# Patient Record
Sex: MALE | Race: WHITE | NOT HISPANIC OR LATINO | Employment: OTHER | URBAN - METROPOLITAN AREA
[De-identification: names, ages, dates, MRNs, and addresses within clinical notes are randomized per-mention and may not be internally consistent; named-entity substitution may affect disease eponyms.]

---

## 2017-01-09 ENCOUNTER — DOCUMENTATION ONLY (OUTPATIENT)
Dept: OTHER | Facility: CLINIC | Age: 77
End: 2017-01-09

## 2017-01-09 DIAGNOSIS — Z71.89 ACP (ADVANCE CARE PLANNING): Primary | Chronic | ICD-10-CM

## 2017-01-24 ENCOUNTER — TRANSFERRED RECORDS (OUTPATIENT)
Dept: CARDIOLOGY | Facility: CLINIC | Age: 77
End: 2017-01-24

## 2017-01-24 LAB
ALBUMIN SERPL-MCNC: 3.9 G/DL
ALP SERPL-CCNC: 58 U/L
ALT SERPL-CCNC: 23 U/L
ANION GAP SERPL CALCULATED.3IONS-SCNC: NORMAL MMOL/L
AST SERPL-CCNC: 27 U/L
BILIRUB SERPL-MCNC: 0.2 MG/DL
BUN SERPL-MCNC: 28 MG/DL
CALCIUM SERPL-MCNC: 8.2 MG/DL
CHLORIDE SERPLBLD-SCNC: 103 MMOL/L
CHOLEST SERPL-MCNC: 172 MG/DL
CO2 SERPL-SCNC: 27 MMOL/L
CREAT SERPL-MCNC: 1.1 MG/DL
ERYTHROCYTE [DISTWIDTH] IN BLOOD BY AUTOMATED COUNT: 13.1 %
GFR SERPL CREATININE-BSD FRML MDRD: >60 ML/MIN/1.73M2
GLUCOSE SERPL-MCNC: 72 MG/DL (ref 70–139)
HCT VFR BLD AUTO: 41.2 %
HDLC SERPL-MCNC: 73 MG/DL
HEMOGLOBIN: 13.9 G/DL (ref 13.5–17.5)
LDLC SERPL CALC-MCNC: 78 MG/DL
MCH RBC QN AUTO: NORMAL PG
MCHC RBC AUTO-ENTMCNC: NORMAL G/DL
MCV RBC AUTO: 99 FL
NONHDLC SERPL-MCNC: NORMAL MG/DL
PLATELET # BLD AUTO: 130 10^9/L
POTASSIUM SERPL-SCNC: 4.6 MMOL/L
PROT SERPL-MCNC: 6.6 G/DL
RBC # BLD AUTO: 4.16 10^12/L
SODIUM SERPL-SCNC: 141 MMOL/L
TRIGL SERPL-MCNC: 107 MG/DL
WBC # BLD AUTO: 4.8 10^9/L

## 2017-03-13 DIAGNOSIS — I25.10 CORONARY ARTERY DISEASE INVOLVING NATIVE CORONARY ARTERY WITHOUT ANGINA PECTORIS: ICD-10-CM

## 2017-03-13 RX ORDER — ISOSORBIDE MONONITRATE 60 MG/1
60 TABLET, EXTENDED RELEASE ORAL DAILY
Qty: 90 TABLET | Refills: 3 | Status: SHIPPED | OUTPATIENT
Start: 2017-03-13 | End: 2018-03-01

## 2017-06-02 ENCOUNTER — HOSPITAL ENCOUNTER (OUTPATIENT)
Dept: ULTRASOUND IMAGING | Facility: CLINIC | Age: 77
Discharge: HOME | End: 2017-06-02
Attending: SURGERY | Admitting: SURGERY
Payer: MEDICARE

## 2017-06-02 ENCOUNTER — OFFICE VISIT (OUTPATIENT)
Dept: OTHER | Facility: CLINIC | Age: 77
End: 2017-06-02
Attending: SURGERY
Payer: MEDICARE

## 2017-06-02 VITALS — DIASTOLIC BLOOD PRESSURE: 81 MMHG | SYSTOLIC BLOOD PRESSURE: 145 MMHG | HEART RATE: 46 BPM

## 2017-06-02 DIAGNOSIS — Z98.890 HISTORY OF RIGHT-SIDED CAROTID ENDARTERECTOMY: ICD-10-CM

## 2017-06-02 DIAGNOSIS — I65.21 CAROTID STENOSIS, RIGHT: ICD-10-CM

## 2017-06-02 DIAGNOSIS — I65.21 CAROTID STENOSIS, RIGHT: Primary | ICD-10-CM

## 2017-06-02 PROCEDURE — 99212 OFFICE O/P EST SF 10 MIN: CPT | Mod: ZP | Performed by: SURGERY

## 2017-06-02 PROCEDURE — 99211 OFF/OP EST MAY X REQ PHY/QHP: CPT | Mod: 25

## 2017-06-02 PROCEDURE — 93882 EXTRACRANIAL UNI/LTD STUDY: CPT | Mod: RT

## 2017-06-02 NOTE — NURSING NOTE
"Chief Complaint   Patient presents with     RECHECK     R carotid (11:00 [facility]; 11:30 K) History of RIGHT CAROTID ENDARTERECTOMY on 11/9/16; 6 month follow up to 12/27/16 appointment with Dr. Suresh       Initial /81 (BP Location: Right arm, Patient Position: Chair, Cuff Size: Adult Large)  Pulse (!) 46 Estimated body mass index is 31.9 kg/(m^2) as calculated from the following:    Height as of 11/8/16: 5' 10\" (1.778 m).    Weight as of 11/8/16: 222 lb 4.8 oz (100.8 kg).  Medication Reconciliation: complete     Face to face nursing time: 8 minutes    Jody Hummel MA     "

## 2017-06-02 NOTE — MR AVS SNAPSHOT
After Visit Summary   6/2/2017    Iván Nicholas    MRN: [id number]           Patient Information     Date Of Birth          1940        Visit Information        Provider Department      6/2/2017 11:30 AM Paco Suresh MD [facility]      Today's Diagnoses     Carotid stenosis, right    -  1       Follow-ups after your visit        Your next 10 appointments already scheduled     Aug 15, 2017 12:10 PM CDT   LAB with [facility]   [facility] ([facility])    [address] [place] [id number] [phone]           Patient must bring picture ID.  Patient should be prepared to give a urine specimen  Please do not eat 10-12 hours before your appointment if you are coming in fasting for labs on lipids, cholesterol, or glucose (sugar).  Pregnant women should follow their Care Team instructions. Water with medications is okay. Do not drink coffee or other fluids.   If you have concerns about taking  your medications, please ask at office or if scheduling via [employer], send a message by clicking on Secure Messaging, Message Your Care Team.            Aug 15, 2017  1:15 PM CDT   Return Visit with Akhil Mendoza MD   [facility] ([facility])    [address] [place] [id number] [phone]              Who to contact     If you have questions or need follow up information about today's clinic visit or your schedule please contact [facility] directly at [phone].  Normal or non-critical lab and imaging results will be communicated to you by MyChart, letter or phone within 4 business days after the clinic has received the results. If you do not hear from us within 7 days, please contact the clinic through MyChart or phone. If you have a critical or abnormal  "lab result, we will notify you by phone as soon as possible.  Submit refill requests through [employer] or call your pharmacy and they will forward the refill request to us. Please allow 3 business days for your refill to be completed.          Additional Information About Your Visit        MyChart Information     [employer] lets you send messages to your doctor, view your test results, renew your prescriptions, schedule appointments and more. To sign up, go to [email or web address] . Click on \"Log in\" on the left side of the screen, which will take you to the Welcome page. Then click on \"Sign up Now\" on the right side of the page.     You will be asked to enter the access code listed below, as well as some personal information. Please follow the directions to create your username and password.     Your access code is: [email or web address]  Expires: 9/3/2017  8:24 AM     Your access code will  in 90 days. If you need help or a new code, please call your [place] clinic or [phone].        Care EveryWhere ID     This is your Care EveryWhere ID. This could be used by other organizations to access your [facility] medical records  [id number]        Your Vitals Were     Pulse                   46            Blood Pressure from Last 3 Encounters:   17 145/81   16 134/62   16 118/60    Weight from Last 3 Encounters:   16 222 lb 4.8 oz (100.8 kg)   16 222 lb 11.2 oz (101 kg)   08/17/15 221 lb 11.2 oz (100.6 kg)              Today, you had the following     No orders found for display       Primary Care Provider Office Phone # Fax #    Stephan Nice [phone]       [facility] [address]  [facility] [address]        Thank you!     Thank you for choosing [facility]  for your care. Our goal is always to provide you with excellent care. Hearing back from our patients is one way we can continue to improve our services. Please take a few " minutes to complete the written survey that you may receive in the mail after your visit with us. Thank you!             Your Updated Medication List - Protect others around you: Learn how to safely use, store and throw away your medicines at www.disposemymeds.org.          This list is accurate as of: 6/2/17 11:59 PM.  Always use your most recent med list.                   Brand Name Dispense Instructions for use    ALEVE 220 MG tablet   Generic drug:  naproxen sodium      Take 220 mg by mouth 2 times daily (with meals)       ALLOPURINOL PO      Take 100 mg by mouth daily. To prevent gout attacks, recently started.       aspirin 81 MG EC tablet     30 tablet    Take 1 tablet (81 mg) by mouth daily       atenolol 25 MG tablet    TENORMIN    90 tablet    Take 1 tablet by mouth daily. Hold IF heart rate less than 55.       * CENTRUM SILVER per tablet      Take 1 tablet by mouth daily.       * PRESERVISION AREDS 2 Caps      Take by mouth 2 times daily       clopidogrel 75 MG tablet    PLAVIX    90 tablet    Take 1 tablet (75 mg) by mouth daily       CRESTOR 40 MG tablet   Generic drug:  rosuvastatin     30 tablet    Take 1 tablet by mouth daily.       ezetimibe 10 MG tablet    ZETIA    90 tablet    Take 1 tablet (10 mg) by mouth daily       indomethacin 50 MG capsule    INDOCIN     Take 50 mg by mouth as needed for moderate pain       isosorbide mononitrate 60 MG 24 hr tablet    IMDUR    90 tablet    Take 1 tablet (60 mg) by mouth daily       nitroglycerin 0.4 MG sublingual tablet    NITROSTAT    25 tablet    Place 1 tablet (0.4 mg) under the tongue every 5 minutes as needed for chest pain       oxyCODONE 5 MG IR tablet    ROXICODONE    20 tablet    Take 1-2 tablets (5-10 mg) by mouth every 4 hours as needed for moderate to severe pain       primidone 250 MG tablet    MYSOLINE     Take 250 mg by mouth 2 times daily       * Notice:  This list has 2 medication(s) that are the same as other medications prescribed for  you. Read the directions carefully, and ask your doctor or other care provider to review them with you.

## 2017-06-05 NOTE — PROGRESS NOTES
Mr. Nicholas is a 77-year-old gentleman who underwent a right carotid endarterectomy in November 2016.  This was performed for symptomatic disease.  He does not have any complaints.  His surgical site has healed nicely.  He underwent right carotid duplex sonography today.  There is no evidence of recurrent stenosis on the right side.  We will see him back in 1 year.  At that time carotid duplex sonography will be repeated.

## 2017-08-07 ENCOUNTER — PRE VISIT (OUTPATIENT)
Dept: CARDIOLOGY | Facility: CLINIC | Age: 77
End: 2017-08-07

## 2017-08-07 DIAGNOSIS — I65.21 CAROTID STENOSIS, RIGHT: ICD-10-CM

## 2017-08-07 DIAGNOSIS — I47.29 PAROXYSMAL VENTRICULAR TACHYCARDIA (H): ICD-10-CM

## 2017-08-09 NOTE — TELEPHONE ENCOUNTER
Received records from [facility] for Jan 2017, labs entered and records sent to scan

## 2017-08-15 ENCOUNTER — OFFICE VISIT (OUTPATIENT)
Dept: CARDIOLOGY | Facility: CLINIC | Age: 77
End: 2017-08-15
Attending: INTERNAL MEDICINE
Payer: COMMERCIAL

## 2017-08-15 VITALS
BODY MASS INDEX: 31.57 KG/M2 | HEART RATE: 48 BPM | HEIGHT: 70 IN | DIASTOLIC BLOOD PRESSURE: 80 MMHG | WEIGHT: 220.5 LBS | SYSTOLIC BLOOD PRESSURE: 136 MMHG

## 2017-08-15 DIAGNOSIS — E78.00 PURE HYPERCHOLESTEROLEMIA: ICD-10-CM

## 2017-08-15 DIAGNOSIS — I25.118 CORONARY ARTERY DISEASE OF NATIVE ARTERY OF NATIVE HEART WITH STABLE ANGINA PECTORIS (H): Chronic | ICD-10-CM

## 2017-08-15 DIAGNOSIS — I65.21 CAROTID STENOSIS, RIGHT: ICD-10-CM

## 2017-08-15 DIAGNOSIS — I25.10 CAD (CORONARY ARTERY DISEASE): ICD-10-CM

## 2017-08-15 DIAGNOSIS — I47.29 PAROXYSMAL VENTRICULAR TACHYCARDIA (H): Primary | ICD-10-CM

## 2017-08-15 DIAGNOSIS — I10 ESSENTIAL HYPERTENSION, BENIGN: ICD-10-CM

## 2017-08-15 DIAGNOSIS — R06.09 DOE (DYSPNEA ON EXERTION): ICD-10-CM

## 2017-08-15 LAB
ANION GAP SERPL CALCULATED.3IONS-SCNC: 9.2 MMOL/L (ref 6–17)
BUN SERPL-MCNC: 25 MG/DL (ref 7–30)
CALCIUM SERPL-MCNC: 8.9 MG/DL (ref 8.5–10.5)
CHLORIDE SERPL-SCNC: 102 MMOL/L (ref 98–107)
CHOLEST SERPL-MCNC: 171 MG/DL
CO2 SERPL-SCNC: 32 MMOL/L (ref 23–29)
CREAT SERPL-MCNC: 1.13 MG/DL (ref 0.7–1.3)
GFR SERPL CREATININE-BSD FRML MDRD: 63 ML/MIN/1.7M2
GLUCOSE SERPL-MCNC: 93 MG/DL (ref 70–105)
HDLC SERPL-MCNC: 66 MG/DL
LDLC SERPL CALC-MCNC: 86 MG/DL
NONHDLC SERPL-MCNC: 105 MG/DL
POTASSIUM SERPL-SCNC: 5.2 MMOL/L (ref 3.5–5.1)
SODIUM SERPL-SCNC: 138 MMOL/L (ref 136–145)
TRIGL SERPL-MCNC: 93 MG/DL

## 2017-08-15 PROCEDURE — 36415 COLL VENOUS BLD VENIPUNCTURE: CPT | Performed by: INTERNAL MEDICINE

## 2017-08-15 PROCEDURE — 80048 BASIC METABOLIC PNL TOTAL CA: CPT | Performed by: INTERNAL MEDICINE

## 2017-08-15 PROCEDURE — 80061 LIPID PANEL: CPT | Performed by: INTERNAL MEDICINE

## 2017-08-15 PROCEDURE — 99213 OFFICE O/P EST LOW 20 MIN: CPT | Performed by: INTERNAL MEDICINE

## 2017-08-15 RX ORDER — OMEGA-3 FATTY ACIDS/FISH OIL 300-1000MG
200 CAPSULE ORAL EVERY 4 HOURS PRN
Status: ON HOLD | COMMUNITY
End: 2018-11-14

## 2017-08-15 NOTE — MR AVS SNAPSHOT
After Visit Summary   8/15/2017    Iván Nicholas    MRN: [id number]           Patient Information     Date Of Birth          1940        Visit Information        Provider Department      8/15/2017 1:15 PM Akhil Mendoza MD [facility] AT [place]        Today's Diagnoses     Paroxysmal ventricular tachycardia (H)    -  1    Pure hypercholesterolemia        Essential hypertension, benign        SAMS (dyspnea on exertion)        Carotid stenosis, right        Coronary artery disease of native artery of native heart with stable angina pectoris (H)           Follow-ups after your visit        Additional Services     Follow-Up with Cardiac Advanced Practice Provider           Follow-Up with Cardiologist                 Future tests that were ordered for you today     Open Future Orders        Priority Expected Expires Ordered    Follow-Up with Cardiologist Routine 8/15/2019 9/4/2019 8/15/2017    Basic metabolic panel Routine 8/15/2018 8/16/2018 8/15/2017    Lipid Profile Routine 8/15/2018 8/16/2018 8/15/2017    Follow-Up with Cardiac Advanced Practice Provider Routine 8/15/2018 8/16/2018 8/15/2017            Who to contact     If you have questions or need follow up information about today's clinic visit or your schedule please contact [facility] directly at [phone].  Normal or non-critical lab and imaging results will be communicated to you by MyChart, letter or phone within 4 business days after the clinic has received the results. If you do not hear from us within 7 days, please contact the clinic through MyChart or phone. If you have a critical or abnormal lab result, we will notify you by phone as soon as possible.  Submit refill requests through [employer] or call your pharmacy and they will forward the refill request to us. Please allow 3 business days for your refill to be completed.          Additional  "Information About Your Visit        MyChart Information     [employer] lets you send messages to your doctor, view your test results, renew your prescriptions, schedule appointments and more. To sign up, go to [email or web address] . Click on \"Log in\" on the left side of the screen, which will take you to the Welcome page. Then click on \"Sign up Now\" on the right side of the page.     You will be asked to enter the access code listed below, as well as some personal information. Please follow the directions to create your username and password.     Your access code is: [email or web address]  Expires: 9/3/2017  8:24 AM     Your access code will  in 90 days. If you need help or a new code, please call your [place] clinic or [phone].        Care EveryWhere ID     This is your Care EveryWhere ID. This could be used by other organizations to access your [facility] medical records  [id number]        Your Vitals Were     Pulse Height BMI (Body Mass Index)             48 1.778 m (5' 10\") 31.64 kg/m2          Blood Pressure from Last 3 Encounters:   08/15/17 136/80   17 145/81   16 134/62    Weight from Last 3 Encounters:   08/15/17 100 kg (220 lb 8 oz)   16 100.8 kg (222 lb 4.8 oz)   16 101 kg (222 lb 11.2 oz)              We Performed the Following     Follow-Up with Cardiologist        Primary Care Provider Office Phone # Fax #    Stephan Harlan Nice [phone]       [facility] [address]  [facility] [address]        Equal Access to Services     ELIDA [place] : Hadaaron Caballero, elin aleman, mayte hathaway. So [facility] [phone].    ATENCIÓN: Si habla español, tiene a mckeon disposición servicios gratuitos de asistencia lingüística. Llame al [phone].    We comply with applicable federal civil rights laws and [place] laws. We do not discriminate on the basis of race, color, national origin, age, " disability sex, sexual orientation or gender identity.            Thank you!     Thank you for choosing [facility] [place]  for your care. Our goal is always to provide you with excellent care. Hearing back from our patients is one way we can continue to improve our services. Please take a few minutes to complete the written survey that you may receive in the mail after your visit with us. Thank you!             Your Updated Medication List - Protect others around you: Learn how to safely use, store and throw away your medicines at www.disposemymeds.org.          This list is accurate as of: 8/15/17  1:58 PM.  Always use your most recent med list.                   Brand Name Dispense Instructions for use Diagnosis    ADVIL 200 MG capsule   Generic drug:  ibuprofen      Take 200 mg by mouth every 4 hours as needed for fever        ALEVE 220 MG tablet   Generic drug:  naproxen sodium      Take 220 mg by mouth 2 times daily (with meals)        ALLOPURINOL PO      Take 100 mg by mouth daily. To prevent gout attacks, recently started.        aspirin 81 MG EC tablet     30 tablet    Take 1 tablet (81 mg) by mouth daily    Stenosis of right internal carotid artery       atenolol 25 MG tablet    TENORMIN    90 tablet    Take 1 tablet by mouth daily. Hold IF heart rate less than 55.    CAD (coronary artery disease)       * CENTRUM SILVER per tablet      Take 1 tablet by mouth daily.        * PRESERVISION AREDS 2 Caps      Take by mouth 2 times daily        clopidogrel 75 MG tablet    PLAVIX    90 tablet    Take 1 tablet (75 mg) by mouth daily    Stenosis of right internal carotid artery       CRESTOR 40 MG tablet   Generic drug:  rosuvastatin     30 tablet    Take 1 tablet by mouth daily.    CAD (coronary artery disease)       ezetimibe 10 MG tablet    ZETIA    90 tablet    Take 1 tablet (10 mg) by mouth daily    CAD (coronary artery disease)       indomethacin 50 MG capsule    INDOCIN      Take 50 mg by mouth as needed for moderate pain        isosorbide mononitrate 60 MG 24 hr tablet    IMDUR    90 tablet    Take 1 tablet (60 mg) by mouth daily    Coronary artery disease involving native coronary artery without angina pectoris       nitroGLYcerin 0.4 MG sublingual tablet    NITROSTAT    25 tablet    Place 1 tablet (0.4 mg) under the tongue every 5 minutes as needed for chest pain    CAD (coronary artery disease)       primidone 250 MG tablet    MYSOLINE     Take 250 mg by mouth 2 times daily        * Notice:  This list has 2 medication(s) that are the same as other medications prescribed for you. Read the directions carefully, and ask your doctor or other care provider to review them with you.

## 2017-08-15 NOTE — PROGRESS NOTES
HPI and Plan:   See dictation    Orders Placed This Encounter   Procedures     Basic metabolic panel     Lipid Profile     Follow-Up with Cardiac Advanced Practice Provider     Follow-Up with Cardiologist       Orders Placed This Encounter   Medications     ibuprofen (ADVIL) 200 MG capsule     Sig: Take 200 mg by mouth every 4 hours as needed for fever       Medications Discontinued During This Encounter   Medication Reason     oxyCODONE (ROXICODONE) 5 MG immediate release tablet Therapy completed         Encounter Diagnoses   Name Primary?     Paroxysmal ventricular tachycardia (H) Yes     Pure hypercholesterolemia      Essential hypertension, benign      SAMS (dyspnea on exertion)      Carotid stenosis, right      Coronary artery disease of native artery of native heart with stable angina pectoris (H)        CURRENT MEDICATIONS:  Current Outpatient Prescriptions   Medication Sig Dispense Refill     ibuprofen (ADVIL) 200 MG capsule Take 200 mg by mouth every 4 hours as needed for fever       isosorbide mononitrate (IMDUR) 60 MG 24 hr tablet Take 1 tablet (60 mg) by mouth daily 90 tablet 3     aspirin EC 81 MG EC tablet Take 1 tablet (81 mg) by mouth daily 30 tablet 0     naproxen sodium (ALEVE) 220 MG tablet Take 220 mg by mouth 2 times daily (with meals)       Multiple Vitamins-Minerals (PRESERVISION AREDS 2) CAPS Take by mouth 2 times daily       ezetimibe (ZETIA) 10 MG tablet Take 1 tablet (10 mg) by mouth daily 90 tablet 3     indomethacin (INDOCIN) 50 MG capsule Take 50 mg by mouth as needed for moderate pain       primidone (MYSOLINE) 250 MG tablet Take 250 mg by mouth 2 times daily       nitroglycerin (NITROSTAT) 0.4 MG SL tablet Place 1 tablet (0.4 mg) under the tongue every 5 minutes as needed for chest pain 25 tablet 11     atenolol (TENORMIN) 25 MG tablet Take 1 tablet by mouth daily. Hold IF heart rate less than 55. 90 tablet 3     rosuvastatin (CRESTOR) 40 MG tablet Take 1 tablet by mouth daily. 30  tablet 1     ALLOPURINOL PO Take 100 mg by mouth daily. To prevent gout attacks, recently started.        Multiple Vitamins-Minerals (CENTRUM SILVER) per tablet Take 1 tablet by mouth daily.       clopidogrel (PLAVIX) 75 MG tablet Take 1 tablet (75 mg) by mouth daily (Patient not taking: Reported on 6/2/2017) 90 tablet 3       ALLERGIES   No Known Allergies    PAST MEDICAL HISTORY:  Past Medical History:   Diagnosis Date     Arthritis      Carotid artery stenosis     Right, s/p right CEA 11/9/2016     Cholesterol serum elevated      Colon polyps      Color blind      Coronary artery disease     2/2012 - MERARI to mid LAD     Decreased hearing      Depression      Gout      Hypertension      Hypertrophy of prostate without urinary obstruction and other lower urinary tract symptoms (LUTS)      Impotence      Left bundle branch block      Onychomycosis of toenail      Other and unspecified hyperlipidemia      Paroxysmal ventricular tachycardia (H)     with stress test 2012     Stented coronary artery        PAST SURGICAL HISTORY:  Past Surgical History:   Procedure Laterality Date     ANGIOPLASTY  1991     COLONOSCOPY  12/13/2011    Procedure:COLONOSCOPY; COLONOSCOPY; Surgeon:EMMANUELLE MOSER; Location: GI     CORONARY ANGIOGRAPHY ADULT ORDER  1991,2012 1991 - stent to proximal LAD, 2012 - Stent to mid LAD     ENDARTERECTOMY CAROTID Right 11/10/2016    Procedure: ENDARTERECTOMY CAROTID;  Surgeon: Paco Suresh MD;  Location:  OR     HEART CATH, ANGIOPLASTY       ORTHOPEDIC SURGERY       PHACOEMULSIFICATION CLEAR CORNEA WITH STANDARD INTRAOCULAR LENS IMPLANT  12/19/2013    Procedure: PHACOEMULSIFICATION CLEAR CORNEA WITH STANDARD INTRAOCULAR LENS IMPLANT;  RIGHT PHACOEMULSIFICATION CLEAR CORNEA WITH STANDARD INTRAOCULAR LENS IMPLANT ;  Surgeon: Luisito Juan MD;  Location: [facility]       FAMILY HISTORY:  Family History   Problem Relation Age of Onset     HEART DISEASE Mother 83     HEART DISEASE Father  "69     emphysema       SOCIAL HISTORY:  Social History     Social History     Marital status:      Spouse name: N/A     Number of children: N/A     Years of education: N/A     Social History Main Topics     Smoking status: Former Smoker     Packs/day: 0.50     Years: 20.00     Types: Cigarettes     Quit date: 12/13/1990     Smokeless tobacco: Never Used     Alcohol use 0.0 oz/week     0 Standard drinks or equivalent per week      Comment: daily - drinks x2     Drug use: No     Sexual activity: No     Other Topics Concern     Caffeine Concern No     1-2 cups daily     Sleep Concern No     Stress Concern No     Special Diet No     trying to watch sodium intake     Exercise No     some walking     Social History Narrative       Review of Systems:  Skin:  Negative       Eyes:  Positive for glasses    ENT:  Positive for nasal congestion while sleeping  Respiratory:  Positive for dyspnea on exertion     Cardiovascular:    edema;Positive for    Gastroenterology: Negative      Genitourinary:  not assessed      Musculoskeletal:  Positive for arthritis    Neurologic:  Positive for numbness or tingling of feet cold at night needs to wear socks now while sleeping  Psychiatric:  Negative      Heme/Lymph/Imm:  Negative      Endocrine:  Negative        Physical Exam:  Vitals: /80  Pulse (!) 48  Ht 1.778 m (5' 10\")  Wt 100 kg (220 lb 8 oz)  BMI 31.64 kg/m2    Constitutional:  cooperative, alert and oriented, well developed, well nourished, in no acute distress        Skin:  warm and dry to the touch, no apparent skin lesions or masses noted        Head:  normocephalic, no masses or lesions        Eyes:  pupils equal and round;conjunctivae and lids unremarkable;sclera white;no xanthalasma;no nystagmus        ENT:  no pallor or cyanosis, dentition good        Neck:  carotid pulses are full and equal bilaterally;no carotid bruit   prominent carotid pulsations at base of neck bilaterally. Well-healed right carotid " endarterectomy scar    Chest:  normal breath sounds, clear to auscultation, normal A-P diameter, normal symmetry, normal respiratory excursion, no use of accessory muscles          Cardiac: regular rhythm;normal S1 and S2       systolic murmur;grade 1;RUSB          Abdomen:  BS normoactive        Vascular: pulses full and equal                                        Extremities and Back:  no spinal abnormalities noted;normal muscle strength and tone   bilateral LE edema;pitting;1+          Neurological:  affect appropriate, oriented to time, person and place;no gross motor deficits              CC  Akhil Mendoza MD  [address]  [place] [address]

## 2017-08-15 NOTE — LETTER
8/15/2017    Stephan Nice MD  [facility]   [address]  [place] [address]    RE: Iván Lopeztres       Dear Colleague,    I had the pleasure of seeing Iván Nicholas in the [facility].    Mr. Nicholas is a very nice 77-year-old gentleman with past medical history significant for angioplasty of his left anterior descending artery in 1991, stenting of his mid left anterior descending artery in 02/2012 due to crescendo angina and abnormal stress test.  Wen was jailed but did not appear to be significantly compromised.  He has had chronic stable exertional angina at a high workload ever since.  We did take him back to the Cath Lab in 2012.  FFR of the jailed diagonal was 0.8, and we decided to treat him medically.  He had no other significant blockages.  Last evaluation of his coronary anatomy was a stress nuclear scan in 09/2015 demonstrating a small reversible basal lateral defect consistent with a small area of ischemia.  We decided to continue medical management.      Iván returns to clinic stating he is doing quite well.  Again, he continues to only have shortness of breath at a very high workload when walking up a hill.  There may be some low-grade chest discomfort associated with this.  It goes away quickly with rest.  He has had no spontaneous episodes and no prolonged episodes.      He has no orthopnea or PND.  His ankle edema is at baseline.  He has no orthopnea or PND, no palpitations, lightheadedness, dizziness, syncope or near-syncope.      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  Unfortunately, he does not do anything in the off season.     Outpatient Encounter Prescriptions as of 8/15/2017   Medication Sig Dispense Refill     ibuprofen (ADVIL) 200 MG capsule Take 200 mg by mouth every 4 hours as needed for fever        isosorbide mononitrate (IMDUR) 60 MG 24 hr tablet Take 1 tablet (60 mg) by mouth daily 90 tablet 3     aspirin EC 81 MG EC tablet Take 1 tablet (81 mg) by mouth daily 30 tablet 0     naproxen sodium (ALEVE) 220 MG tablet Take 220 mg by mouth 2 times daily (with meals)       Multiple Vitamins-Minerals (PRESERVISION AREDS 2) CAPS Take by mouth 2 times daily       ezetimibe (ZETIA) 10 MG tablet Take 1 tablet (10 mg) by mouth daily 90 tablet 3     indomethacin (INDOCIN) 50 MG capsule Take 50 mg by mouth as needed for moderate pain       primidone (MYSOLINE) 250 MG tablet Take 250 mg by mouth 2 times daily       nitroglycerin (NITROSTAT) 0.4 MG SL tablet Place 1 tablet (0.4 mg) under the tongue every 5 minutes as needed for chest pain 25 tablet 11     atenolol (TENORMIN) 25 MG tablet Take 1 tablet by mouth daily. Hold IF heart rate less than 55. 90 tablet 3     rosuvastatin (CRESTOR) 40 MG tablet Take 1 tablet by mouth daily. 30 tablet 1     ALLOPURINOL PO Take 100 mg by mouth daily. To prevent gout attacks, recently started.        Multiple Vitamins-Minerals (CENTRUM SILVER) per tablet Take 1 tablet by mouth daily.       clopidogrel (PLAVIX) 75 MG tablet Take 1 tablet (75 mg) by mouth daily (Patient not taking: Reported on 6/2/2017) 90 tablet 3     [DISCONTINUED] oxyCODONE (ROXICODONE) 5 MG immediate release tablet Take 1-2 tablets (5-10 mg) by mouth every 4 hours as needed for moderate to severe pain 20 tablet 0     No facility-administered encounter medications on file as of 8/15/2017.       ASSESSMENT AND PLAN:  Iván appears to be doing well from a cardiac standpoint, appears to have chronic stable exertional angina.  Stress nuclear scan of 2015 appears to show that it is a small area, and we have decided to continue with medical management.  He states he has not changed over the years.      Blood pressure is well controlled at 136/80 with a pulse of 48.  He is asymptomatic with this and will continue his medical  regimen as is.  I have warned him that there is on atenolol shortage and he may not be able to get his next dose, at which time we will consider switching him to metoprolol.      Fasting lipid profile is okay.  Total cholesterol is 171, HDL is 66, LDL is 86, triglycerides are 93, and this is on rosuvastatin 40 plus Zetia 10.  We talked about the fact that the price of Zetia should come down in the next 6 months or so as it is going generic this fall, but he is on pretty much maximal therapy at this time.  I do not think I am going to move to a PCSK9 inhibitor.  I have recommended that he just try to exercise regularly and try to lose weight.      Weight is 220 pounds, which gives him a body mass index of 31.7.      Basic metabolic profile shows potassium of 5.2.  Creatinine is 1.13 with a BUN of 27.  Again, we will continue his medical regimen as is.  We did talk about the fact that taking Naprosyn or his Aleve or indomethacin, which he almost never does but he does take Aleve regularly, can contribute to his peripheral edema.  It does not appear to be causing any kidney problems.      We will have him follow up in 1 year.  If he should have any problems, I would be glad to see him sooner.      Thank you for allowing me to participate in his care.     Sincerely,    Akhil Mendoza MD     [facility]

## 2017-08-16 NOTE — PROGRESS NOTES
HISTORY OF PRESENT ILLNESS:  Mr. Nicholas is a very nice 77-year-old gentleman with past medical history significant for angioplasty of his left anterior descending artery in 1991, stenting of his mid left anterior descending artery in 02/2012 due to crescendo angina and abnormal stress test.  Wen was jailed but did not appear to be significantly compromised.  He has had chronic stable exertional angina at a high workload ever since.  We did take him back to the Cath Lab in 2012.  FFR of the jailed diagonal was 0.8, and we decided to treat him medically.  He had no other significant blockages.  Last evaluation of his coronary anatomy was a stress nuclear scan in 09/2015 demonstrating a small reversible basal lateral defect consistent with a small area of ischemia.  We decided to continue medical management.      Iván returns to clinic stating he is doing quite well.  Again, he continues to only have shortness of breath at a very high workload when walking up a hill.  There may be some low-grade chest discomfort associated with this.  It goes away quickly with rest.  He has had no spontaneous episodes and no prolonged episodes.      He has no orthopnea or PND.  His ankle edema is at baseline.  He has no orthopnea or PND, no palpitations, lightheadedness, dizziness, syncope or near-syncope.      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  Unfortunately, he does not do anything in the off season.      ASSESSMENT AND PLAN:  Iván appears to be doing well from a cardiac standpoint, appears to have chronic stable exertional angina.  Stress nuclear scan of 2015 appears to show that it is a small area, and we have decided to continue with medical management.  He states he has not changed over the years.      Blood pressure is well controlled at 136/80 with a pulse of 48.  He is asymptomatic with this and will  continue his medical regimen as is.  I have warned him that there is on atenolol shortage and he may not be able to get his next dose, at which time we will consider switching him to metoprolol.      Fasting lipid profile is okay.  Total cholesterol is 171, HDL is 66, LDL is 86, triglycerides are 93, and this is on rosuvastatin 40 plus Zetia 10.  We talked about the fact that the price of Zetia should come down in the next 6 months or so as it is going generic this fall, but he is on pretty much maximal therapy at this time.  I do not think I am going to move to a PCSK9 inhibitor.  I have recommended that he just try to exercise regularly and try to lose weight.      Weight is 220 pounds, which gives him a body mass index of 31.7.      Basic metabolic profile shows potassium of 5.2.  Creatinine is 1.13 with a BUN of 27.  Again, we will continue his medical regimen as is.  We did talk about the fact that taking Naprosyn or his Aleve or indomethacin, which he almost never does but he does take Aleve regularly, can contribute to his peripheral edema.  It does not appear to be causing any kidney problems.      We will have him follow up in 1 year.  If he should have any problems, I would be glad to see him sooner.      Thank you for allowing me to participate in his care.         MIRELLA GOODE MD, [facility]             D: 08/15/2017 13:58   T: 08/15/2017 19:03   MT: CAROLYN      Name:     GREG VALDERRAMA   MRN:      [id number]        Account:      [id number]   :      1940           Service Date: 08/15/2017      Document: [id number]

## 2017-11-09 DIAGNOSIS — I65.21 STENOSIS OF RIGHT INTERNAL CAROTID ARTERY: Primary | ICD-10-CM

## 2018-03-01 DIAGNOSIS — I25.10 CORONARY ARTERY DISEASE INVOLVING NATIVE CORONARY ARTERY WITHOUT ANGINA PECTORIS: ICD-10-CM

## 2018-03-01 RX ORDER — ISOSORBIDE MONONITRATE 60 MG/1
60 TABLET, EXTENDED RELEASE ORAL DAILY
Qty: 90 TABLET | Refills: 1 | Status: SHIPPED | OUTPATIENT
Start: 2018-03-01 | End: 2019-04-16

## 2018-08-03 ENCOUNTER — HOSPITAL ENCOUNTER (OUTPATIENT)
Dept: ULTRASOUND IMAGING | Facility: CLINIC | Age: 78
Discharge: HOME | End: 2018-08-03
Attending: SURGERY | Admitting: SURGERY
Payer: MEDICARE

## 2018-08-03 ENCOUNTER — OFFICE VISIT (OUTPATIENT)
Dept: OTHER | Facility: CLINIC | Age: 78
End: 2018-08-03
Attending: SURGERY
Payer: MEDICARE

## 2018-08-03 VITALS — DIASTOLIC BLOOD PRESSURE: 81 MMHG | HEART RATE: 65 BPM | SYSTOLIC BLOOD PRESSURE: 194 MMHG

## 2018-08-03 DIAGNOSIS — I65.21 CAROTID ARTERY STENOSIS, SYMPTOMATIC, RIGHT: Primary | ICD-10-CM

## 2018-08-03 DIAGNOSIS — I65.21 STENOSIS OF RIGHT INTERNAL CAROTID ARTERY: ICD-10-CM

## 2018-08-03 PROCEDURE — 99213 OFFICE O/P EST LOW 20 MIN: CPT | Mod: ZP | Performed by: SURGERY

## 2018-08-03 PROCEDURE — 93880 EXTRACRANIAL BILAT STUDY: CPT

## 2018-08-03 PROCEDURE — G0463 HOSPITAL OUTPT CLINIC VISIT: HCPCS | Mod: 25

## 2018-08-03 NOTE — PROGRESS NOTES
Mr. ALBRECHT underwent right CEA in Nov 2016 for a symptomatic lesion.  He has no complaints.  His right neck incision has healed completely.    I personally reviewed the ultrasonography.  There is no evidence of recurrence on the right or progression of the stenosis on the left.    I discussed the findings with him in detail.  We will see him back in 1 year with repeat duplex sonography of both carotid arteries.

## 2018-08-03 NOTE — NURSING NOTE
"Iván Nicholas is a 78 year old male who presents for:  Chief Complaint   Patient presents with     RECHECK     1 year carotid follow up         Vitals:    Vitals:    08/03/18 1124   BP: 189/78   BP Location: Right arm   Patient Position: Chair   Cuff Size: Adult Large   Pulse: 65       BMI:  Estimated body mass index is 31.64 kg/(m^2) as calculated from the following:    Height as of 8/15/17: 5' 10\" (1.778 m).    Weight as of 8/15/17: 220 lb 8 oz (100 kg).    Pain Score:  Data Unavailable        Nichole Walter      "

## 2018-08-03 NOTE — LETTER
[facility] [address]  [place] [address]  Phone: [phone]  Fax: [phone]      August 3, 2018    Re: Iván MELGAR 1940    Mr. ALBRECHT underwent right CEA in 2016 for a symptomatic lesion.  He has no complaints.  His right neck incision has healed completely.     I personally reviewed the ultrasonography.  There is no evidence of recurrence on the right or progression of the stenosis on the left.     I discussed the findings with him in detail.  We will see him back in 1 year with repeat duplex sonography of both carotid arteries.    ANA MÁRQUEZ MD

## 2018-08-13 ENCOUNTER — DOCUMENTATION ONLY (OUTPATIENT)
Dept: CARDIOLOGY | Facility: CLINIC | Age: 78
End: 2018-08-13

## 2018-08-13 ENCOUNTER — OFFICE VISIT (OUTPATIENT)
Dept: CARDIOLOGY | Facility: CLINIC | Age: 78
End: 2018-08-13
Attending: INTERNAL MEDICINE
Payer: COMMERCIAL

## 2018-08-13 VITALS
BODY MASS INDEX: 32.25 KG/M2 | HEIGHT: 70 IN | WEIGHT: 225.3 LBS | DIASTOLIC BLOOD PRESSURE: 69 MMHG | SYSTOLIC BLOOD PRESSURE: 134 MMHG | HEART RATE: 52 BPM

## 2018-08-13 DIAGNOSIS — I25.118 CORONARY ARTERY DISEASE OF NATIVE ARTERY OF NATIVE HEART WITH STABLE ANGINA PECTORIS (H): Chronic | ICD-10-CM

## 2018-08-13 LAB
ANION GAP SERPL CALCULATED.3IONS-SCNC: 10.6 MMOL/L (ref 6–17)
BUN SERPL-MCNC: 23 MG/DL (ref 7–30)
CALCIUM SERPL-MCNC: 8.8 MG/DL (ref 8.5–10.5)
CHLORIDE SERPL-SCNC: 105 MMOL/L (ref 98–107)
CHOLEST SERPL-MCNC: 160 MG/DL
CO2 SERPL-SCNC: 30 MMOL/L (ref 23–29)
CREAT SERPL-MCNC: 1 MG/DL (ref 0.7–1.3)
GFR SERPL CREATININE-BSD FRML MDRD: 72 ML/MIN/1.7M2
GLUCOSE SERPL-MCNC: 101 MG/DL (ref 70–105)
HDLC SERPL-MCNC: 61 MG/DL
LDLC SERPL CALC-MCNC: 82 MG/DL
NONHDLC SERPL-MCNC: 99 MG/DL
POTASSIUM SERPL-SCNC: 4.6 MMOL/L (ref 3.5–5.1)
SODIUM SERPL-SCNC: 141 MMOL/L (ref 136–145)
TRIGL SERPL-MCNC: 83 MG/DL

## 2018-08-13 PROCEDURE — 80048 BASIC METABOLIC PNL TOTAL CA: CPT | Performed by: INTERNAL MEDICINE

## 2018-08-13 PROCEDURE — 99214 OFFICE O/P EST MOD 30 MIN: CPT | Performed by: NURSE PRACTITIONER

## 2018-08-13 PROCEDURE — 36415 COLL VENOUS BLD VENIPUNCTURE: CPT | Performed by: INTERNAL MEDICINE

## 2018-08-13 PROCEDURE — 80061 LIPID PANEL: CPT | Performed by: INTERNAL MEDICINE

## 2018-08-13 NOTE — PATIENT INSTRUCTIONS
Results for orders placed or performed in visit on 08/13/18 (from the past 24 hour(s))   Basic metabolic panel   Result Value Ref Range    Sodium 141 136 - 145 mmol/L    Potassium 4.6 3.5 - 5.1 mmol/L    Chloride 105 98 - 107 mmol/L    Carbon Dioxide 30 (H) 23 - 29 mmol/L    Anion Gap 10.6 6 - 17 mmol/L    Glucose 101 70 - 105 mg/dL    Urea Nitrogen 23 7 - 30 mg/dL    Creatinine 1.00 0.70 - 1.30 mg/dL    GFR Estimate 72 >60 mL/min/1.7m2    GFR Estimate If Black 87 >60 mL/min/1.7m2    Calcium 8.8 8.5 - 10.5 mg/dL   Lipid Profile   Result Value Ref Range    Cholesterol 160 <200 mg/dL    Triglycerides 83 <150 mg/dL    HDL Cholesterol 61 >39 mg/dL    LDL Cholesterol Calculated 82 <100 mg/dL    Non HDL Cholesterol 99 <130 mg/dL       We will call you if Dr. Mendoza wants any testing down

## 2018-08-13 NOTE — PROGRESS NOTES
Service Date: 08/13/2018      HISTORY OF PRESENT ILLNESS:  Mr. Nicholas is a 78-year-old gentleman who is here today for an annual cardiovascular exam.  His past medical history includes angioplasty of his left anterior descending artery in 1991, stenting of his mid-LAD descending artery in 02/2012.  His distal LAD was jailed but did not appear to be significantly compromised.  He has had chronic stable exertional angina at high workload since.  He was taken back to the Cath Lab in 2012, and the FFR of the jailed diagonal was 0.8.  He has been treated medically since.  He is on Imdur 60 mg per day.  His last stress test was done in 2015.  It was a stress nuclear scan.  It demonstrated a small reversible basal lateral defect consistent with a small area of ischemia.  Continuation of medical management was recommended.  Today he reports he took nitroglycerin back in December of 2017 when he had to walk up a flight of stairs.  Previously, if he just stopped his activity his chest tightness would resolve, but this time he took 1 nitro and then his chest pain resolved.  He is scheduled for knee replacement in November.  His activities have been fairly limited because of left knee pain.  He does not have too much opportunity for high workloads.  He denies any chest pain at rest or with minimal exertions.  Today he denies orthopnea, PND.  His right ankle has 1+ edema at baseline.  He denies palpitations, lightheadedness, dizziness, syncope or near-syncope.        Labwork done today:  Cholesterol 160, HDL 61, LDL 82, triglycerides 83.  Sodium 141, potassium 4.6, BUN 23, creatinine 1, GFR 72.      The patient underwent a right CEA in 11/2016.  He has had no symptoms of TIAs.  Recent ultrasound of his carotids demonstrated good blood flow.      PHYSICAL EXAMINATION:    VITAL SIGNS:   Blood pressure 134/69, pulse 52, weight today is 225 pounds.   CARDIAC:  Heart tones are regular with an S1, S2 without an S3, S4.   LUNGS:   Clear without crackles or wheezes.   NECK:  Veins are flat.  Negative for carotid bruits.   ABDOMEN:  Large but soft, negative for abdominal bruits.   EXTREMITIES:  Lower extremities with bilateral trace edema.      ASSESSMENT AND PLAN:   1.  This is a 78-year-old gentleman with a history of coronary artery disease as well as chronic stable exertional angina.  He has needed to use nitroglycerin once in the last year.  He is also facing left knee replacement surgery in November.  I am wondering if he should undergo a nuclear stress scan.  I will have a discussion with Dr. Mendoza and order one if appropriate.   2.  Hypertension.  Blood pressure looks well-managed.   3.  Hyperlipidemia.  His lipid profile today with Zetia and Crestor is at goal.      He will see Dr. Mendoza in followup next year.  If we decide to go ahead and do a nuclear stress test, we will schedule him appropriately.      Alley Hdez MS, ANP         [facility]IC RAMIRES, CNP             D: 2018   T: 2018   MT: CAROLYN      Name:     GREG VALDERRAMA   MRN:      [id number]        Account:      [id number]   :      1940           Service Date: 2018      Document: [id number]

## 2018-08-13 NOTE — MR AVS SNAPSHOT
After Visit Summary   8/13/2018    Iván Nicholas    MRN: [id number]           Patient Information     Date Of Birth          1940        Visit Information        Provider Department      8/13/2018 11:00 AM Alley Hdez, [facility]   [place]        Today's Diagnoses     Coronary artery disease of native artery of native heart with stable angina pectoris (H)          Care Instructions    Results for orders placed or performed in visit on 08/13/18 (from the past 24 hour(s))   Basic metabolic panel   Result Value Ref Range    Sodium 141 136 - 145 mmol/L    Potassium 4.6 3.5 - 5.1 mmol/L    Chloride 105 98 - 107 mmol/L    Carbon Dioxide 30 (H) 23 - 29 mmol/L    Anion Gap 10.6 6 - 17 mmol/L    Glucose 101 70 - 105 mg/dL    Urea Nitrogen 23 7 - 30 mg/dL    Creatinine 1.00 0.70 - 1.30 mg/dL    GFR Estimate 72 >60 mL/min/1.7m2    GFR Estimate If Black 87 >60 mL/min/1.7m2    Calcium 8.8 8.5 - 10.5 mg/dL   Lipid Profile   Result Value Ref Range    Cholesterol 160 <200 mg/dL    Triglycerides 83 <150 mg/dL    HDL Cholesterol 61 >39 mg/dL    LDL Cholesterol Calculated 82 <100 mg/dL    Non HDL Cholesterol 99 <130 mg/dL       We will call you if Dr. Mendoza wants any testing down          Follow-ups after your visit        Additional Services     Follow-Up with Cardiologist                 Your next 10 appointments already scheduled     Nov 12, 2018   Procedure with Matt Schaefer MD   [facility] (--)    [address] [facility] [address]   [phone]              Future tests that were ordered for you today     Open Future Orders        Priority Expected Expires Ordered    Follow-Up with Cardiologist Routine 8/13/2019 8/14/2019 8/13/2018    Lipid Profile Routine 8/13/2019 8/14/2019 8/13/2018    Basic metabolic panel Routine 8/13/2019 8/14/2019 8/13/2018            Who to contact     If you have questions or need follow  "up information about today's clinic visit or your schedule please contact [facility] directly at [phone].  Normal or non-critical lab and imaging results will be communicated to you by MyChart, letter or phone within 4 business days after the clinic has received the results. If you do not hear from us within 7 days, please contact the clinic through MyChart or phone. If you have a critical or abnormal lab result, we will notify you by phone as soon as possible.  Submit refill requests through [facility] or call your pharmacy and they will forward the refill request to us. Please allow 3 business days for your refill to be completed.          Additional Information About Your Visit        Care EveryWhere ID     This is your Care EveryWhere ID. This could be used by other organizations to access your [facility] medical records  [id number]        Your Vitals Were     Pulse Height BMI (Body Mass Index)             52 1.778 m (5' 10\") 32.33 kg/m2          Blood Pressure from Last 3 Encounters:   08/13/18 134/69   08/03/18 194/81   08/15/17 136/80    Weight from Last 3 Encounters:   08/13/18 102.2 kg (225 lb 4.8 oz)   08/15/17 100 kg (220 lb 8 oz)   11/08/16 100.8 kg (222 lb 4.8 oz)              We Performed the Following     Follow-Up with Cardiac Advanced Practice Provider        Primary Care Provider Office Phone # Fax #    Stephan Nice [phone]       [facility] [address]  [facility] [place] [facility] [address]        Equal Access to Services     SILVANA ISRAEL AH: Hadii aad ku hadasho Soomaali, waaxda luqadaha, qaybta kaalmada adeegyada, mayte dick. So [facility] [phone].    ATENCIÓN: Si habla español, tiene a mckeon disposición servicios gratuitos de asistencia lingüística. Llame al [phone].    We comply with applicable federal civil rights laws and [place] laws. We do not discriminate on the basis of race, color, " national origin, age, disability, sex, sexual orientation, or gender identity.            Thank you!     Thank you for choosing [facility]  for your care. Our goal is always to provide you with excellent care. Hearing back from our patients is one way we can continue to improve our services. Please take a few minutes to complete the written survey that you may receive in the mail after your visit with us. Thank you!             Your Updated Medication List - Protect others around you: Learn how to safely use, store and throw away your medicines at www.disposemymeds.org.          This list is accurate as of 8/13/18 11:36 AM.  Always use your most recent med list.                   Brand Name Dispense Instructions for use Diagnosis    ADVIL 200 MG capsule   Generic drug:  ibuprofen      Take 200 mg by mouth every 4 hours as needed for fever        ALEVE 220 MG tablet   Generic drug:  naproxen sodium      Take 220 mg by mouth 2 times daily (with meals)        ALLOPURINOL PO      Take 100 mg by mouth daily. To prevent gout attacks, recently started.        aspirin 81 MG EC tablet     30 tablet    Take 1 tablet (81 mg) by mouth daily    Stenosis of right internal carotid artery       atenolol 25 MG tablet    TENORMIN    90 tablet    Take 1 tablet by mouth daily. Hold IF heart rate less than 55.    CAD (coronary artery disease)       * CENTRUM SILVER per tablet      Take 1 tablet by mouth daily.        * PRESERVISION AREDS 2 Caps      Take by mouth 2 times daily        clopidogrel 75 MG tablet    PLAVIX    90 tablet    Take 1 tablet (75 mg) by mouth daily    Stenosis of right internal carotid artery       CRESTOR 40 MG tablet   Generic drug:  rosuvastatin     30 tablet    Take 1 tablet by mouth daily.    CAD (coronary artery disease)       ezetimibe 10 MG tablet    ZETIA    90 tablet    Take 1 tablet (10 mg) by mouth daily    CAD (coronary artery disease)       indomethacin 50 MG  capsule    INDOCIN     Take 50 mg by mouth as needed for moderate pain        isosorbide mononitrate 60 MG 24 hr tablet    IMDUR    90 tablet    Take 1 tablet (60 mg) by mouth daily    Coronary artery disease involving native coronary artery without angina pectoris       nitroGLYcerin 0.4 MG sublingual tablet    NITROSTAT    25 tablet    Place 1 tablet (0.4 mg) under the tongue every 5 minutes as needed for chest pain    CAD (coronary artery disease)       primidone 250 MG tablet    MYSOLINE     Take 250 mg by mouth 2 times daily        * Notice:  This list has 2 medication(s) that are the same as other medications prescribed for you. Read the directions carefully, and ask your doctor or other care provider to review them with you.

## 2018-08-13 NOTE — LETTER
8/13/2018    Stephan Nice MD  [facility] [address]  [place] [address]    RE: Iván Nicholas       Dear Colleague,    I had the pleasure of seeing Iván Nicholas in the [facility].    HPI and Plan:   See dictation    Orders Placed This Encounter   Procedures     Lipid Profile     Basic metabolic panel     Follow-Up with Cardiologist     No orders of the defined types were placed in this encounter.    There are no discontinued medications.      Encounter Diagnosis   Name Primary?     Coronary artery disease of native artery of native heart with stable angina pectoris (H)        CURRENT MEDICATIONS:  Current Outpatient Prescriptions   Medication Sig Dispense Refill     ALLOPURINOL PO Take 100 mg by mouth daily. To prevent gout attacks, recently started.        aspirin EC 81 MG EC tablet Take 1 tablet (81 mg) by mouth daily 30 tablet 0     atenolol (TENORMIN) 25 MG tablet Take 1 tablet by mouth daily. Hold IF heart rate less than 55. 90 tablet 3     ezetimibe (ZETIA) 10 MG tablet Take 1 tablet (10 mg) by mouth daily 90 tablet 3     ibuprofen (ADVIL) 200 MG capsule Take 200 mg by mouth every 4 hours as needed for fever       isosorbide mononitrate (IMDUR) 60 MG 24 hr tablet Take 1 tablet (60 mg) by mouth daily 90 tablet 1     Multiple Vitamins-Minerals (CENTRUM SILVER) per tablet Take 1 tablet by mouth daily.       Multiple Vitamins-Minerals (PRESERVISION AREDS 2) CAPS Take by mouth 2 times daily       naproxen sodium (ALEVE) 220 MG tablet Take 220 mg by mouth 2 times daily (with meals)       nitroglycerin (NITROSTAT) 0.4 MG SL tablet Place 1 tablet (0.4 mg) under the tongue every 5 minutes as needed for chest pain 25 tablet 11     primidone (MYSOLINE) 250 MG tablet Take 250 mg by mouth 2 times daily       rosuvastatin (CRESTOR) 40 MG tablet Take 1 tablet by mouth daily. 30 tablet 1     clopidogrel (PLAVIX) 75 MG tablet Take 1 tablet (75 mg)  by mouth daily (Patient not taking: Reported on 8/13/2018) 90 tablet 3     indomethacin (INDOCIN) 50 MG capsule Take 50 mg by mouth as needed for moderate pain         ALLERGIES   No Known Allergies    PAST MEDICAL HISTORY:  Past Medical History:   Diagnosis Date     Arthritis      Carotid artery stenosis     Right, s/p right CEA 11/9/2016     Cholesterol serum elevated      Colon polyps      Color blind      Coronary artery disease     2/2012 - MERARI to mid LAD     Decreased hearing      Depression      Gout      Hypertension      Hypertrophy of prostate without urinary obstruction and other lower urinary tract symptoms (LUTS)      Impotence      Left bundle branch block      Onychomycosis of toenail      Other and unspecified hyperlipidemia      Paroxysmal ventricular tachycardia (H)     with stress test 2012     Stented coronary artery        PAST SURGICAL HISTORY:  Past Surgical History:   Procedure Laterality Date     ANGIOPLASTY  1991     COLONOSCOPY  12/13/2011    Procedure:COLONOSCOPY; COLONOSCOPY; Surgeon:EMMANUELLE MOSER; Location: GI     CORONARY ANGIOGRAPHY ADULT ORDER  1991,2012 1991 - stent to proximal LAD, 2012 - Stent to mid LAD     ENDARTERECTOMY CAROTID Right 11/10/2016    Procedure: ENDARTERECTOMY CAROTID;  Surgeon: Paco Suresh MD;  Location:  OR     HEART CATH, ANGIOPLASTY       ORTHOPEDIC SURGERY       PHACOEMULSIFICATION CLEAR CORNEA WITH STANDARD INTRAOCULAR LENS IMPLANT  12/19/2013    Procedure: PHACOEMULSIFICATION CLEAR CORNEA WITH STANDARD INTRAOCULAR LENS IMPLANT;  RIGHT PHACOEMULSIFICATION CLEAR CORNEA WITH STANDARD INTRAOCULAR LENS IMPLANT ;  Surgeon: Luisito Juan MD;  [facility]       FAMILY HISTORY:  Family History   Problem Relation Age of Onset     HEART DISEASE Mother 83     HEART DISEASE Father 69     emphysema     Coronary Artery Disease Brother      Coronary Artery Disease Sister        SOCIAL HISTORY:  Social History     Social History     Marital  "status:      Spouse name: N/A     Number of children: N/A     Years of education: N/A     Social History Main Topics     Smoking status: Former Smoker     Packs/day: 0.50     Years: 20.00     Types: Cigarettes     Quit date: 12/13/1990     Smokeless tobacco: Never Used     Alcohol use 0.0 oz/week     0 Standard drinks or equivalent per week      Comment: daily - drinks x2     Drug use: No     Sexual activity: No     Other Topics Concern     Caffeine Concern No     1-2 cups daily     Sleep Concern No     Stress Concern No     Special Diet No     trying to watch sodium intake     Exercise No     some walking     Social History Narrative       Review of Systems:  Skin:  Negative     Eyes:  Positive for glasses;color blindness;cataracts  ENT:  Positive for nasal congestion  Respiratory:  Positive for shortness of breath;dyspnea on exertion  Cardiovascular:  Negative for;palpitations;chest pain;lightheadedness;dizziness edema;Positive for;exercise intolerance;fatigue  Gastroenterology:   heartburn;reflux  Genitourinary:  Positive for    Musculoskeletal:  Positive for arthritis  Neurologic:  Negative    Psychiatric:  Positive for excessive stress  Heme/Lymph/Imm:  Negative    Endocrine:  Negative      Physical Exam:  Vitals: /69  Pulse 52  Ht 1.778 m (5' 10\")  Wt 102.2 kg (225 lb 4.8 oz)  BMI 32.33 kg/m2    Constitutional:  cooperative, alert and oriented, well developed, well nourished, in no acute distress        Skin:  warm and dry to the touch, no apparent skin lesions or masses noted     right CEA scar    Head:  normocephalic, no masses or lesions        Eyes:  pupils equal and round;conjunctivae and lids unremarkable;sclera white;no xanthalasma;no nystagmus        Lymph:      ENT:  no pallor or cyanosis, dentition good        Neck:  carotid pulses are full and equal bilaterally;no carotid bruit   prominent carotid pulsations at base of neck bilaterally. Well-healed right carotid endarterectomy " scar    Respiratory:  normal breath sounds, clear to auscultation, normal A-P diameter, normal symmetry, normal respiratory excursion, no use of accessory muscles         Cardiac: regular rhythm;normal S1 and S2       systolic murmur;grade 1;RUSB        pulses full and equal                                        GI:  BS normoactive        Extremities and Muscular Skeletal:  no spinal abnormalities noted;normal muscle strength and tone   bilateral LE edema;pitting;1+ 1+;RLE edema;pitting        Neurological:  no gross motor deficits        Psych:           Recent Lab Results:  LIPID RESULTS:  Lab Results   Component Value Date    CHOL 160 08/13/2018    HDL 61 08/13/2018    LDL 82 08/13/2018    TRIG 83 08/13/2018    CHOLHDLRATIO 2.7 09/14/2015       LIVER ENZYME RESULTS:  Lab Results   Component Value Date    AST 27 01/24/2017    ALT 23 01/24/2017       CBC RESULTS:  Lab Results   Component Value Date    WBC 4.8 01/24/2017    RBC 4.16 01/24/2017    HGB 13.9 01/24/2017    HCT 41.2 01/24/2017    MCV 99 01/24/2017    MCH 34.3 (H) 11/10/2016    MCHC 34.3 11/10/2016    RDW 13.1 01/24/2017     01/24/2017       BMP RESULTS:  Lab Results   Component Value Date     08/13/2018    POTASSIUM 4.6 08/13/2018    CHLORIDE 105 08/13/2018    CO2 30 (H) 08/13/2018    ANIONGAP 10.6 08/13/2018     08/13/2018    BUN 23 08/13/2018    CR 1.00 08/13/2018    GFRESTIMATED 72 08/13/2018    GFRESTBLACK 87 08/13/2018    DONELL 8.8 08/13/2018        A1C RESULTS:  Lab Results   Component Value Date    A1C 5.2 11/10/2016       INR RESULTS:  Lab Results   Component Value Date    INR 0.99 11/10/2016    INR 1.01 11/08/2016           CC  Akhil Mendoza MD  [address]  [place] [id number]                  Thank you for allowing me to participate in the care of your patient.      Sincerely,     ERIC Lugo [facility]    cc:   Akhil Mendoza MD  [address]  [place]  [place] [address]

## 2018-08-13 NOTE — PROGRESS NOTES
"Saw pt in clinic for annual follow-up. He reported using \"nitroglycerin\"} x1 with stair climbing only.He is on imdur/ atenolol.  He is scheduled for left knee replacement in November, his last stress test was in 2015.  I think he needs another one. Since I am retiring I want to check in with you for continuity of plans. thanks  "

## 2018-08-13 NOTE — LETTER
8/13/2018      Stephan Nice MD  [facility] [address]  [place] [address]      RE: Iván Lopeztres       Dear Colleague,    I had the pleasure of seeing Iván Nicholas in the [facility].    Service Date: 08/13/2018      HISTORY OF PRESENT ILLNESS:  Mr. Nicholas is a 78-year-old gentleman who is here today for an annual cardiovascular exam.  His past medical history includes angioplasty of his left anterior descending artery in 1991, stenting of his mid-LAD descending artery in 02/2012.  His distal LAD was jailed but did not appear to be significantly compromised.  He has had chronic stable exertional angina at high workload since.  He was taken back to the Cath Lab in 2012, and the FFR of the jailed diagonal was 0.8.  He has been treated medically since.  He is on Imdur 60 mg per day.  His last stress test was done in 2015.  It was a stress nuclear scan.  It demonstrated a small reversible basal lateral defect consistent with a small area of ischemia.  Continuation of medical management was recommended.  Today he reports he took nitroglycerin back in December of 2017 when he had to walk up a flight of stairs.  Previously, if he just stopped his activity his chest tightness would resolve, but this time he took 1 nitro and then his chest pain resolved.  He is scheduled for knee replacement in November.  His activities have been fairly limited because of left knee pain.  He does not have too much opportunity for high workloads.  He denies any chest pain at rest or with minimal exertions.  Today he denies orthopnea, PND.  His right ankle has 1+ edema at baseline.  He denies palpitations, lightheadedness, dizziness, syncope or near-syncope.        Labwork done today:  Cholesterol 160, HDL 61, LDL 82, triglycerides 83.  Sodium 141, potassium 4.6, BUN 23, creatinine 1, GFR 72.      The patient underwent a right CEA in 11/2016.  He has had no  symptoms of TIAs.  Recent ultrasound of his carotids demonstrated good blood flow.      PHYSICAL EXAMINATION:    VITAL SIGNS:   Blood pressure 134/69, pulse 52, weight today is 225 pounds.   CARDIAC:  Heart tones are regular with an S1, S2 without an S3, S4.   LUNGS:  Clear without crackles or wheezes.   NECK:  Veins are flat.  Negative for carotid bruits.   ABDOMEN:  Large but soft, negative for abdominal bruits.   EXTREMITIES:  Lower extremities with bilateral trace edema.      ASSESSMENT AND PLAN:   1.  This is a 78-year-old gentleman with a history of coronary artery disease as well as chronic stable exertional angina.  He has needed to use nitroglycerin once in the last year.  He is also facing left knee replacement surgery in November.  I am wondering if he should undergo a nuclear stress scan.  I will have a discussion with Dr. Mendoza and order one if appropriate.   2.  Hypertension.  Blood pressure looks well-managed.   3.  Hyperlipidemia.  His lipid profile today with Zetia and Crestor is at goal.      He will see Dr. Mendoza in followup next year.  If we decide to go ahead and do a nuclear stress test, we will schedule him appropriately.      Alley Hdez MS, ANP         ERIC RAMIRES, CNP             D: 2018   T: 2018   MT: CAROLYN      Name:     GREG VALDERRAMA   MRN:      [id number]        Account:      [id number]   :      1940           Service Date: 2018      Document: [id number]           Outpatient Encounter Prescriptions as of 2018   Medication Sig Dispense Refill     ALLOPURINOL PO Take 100 mg by mouth daily. To prevent gout attacks, recently started.        aspirin EC 81 MG EC tablet Take 1 tablet (81 mg) by mouth daily 30 tablet 0     atenolol (TENORMIN) 25 MG tablet Take 1 tablet by mouth daily. Hold IF heart rate less than 55. 90 tablet 3     ezetimibe (ZETIA) 10 MG tablet Take 1 tablet (10 mg) by mouth daily 90 tablet 3     ibuprofen (ADVIL) 200 MG  capsule Take 200 mg by mouth every 4 hours as needed for fever       isosorbide mononitrate (IMDUR) 60 MG 24 hr tablet Take 1 tablet (60 mg) by mouth daily 90 tablet 1     Multiple Vitamins-Minerals (CENTRUM SILVER) per tablet Take 1 tablet by mouth daily.       Multiple Vitamins-Minerals (PRESERVISION AREDS 2) CAPS Take by mouth 2 times daily       naproxen sodium (ALEVE) 220 MG tablet Take 220 mg by mouth 2 times daily (with meals)       nitroglycerin (NITROSTAT) 0.4 MG SL tablet Place 1 tablet (0.4 mg) under the tongue every 5 minutes as needed for chest pain 25 tablet 11     primidone (MYSOLINE) 250 MG tablet Take 250 mg by mouth 2 times daily       rosuvastatin (CRESTOR) 40 MG tablet Take 1 tablet by mouth daily. 30 tablet 1     clopidogrel (PLAVIX) 75 MG tablet Take 1 tablet (75 mg) by mouth daily (Patient not taking: Reported on 8/13/2018) 90 tablet 3     indomethacin (INDOCIN) 50 MG capsule Take 50 mg by mouth as needed for moderate pain       No facility-administered encounter medications on file as of 8/13/2018.        Again, thank you for allowing me to participate in the care of your patient.      Sincerely,    ERIC Lugo [facility]

## 2018-08-13 NOTE — PROGRESS NOTES
HPI and Plan:   See dictation    Orders Placed This Encounter   Procedures     Lipid Profile     Basic metabolic panel     Follow-Up with Cardiologist     No orders of the defined types were placed in this encounter.    There are no discontinued medications.      Encounter Diagnosis   Name Primary?     Coronary artery disease of native artery of native heart with stable angina pectoris (H)        CURRENT MEDICATIONS:  Current Outpatient Prescriptions   Medication Sig Dispense Refill     ALLOPURINOL PO Take 100 mg by mouth daily. To prevent gout attacks, recently started.        aspirin EC 81 MG EC tablet Take 1 tablet (81 mg) by mouth daily 30 tablet 0     atenolol (TENORMIN) 25 MG tablet Take 1 tablet by mouth daily. Hold IF heart rate less than 55. 90 tablet 3     ezetimibe (ZETIA) 10 MG tablet Take 1 tablet (10 mg) by mouth daily 90 tablet 3     ibuprofen (ADVIL) 200 MG capsule Take 200 mg by mouth every 4 hours as needed for fever       isosorbide mononitrate (IMDUR) 60 MG 24 hr tablet Take 1 tablet (60 mg) by mouth daily 90 tablet 1     Multiple Vitamins-Minerals (CENTRUM SILVER) per tablet Take 1 tablet by mouth daily.       Multiple Vitamins-Minerals (PRESERVISION AREDS 2) CAPS Take by mouth 2 times daily       naproxen sodium (ALEVE) 220 MG tablet Take 220 mg by mouth 2 times daily (with meals)       nitroglycerin (NITROSTAT) 0.4 MG SL tablet Place 1 tablet (0.4 mg) under the tongue every 5 minutes as needed for chest pain 25 tablet 11     primidone (MYSOLINE) 250 MG tablet Take 250 mg by mouth 2 times daily       rosuvastatin (CRESTOR) 40 MG tablet Take 1 tablet by mouth daily. 30 tablet 1     clopidogrel (PLAVIX) 75 MG tablet Take 1 tablet (75 mg) by mouth daily (Patient not taking: Reported on 8/13/2018) 90 tablet 3     indomethacin (INDOCIN) 50 MG capsule Take 50 mg by mouth as needed for moderate pain         ALLERGIES   No Known Allergies    PAST MEDICAL HISTORY:  Past Medical History:   Diagnosis Date      Arthritis      Carotid artery stenosis     Right, s/p right CEA 11/9/2016     Cholesterol serum elevated      Colon polyps      Color blind      Coronary artery disease     2/2012 - MERARI to mid LAD     Decreased hearing      Depression      Gout      Hypertension      Hypertrophy of prostate without urinary obstruction and other lower urinary tract symptoms (LUTS)      Impotence      Left bundle branch block      Onychomycosis of toenail      Other and unspecified hyperlipidemia      Paroxysmal ventricular tachycardia (H)     with stress test 2012     Stented coronary artery        PAST SURGICAL HISTORY:  Past Surgical History:   Procedure Laterality Date     ANGIOPLASTY  1991     COLONOSCOPY  12/13/2011    Procedure:COLONOSCOPY; COLONOSCOPY; Surgeon:EMMANUELLE MOSER; Location: GI     CORONARY ANGIOGRAPHY ADULT ORDER  1991,2012 1991 - stent to proximal LAD, 2012 - Stent to mid LAD     ENDARTERECTOMY CAROTID Right 11/10/2016    Procedure: ENDARTERECTOMY CAROTID;  Surgeon: Paco Suresh MD;  Location:  OR     HEART CATH, ANGIOPLASTY       ORTHOPEDIC SURGERY       PHACOEMULSIFICATION CLEAR CORNEA WITH STANDARD INTRAOCULAR LENS IMPLANT  12/19/2013    Procedure: PHACOEMULSIFICATION CLEAR CORNEA WITH STANDARD INTRAOCULAR LENS IMPLANT;  RIGHT PHACOEMULSIFICATION CLEAR CORNEA WITH STANDARD INTRAOCULAR LENS IMPLANT ;  Surgeon: Luisito Juan MD;  Location: [facility]       FAMILY HISTORY:  Family History   Problem Relation Age of Onset     HEART DISEASE Mother 83     HEART DISEASE Father 69     emphysema     Coronary Artery Disease Brother      Coronary Artery Disease Sister        SOCIAL HISTORY:  Social History     Social History     Marital status:      Spouse name: N/A     Number of children: N/A     Years of education: N/A     Social History Main Topics     Smoking status: Former Smoker     Packs/day: 0.50     Years: 20.00     Types: Cigarettes     Quit date: 12/13/1990     Smokeless tobacco:  "Never Used     Alcohol use 0.0 oz/week     0 Standard drinks or equivalent per week      Comment: daily - drinks x2     Drug use: No     Sexual activity: No     Other Topics Concern     Caffeine Concern No     1-2 cups daily     Sleep Concern No     Stress Concern No     Special Diet No     trying to watch sodium intake     Exercise No     some walking     Social History Narrative       Review of Systems:  Skin:  Negative     Eyes:  Positive for glasses;color blindness;cataracts  ENT:  Positive for nasal congestion  Respiratory:  Positive for shortness of breath;dyspnea on exertion  Cardiovascular:  Negative for;palpitations;chest pain;lightheadedness;dizziness edema;Positive for;exercise intolerance;fatigue  Gastroenterology:   heartburn;reflux  Genitourinary:  Positive for    Musculoskeletal:  Positive for arthritis  Neurologic:  Negative    Psychiatric:  Positive for excessive stress  Heme/Lymph/Imm:  Negative    Endocrine:  Negative      Physical Exam:  Vitals: /69  Pulse 52  Ht 1.778 m (5' 10\")  Wt 102.2 kg (225 lb 4.8 oz)  BMI 32.33 kg/m2    Constitutional:  cooperative, alert and oriented, well developed, well nourished, in no acute distress        Skin:  warm and dry to the touch, no apparent skin lesions or masses noted     right CEA scar    Head:  normocephalic, no masses or lesions        Eyes:  pupils equal and round;conjunctivae and lids unremarkable;sclera white;no xanthalasma;no nystagmus        Lymph:      ENT:  no pallor or cyanosis, dentition good        Neck:  carotid pulses are full and equal bilaterally;no carotid bruit   prominent carotid pulsations at base of neck bilaterally. Well-healed right carotid endarterectomy scar    Respiratory:  normal breath sounds, clear to auscultation, normal A-P diameter, normal symmetry, normal respiratory excursion, no use of accessory muscles         Cardiac: regular rhythm;normal S1 and S2       systolic murmur;grade 1;RUSB        pulses full and " equal                                        GI:  BS normoactive        Extremities and Muscular Skeletal:  no spinal abnormalities noted;normal muscle strength and tone   bilateral LE edema;pitting;1+ 1+;RLE edema;pitting        Neurological:  no gross motor deficits        Psych:           Recent Lab Results:  LIPID RESULTS:  Lab Results   Component Value Date    CHOL 160 08/13/2018    HDL 61 08/13/2018    LDL 82 08/13/2018    TRIG 83 08/13/2018    CHOLHDLRATIO 2.7 09/14/2015       LIVER ENZYME RESULTS:  Lab Results   Component Value Date    AST 27 01/24/2017    ALT 23 01/24/2017       CBC RESULTS:  Lab Results   Component Value Date    WBC 4.8 01/24/2017    RBC 4.16 01/24/2017    HGB 13.9 01/24/2017    HCT 41.2 01/24/2017    MCV 99 01/24/2017    MCH 34.3 (H) 11/10/2016    MCHC 34.3 11/10/2016    RDW 13.1 01/24/2017     01/24/2017       BMP RESULTS:  Lab Results   Component Value Date     08/13/2018    POTASSIUM 4.6 08/13/2018    CHLORIDE 105 08/13/2018    CO2 30 (H) 08/13/2018    ANIONGAP 10.6 08/13/2018     08/13/2018    BUN 23 08/13/2018    CR 1.00 08/13/2018    GFRESTIMATED 72 08/13/2018    GFRESTBLACK 87 08/13/2018    DONELL 8.8 08/13/2018        A1C RESULTS:  Lab Results   Component Value Date    A1C 5.2 11/10/2016       INR RESULTS:  Lab Results   Component Value Date    INR 0.99 11/10/2016    INR 1.01 11/08/2016           CC  Akhil Mendoza MD  [address]  [place] [id number]

## 2018-08-15 DIAGNOSIS — I25.118 CORONARY ARTERY DISEASE OF NATIVE ARTERY OF NATIVE HEART WITH STABLE ANGINA PECTORIS (H): Primary | ICD-10-CM

## 2018-08-20 ENCOUNTER — TELEPHONE (OUTPATIENT)
Dept: CARDIOLOGY | Facility: CLINIC | Age: 78
End: 2018-08-20

## 2018-08-20 DIAGNOSIS — I25.10 CORONARY ARTERY DISEASE INVOLVING NATIVE CORONARY ARTERY OF NATIVE HEART WITHOUT ANGINA PECTORIS: Primary | ICD-10-CM

## 2018-08-20 NOTE — TELEPHONE ENCOUNTER
Last seen by Alley WARE FLORENCIA 8-13-18.   Patient scheduled for left total knee in Nov.   Last stress test was 9/2015.  Patient reports using 1 nitroglycerine SL with stair climbing.   Patient  believes he will need cardiac clearance for his knee surgery. Tentative Dr. Mendoza OV Oct 12 @ 4:15.   Will message Dr. Mendoza if stress test needed .

## 2018-08-21 NOTE — TELEPHONE ENCOUNTER
Spoke with patient and Oct 12th OV with Dr. Mendoza reviewed and recommendation for a lexiscan stress test. Patient agrees with plan. Stress test order placed.  Patient scheduled for Total Knee surgery November 12, 2018.  Patient also notes land line is disconnected. Please use mobile phone.

## 2018-10-03 ENCOUNTER — PRE VISIT (OUTPATIENT)
Dept: CARDIOLOGY | Facility: CLINIC | Age: 78
End: 2018-10-03

## 2018-10-03 DIAGNOSIS — I25.10 CORONARY ARTERY DISEASE INVOLVING NATIVE CORONARY ARTERY OF NATIVE HEART WITHOUT ANGINA PECTORIS: Primary | ICD-10-CM

## 2018-10-04 ENCOUNTER — HOSPITAL ENCOUNTER (OUTPATIENT)
Dept: CARDIOLOGY | Facility: CLINIC | Age: 78
Discharge: HOME | End: 2018-10-04
Attending: INTERNAL MEDICINE | Admitting: INTERNAL MEDICINE
Payer: MEDICARE

## 2018-10-04 PROCEDURE — 34300033 ZZH RX 343: Performed by: INTERNAL MEDICINE

## 2018-10-04 PROCEDURE — 93018 CV STRESS TEST I&R ONLY: CPT | Performed by: INTERNAL MEDICINE

## 2018-10-04 PROCEDURE — 25000128 H RX IP 250 OP 636: Performed by: INTERNAL MEDICINE

## 2018-10-04 PROCEDURE — 78452 HT MUSCLE IMAGE SPECT MULT: CPT

## 2018-10-04 PROCEDURE — A9502 TC99M TETROFOSMIN: HCPCS | Performed by: INTERNAL MEDICINE

## 2018-10-04 PROCEDURE — 93016 CV STRESS TEST SUPVJ ONLY: CPT | Performed by: INTERNAL MEDICINE

## 2018-10-04 PROCEDURE — 78452 HT MUSCLE IMAGE SPECT MULT: CPT | Mod: 26 | Performed by: INTERNAL MEDICINE

## 2018-10-04 RX ORDER — AMINOPHYLLINE 25 MG/ML
50-100 INJECTION, SOLUTION INTRAVENOUS
Status: DISCONTINUED | OUTPATIENT
Start: 2018-10-04 | End: 2018-10-05 | Stop reason: HOSPADM

## 2018-10-04 RX ORDER — ACYCLOVIR 200 MG/1
0-1 CAPSULE ORAL
Status: DISCONTINUED | OUTPATIENT
Start: 2018-10-04 | End: 2018-10-05 | Stop reason: HOSPADM

## 2018-10-04 RX ORDER — REGADENOSON 0.08 MG/ML
0.4 INJECTION, SOLUTION INTRAVENOUS ONCE
Status: COMPLETED | OUTPATIENT
Start: 2018-10-04 | End: 2018-10-04

## 2018-10-04 RX ORDER — ALBUTEROL SULFATE 90 UG/1
2 AEROSOL, METERED RESPIRATORY (INHALATION) EVERY 5 MIN PRN
Status: DISCONTINUED | OUTPATIENT
Start: 2018-10-04 | End: 2018-10-05 | Stop reason: HOSPADM

## 2018-10-04 RX ADMIN — TETROFOSMIN 12.8 MCI.: 1.38 INJECTION, POWDER, LYOPHILIZED, FOR SOLUTION INTRAVENOUS at 10:46

## 2018-10-04 RX ADMIN — REGADENOSON 0.4 MG: 0.08 INJECTION, SOLUTION INTRAVENOUS at 10:50

## 2018-10-04 RX ADMIN — TETROFOSMIN 4.49 MCI.: 1.38 INJECTION, POWDER, LYOPHILIZED, FOR SOLUTION INTRAVENOUS at 09:24

## 2018-10-12 ENCOUNTER — OFFICE VISIT (OUTPATIENT)
Dept: CARDIOLOGY | Facility: CLINIC | Age: 78
End: 2018-10-12
Payer: COMMERCIAL

## 2018-10-12 VITALS
DIASTOLIC BLOOD PRESSURE: 70 MMHG | WEIGHT: 221 LBS | HEART RATE: 50 BPM | SYSTOLIC BLOOD PRESSURE: 148 MMHG | HEIGHT: 70 IN | BODY MASS INDEX: 31.64 KG/M2

## 2018-10-12 DIAGNOSIS — I44.7 LEFT BUNDLE BRANCH BLOCK: ICD-10-CM

## 2018-10-12 DIAGNOSIS — E66.811 CLASS 1 OBESITY DUE TO EXCESS CALORIES WITH SERIOUS COMORBIDITY AND BODY MASS INDEX (BMI) OF 31.0 TO 31.9 IN ADULT: Chronic | ICD-10-CM

## 2018-10-12 DIAGNOSIS — I47.29 PAROXYSMAL VENTRICULAR TACHYCARDIA (H): ICD-10-CM

## 2018-10-12 DIAGNOSIS — E78.00 PURE HYPERCHOLESTEROLEMIA: ICD-10-CM

## 2018-10-12 DIAGNOSIS — R06.09 DOE (DYSPNEA ON EXERTION): ICD-10-CM

## 2018-10-12 DIAGNOSIS — E66.09 CLASS 1 OBESITY DUE TO EXCESS CALORIES WITH SERIOUS COMORBIDITY AND BODY MASS INDEX (BMI) OF 31.0 TO 31.9 IN ADULT: Chronic | ICD-10-CM

## 2018-10-12 DIAGNOSIS — I10 ESSENTIAL HYPERTENSION, BENIGN: ICD-10-CM

## 2018-10-12 DIAGNOSIS — I25.118 CORONARY ARTERY DISEASE OF NATIVE ARTERY OF NATIVE HEART WITH STABLE ANGINA PECTORIS (H): Primary | Chronic | ICD-10-CM

## 2018-10-12 DIAGNOSIS — R60.9 EDEMA, UNSPECIFIED TYPE: ICD-10-CM

## 2018-10-12 PROCEDURE — 99214 OFFICE O/P EST MOD 30 MIN: CPT | Performed by: INTERNAL MEDICINE

## 2018-10-12 PROCEDURE — 93000 ELECTROCARDIOGRAM COMPLETE: CPT | Performed by: INTERNAL MEDICINE

## 2018-10-12 RX ORDER — EZETIMIBE 10 MG/1
10 TABLET ORAL DAILY
Qty: 90 TABLET | Refills: 3 | Status: ON HOLD
Start: 2018-10-12 | End: 2021-09-07

## 2018-10-12 NOTE — MR AVS SNAPSHOT
After Visit Summary   10/12/2018    Iván Nicholas    MRN: [id number]           Patient Information     Date Of Birth          1940        Visit Information        Provider Department      10/12/2018 4:15 PM Akhil Mendoza MD [facility]        Today's Diagnoses     Coronary artery disease of native artery of native heart with stable angina pectoris (H)    -  1    Left bundle branch block        Pure hypercholesterolemia        Paroxysmal ventricular tachycardia (H)        Essential hypertension, benign        SAMS (dyspnea on exertion)        Edema, unspecified type        Class 1 obesity due to excess calories with serious comorbidity and body mass index (BMI) of 31.0 to 31.9 in adult           Follow-ups after your visit        Your next 10 appointments already scheduled     Nov 12, 2018   Procedure with Matt Schaefer MD   [facility] (--)    [address] [facility] [id number] [phone]              Who to contact     If you have questions or need follow up information about today's clinic visit or your schedule please contact [facility] directly at [phone].  Normal or non-critical lab and imaging results will be communicated to you by MyChart, letter or phone within 4 business days after the clinic has received the results. If you do not hear from us within 7 days, please contact the clinic through MyChart or phone. If you have a critical or abnormal lab result, we will notify you by phone as soon as possible.  Submit refill requests through [employer] or call your pharmacy and they will forward the refill request to us. Please allow 3 business days for your refill to be completed.          Additional Information About Your Visit        Care EveryWhere ID     This is your Care EveryWhere ID. This could be used by other organizations to access your [facility]  "medical records  [id number]        Your Vitals Were     Pulse Height BMI (Body Mass Index)             50 1.778 m (5' 10\") 31.71 kg/m2          Blood Pressure from Last 3 Encounters:   10/12/18 148/70   08/13/18 134/69   08/03/18 194/81    Weight from Last 3 Encounters:   10/12/18 100.2 kg (221 lb)   08/13/18 102.2 kg (225 lb 4.8 oz)   08/15/17 100 kg (220 lb 8 oz)              We Performed the Following     EKG 12-lead complete w/read - Clinics          Today's Medication Changes          These changes are accurate as of 10/12/18  5:00 PM.  If you have any questions, ask your nurse or doctor.               Stop taking these medicines if you haven't already. Please contact your care team if you have questions.     clopidogrel 75 MG tablet   Commonly known as:  PLAVIX   Stopped by:  Akhil Mendoza MD                Where to get your medicines      Some of these will need a paper prescription and others can be bought over the counter.  Ask your nurse if you have questions.     You don't need a prescription for these medications     ezetimibe 10 MG tablet                Primary Care Provider Office Phone # Fax #    Stephan Harlan Nice [phone]       [facility] [address]  [facility] [address]        Equal Access to Services     ELIDA ISRAEL AH: Hadii lashaun justin hadasho Somayela, waaxda luqadaha, qaybta kaalmada adeegyada, mayte ramos . [facility] [phone].    ATENCIÓN: Si habla español, tiene a mckeon disposición servicios gratuitos de asistencia lingüística. Llame al [phone].    We comply with applicable federal civil rights laws and [place] laws. We do not discriminate on the basis of race, color, national origin, age, disability, sex, sexual orientation, or gender identity.            Thank you!     Thank you for choosing [facility]  for your care. Our goal is always to provide you with excellent care. " Hearing back from our patients is one way we can continue to improve our services. Please take a few minutes to complete the written survey that you may receive in the mail after your visit with us. Thank you!             Your Updated Medication List - Protect others around you: Learn how to safely use, store and throw away your medicines at www.disposemymeds.org.          This list is accurate as of 10/12/18  5:00 PM.  Always use your most recent med list.                   Brand Name Dispense Instructions for use Diagnosis    ADVIL 200 MG capsule   Generic drug:  ibuprofen      Take 200 mg by mouth every 4 hours as needed for fever        ALEVE 220 MG tablet   Generic drug:  naproxen sodium      Take 220 mg by mouth 2 times daily (with meals)        ALLOPURINOL PO      Take 100 mg by mouth daily. To prevent gout attacks, recently started.        aspirin 81 MG EC tablet     30 tablet    Take 1 tablet (81 mg) by mouth daily    Stenosis of right internal carotid artery       atenolol 25 MG tablet    TENORMIN    90 tablet    Take 1 tablet by mouth daily. Hold IF heart rate less than 55.    CAD (coronary artery disease)       * CENTRUM SILVER per tablet      Take 1 tablet by mouth daily.        * PRESERVISION AREDS 2 Caps      Take by mouth 2 times daily        CRESTOR 40 MG tablet   Generic drug:  rosuvastatin     30 tablet    Take 1 tablet by mouth daily.    CAD (coronary artery disease)       ezetimibe 10 MG tablet    ZETIA    90 tablet    Take 1 tablet (10 mg) by mouth daily    Coronary artery disease of native artery of native heart with stable angina pectoris (H)       indomethacin 50 MG capsule    INDOCIN     Take 50 mg by mouth as needed for moderate pain        isosorbide mononitrate 60 MG 24 hr tablet    IMDUR    90 tablet    Take 1 tablet (60 mg) by mouth daily    Coronary artery disease involving native coronary artery without angina pectoris       nitroGLYcerin 0.4 MG sublingual tablet    NITROSTAT    25  tablet    Place 1 tablet (0.4 mg) under the tongue every 5 minutes as needed for chest pain    CAD (coronary artery disease)       primidone 250 MG tablet    MYSOLINE     Take 250 mg by mouth 2 times daily        * Notice:  This list has 2 medication(s) that are the same as other medications prescribed for you. Read the directions carefully, and ask your doctor or other care provider to review them with you.

## 2018-10-12 NOTE — LETTER
10/12/2018    Stephan Nice MD  [facility] [address]  [place] [address]    RE: Iván Nicholas       Dear Colleague,    I had the pleasure of seeing Iván Nicholas in the [facility].    HPI and Plan:   See dictation    Orders Placed This Encounter   Procedures     EKG 12-lead complete w/read - Clinics       Orders Placed This Encounter   Medications     ezetimibe (ZETIA) 10 MG tablet     Sig: Take 1 tablet (10 mg) by mouth daily     Dispense:  90 tablet     Refill:  3       Medications Discontinued During This Encounter   Medication Reason     clopidogrel (PLAVIX) 75 MG tablet      ezetimibe (ZETIA) 10 MG tablet          Encounter Diagnoses   Name Primary?     Coronary artery disease of native artery of native heart with stable angina pectoris (H) Yes     Left bundle branch block      Pure hypercholesterolemia      Paroxysmal ventricular tachycardia (H)      Essential hypertension, benign      SAMS (dyspnea on exertion)      Edema, unspecified type      Class 1 obesity due to excess calories with serious comorbidity and body mass index (BMI) of 31.0 to 31.9 in adult        CURRENT MEDICATIONS:  Current Outpatient Prescriptions   Medication Sig Dispense Refill     ALLOPURINOL PO Take 100 mg by mouth daily. To prevent gout attacks, recently started.        aspirin EC 81 MG EC tablet Take 1 tablet (81 mg) by mouth daily 30 tablet 0     atenolol (TENORMIN) 25 MG tablet Take 1 tablet by mouth daily. Hold IF heart rate less than 55. 90 tablet 3     ezetimibe (ZETIA) 10 MG tablet Take 1 tablet (10 mg) by mouth daily 90 tablet 3     ibuprofen (ADVIL) 200 MG capsule Take 200 mg by mouth every 4 hours as needed for fever       indomethacin (INDOCIN) 50 MG capsule Take 50 mg by mouth as needed for moderate pain       isosorbide mononitrate (IMDUR) 60 MG 24 hr tablet Take 1 tablet (60 mg) by mouth daily 90 tablet 1     Multiple Vitamins-Minerals  (CENTRUM SILVER) per tablet Take 1 tablet by mouth daily.       Multiple Vitamins-Minerals (PRESERVISION AREDS 2) CAPS Take by mouth 2 times daily       naproxen sodium (ALEVE) 220 MG tablet Take 220 mg by mouth 2 times daily (with meals)       nitroglycerin (NITROSTAT) 0.4 MG SL tablet Place 1 tablet (0.4 mg) under the tongue every 5 minutes as needed for chest pain 25 tablet 11     primidone (MYSOLINE) 250 MG tablet Take 250 mg by mouth 2 times daily       rosuvastatin (CRESTOR) 40 MG tablet Take 1 tablet by mouth daily. 30 tablet 1     [DISCONTINUED] ezetimibe (ZETIA) 10 MG tablet Take 1 tablet (10 mg) by mouth daily 90 tablet 3       ALLERGIES   No Known Allergies    PAST MEDICAL HISTORY:  Past Medical History:   Diagnosis Date     Arthritis      Carotid artery stenosis     Right, s/p right CEA 11/9/2016     Cholesterol serum elevated      Colon polyps      Color blind      Coronary artery disease     2/2012 - MERARI to mid LAD     Decreased hearing      Depression      Gout      Hypertension      Hypertrophy of prostate without urinary obstruction and other lower urinary tract symptoms (LUTS)      Impotence      Left bundle branch block      Onychomycosis of toenail      Other and unspecified hyperlipidemia      Paroxysmal ventricular tachycardia (H)     with stress test 2012       PAST SURGICAL HISTORY:  Past Surgical History:   Procedure Laterality Date     ANGIOPLASTY  1991     COLONOSCOPY  12/13/2011    Procedure:COLONOSCOPY; COLONOSCOPY; Surgeon:EMMANUELLE MOSER; Location: GI     CORONARY ANGIOGRAPHY ADULT ORDER  1991,2012 1991 - stent to proximal LAD, 2012 - Stent to mid LAD     ENDARTERECTOMY CAROTID Right 11/10/2016    Procedure: ENDARTERECTOMY CAROTID;  Surgeon: Paco Suresh MD;  Location:  OR     HEART CATH, ANGIOPLASTY       ORTHOPEDIC SURGERY       PHACOEMULSIFICATION CLEAR CORNEA WITH STANDARD INTRAOCULAR LENS IMPLANT  12/19/2013    Procedure: PHACOEMULSIFICATION CLEAR CORNEA WITH  STANDARD INTRAOCULAR LENS IMPLANT;  RIGHT PHACOEMULSIFICATION CLEAR CORNEA WITH STANDARD INTRAOCULAR LENS IMPLANT ;  Surgeon: Luisito Juan MD;  [facility]       FAMILY HISTORY:  Family History   Problem Relation Age of Onset     HEART DISEASE Mother 83     HEART DISEASE Father 69     emphysema     Coronary Artery Disease Brother      Coronary Artery Disease Sister        SOCIAL HISTORY:  Social History     Social History     Marital status:      Spouse name: N/A     Number of children: N/A     Years of education: N/A     Social History Main Topics     Smoking status: Former Smoker     Packs/day: 0.50     Years: 20.00     Types: Cigarettes     Quit date: 12/13/1990     Smokeless tobacco: Never Used     Alcohol use 0.0 oz/week     0 Standard drinks or equivalent per week      Comment: daily - drinks x2     Drug use: No     Sexual activity: No     Other Topics Concern     Caffeine Concern No     1-2 cups daily     Sleep Concern No     Stress Concern No     Special Diet No     trying to watch sodium intake     Exercise No     some walking     Social History Narrative       Review of Systems:  Skin:  Negative       Eyes:  Positive for glasses;color blindness;cataracts pt reports cataracts surgery bilaterally a couple of years ago.  ENT:  Positive for nasal congestion    Respiratory:  Positive for shortness of breath;dyspnea on exertion Pt reports  on hills, stairs; coughing at night.   Cardiovascular:    edema;Positive for    Gastroenterology: Positive for heartburn;reflux Pt reports with acidic foots eaten at PM time frame;   Genitourinary:  Positive for nocturia hx of prostate surgery  Musculoskeletal:  Positive for arthritis of the knees, hips, ankles  Neurologic:  Positive for numbness or tingling of feet cold at night needs to wear socks now while sleeping  Psychiatric:  Negative      Heme/Lymph/Imm:  Negative      Endocrine:  Negative        Physical Exam:  Vitals: /70  Pulse 50  Ht  "1.778 m (5' 10\")  Wt 100.2 kg (221 lb)  BMI 31.71 kg/m2    Constitutional:  cooperative, alert and oriented, well developed, well nourished, in no acute distress        Skin:  warm and dry to the touch, no apparent skin lesions or masses noted     right CEA scar    Head:  normocephalic, no masses or lesions        Eyes:  pupils equal and round;conjunctivae and lids unremarkable;sclera white;no xanthalasma;no nystagmus        Lymph:      ENT:  no pallor or cyanosis, dentition good        Neck:  carotid pulses are full and equal bilaterally;no carotid bruit   prominent carotid pulsations at base of neck bilaterally. Well-healed right carotid endarterectomy scar    Respiratory:  normal breath sounds, clear to auscultation, normal A-P diameter, normal symmetry, normal respiratory excursion, no use of accessory muscles         Cardiac: regular rhythm;normal S1 and S2       systolic murmur;grade 1;RUSB        pulses full and equal                                        GI:           Extremities and Muscular Skeletal:  no spinal abnormalities noted;normal muscle strength and tone   bilateral LE edema;pitting;trace          Neurological:  no gross motor deficits        Psych:  affect appropriate, oriented to time, person and place        CC  No referring provider defined for this encounter.                Thank you for allowing me to participate in the care of your patient.      Sincerely,     Akhil Mendoza MD     [facility]    cc:   No referring provider defined for this encounter.        "

## 2018-10-12 NOTE — LETTER
10/12/2018      Stephan Nice MD  [facility] [address]  [place] [address]      RE: Iván Lopeztres       Dear Colleague,    I had the pleasure of seeing Iván Nicholas in the [facility].    Service Date: 10/12/2018      HISTORY OF PRESENT ILLNESS:  Mr. Nicholas is a very nice 78-year-old gentleman with past medical history significant for angioplasty of the left anterior descending artery in 1991, stenting of his mid left anterior descending artery in 2012 due to crescendo angina and abnormal stress test.  His diagonal was jailed but did not appear to be significantly compromised.  He had chronic stable exertional angina at a high workload ever since.  We took him back to the Cath Lab later in 2012.  FFR of the jailed diagonal was 0.8, and we decided to treat him medically.  He had no other significant blockages.      Iván is now referred to the clinic for preoperative evaluation in anticipation of knee surgery in November.  A stress nuclear scan was performed earlier this month which appeared to demonstrate normal perfusion and ejection fraction of 52%.  Last evaluation of his left ventricular function by echo was 11/2016, estimating ejection fraction of 60%-65% without valvular problems.      Iván returns to clinic stating that he occasionally gets shortness of breath with climbing hills, but lately has been more limited by the knee, and he is not having any symptoms.  He does not have any chest, arm, neck, jaw or shoulder discomfort.  No lightheadedness, dizziness, syncope or near-syncope.  He does have peripheral edema for which he watches the salt.  He notes that it is usually gone first thing in the morning but accumulates as the day goes on.      ASSESSMENT AND PLAN:  Iván appears to be doing well from a cardiac standpoint without clinical evidence of ischemia, heart failure or significant arrhythmia.        Blood pressure  is up a bit at 148/70 with a pulse of 50.  I am not going to intensify his antihypertensive regimen at this time but will watch this.  If it stays high, I will do that.      His pulse is slow.  At some point in time I would evaluate for chronotropic incompetence as a possible cause of his dyspnea on exertion.  In the past I have always attributed this to angina at a high workload and consistent with chronic stable exertional angina.      Weight is 221 pounds, giving him a body mass index of 31.8.  I did recommend that once he gets his knee fixed, he needs to exercise regularly and get that weight down.      Fasting lipid profile was checked in August.  Total cholesterol is 160, HDL 61, LDL is 82, triglycerides are 83.  This is on rosuvastatin 40 and Zetia 10.  Zetia is not on his list, but he thinks he is still on it and he is going to check this.      I have emphasized the importance of once he gets his knee fixed, he needs to start exercising regularly.  His cholesterol profile will improve, his weight, his blood pressure control and overall cardiovascular fitness.      Right now he is ordered to see me again next summer.  I will see him at that time.  I will see him sooner if he has any problems.      Thank you for allowing me to participate in his care.         MIRELLA GOODE MD, [facility]             D: 10/12/2018   T: 10/12/2018   MT: CAROLYN      Name:     GREG VALDERRAMA   MRN:      [id number]        Account:      [id number]   :      1940           Service Date: 10/12/2018      Document: [id number]         Outpatient Encounter Prescriptions as of 10/12/2018   Medication Sig Dispense Refill     ALLOPURINOL PO Take 100 mg by mouth daily. To prevent gout attacks, recently started.        aspirin EC 81 MG EC tablet Take 1 tablet (81 mg) by mouth daily 30 tablet 0     atenolol (TENORMIN) 25 MG tablet Take 1 tablet by mouth daily. Hold IF heart rate less than 55. 90 tablet 3     ezetimibe (ZETIA) 10 MG  tablet Take 1 tablet (10 mg) by mouth daily 90 tablet 3     ibuprofen (ADVIL) 200 MG capsule Take 200 mg by mouth every 4 hours as needed for fever       indomethacin (INDOCIN) 50 MG capsule Take 50 mg by mouth as needed for moderate pain       isosorbide mononitrate (IMDUR) 60 MG 24 hr tablet Take 1 tablet (60 mg) by mouth daily 90 tablet 1     Multiple Vitamins-Minerals (CENTRUM SILVER) per tablet Take 1 tablet by mouth daily.       Multiple Vitamins-Minerals (PRESERVISION AREDS 2) CAPS Take by mouth 2 times daily       naproxen sodium (ALEVE) 220 MG tablet Take 220 mg by mouth 2 times daily (with meals)       nitroglycerin (NITROSTAT) 0.4 MG SL tablet Place 1 tablet (0.4 mg) under the tongue every 5 minutes as needed for chest pain 25 tablet 11     primidone (MYSOLINE) 250 MG tablet Take 250 mg by mouth 2 times daily       rosuvastatin (CRESTOR) 40 MG tablet Take 1 tablet by mouth daily. 30 tablet 1     [DISCONTINUED] clopidogrel (PLAVIX) 75 MG tablet Take 1 tablet (75 mg) by mouth daily (Patient not taking: Reported on 8/13/2018) 90 tablet 3     [DISCONTINUED] ezetimibe (ZETIA) 10 MG tablet Take 1 tablet (10 mg) by mouth daily 90 tablet 3     No facility-administered encounter medications on file as of 10/12/2018.        Again, thank you for allowing me to participate in the care of your patient.      Sincerely,    Akhil Mendoza MD     [facility]

## 2018-10-12 NOTE — PROGRESS NOTES
HPI and Plan:   See dictation    Orders Placed This Encounter   Procedures     EKG 12-lead complete w/read - Clinics       Orders Placed This Encounter   Medications     ezetimibe (ZETIA) 10 MG tablet     Sig: Take 1 tablet (10 mg) by mouth daily     Dispense:  90 tablet     Refill:  3       Medications Discontinued During This Encounter   Medication Reason     clopidogrel (PLAVIX) 75 MG tablet      ezetimibe (ZETIA) 10 MG tablet          Encounter Diagnoses   Name Primary?     Coronary artery disease of native artery of native heart with stable angina pectoris (H) Yes     Left bundle branch block      Pure hypercholesterolemia      Paroxysmal ventricular tachycardia (H)      Essential hypertension, benign      SAMS (dyspnea on exertion)      Edema, unspecified type      Class 1 obesity due to excess calories with serious comorbidity and body mass index (BMI) of 31.0 to 31.9 in adult        CURRENT MEDICATIONS:  Current Outpatient Prescriptions   Medication Sig Dispense Refill     ALLOPURINOL PO Take 100 mg by mouth daily. To prevent gout attacks, recently started.        aspirin EC 81 MG EC tablet Take 1 tablet (81 mg) by mouth daily 30 tablet 0     atenolol (TENORMIN) 25 MG tablet Take 1 tablet by mouth daily. Hold IF heart rate less than 55. 90 tablet 3     ezetimibe (ZETIA) 10 MG tablet Take 1 tablet (10 mg) by mouth daily 90 tablet 3     ibuprofen (ADVIL) 200 MG capsule Take 200 mg by mouth every 4 hours as needed for fever       indomethacin (INDOCIN) 50 MG capsule Take 50 mg by mouth as needed for moderate pain       isosorbide mononitrate (IMDUR) 60 MG 24 hr tablet Take 1 tablet (60 mg) by mouth daily 90 tablet 1     Multiple Vitamins-Minerals (CENTRUM SILVER) per tablet Take 1 tablet by mouth daily.       Multiple Vitamins-Minerals (PRESERVISION AREDS 2) CAPS Take by mouth 2 times daily       naproxen sodium (ALEVE) 220 MG tablet Take 220 mg by mouth 2 times daily (with meals)       nitroglycerin (NITROSTAT)  0.4 MG SL tablet Place 1 tablet (0.4 mg) under the tongue every 5 minutes as needed for chest pain 25 tablet 11     primidone (MYSOLINE) 250 MG tablet Take 250 mg by mouth 2 times daily       rosuvastatin (CRESTOR) 40 MG tablet Take 1 tablet by mouth daily. 30 tablet 1     [DISCONTINUED] ezetimibe (ZETIA) 10 MG tablet Take 1 tablet (10 mg) by mouth daily 90 tablet 3       ALLERGIES   No Known Allergies    PAST MEDICAL HISTORY:  Past Medical History:   Diagnosis Date     Arthritis      Carotid artery stenosis     Right, s/p right CEA 11/9/2016     Cholesterol serum elevated      Colon polyps      Color blind      Coronary artery disease     2/2012 - MERARI to mid LAD     Decreased hearing      Depression      Gout      Hypertension      Hypertrophy of prostate without urinary obstruction and other lower urinary tract symptoms (LUTS)      Impotence      Left bundle branch block      Onychomycosis of toenail      Other and unspecified hyperlipidemia      Paroxysmal ventricular tachycardia (H)     with stress test 2012       PAST SURGICAL HISTORY:  Past Surgical History:   Procedure Laterality Date     ANGIOPLASTY  1991     COLONOSCOPY  12/13/2011    Procedure:COLONOSCOPY; COLONOSCOPY; Surgeon:EMMANUELLE MOSER; Location: GI     CORONARY ANGIOGRAPHY ADULT ORDER  1991,2012 1991 - stent to proximal LAD, 2012 - Stent to mid LAD     ENDARTERECTOMY CAROTID Right 11/10/2016    Procedure: ENDARTERECTOMY CAROTID;  Surgeon: Paco Suresh MD;  Location:  OR     HEART CATH, ANGIOPLASTY       ORTHOPEDIC SURGERY       PHACOEMULSIFICATION CLEAR CORNEA WITH STANDARD INTRAOCULAR LENS IMPLANT  12/19/2013    Procedure: PHACOEMULSIFICATION CLEAR CORNEA WITH STANDARD INTRAOCULAR LENS IMPLANT;  RIGHT PHACOEMULSIFICATION CLEAR CORNEA WITH STANDARD INTRAOCULAR LENS IMPLANT ;  Surgeon: Luisito Juan MD;  Location: [facility]       FAMILY HISTORY:  Family History   Problem Relation Age of Onset     HEART DISEASE Mother 83      "HEART DISEASE Father 69     emphysema     Coronary Artery Disease Brother      Coronary Artery Disease Sister        SOCIAL HISTORY:  Social History     Social History     Marital status:      Spouse name: N/A     Number of children: N/A     Years of education: N/A     Social History Main Topics     Smoking status: Former Smoker     Packs/day: 0.50     Years: 20.00     Types: Cigarettes     Quit date: 12/13/1990     Smokeless tobacco: Never Used     Alcohol use 0.0 oz/week     0 Standard drinks or equivalent per week      Comment: daily - drinks x2     Drug use: No     Sexual activity: No     Other Topics Concern     Caffeine Concern No     1-2 cups daily     Sleep Concern No     Stress Concern No     Special Diet No     trying to watch sodium intake     Exercise No     some walking     Social History Narrative       Review of Systems:  Skin:  Negative       Eyes:  Positive for glasses;color blindness;cataracts pt reports cataracts surgery bilaterally a couple of years ago.  ENT:  Positive for nasal congestion    Respiratory:  Positive for shortness of breath;dyspnea on exertion Pt reports  on hills, stairs; coughing at night.   Cardiovascular:    edema;Positive for    Gastroenterology: Positive for heartburn;reflux Pt reports with acidic foots eaten at PM time frame;   Genitourinary:  Positive for nocturia hx of prostate surgery  Musculoskeletal:  Positive for arthritis of the knees, hips, ankles  Neurologic:  Positive for numbness or tingling of feet cold at night needs to wear socks now while sleeping  Psychiatric:  Negative      Heme/Lymph/Imm:  Negative      Endocrine:  Negative        Physical Exam:  Vitals: /70  Pulse 50  Ht 1.778 m (5' 10\")  Wt 100.2 kg (221 lb)  BMI 31.71 kg/m2    Constitutional:  cooperative, alert and oriented, well developed, well nourished, in no acute distress        Skin:  warm and dry to the touch, no apparent skin lesions or masses noted     right CEA scar    Head:  " normocephalic, no masses or lesions        Eyes:  pupils equal and round;conjunctivae and lids unremarkable;sclera white;no xanthalasma;no nystagmus        Lymph:      ENT:  no pallor or cyanosis, dentition good        Neck:  carotid pulses are full and equal bilaterally;no carotid bruit   prominent carotid pulsations at base of neck bilaterally. Well-healed right carotid endarterectomy scar    Respiratory:  normal breath sounds, clear to auscultation, normal A-P diameter, normal symmetry, normal respiratory excursion, no use of accessory muscles         Cardiac: regular rhythm;normal S1 and S2       systolic murmur;grade 1;RUSB        pulses full and equal                                        GI:           Extremities and Muscular Skeletal:  no spinal abnormalities noted;normal muscle strength and tone   bilateral LE edema;pitting;trace          Neurological:  no gross motor deficits        Psych:  affect appropriate, oriented to time, person and place        CC  No referring provider defined for this encounter.

## 2018-10-18 ENCOUNTER — HOSPITAL ENCOUNTER (OUTPATIENT)
Dept: LAB | Facility: CLINIC | Age: 78
Discharge: HOME | End: 2018-10-18
Attending: ORTHOPAEDIC SURGERY | Admitting: ORTHOPAEDIC SURGERY
Payer: MEDICARE

## 2018-10-18 DIAGNOSIS — Z01.812 PRE-OPERATIVE LABORATORY EXAMINATION: ICD-10-CM

## 2018-10-18 LAB
MRSA DNA SPEC QL NAA+PROBE: NEGATIVE
SPECIMEN SOURCE: NORMAL

## 2018-10-18 PROCEDURE — 40000830 ZZHCL STATISTIC STAPH AUREUS METH RESIST SCREEN PCR: Performed by: ORTHOPAEDIC SURGERY

## 2018-10-18 PROCEDURE — 40000829 ZZHCL STATISTIC STAPH AUREUS SUSCEPT SCREEN PCR: Performed by: ORTHOPAEDIC SURGERY

## 2018-10-18 PROCEDURE — 87640 STAPH A DNA AMP PROBE: CPT | Performed by: ORTHOPAEDIC SURGERY

## 2018-10-18 PROCEDURE — 87641 MR-STAPH DNA AMP PROBE: CPT | Performed by: ORTHOPAEDIC SURGERY

## 2018-11-12 ENCOUNTER — ANESTHESIA EVENT (OUTPATIENT)
Dept: SURGERY | Facility: CLINIC | Age: 78
DRG: 470 | End: 2018-11-12
Payer: MEDICARE

## 2018-11-12 ENCOUNTER — APPOINTMENT (OUTPATIENT)
Dept: PHYSICAL THERAPY | Facility: CLINIC | Age: 78
DRG: 470 | End: 2018-11-12
Attending: ORTHOPAEDIC SURGERY
Payer: MEDICARE

## 2018-11-12 ENCOUNTER — ANESTHESIA (OUTPATIENT)
Dept: SURGERY | Facility: CLINIC | Age: 78
DRG: 470 | End: 2018-11-12
Payer: MEDICARE

## 2018-11-12 ENCOUNTER — APPOINTMENT (OUTPATIENT)
Dept: GENERAL RADIOLOGY | Facility: CLINIC | Age: 78
DRG: 470 | End: 2018-11-12
Attending: ORTHOPAEDIC SURGERY
Payer: MEDICARE

## 2018-11-12 ENCOUNTER — HOSPITAL ENCOUNTER (INPATIENT)
Facility: CLINIC | Age: 78
LOS: 2 days | Discharge: HOME | DRG: 470 | End: 2018-11-14
Attending: ORTHOPAEDIC SURGERY | Admitting: ORTHOPAEDIC SURGERY
Payer: MEDICARE

## 2018-11-12 DIAGNOSIS — Z96.652 STATUS POST TOTAL LEFT KNEE REPLACEMENT: Primary | ICD-10-CM

## 2018-11-12 DIAGNOSIS — I65.21 STENOSIS OF RIGHT INTERNAL CAROTID ARTERY: ICD-10-CM

## 2018-11-12 PROBLEM — Z96.659 S/P TOTAL KNEE ARTHROPLASTY: Status: ACTIVE | Noted: 2018-11-12

## 2018-11-12 LAB
CREAT SERPL-MCNC: 1.45 MG/DL (ref 0.66–1.25)
GFR SERPL CREATININE-BSD FRML MDRD: 47 ML/MIN/1.7M2
POTASSIUM SERPL-SCNC: 4.4 MMOL/L (ref 3.4–5.3)

## 2018-11-12 PROCEDURE — 97110 THERAPEUTIC EXERCISES: CPT | Mod: GP

## 2018-11-12 PROCEDURE — 0SRD0J9 REPLACEMENT OF LEFT KNEE JOINT WITH SYNTHETIC SUBSTITUTE, CEMENTED, OPEN APPROACH: ICD-10-PCS | Performed by: ORTHOPAEDIC SURGERY

## 2018-11-12 PROCEDURE — 36000093 ZZH SURGERY LEVEL 4 1ST 30 MIN: Performed by: ORTHOPAEDIC SURGERY

## 2018-11-12 PROCEDURE — 71000012 ZZH RECOVERY PHASE 1 LEVEL 1 FIRST HR: Performed by: ORTHOPAEDIC SURGERY

## 2018-11-12 PROCEDURE — 27810169 ZZH OR IMPLANT GENERAL: Performed by: ORTHOPAEDIC SURGERY

## 2018-11-12 PROCEDURE — 27210794 ZZH OR GENERAL SUPPLY STERILE: Performed by: ORTHOPAEDIC SURGERY

## 2018-11-12 PROCEDURE — 97530 THERAPEUTIC ACTIVITIES: CPT | Mod: GP

## 2018-11-12 PROCEDURE — 25000125 ZZHC RX 250: Performed by: ORTHOPAEDIC SURGERY

## 2018-11-12 PROCEDURE — 37000008 ZZH ANESTHESIA TECHNICAL FEE, 1ST 30 MIN: Performed by: ORTHOPAEDIC SURGERY

## 2018-11-12 PROCEDURE — 36415 COLL VENOUS BLD VENIPUNCTURE: CPT | Performed by: ANESTHESIOLOGY

## 2018-11-12 PROCEDURE — A9270 NON-COVERED ITEM OR SERVICE: HCPCS | Mod: GY | Performed by: PHYSICIAN ASSISTANT

## 2018-11-12 PROCEDURE — 37000009 ZZH ANESTHESIA TECHNICAL FEE, EACH ADDTL 15 MIN: Performed by: ORTHOPAEDIC SURGERY

## 2018-11-12 PROCEDURE — 84132 ASSAY OF SERUM POTASSIUM: CPT | Performed by: ANESTHESIOLOGY

## 2018-11-12 PROCEDURE — C1776 JOINT DEVICE (IMPLANTABLE): HCPCS | Performed by: ORTHOPAEDIC SURGERY

## 2018-11-12 PROCEDURE — 99222 1ST HOSP IP/OBS MODERATE 55: CPT | Performed by: INTERNAL MEDICINE

## 2018-11-12 PROCEDURE — 25800025 ZZH RX 258: Performed by: ORTHOPAEDIC SURGERY

## 2018-11-12 PROCEDURE — 82565 ASSAY OF CREATININE: CPT | Performed by: ANESTHESIOLOGY

## 2018-11-12 PROCEDURE — 25000128 H RX IP 250 OP 636: Performed by: ANESTHESIOLOGY

## 2018-11-12 PROCEDURE — 25000132 ZZH RX MED GY IP 250 OP 250 PS 637: Mod: GY | Performed by: ORTHOPAEDIC SURGERY

## 2018-11-12 PROCEDURE — A9270 NON-COVERED ITEM OR SERVICE: HCPCS | Mod: GY | Performed by: ORTHOPAEDIC SURGERY

## 2018-11-12 PROCEDURE — 25000128 H RX IP 250 OP 636

## 2018-11-12 PROCEDURE — C1713 ANCHOR/SCREW BN/BN,TIS/BN: HCPCS | Performed by: ORTHOPAEDIC SURGERY

## 2018-11-12 PROCEDURE — 40000193 ZZH STATISTIC PT WARD VISIT

## 2018-11-12 PROCEDURE — 97161 PT EVAL LOW COMPLEX 20 MIN: CPT | Mod: GP

## 2018-11-12 PROCEDURE — 25000132 ZZH RX MED GY IP 250 OP 250 PS 637: Mod: GY | Performed by: PHYSICIAN ASSISTANT

## 2018-11-12 PROCEDURE — 99207 ZZC CONSULT E&M CHANGED TO INITIAL LEVEL: CPT | Performed by: INTERNAL MEDICINE

## 2018-11-12 PROCEDURE — 25000125 ZZHC RX 250: Performed by: PHYSICIAN ASSISTANT

## 2018-11-12 PROCEDURE — 36000063 ZZH SURGERY LEVEL 4 EA 15 ADDTL MIN: Performed by: ORTHOPAEDIC SURGERY

## 2018-11-12 PROCEDURE — 25000125 ZZHC RX 250

## 2018-11-12 PROCEDURE — 25000128 H RX IP 250 OP 636: Performed by: PHYSICIAN ASSISTANT

## 2018-11-12 PROCEDURE — 25000128 H RX IP 250 OP 636: Performed by: ORTHOPAEDIC SURGERY

## 2018-11-12 PROCEDURE — 73560 X-RAY EXAM OF KNEE 1 OR 2: CPT | Mod: LT

## 2018-11-12 PROCEDURE — 40000171 ZZH STATISTIC PRE-PROCEDURE ASSESSMENT III: Performed by: ORTHOPAEDIC SURGERY

## 2018-11-12 PROCEDURE — 25000125 ZZHC RX 250: Performed by: ANESTHESIOLOGY

## 2018-11-12 PROCEDURE — 12000007 ZZH R&B INTERMEDIATE

## 2018-11-12 DEVICE — IMP ART SURFACE ZIM NEXGEN LPS EF 5-6 10MM 00-5964-040-10: Type: IMPLANTABLE DEVICE | Site: KNEE | Status: FUNCTIONAL

## 2018-11-12 DEVICE — IMP PLATE TIBIAL ZIM NEXGEN SIZE 5 00-5980-047-01: Type: IMPLANTABLE DEVICE | Site: KNEE | Status: FUNCTIONAL

## 2018-11-12 DEVICE — BONE CEMENT SIMPLEX FULL DOSE 6191-1-001: Type: IMPLANTABLE DEVICE | Site: KNEE | Status: FUNCTIONAL

## 2018-11-12 DEVICE — IMP COMP PATELLA ZIM NEXGEN 9.0X35MM: Type: IMPLANTABLE DEVICE | Site: KNEE | Status: FUNCTIONAL

## 2018-11-12 DEVICE — IMP COMP FEMORAL ZIM NEXGEN F STD PS LT 00-5996-016-51: Type: IMPLANTABLE DEVICE | Site: KNEE | Status: FUNCTIONAL

## 2018-11-12 RX ORDER — ACETAMINOPHEN 325 MG/1
975 TABLET ORAL EVERY 8 HOURS
Status: DISCONTINUED | OUTPATIENT
Start: 2018-11-12 | End: 2018-11-14 | Stop reason: HOSPADM

## 2018-11-12 RX ORDER — DIPHENHYDRAMINE HYDROCHLORIDE 50 MG/ML
12.5 INJECTION INTRAMUSCULAR; INTRAVENOUS EVERY 6 HOURS PRN
Status: DISCONTINUED | OUTPATIENT
Start: 2018-11-12 | End: 2018-11-14 | Stop reason: HOSPADM

## 2018-11-12 RX ORDER — NALOXONE HYDROCHLORIDE 0.4 MG/ML
.1-.4 INJECTION, SOLUTION INTRAMUSCULAR; INTRAVENOUS; SUBCUTANEOUS
Status: ACTIVE | OUTPATIENT
Start: 2018-11-12 | End: 2018-11-13

## 2018-11-12 RX ORDER — BUPIVACAINE HYDROCHLORIDE 5 MG/ML
INJECTION, SOLUTION EPIDURAL; INTRACAUDAL PRN
Status: DISCONTINUED | OUTPATIENT
Start: 2018-11-12 | End: 2018-11-12

## 2018-11-12 RX ORDER — GLYCOPYRROLATE 0.2 MG/ML
INJECTION, SOLUTION INTRAMUSCULAR; INTRAVENOUS PRN
Status: DISCONTINUED | OUTPATIENT
Start: 2018-11-12 | End: 2018-11-12

## 2018-11-12 RX ORDER — PROPRANOLOL HYDROCHLORIDE 80 MG/1
80 CAPSULE, EXTENDED RELEASE ORAL DAILY
Status: ON HOLD | COMMUNITY
End: 2019-12-12

## 2018-11-12 RX ORDER — PROCHLORPERAZINE MALEATE 5 MG
5 TABLET ORAL EVERY 6 HOURS PRN
Status: DISCONTINUED | OUTPATIENT
Start: 2018-11-12 | End: 2018-11-14 | Stop reason: HOSPADM

## 2018-11-12 RX ORDER — CELECOXIB 200 MG/1
200 CAPSULE ORAL DAILY
Status: DISCONTINUED | OUTPATIENT
Start: 2018-11-13 | End: 2018-11-14

## 2018-11-12 RX ORDER — ROSUVASTATIN CALCIUM 20 MG/1
40 TABLET, COATED ORAL DAILY
Status: DISCONTINUED | OUTPATIENT
Start: 2018-11-13 | End: 2018-11-14 | Stop reason: HOSPADM

## 2018-11-12 RX ORDER — LIDOCAINE HYDROCHLORIDE 10 MG/ML
INJECTION, SOLUTION INFILTRATION; PERINEURAL PRN
Status: DISCONTINUED | OUTPATIENT
Start: 2018-11-12 | End: 2018-11-12

## 2018-11-12 RX ORDER — ONDANSETRON 4 MG/1
4 TABLET, ORALLY DISINTEGRATING ORAL EVERY 6 HOURS PRN
Status: DISCONTINUED | OUTPATIENT
Start: 2018-11-12 | End: 2018-11-14 | Stop reason: HOSPADM

## 2018-11-12 RX ORDER — LIDOCAINE 40 MG/G
CREAM TOPICAL
Status: DISCONTINUED | OUTPATIENT
Start: 2018-11-12 | End: 2018-11-14 | Stop reason: HOSPADM

## 2018-11-12 RX ORDER — NALOXONE HYDROCHLORIDE 0.4 MG/ML
.1-.4 INJECTION, SOLUTION INTRAMUSCULAR; INTRAVENOUS; SUBCUTANEOUS
Status: DISCONTINUED | OUTPATIENT
Start: 2018-11-12 | End: 2018-11-14 | Stop reason: HOSPADM

## 2018-11-12 RX ORDER — DEXAMETHASONE SODIUM PHOSPHATE 4 MG/ML
INJECTION, SOLUTION INTRA-ARTICULAR; INTRALESIONAL; INTRAMUSCULAR; INTRAVENOUS; SOFT TISSUE PRN
Status: DISCONTINUED | OUTPATIENT
Start: 2018-11-12 | End: 2018-11-12

## 2018-11-12 RX ORDER — BUPIVACAINE HYDROCHLORIDE AND EPINEPHRINE 5; 5 MG/ML; UG/ML
INJECTION, SOLUTION PERINEURAL PRN
Status: DISCONTINUED | OUTPATIENT
Start: 2018-11-12 | End: 2018-11-12

## 2018-11-12 RX ORDER — ONDANSETRON 2 MG/ML
INJECTION INTRAMUSCULAR; INTRAVENOUS PRN
Status: DISCONTINUED | OUTPATIENT
Start: 2018-11-12 | End: 2018-11-12

## 2018-11-12 RX ORDER — OXYCODONE HYDROCHLORIDE 5 MG/1
5-10 TABLET ORAL EVERY 4 HOURS PRN
Status: DISCONTINUED | OUTPATIENT
Start: 2018-11-12 | End: 2018-11-14 | Stop reason: HOSPADM

## 2018-11-12 RX ORDER — CEFAZOLIN SODIUM 2 G/100ML
2 INJECTION, SOLUTION INTRAVENOUS
Status: COMPLETED | OUTPATIENT
Start: 2018-11-12 | End: 2018-11-12

## 2018-11-12 RX ORDER — FENTANYL CITRATE 50 UG/ML
INJECTION, SOLUTION INTRAMUSCULAR; INTRAVENOUS PRN
Status: DISCONTINUED | OUTPATIENT
Start: 2018-11-12 | End: 2018-11-12

## 2018-11-12 RX ORDER — SODIUM CHLORIDE, SODIUM LACTATE, POTASSIUM CHLORIDE, CALCIUM CHLORIDE 600; 310; 30; 20 MG/100ML; MG/100ML; MG/100ML; MG/100ML
INJECTION, SOLUTION INTRAVENOUS CONTINUOUS
Status: DISCONTINUED | OUTPATIENT
Start: 2018-11-12 | End: 2018-11-12 | Stop reason: HOSPADM

## 2018-11-12 RX ORDER — OXYCODONE HCL 10 MG/1
10 TABLET, FILM COATED, EXTENDED RELEASE ORAL ONCE
Status: COMPLETED | OUTPATIENT
Start: 2018-11-12 | End: 2018-11-12

## 2018-11-12 RX ORDER — HYDROMORPHONE HYDROCHLORIDE 1 MG/ML
.3-.5 INJECTION, SOLUTION INTRAMUSCULAR; INTRAVENOUS; SUBCUTANEOUS
Status: DISCONTINUED | OUTPATIENT
Start: 2018-11-12 | End: 2018-11-14 | Stop reason: HOSPADM

## 2018-11-12 RX ORDER — ATENOLOL 25 MG/1
25 TABLET ORAL DAILY
Status: DISCONTINUED | OUTPATIENT
Start: 2018-11-13 | End: 2018-11-12

## 2018-11-12 RX ORDER — SODIUM CHLORIDE, SODIUM LACTATE, POTASSIUM CHLORIDE, CALCIUM CHLORIDE 600; 310; 30; 20 MG/100ML; MG/100ML; MG/100ML; MG/100ML
INJECTION, SOLUTION INTRAVENOUS CONTINUOUS
Status: DISCONTINUED | OUTPATIENT
Start: 2018-11-12 | End: 2018-11-14

## 2018-11-12 RX ORDER — HYDRALAZINE HYDROCHLORIDE 20 MG/ML
10 INJECTION INTRAMUSCULAR; INTRAVENOUS EVERY 4 HOURS PRN
Status: DISCONTINUED | OUTPATIENT
Start: 2018-11-12 | End: 2018-11-14 | Stop reason: HOSPADM

## 2018-11-12 RX ORDER — KETAMINE HYDROCHLORIDE 10 MG/ML
INJECTION INTRAMUSCULAR; INTRAVENOUS PRN
Status: DISCONTINUED | OUTPATIENT
Start: 2018-11-12 | End: 2018-11-12

## 2018-11-12 RX ORDER — ONDANSETRON 2 MG/ML
4 INJECTION INTRAMUSCULAR; INTRAVENOUS EVERY 6 HOURS PRN
Status: DISCONTINUED | OUTPATIENT
Start: 2018-11-12 | End: 2018-11-14 | Stop reason: HOSPADM

## 2018-11-12 RX ORDER — CELECOXIB 200 MG/1
400 CAPSULE ORAL ONCE
Status: COMPLETED | OUTPATIENT
Start: 2018-11-12 | End: 2018-11-12

## 2018-11-12 RX ORDER — LIDOCAINE 40 MG/G
CREAM TOPICAL
Status: DISCONTINUED | OUTPATIENT
Start: 2018-11-12 | End: 2018-11-12 | Stop reason: HOSPADM

## 2018-11-12 RX ORDER — CEFAZOLIN SODIUM 2 G/100ML
2 INJECTION, SOLUTION INTRAVENOUS EVERY 8 HOURS
Status: COMPLETED | OUTPATIENT
Start: 2018-11-12 | End: 2018-11-13

## 2018-11-12 RX ORDER — CEFAZOLIN SODIUM 1 G/3ML
1 INJECTION, POWDER, FOR SOLUTION INTRAMUSCULAR; INTRAVENOUS SEE ADMIN INSTRUCTIONS
Status: DISCONTINUED | OUTPATIENT
Start: 2018-11-12 | End: 2018-11-12 | Stop reason: HOSPADM

## 2018-11-12 RX ORDER — ONDANSETRON 2 MG/ML
4 INJECTION INTRAMUSCULAR; INTRAVENOUS EVERY 30 MIN PRN
Status: DISCONTINUED | OUTPATIENT
Start: 2018-11-12 | End: 2018-11-12 | Stop reason: HOSPADM

## 2018-11-12 RX ORDER — FERROUS GLUCONATE 324(38)MG
324 TABLET ORAL DAILY
Status: DISCONTINUED | OUTPATIENT
Start: 2018-11-12 | End: 2018-11-14 | Stop reason: HOSPADM

## 2018-11-12 RX ORDER — METOPROLOL TARTRATE 1 MG/ML
1-2 INJECTION, SOLUTION INTRAVENOUS EVERY 5 MIN PRN
Status: DISCONTINUED | OUTPATIENT
Start: 2018-11-12 | End: 2018-11-12 | Stop reason: HOSPADM

## 2018-11-12 RX ORDER — ONDANSETRON 4 MG/1
4 TABLET, ORALLY DISINTEGRATING ORAL EVERY 30 MIN PRN
Status: DISCONTINUED | OUTPATIENT
Start: 2018-11-12 | End: 2018-11-12 | Stop reason: HOSPADM

## 2018-11-12 RX ORDER — PROPRANOLOL HYDROCHLORIDE 80 MG/1
80 CAPSULE, EXTENDED RELEASE ORAL EVERY EVENING
Status: DISCONTINUED | OUTPATIENT
Start: 2018-11-12 | End: 2018-11-12

## 2018-11-12 RX ORDER — AMOXICILLIN 250 MG
2 CAPSULE ORAL 2 TIMES DAILY
Status: DISCONTINUED | OUTPATIENT
Start: 2018-11-12 | End: 2018-11-14 | Stop reason: HOSPADM

## 2018-11-12 RX ORDER — FENTANYL CITRATE 50 UG/ML
25-50 INJECTION, SOLUTION INTRAMUSCULAR; INTRAVENOUS
Status: DISCONTINUED | OUTPATIENT
Start: 2018-11-12 | End: 2018-11-12 | Stop reason: HOSPADM

## 2018-11-12 RX ORDER — ACETAMINOPHEN 325 MG/1
650 TABLET ORAL EVERY 4 HOURS PRN
Status: DISCONTINUED | OUTPATIENT
Start: 2018-11-15 | End: 2018-11-14 | Stop reason: HOSPADM

## 2018-11-12 RX ORDER — AMOXICILLIN 250 MG
1 CAPSULE ORAL 2 TIMES DAILY
Status: DISCONTINUED | OUTPATIENT
Start: 2018-11-12 | End: 2018-11-14 | Stop reason: HOSPADM

## 2018-11-12 RX ORDER — DIPHENHYDRAMINE HCL 12.5MG/5ML
12.5 LIQUID (ML) ORAL EVERY 6 HOURS PRN
Status: DISCONTINUED | OUTPATIENT
Start: 2018-11-12 | End: 2018-11-14 | Stop reason: HOSPADM

## 2018-11-12 RX ORDER — ISOSORBIDE MONONITRATE 60 MG/1
60 TABLET, EXTENDED RELEASE ORAL DAILY
Status: DISCONTINUED | OUTPATIENT
Start: 2018-11-13 | End: 2018-11-14 | Stop reason: HOSPADM

## 2018-11-12 RX ORDER — NITROGLYCERIN 0.4 MG/1
0.4 TABLET SUBLINGUAL EVERY 5 MIN PRN
Status: DISCONTINUED | OUTPATIENT
Start: 2018-11-12 | End: 2018-11-14 | Stop reason: HOSPADM

## 2018-11-12 RX ADMIN — OXYCODONE HYDROCHLORIDE 10 MG: 10 TABLET, FILM COATED, EXTENDED RELEASE ORAL at 06:30

## 2018-11-12 RX ADMIN — SODIUM CHLORIDE, POTASSIUM CHLORIDE, SODIUM LACTATE AND CALCIUM CHLORIDE: 600; 310; 30; 20 INJECTION, SOLUTION INTRAVENOUS at 13:59

## 2018-11-12 RX ADMIN — ACETAMINOPHEN 975 MG: 325 TABLET, FILM COATED ORAL at 16:57

## 2018-11-12 RX ADMIN — MIDAZOLAM 2 MG: 1 INJECTION INTRAMUSCULAR; INTRAVENOUS at 07:19

## 2018-11-12 RX ADMIN — SENNOSIDES AND DOCUSATE SODIUM 1 TABLET: 8.6; 5 TABLET ORAL at 19:25

## 2018-11-12 RX ADMIN — LIDOCAINE HYDROCHLORIDE 40 MG: 10 INJECTION, SOLUTION INFILTRATION; PERINEURAL at 07:30

## 2018-11-12 RX ADMIN — SENNOSIDES AND DOCUSATE SODIUM 1 TABLET: 8.6; 5 TABLET ORAL at 11:08

## 2018-11-12 RX ADMIN — CEFAZOLIN SODIUM 2 G: 2 INJECTION, SOLUTION INTRAVENOUS at 07:31

## 2018-11-12 RX ADMIN — SODIUM CHLORIDE, POTASSIUM CHLORIDE, SODIUM LACTATE AND CALCIUM CHLORIDE: 600; 310; 30; 20 INJECTION, SOLUTION INTRAVENOUS at 06:55

## 2018-11-12 RX ADMIN — MIDAZOLAM 1 MG: 1 INJECTION INTRAMUSCULAR; INTRAVENOUS at 07:30

## 2018-11-12 RX ADMIN — FENTANYL CITRATE 100 MCG: 50 INJECTION, SOLUTION INTRAMUSCULAR; INTRAVENOUS at 07:06

## 2018-11-12 RX ADMIN — GLYCOPYRROLATE 0.4 MG: 0.2 INJECTION, SOLUTION INTRAMUSCULAR; INTRAVENOUS at 07:37

## 2018-11-12 RX ADMIN — Medication 2 LOZENGE: at 13:57

## 2018-11-12 RX ADMIN — ACETAMINOPHEN 975 MG: 325 TABLET, FILM COATED ORAL at 11:05

## 2018-11-12 RX ADMIN — CELECOXIB 400 MG: 200 CAPSULE ORAL at 06:30

## 2018-11-12 RX ADMIN — BUPIVACAINE HYDROCHLORIDE AND EPINEPHRINE BITARTRATE 30 ML: 5; .005 INJECTION, SOLUTION EPIDURAL; INTRACAUDAL; PERINEURAL at 07:16

## 2018-11-12 RX ADMIN — KETAMINE HCL-NACL SOLN PREF SY 50 MG/5ML-0.9% (10MG/ML) 20 MG: 10 SOLUTION PREFILLED SYRINGE at 07:44

## 2018-11-12 RX ADMIN — MIDAZOLAM 1 MG: 1 INJECTION INTRAMUSCULAR; INTRAVENOUS at 07:45

## 2018-11-12 RX ADMIN — DEXAMETHASONE SODIUM PHOSPHATE 8 MG: 4 INJECTION, SOLUTION INTRA-ARTICULAR; INTRALESIONAL; INTRAMUSCULAR; INTRAVENOUS; SOFT TISSUE at 07:45

## 2018-11-12 RX ADMIN — Medication 1 G: at 07:36

## 2018-11-12 RX ADMIN — GLYCOPYRROLATE 0.2 MG: 0.2 INJECTION, SOLUTION INTRAMUSCULAR; INTRAVENOUS at 08:22

## 2018-11-12 RX ADMIN — BUPIVACAINE HYDROCHLORIDE 2.6 ML: 5 INJECTION, SOLUTION EPIDURAL; INTRACAUDAL at 07:09

## 2018-11-12 RX ADMIN — OXYCODONE HYDROCHLORIDE 5 MG: 5 TABLET ORAL at 11:06

## 2018-11-12 RX ADMIN — FERROUS GLUCONATE 324 MG: 324 TABLET ORAL at 11:05

## 2018-11-12 RX ADMIN — CEFAZOLIN SODIUM 2 G: 2 INJECTION, SOLUTION INTRAVENOUS at 14:45

## 2018-11-12 RX ADMIN — KETAMINE HCL-NACL SOLN PREF SY 50 MG/5ML-0.9% (10MG/ML) 10 MG: 10 SOLUTION PREFILLED SYRINGE at 08:22

## 2018-11-12 RX ADMIN — ONDANSETRON 4 MG: 2 INJECTION INTRAMUSCULAR; INTRAVENOUS at 07:46

## 2018-11-12 RX ADMIN — SODIUM CHLORIDE, POTASSIUM CHLORIDE, SODIUM LACTATE AND CALCIUM CHLORIDE: 600; 310; 30; 20 INJECTION, SOLUTION INTRAVENOUS at 08:00

## 2018-11-12 RX ADMIN — OXYCODONE HYDROCHLORIDE 10 MG: 5 TABLET ORAL at 19:25

## 2018-11-12 RX ADMIN — OXYCODONE HYDROCHLORIDE 10 MG: 5 TABLET ORAL at 14:45

## 2018-11-12 ASSESSMENT — ENCOUNTER SYMPTOMS: DYSRHYTHMIAS: 0

## 2018-11-12 ASSESSMENT — ACTIVITIES OF DAILY LIVING (ADL)
ADLS_ACUITY_SCORE: 9
ADLS_ACUITY_SCORE: 8
ADLS_ACUITY_SCORE: 9

## 2018-11-12 ASSESSMENT — LIFESTYLE VARIABLES: TOBACCO_USE: 0

## 2018-11-12 NOTE — PLAN OF CARE
Problem: Patient Care Overview  Goal: Plan of Care/Patient Progress Review  Outcome: No Change  Arrived from PACU 0955 s/p LTKA. A & Ox4, VSS, -150/50-60, bradycardic HR 40-50's, baseline HR. Capnography continuously alarming due bradycardia, per surgery PA okay to use pulse oximeter.  O2 1L/NC, sats mid 90's. LS clear. Using incentive spirometer, up to 2500.  Has baseline cough.  Tolerating clear/fulls, may order regular diet, denies nausea.  Negative flatus, hypo bowel sounds.  LLE with ace wrap on, CMS intact.  Pain rated 2-3, managed with prn oxycodone/ice pack.   PT/OT following, PT to see this afternoon.  Siddiqui patent.  Hemovac with 100cc bright red output.  Dressings c/d/i.  Leg in full extension.   Continue to monitor, continue with plan of care.

## 2018-11-12 NOTE — CONSULTS
[facility]  Hospitalist H&P    Name: Iván Nicholas      MRN: [id number]  YOB: 1940    Age: 78 year old  Date of admission: 11/12/2018  Primary care provider: Stephan Nice            Assessment and Plan:   Iván Nicholas is a 78 year old male with a history of coronary artery disease, hyperlipidemia, prostate cancer, BPH, hypertension, gout, depression, stroke, and osteoarthritis who presents with left total knee arthroplasty.    1.  Left total knee arthroplasty.  Done on 11/12/18.  Pain medications as needed.  Use incentive spirometry.  Work with therapy.    2.  Bradycardia.  Hold atenolol and propranolol for now.  Monitor on telemetry.    3.  Coronary artery disease.  Continue aspirin, Imdur, and rosuvastatin.  Hold atenolol and propranolol initially due to bradycardia.  Likely able to restart propranolol tomorrow.    4.  Hypertension.  Continue Imdur.  Hold atenolol and propranolol due to bradycardia.  Have IV hydralazine available as needed.    5.  Hyperlipidemia.  Continue rosuvastatin.    6.  Acute kidney injury on chronic kidney disease.  Recheck metabolic panel tomorrow.  Avoid nephrotoxins as able.  Continuous IV fluids.    Code status: Full code.  Prophylaxis: Aspirin              Chief Complaint:   Left total knee arthroplasty.         History of Present Illness:   Iván Nicholas is a 78 year old male who presents with left total knee arthroplasty on 11/12/18.  Currently having some left knee pain.  Denies nausea or shortness of breath.  No other complaints at this time.  He did take his usual home medications before surgery today.  He does note that he was feeling anxious before surgery.  Feels that his anxiety may have been causing earlier noted hypertension.  No other complaints.            Past Medical History:     Past Medical History:   Diagnosis Date     Arthritis      Carotid artery stenosis     Right, s/p right CEA 11/9/2016      Cerebral artery occlusion with cerebral infarction (H) 2016    TIA -  Endarterectomy...No residual     Cholesterol serum elevated      Colon polyps      Color blind      Coronary artery disease     2/2012 - MERARI to mid LAD     Decreased hearing      Depression      Gout      Hypertension      Hypertrophy of prostate without urinary obstruction and other lower urinary tract symptoms (LUTS)      Impotence      Left bundle branch block      Onychomycosis of toenail      Other and unspecified hyperlipidemia      Paroxysmal ventricular tachycardia (H)     with stress test 2012     Stented coronary artery 2013    one stent             Past Surgical History:     Past Surgical History:   Procedure Laterality Date     ANGIOPLASTY  1991     COLONOSCOPY  12/13/2011    Procedure:COLONOSCOPY; COLONOSCOPY; Surgeon:EMMANUELLE MOSER; Location: GI     CORONARY ANGIOGRAPHY ADULT ORDER  1991,2012 1991 - stent to proximal LAD, 2012 - Stent to mid LAD     ENDARTERECTOMY CAROTID Right 11/10/2016    Procedure: ENDARTERECTOMY CAROTID;  Surgeon: Paco Suresh MD;  Location:  OR     HEART CATH, ANGIOPLASTY       ORTHOPEDIC SURGERY       PHACOEMULSIFICATION CLEAR CORNEA WITH STANDARD INTRAOCULAR LENS IMPLANT  12/19/2013    Procedure: PHACOEMULSIFICATION CLEAR CORNEA WITH STANDARD INTRAOCULAR LENS IMPLANT;  RIGHT PHACOEMULSIFICATION CLEAR CORNEA WITH STANDARD INTRAOCULAR LENS IMPLANT ;  Surgeon: Luisito Juan MD;  Location: [facility]             Social History:     Social History   Substance Use Topics     Smoking status: Former Smoker     Packs/day: 0.50     Years: 20.00     Types: Cigarettes     Quit date: 12/13/1990     Smokeless tobacco: Never Used     Alcohol use 0.0 oz/week     0 Standard drinks or equivalent per week      Comment: daily - drinks x2             Family History:   Mother, father, and 2 brothers with coronary artery disease.         Allergies:     Allergies   Allergen Reactions     Hmg-Coa-R Inhibitors  Muscle Pain (Myalgia)     lipitor             Medications:     Prior to Admission medications    Medication Sig Last Dose Taking? Auth Provider   ALLOPURINOL PO Take 100 mg by mouth daily. To prevent gout attacks, recently started.  11/12/2018 at Unknown time Yes Unknown, Entered By History   aspirin EC 81 MG EC tablet Take 1 tablet (81 mg) by mouth daily 11/6/2018 Yes Abner Maurice PA-C   atenolol (TENORMIN) 25 MG tablet Take 1 tablet by mouth daily. Hold IF heart rate less than 55. 11/12/2018 at Unknown time Yes Akhil Mendoza MD   ezetimibe (ZETIA) 10 MG tablet Take 1 tablet (10 mg) by mouth daily 11/12/2018 at Unknown time Yes Akhil Mendoza MD   ibuprofen (ADVIL) 200 MG capsule Take 200 mg by mouth every 4 hours as needed for fever 11/6/2018 Yes Reported, Patient   isosorbide mononitrate (IMDUR) 60 MG 24 hr tablet Take 1 tablet (60 mg) by mouth daily 11/12/2018 at Unknown time Yes Akhil Mendoza MD   Multiple Vitamins-Minerals (CENTRUM SILVER) per tablet Take 1 tablet by mouth daily. 11/9/2018 Yes Unknown, Entered By History   Multiple Vitamins-Minerals (PRESERVISION AREDS 2) CAPS Take by mouth 2 times daily 11/10/2018 Yes Reported, Patient   naproxen sodium (ALEVE) 220 MG tablet Take 220 mg by mouth 2 times daily (with meals) 11/6/2018 Yes Unknown, Entered By History   propranolol (INDERAL LA) 80 MG 24 hr capsule Take 80 mg by mouth daily 11/11/2018 at pm Yes Unknown, Entered By History   rosuvastatin (CRESTOR) 40 MG tablet Take 1 tablet by mouth daily. 11/12/2018 at Unknown time Yes Akhil Mendoza MD   nitroglycerin (NITROSTAT) 0.4 MG SL tablet Place 1 tablet (0.4 mg) under the tongue every 5 minutes as needed for chest pain Unknown at Unknown time  Akhil Mendoza MD   primidone (MYSOLINE) 250 MG tablet Take 250 mg by mouth 2 times daily   Reported, Patient             Review of Systems:   A Comprehensive greater than 10 system review of systems was carried out.  Pertinent  "positives and negatives are noted above.  Otherwise negative for contributory information.           Physical Exam:   Blood pressure 147/69, temperature 94.2  F (34.6  C), resp. rate 16, height 1.8 m (5' 10.87\"), weight 98.4 kg (217 lb), SpO2 96 %.  Wt Readings from Last 1 Encounters:   11/12/18 98.4 kg (217 lb)     Exam:  GENERAL: No apparent distress. Awake, alert, and fully oriented.  HEENT: Normocephalic, atraumatic. Extraocular movements intact.  CARDIOVASCULAR: Regular.  Bradycardic.  No murmur.  PULMONARY: Clear to auscultation bilaterally.  ABDOMINAL: Soft, non-tender, non-distended. Bowel sounds normoactive.   EXTREMITIES: No cyanosis or clubbing. No appreciable edema.  Left leg dressings not removed.  NEUROLOGICAL: CN 2-12 grossly intact, no focal neurological deficits.  DERMATOLOGICAL: No rash, ulcer, bruising, nor jaundice.          Data:       Laboratory:  No results for input(s): WBC, HGB, HCT, MCV, PLT in the last 168 hours.    Recent Labs  Lab 11/12/18  0649   POTASSIUM 4.4   CR 1.45*   GFRESTIMATED 47*   GFRESTBLACK 57*     No results for input(s): CULT in the last 168 hours.    Imaging:  Recent Results (from the past 24 hour(s))   XR Knee Port Left 1/2 Views    Narrative    XR KNEE PORT LT 1/2 VW 11/12/2018 9:33 AM    HISTORY: Knee replacement.    COMPARISON: None.    FINDINGS: Left knee arthroplasty hardware components appear  well-seated without radiographic evidence of complication.      Impression    IMPRESSION: Left knee arthroplasty.    ANSON MITCHELL MD         "

## 2018-11-12 NOTE — ANESTHESIA PROCEDURE NOTES
Peripheral nerve/Neuraxial procedure note : Saphenous  Pre-Procedure  Performed by IRENE HEBERT  Referred by DANYEL  Location: pre-op      Pre-Anesthestic Checklist: patient identified, IV checked, site marked, risks and benefits discussed, informed consent, monitors and equipment checked, pre-op evaluation, at physician/surgeon's request and post-op pain management    Timeout  Correct Patient: Yes   Correct Procedure: Yes   Correct Site: Yes   Correct Laterality: Yes   Correct Position: Yes   Site Marked: Yes   .   Procedure Documentation    .    Procedure:  left  Saphenous.     Ultrasound used to identify targeted nerve, plexus, or vascular marker and placed a needle adjacent to it., Ultrasound was used to visualize the spread of the anesthetic in close proximity to the above stated nerve.   Patient Prep;mask, sterile gloves, povidone-iodine 7.5% surgical scrub.  .  Needle: insulated, short bevel Needle Gauge: 20.    Needle Length (Inches) 2  Insertion Method: Single Shot.       Assessment/Narrative  Paresthesias: No.  Injection made incrementally with aspirations every 5 mL..  The placement was negative for: blood aspirated, painful injection and site bleeding.  Bolus given via needle..   Secured via.   Complications: none. Test dose of mL at. Test dose negative for signs of intravascular, subdural or intrathecal injection. Comments:  The surgeon has given a verbal order transferring care of this patient to me for the performance of a regional analgesia block for post-op pain control. It is requested of me because I am uniquely trained and qualified to perform this block and the surgeon is neither trained nor qualified to perform this procedure.

## 2018-11-12 NOTE — PHARMACY-ADMISSION MEDICATION HISTORY
Medication reconciliation completed by pre-admitting.    Prior to Admission medications    Medication Sig Last Dose Taking? Auth Provider   ALLOPURINOL PO Take 100 mg by mouth daily. To prevent gout attacks, recently started.  11/12/2018 at Unknown time Yes Unknown, Entered By History   aspirin EC 81 MG EC tablet Take 1 tablet (81 mg) by mouth daily 11/6/2018 Yes Abner Maurice PA-C   atenolol (TENORMIN) 25 MG tablet Take 1 tablet by mouth daily. Hold IF heart rate less than 55. 11/12/2018 at Unknown time Yes Akhil Mendoza MD   ezetimibe (ZETIA) 10 MG tablet Take 1 tablet (10 mg) by mouth daily 11/12/2018 at Unknown time Yes Akhil Mendoza MD   ibuprofen (ADVIL) 200 MG capsule Take 200 mg by mouth every 4 hours as needed for fever 11/6/2018 Yes Reported, Patient   isosorbide mononitrate (IMDUR) 60 MG 24 hr tablet Take 1 tablet (60 mg) by mouth daily 11/12/2018 at Unknown time Yes Akhil Mendoza MD   Multiple Vitamins-Minerals (CENTRUM SILVER) per tablet Take 1 tablet by mouth daily. 11/9/2018 Yes Unknown, Entered By History   Multiple Vitamins-Minerals (PRESERVISION AREDS 2) CAPS Take by mouth 2 times daily 11/10/2018 Yes Reported, Patient   naproxen sodium (ALEVE) 220 MG tablet Take 220 mg by mouth 2 times daily (with meals) 11/6/2018 Yes Unknown, Entered By History   propranolol (INDERAL LA) 80 MG 24 hr capsule Take 80 mg by mouth daily 11/11/2018 at pm Yes Unknown, Entered By History   rosuvastatin (CRESTOR) 40 MG tablet Take 1 tablet by mouth daily. 11/12/2018 at Unknown time Yes Akhil Mendoza MD   nitroglycerin (NITROSTAT) 0.4 MG SL tablet Place 1 tablet (0.4 mg) under the tongue every 5 minutes as needed for chest pain Unknown at Unknown time  Akhil Mendoza MD   primidone (MYSOLINE) 250 MG tablet Take 250 mg by mouth 2 times daily   Reported, Patient

## 2018-11-12 NOTE — ANESTHESIA PREPROCEDURE EVALUATION
PAC NOTE:       ANESTHESIA PRE EVALUATION:  Anesthesia Evaluation     . Pt has had prior anesthetic.            ROS/MED HX    ENT/Pulmonary:      (-) tobacco use and sleep apnea   Neurologic:      (-) CVA and Other neuro hx   Cardiovascular: Comment:  10/4/2018 10:51 AM  GREG VALDERRAMA  78 years  Male   1940.     Indication/Clinical History: Coronary artery disease, preoperative  assessment     Impression  1.  Myocardial perfusion imaging using single isotope technique  demonstrated normal myocardial perfusion.   2. Gated images demonstrated normal wall motion.  The left ventricular  systolic function is normal with a calculated ejection fraction of  52%.  3. Compared to the prior study from 2015, the previously described  area of basal lateral wall ischemia is no longer evident .    (+) Dyslipidemia, hypertension--CAD, angina--stent,2012  Drug Eluting Stent .. : . . . :. .      (-) CHF, arrhythmias and pulmonary hypertension   METS/Exercise Tolerance:     Hematologic:         Musculoskeletal:   (+) arthritis, , , -       GI/Hepatic:        (-) GERD, hiatal hernia and hepatitis   Renal/Genitourinary:      (-) renal disease   Endo:     (+) Obesity, .   (-) Type I DM, Type II DM, thyroid disease, chronic steroid usage and other endocrine disorder   Psychiatric:     (+) psychiatric history depression      Infectious Disease:  - neg infectious disease ROS       Malignancy:         Other:    (+) H/O Chronic Pain,                   Physical Exam      Airway   Mallampati: II  TM distance: >3 FB  Neck ROM: full    Dental     Cardiovascular   Rhythm and rate: regular and normal  (-) no murmur    Pulmonary    breath sounds clear to auscultation    Other findings: Lab Test        01/24/17     11/10/16     11/09/16     11/08/16                                         0010          0645          2017          WBC          4.8          5.1          4.1          5.2           HGB          13.9         13.6          12.9*        13.9          MCV          99           100          102*         103*          PLT          130          130*         115*         145*          INR           --          0.99          --          1.01           Lab Test        08/13/18     08/15/17     01/24/17     11/10/16                       0953          1200                            0010          NA           141          138          141          143           POTASSIUM    4.6          5.2*         4.6          4.1           CHLORIDE     105          102          103          109           CO2          30*          32*          27           26            BUN          23           25           28           28            CR           1.00         1.13         1.10         0.92          ANIONGAP     10.6         9.2           --          8             DONELL          8.8          8.9          8.2          7.9*          GLC          101          93           72           96                   Anesthesia Plan      History & Physical Review  History and physical reviewed and following examination; no interval change.    ASA Status:  3 .    NPO Status:  > 8 hours    Plan for Spinal and Periph. Nerve Block for postop pain with Propofol induction.   PONV prophylaxis:  Ondansetron (or other 5HT-3) and Dexamethasone or Solumedrol       Postoperative Care  Postoperative pain management:  IV analgesics, Oral pain medications and Peripheral nerve block (Single Shot).      Consents  Anesthetic plan, risks, benefits and alternatives discussed with:  Patient.  Use of blood products discussed: Yes.   Use of blood products discussed with Patient.  Consented to blood products.  .                            .

## 2018-11-12 NOTE — IP AVS SNAPSHOT
MRN:[id number]                      After Visit Summary   11/12/2018    Iván Nicholas    MRN: [id number]           Thank you!     Thank you for choosing [facility] for your care. Our goal is always to provide you with excellent care. Hearing back from our patients is one way we can continue to improve our services. Please take a few minutes to complete the written survey that you may receive in the mail after you visit. If you would like to speak to someone directly about your visit please contact Patient Relations at [phone]. Thank you!          Patient Information     Date Of Birth          1940        Designated Caregiver       Most Recent Value    Caregiver    Will someone help with your care after discharge? yes    Name of designated caregiver Linda, spouse    Phone number of caregiver [phone]    Caregiver address same      About your hospital stay     You were admitted on:  November 12, 2018 You last received care in the:  [facility]    You were discharged on:  November 14, 2018        Reason for your hospital stay       Diagnosis: left DJD knee  Procedure: left Cemented total knee replacement  Surgeon: Matt Schaefer MD  Patient underwent total knee replacement without complication. Patient had typical transient post-op anemia. Patient's hospital stay included physical therapy and DVT prophylaxis. After discussion with patient regarding no history of DVT and current high mobility, patient is discharged with ASA for home DVT pphx. Patient will follow -up in the office 10-14days post-op for wound check. Please refer to chart for any other specifics of this hospital stay.  Daniela Jordan PA-C                  Who to Call     For medical emergencies, please call [phone].  For non-urgent questions about your medical care, please call your primary care provider or clinic, [phone]  For questions related to your surgery, please call your  surgery clinic        Attending Provider     Provider Matt Puckett MD Orthopedics       Primary Care Provider Office Phone # Fax #    Stephan Nice [phone]      After Care Instructions     Activity       Your activity upon discharge: activity as tolerated            Diet       Follow this diet upon discharge: Orders Placed This Encounter      Advance Diet as Tolerated: Regular Diet Adult            Supplies       List the supplies the pt needs to go home: Mateusz stockings measured and applied for home use. May remove QHS but should be word during day.            Wound care and dressings       Instructions to care for your wound at home: Keep waterproof dressing in place until post-op visit with Dr Schaefer. (10-14 days from surgery)                  Follow-up Appointments     Follow-up and recommended labs and tests        Follow-up with Dr Schaefer for wound check in 10-14 days. Call for appointment [phone].            Follow-up and recommended labs and tests        Follow up with primary care provider, Stephan Nice, within 7 days for hospital follow- up.  The following labs/tests are recommended: basic metabolic panel.                  Additional Services     Physical Therapy Referral       *This therapy referral will be filtered to a centralized scheduling office at [facility] and the patient will receive a call to schedule an appointment at a [place] location most convenient for them. *     For [facility] scheduling, Call [phone]  Treatment: Evaluation & Treatment  Special Instructions/Modalities: Physical therapy to be done at [facility]  Call to set up appointment if not already scheduled; [phone]  Special Programs: Physical therapy to be done at [facility]  Call to set up appointment if not already scheduled; [phone]  Please be aware that coverage of these services is subject to  "the terms and limitations of your health insurance plan.  Call member services at your health plan with any benefit or coverage questions.      **Note to Provider:  If you are referring outside of [place] for the therapy appointment, please list the name of the location in the \"special instructions\" above, print the referral and give to the patient to schedule the appointment.                  Further instructions from your care team       Return to clinic in 10-14 days.  Call [phone] to schedule or if you experience any problems before your scheduled appointment.      See general discharge instruction sheet for knees  1. Do exercises at home as instructed by therapist twice a day. Continue outpatient therapy as ordered by your doctor.  2. Ice knee after exercises and therapy.  3. Examine dressing daily for problems.  4. May shower, can get dressing wet, no soaking (no bathtubs, pools or hot tubs)  5. Notify your dentist or physician of your implant so you can get antibiotics before any dental work or before any invasive procedure (ie: colonoscopy)  6. Remove anti-embolism stockings (Mateusz hose) for 30 minutes twice daily.      Aquacel dressing:  DO NOT CHANGE DRESSING DAILY.  Leave this dressing on until follow-up appointment with Orthopedic Surgeon.  Dressing is waterproof, can shower with it on, pat dry when done.  No soaking such as in tub baths, pools or hot tubs        While on narcotic pain medication, to prevent constipation:  1. Drink plenty of water to keep well hydrated   2. May take over the counter Colace or Senna (follow instructions on label)        Call your physician if: ([phone])   1. Persistent fever greater than 100 degrees with body chills or excessive sweating.  2. Increased/persistent redness, localized warmth, tenderness, drainage or swelling at incision site. Greater than 50% drainage on dressing.   3. Persistent pain not controlled with oral pain medications, ice and rest.   4. No " "bowel movement in 3 days (may use Milk of Magnesia or other over the counter remedy).  5. Chest pain, shortness of breath, and/or calf pain with excessive swelling.  6. Generalized feeling of illness.  7. Any other questions or concerns related to your surgery/recovery.    Thank you for allowing [facility] to participate in your cares!!          Pending Results     No orders found from 11/10/2018 to 2018.            Statement of Approval     Ordered          18  I have reviewed and agree with all the recommendations and orders detailed in this document.  EFFECTIVE NOW     Approved and electronically signed by:  Daniela Jordan PA-C             Admission Information     Date & Time Provider Department Dept. Phone    2018 Matt Schaefer MD [facility] [phone]      Your Vitals Were     Blood Pressure Pulse Temperature Respirations Height Weight    156/72 (BP Location: Right arm) 48 96.5  F (35.8  C) (Oral) 18 1.8 m (5' 10.87\") 98.4 kg (217 lb)    Pulse Oximetry BMI (Body Mass Index)                91% 30.38 kg/m2          MyChart Information     [employer] lets you send messages to your doctor, view your test results, renew your prescriptions, schedule appointments and more. To sign up, go to [email or web address] . Click on \"Log in\" on the left side of the screen, which will take you to the Welcome page. Then click on \"Sign up Now\" on the right side of the page.     You will be asked to enter the access code listed below, as well as some personal information. Please follow the directions to create your username and password.     Your access code is: [email or web address]  Expires: 2019 10:37 AM     Your access code will  in 90 days. If you need help or a new code, please call your [facility] clinic or [phone].        Care EveryWhere ID     This is your Care EveryWhere ID. This could be used by other organizations to access your [facility] " medical records  [id number]        Equal Access to Services     SUSANNAELIDA LINDY : Hadii lashaun justin tracyyesenia Frantzali, wavladda rodearlha, qalamonteta nazaninpablojose hurley, mayte freedmantariqnaseem dick. So [facility] [phone].    ATENCIÓN: Si habla español, tiene a mckeon disposición servicios gratuitos de asistencia lingüística. [facility] [phone].    We comply with applicable federal civil rights laws and [place] laws. We do not discriminate on the basis of race, color, national origin, age, disability, sex, sexual orientation, or gender identity.               Review of your medicines      START taking        Dose / Directions    acetaminophen 325 MG tablet   Commonly known as:  TYLENOL        Dose:  975 mg   Take 3 tablets (975 mg) by mouth every 8 hours   Quantity:  250 tablet   Refills:  0       hydrOXYzine 25 MG tablet   Commonly known as:  ATARAX        Dose:  25 mg   Take 1 tablet (25 mg) by mouth every 6 hours as needed for other (adjuvant pain)   Quantity:  50 tablet   Refills:  0       oxyCODONE IR 5 MG tablet   Commonly known as:  ROXICODONE        1 tab po q 4 hrs prn pain scale 3-6,  2 tabs po q 4hrs prn pain scale 7-10   Quantity:  50 tablet   Refills:  0       senna-docusate 8.6-50 MG per tablet   Commonly known as:  SENOKOT-S;PERICOLACE        Dose:  1-2 tablet   Take 1-2 tablets by mouth 2 times daily as needed for constipation   Quantity:  60 tablet   Refills:  0         CONTINUE these medicines which may have CHANGED, or have new prescriptions. If we are uncertain of the size of tablets/capsules you have at home, strength may be listed as something that might have changed.        Dose / Directions    aspirin 81 MG EC tablet   This may have changed:  when to take this   Used for:  Stenosis of right internal carotid artery        Dose:  81 mg   Take 1 tablet (81 mg) by mouth 2 times daily   Quantity:  60 tablet   Refills:  0         CONTINUE these medicines which have NOT CHANGED        Dose /  Directions    ALLOPURINOL PO        Dose:  100 mg   Take 100 mg by mouth daily. To prevent gout attacks, recently started.   Refills:  0       * CENTRUM SILVER per tablet        Dose:  1 tablet   Take 1 tablet by mouth daily.   Refills:  0       * PRESERVISION AREDS 2 Caps        Take by mouth 2 times daily   Refills:  0       CRESTOR 40 MG tablet   Used for:  CAD (coronary artery disease)   Generic drug:  rosuvastatin        Dose:  40 mg   Take 1 tablet by mouth daily.   Quantity:  30 tablet   Refills:  1       ezetimibe 10 MG tablet   Commonly known as:  ZETIA   Used for:  Coronary artery disease of native artery of native heart with stable angina pectoris (H)        Dose:  10 mg   Take 1 tablet (10 mg) by mouth daily   Quantity:  90 tablet   Refills:  3       isosorbide mononitrate 60 MG 24 hr tablet   Commonly known as:  IMDUR   Used for:  Coronary artery disease involving native coronary artery without angina pectoris        Dose:  60 mg   Take 1 tablet (60 mg) by mouth daily   Quantity:  90 tablet   Refills:  1       nitroGLYcerin 0.4 MG sublingual tablet   Commonly known as:  NITROSTAT   Used for:  CAD (coronary artery disease)        Dose:  0.4 mg   Place 1 tablet (0.4 mg) under the tongue every 5 minutes as needed for chest pain   Quantity:  25 tablet   Refills:  11       primidone 250 MG tablet   Commonly known as:  MYSOLINE        Dose:  250 mg   Take 250 mg by mouth 2 times daily   Refills:  0       propranolol 80 MG 24 hr capsule   Commonly known as:  INDERAL LA        Dose:  80 mg   Take 80 mg by mouth daily   Refills:  0       * Notice:  This list has 2 medication(s) that are the same as other medications prescribed for you. Read the directions carefully, and ask your doctor or other care provider to review them with you.      STOP taking     ADVIL 200 MG capsule   Generic drug:  ibuprofen           ALEVE 220 MG tablet   Generic drug:  naproxen sodium           atenolol 25 MG tablet   Commonly known  as:  TENORMIN                Where to get your medicines      These medications were sent to [place], [place] - [address] [address]  [address] [facility] [address]     Phone:  [phone]     acetaminophen 325 MG tablet    aspirin 81 MG EC tablet    hydrOXYzine 25 MG tablet    senna-docusate 8.6-50 MG per tablet         Some of these will need a paper prescription and others can be bought over the counter. Ask your nurse if you have questions.     Bring a paper prescription for each of these medications     oxyCODONE IR 5 MG tablet                Protect others around you: Learn how to safely use, store and throw away your medicines at www.disposemymeds.org.        Information about OPIOIDS     PRESCRIPTION OPIOIDS: WHAT YOU NEED TO KNOW   We gave you an opioid (narcotic) pain medicine. It is important to manage your pain, but opioids are not always the best choice. You should first try all the other options your care team gave you. Take this medicine for as short a time (and as few doses) as possible.    Some activities can increase your pain, such as bandage changes or therapy sessions. It may help to take your pain medicine 30 to 60 minutes before these activities. Reduce your stress by getting enough sleep, working on hobbies you enjoy and practicing relaxation or meditation. Talk to your care team about ways to manage your pain beyond prescription opioids.    These medicines have risks:    DO NOT drive when on new or higher doses of pain medicine. These medicines can affect your alertness and reaction times, and you could be arrested for driving under the influence (DUI). If you need to use opioids long-term, talk to your care team about driving.    DO NOT operate heavy machinery    DO NOT do any other dangerous activities while taking these medicines.    DO NOT drink any alcohol while taking these medicines.     If the opioid prescribed includes acetaminophen, DO  NOT take with any other medicines that contain acetaminophen. Read all labels carefully. Look for the word  acetaminophen  or  Tylenol.  Ask your pharmacist if you have questions or are unsure.    You can get addicted to pain medicines, especially if you have a history of addiction (chemical, alcohol or substance dependence). Talk to your care team about ways to reduce this risk.    All opioids tend to cause constipation. Drink plenty of water and eat foods that have a lot of fiber, such as fruits, vegetables, prune juice, apple juice and high-fiber cereal. Take a laxative (Miralax, milk of magnesia, Colace, Senna) if you don t move your bowels at least every other day. Other side effects include upset stomach, sleepiness, dizziness, throwing up, tolerance (needing more of the medicine to have the same effect), physical dependence and slowed breathing.    Store your pills in a secure place, locked if possible. We will not replace any lost or stolen medicine. If you don t finish your medicine, please throw away (dispose) as directed by your pharmacist. The [place] Pollution Control Agency has more information about safe disposal: https://www.pca.[facility].mn.us/living-green/managing-unwanted-medications             Medication List: This is a list of all your medications and when to take them. Check marks below indicate your daily home schedule. Keep this list as a reference.      Medications           Morning Afternoon Evening Bedtime As Needed    acetaminophen 325 MG tablet   Commonly known as:  TYLENOL   Take 3 tablets (975 mg) by mouth every 8 hours   Last time this was given:  975 mg on 11/14/2018  9:12 AM                       Next dose due at 5:00 pm                  ALLOPURINOL PO   Take 100 mg by mouth daily. To prevent gout attacks, recently started.                                   aspirin 81 MG EC tablet   Take 1 tablet (81 mg) by mouth 2 times daily   Last time this was given:  325 mg on 11/14/2018  8:39  AM                                      * CENTRUM SILVER per tablet   Take 1 tablet by mouth daily.                                   * PRESERVISION AREDS 2 Caps   Take by mouth 2 times daily                                      CRESTOR 40 MG tablet   Take 1 tablet by mouth daily.   Last time this was given:  40 mg on 11/14/2018  8:38 AM   Generic drug:  rosuvastatin                                   ezetimibe 10 MG tablet   Commonly known as:  ZETIA   Take 1 tablet (10 mg) by mouth daily                                   hydrOXYzine 25 MG tablet   Commonly known as:  ATARAX   Take 1 tablet (25 mg) by mouth every 6 hours as needed for other (adjuvant pain)                                   isosorbide mononitrate 60 MG 24 hr tablet   Commonly known as:  IMDUR   Take 1 tablet (60 mg) by mouth daily   Last time this was given:  60 mg on 11/14/2018  8:39 AM                                   nitroGLYcerin 0.4 MG sublingual tablet   Commonly known as:  NITROSTAT   Place 1 tablet (0.4 mg) under the tongue every 5 minutes as needed for chest pain                                   oxyCODONE IR 5 MG tablet   Commonly known as:  ROXICODONE   1 tab po q 4 hrs prn pain scale 3-6,  2 tabs po q 4hrs prn pain scale 7-10   Last time this was given:  10 mg on 11/14/2018  8:39 AM                            Next available at 12:30 pm       primidone 250 MG tablet   Commonly known as:  MYSOLINE   Take 250 mg by mouth 2 times daily                                      propranolol 80 MG 24 hr capsule   Commonly known as:  INDERAL LA   Take 80 mg by mouth daily                                   senna-docusate 8.6-50 MG per tablet   Commonly known as:  SENOKOT-S;PERICOLACE   Take 1-2 tablets by mouth 2 times daily as needed for constipation   Last time this was given:  1 tablet on 11/14/2018  8:39 AM                            Take while you are taking Narcotics (Oxycodone)       * Notice:  This list has 2 medication(s) that are the same as  other medications prescribed for you. Read the directions carefully, and ask your doctor or other care provider to review them with you.

## 2018-11-12 NOTE — ANESTHESIA PROCEDURE NOTES
Peripheral nerve/Neuraxial procedure note : intrathecal  Pre-Procedure  Performed by IRENE HEBERT  Referred by DANYEL  Location: pre-op      Pre-Anesthestic Checklist: patient identified, IV checked, risks and benefits discussed, informed consent, monitors and equipment checked, pre-op evaluation and at physician/surgeon's request    Timeout  Correct Patient: Yes   Correct Procedure: Yes   Correct Site: Yes   Correct Laterality: Yes   Correct Position: Yes   Site Marked: Yes   .   Procedure Documentation  ASA 3  .    Procedure:    Intrathecal.  Insertion Site:L3-4, L2-3  (midline approach)      Patient Prep;povidone-iodine 7.5% surgical scrub.  .  Needle: Barb tip Spinal Needle (gauge): 22  Spinal/LP Needle Length (inches): 3 # of attempts: 1 and # of redirects:  No introducer used .       Assessment/Narrative  Paresthesias: No.  .  .  clear CSF fluid removed .

## 2018-11-12 NOTE — PROGRESS NOTES
"SPIRITUAL HEALTH SERVICES Progress Note  Critical access hospital pre-surgical visit    Post recorded:  Visit was at 7 am prior to surgery at request of patient.  Patient and spouse declared:  \"We love the Lord\" shortly upon arrival.  Pt quickly requested prayer and wife joined in holding hands.  Prayer affirmed trust in God's love and care and healing presence, lifted up the surgeon and surgical team as well, and included requests for healing and a good recovery.       Daniela GOMEZ) Francisco Javier  Staff   On-call  [phone]  "

## 2018-11-12 NOTE — PLAN OF CARE
Problem: Patient Care Overview  Goal: Plan of Care/Patient Progress Review  PT: Orders received, evaluation completed and treatment initiated. 78 year old male s/p L TKA. Patient reports independence with mobility at baseline. Pt lives in a [facility] with 2 steps and an additional 12 stairs to the basement. Pt reports he would like to complete most of his recovery in the basement, if able. Spouse able to assist on discharge. Pt has a walker and a cane.     Discharge Planner PT   Patient plan for discharge: Home with OP PT  Current status: Supine to/from sit with SBA. Sit to/from stand with CGA. Ambulates 120' with FWW and CGA. No KI needed; IND SLR. No buckling with functional mobility. L knee AROM 8-60 degrees.   Barriers to return to prior living situation: Stairs - will address in PT.   Recommendations for discharge: Home with OP PT  Rationale for recommendations: Pt moving well and demonstrating good quad strength. Will continue IP PT to address L LE AROM/strength deficits in order to maximize independence with mobility.        Entered by: Chaitanya Abdi 11/12/2018 3:52 PM

## 2018-11-12 NOTE — OP NOTE
Procedure Date: 11/12/2018      PREOPERATIVE DIAGNOSIS:  Osteoarthritis, left knee.      POSTOPERATIVE DIAGNOSIS:  Osteoarthritis, left knee.      PROCEDURE  PERFORMED:  Left total knee arthroplasty.      SURGEON:  Matt Schaefer MD.      ASSISTANT:  Daniela Jordan PA-C.      ANESTHESIA:  Spinal with adductor canal block.      ESTIMATED BLOOD LOSS:  25 mL.      TOURNIQUET TIME:  Approximately 60 minutes.      COMPLICATIONS:  None.      DESCRIPTION OF PROCEDURE:  The patient was taken to the operating room where after successful administration of antibiotic prophylaxis, tranexamic acid, and sterile prep and drape, the leg was exsanguinated and an anterior incision was made followed by medial arthrotomy with a moderate medial soft tissue release.  An intramedullary 5 degree guide was used with anterior referencing at 3 degrees of external rotation which did appear to match the epicondylar axis and a box cut was made for PCL substitution.  Retractors were carefully placed about the proximal tibia and the neurovascular structures protected throughout the case.  A cut was made perpendicular to the mechanical axis of the tibia, removing 1-2 mm of medial bone.  The sclerotic tibia was prepared.  Rotation was matched to the junction of the middle and medial third of the tubercle and to the femur.  Posterior osteophytes were removed under direct vision and with the knee in hyperflexion.  A capsular injection was performed.  Flexion and extension gaps were rectangular and symmetric.  9 mm was resected from the undersurface of a 23 mm patella and sized appropriately.      After copious lavage and drying, Simplex cementing was performed for Poornima NexGen cruciate substituting size F femur, size 5 tibia, 10 mm polyethylene insert, and a 35 mm patellar button.  Again, range of motion, balance and tracking were all excellent.  The cement dried with the knee in full extension.  Excess cement was removed.  Antibiotic and  Betadine irrigation were performed followed by layered closure over a deep drain.  There were no complications.         JERRELL MONTAÑO MD             D: 2018   T: 2018   MT: RYLIE      Name:     GREG VALDERRAMA   MRN:      [id number]        Account:        [id number]   :      1940           Procedure Date: 2018      Document: [id number]

## 2018-11-12 NOTE — PROGRESS NOTES
11/12/18 1500   Quick Adds   Type of Visit Initial PT Evaluation   Living Environment   Lives With spouse   Living Arrangements house   Home Accessibility stairs to enter home;stairs within home   Number of Stairs to Enter Home 2  (no rail, plaform steps. )   Number of Stairs Within Home 12  (one rail)   Transportation Available car;family or friend will provide   Living Environment Comment Spouse able to assist as needed on DC.    Self-Care   Usual Activity Tolerance good   Current Activity Tolerance fair   Equipment Currently Used at Home walker, rolling;cane, straight   Functional Level Prior   Ambulation 0-->independent   Transferring 0-->independent   Toileting 0-->independent   Bathing 0-->independent   Dressing 0-->independent   Fall history within last six months no   General Information   Onset of Illness/Injury or Date of Surgery - Date 11/12/18   Referring Physician Silvano OCHOA    Patient/Family Goals Statement Return home with OP PT   Pertinent History of Current Problem (include personal factors and/or comorbidities that impact the POC) 78 year old male s/p L TKA.    Precautions/Limitations fall precautions   Weight-Bearing Status - LLE weight-bearing as tolerated   Cognitive Status Examination   Orientation orientation to person, place and time   Level of Consciousness alert   Follows Commands and Answers Questions 100% of the time   Pain Assessment   Patient Currently in Pain Yes, see Vital Sign flowsheet  (4/10; L LE at rest)   Integumentary/Edema   Integumentary/Edema Comments Dressing to L LE clean, dry and intact. +Hemovac   Range of Motion (ROM)   ROM Comment Decreased L knee AROM secondary to pain, weakness.    Strength   Strength Comments IND SLR on the L LE.    Bed Mobility   Bed Mobility Comments Supine to sit with SBA   Transfer Skills   Transfer Comments Sit to/from stand with CGA   Gait   Gait Comments Ambulates 5' with FWW and CGA   Balance   Balance Comments Requires bilat UE support on  "FWW for safe dynamic mobility   Sensory Examination   Sensory Perception Comments Denies burning, numbness and tingling currently.    Modality Interventions   Planned Modality Interventions Cryotherapy   Planned Modality Interventions Comments PRN   General Therapy Interventions   Planned Therapy Interventions bed mobility training;gait training;ROM;strengthening;transfer training;home program guidelines;progressive activity/exercise   Clinical Impression   Criteria for Skilled Therapeutic Intervention yes, treatment indicated   PT Diagnosis Impaired gait   Influenced by the following impairments Pain, decreased L LE AROM/strength, impaired balance   Functional limitations due to impairments Decreased IND with bed mobility, transfers and ambulation   Clinical Presentation Stable/Uncomplicated   Clinical Presentation Rationale Stable   Clinical Decision Making (Complexity) Low complexity   Therapy Frequency` 2 times/day   Predicted Duration of Therapy Intervention (days/wks) 3 days   Anticipated Discharge Disposition Home with Assist;Home with Outpatient Therapy   Risk & Benefits of therapy have been explained Yes   Patient, Family & other staff in agreement with plan of care Yes   [facility] TM \"6 Clicks\"   2016, Trustees of [facility], under license to [employer].  All rights reserved.   6 Clicks Short Forms Basic Mobility Inpatient Short Form   [facility]  \"6 Clicks\" V.2 Basic Mobility Inpatient Short Form   1. Turning from your back to your side while in a flat bed without using bedrails? 4 - None   2. Moving from lying on your back to sitting on the side of a flat bed without using bedrails? 3 - A Little   3. Moving to and from a bed to a chair (including a wheelchair)? 3 - A Little   4. Standing up from a chair using your arms (e.g., wheelchair, or bedside chair)? 3 - A Little   5. To walk in hospital room? 3 - A Little   6. Climbing 3-5 steps with a railing? 2 - A Lot "   Basic Mobility Raw Score (Score out of 24.Lower scores equate to lower levels of function) 18   Total Evaluation Time   Total Evaluation Time (Minutes) 5

## 2018-11-12 NOTE — IP AVS SNAPSHOT
[facility]    [address]    [facility] [address]    Phone:  [phone]    Fax:  [phone]                                       After Visit Summary   11/12/2018    Iván Nicholas    MRN: [id number]           After Visit Summary Signature Page     I have received my discharge instructions, and my questions have been answered. I have discussed any challenges I see with this plan with the nurse or doctor.    ..........................................................................................................................................  Patient/Patient Representative Signature      ..........................................................................................................................................  Patient Representative Print Name and Relationship to Patient    ..................................................               ................................................  Date                                   Time    ..........................................................................................................................................  Reviewed by Signature/Title    ...................................................              ..............................................  Date                                               Time          22EPIC Rev 08/18

## 2018-11-12 NOTE — ANESTHESIA CARE TRANSFER NOTE
Patient: Iván Nicholas    Procedure(s):  Left Total Knee Arthroplasty,  Spinal with Adductor Canal Block    Diagnosis: DJD  Diagnosis Additional Information: No value filed.    Anesthesia Type:   Spinal, Periph. Nerve Block for postop pain     Note:  Airway :Face Mask  Patient transferred to:PACU  Comments: Pt. To PACU, VS, report to RN      Vitals: (Last set prior to Anesthesia Care Transfer)    CRNA VITALS  11/12/2018 0820 - 11/12/2018 0856      11/12/2018             Pulse: 56    SpO2: 96 %                Electronically Signed By: ERIC Mitchell CRNA  November 12, 2018  8:56 AM

## 2018-11-13 ENCOUNTER — APPOINTMENT (OUTPATIENT)
Dept: PHYSICAL THERAPY | Facility: CLINIC | Age: 78
DRG: 470 | End: 2018-11-13
Attending: ORTHOPAEDIC SURGERY
Payer: MEDICARE

## 2018-11-13 ENCOUNTER — APPOINTMENT (OUTPATIENT)
Dept: OCCUPATIONAL THERAPY | Facility: CLINIC | Age: 78
DRG: 470 | End: 2018-11-13
Attending: ORTHOPAEDIC SURGERY
Payer: MEDICARE

## 2018-11-13 LAB
ANION GAP SERPL CALCULATED.3IONS-SCNC: 4 MMOL/L (ref 3–14)
BUN SERPL-MCNC: 27 MG/DL (ref 7–30)
CALCIUM SERPL-MCNC: 8.2 MG/DL (ref 8.5–10.1)
CHLORIDE SERPL-SCNC: 105 MMOL/L (ref 94–109)
CO2 SERPL-SCNC: 30 MMOL/L (ref 20–32)
CREAT SERPL-MCNC: 1.34 MG/DL (ref 0.66–1.25)
GFR SERPL CREATININE-BSD FRML MDRD: 51 ML/MIN/1.7M2
GLUCOSE SERPL-MCNC: 126 MG/DL (ref 70–99)
HGB BLD-MCNC: 10.8 G/DL (ref 13.3–17.7)
POTASSIUM SERPL-SCNC: 4.9 MMOL/L (ref 3.4–5.3)
SODIUM SERPL-SCNC: 139 MMOL/L (ref 133–144)

## 2018-11-13 PROCEDURE — 25000128 H RX IP 250 OP 636: Performed by: ORTHOPAEDIC SURGERY

## 2018-11-13 PROCEDURE — 40000193 ZZH STATISTIC PT WARD VISIT: Performed by: PHYSICAL THERAPY ASSISTANT

## 2018-11-13 PROCEDURE — 25000132 ZZH RX MED GY IP 250 OP 250 PS 637: Mod: GY | Performed by: ORTHOPAEDIC SURGERY

## 2018-11-13 PROCEDURE — A9270 NON-COVERED ITEM OR SERVICE: HCPCS | Mod: GY | Performed by: ORTHOPAEDIC SURGERY

## 2018-11-13 PROCEDURE — 99232 SBSQ HOSP IP/OBS MODERATE 35: CPT | Performed by: INTERNAL MEDICINE

## 2018-11-13 PROCEDURE — 97165 OT EVAL LOW COMPLEX 30 MIN: CPT | Mod: GO

## 2018-11-13 PROCEDURE — A9270 NON-COVERED ITEM OR SERVICE: HCPCS | Mod: GY | Performed by: PHYSICIAN ASSISTANT

## 2018-11-13 PROCEDURE — 36415 COLL VENOUS BLD VENIPUNCTURE: CPT | Performed by: ORTHOPAEDIC SURGERY

## 2018-11-13 PROCEDURE — 97116 GAIT TRAINING THERAPY: CPT | Mod: GP | Performed by: PHYSICAL THERAPY ASSISTANT

## 2018-11-13 PROCEDURE — 97530 THERAPEUTIC ACTIVITIES: CPT | Mod: GO

## 2018-11-13 PROCEDURE — 97110 THERAPEUTIC EXERCISES: CPT | Mod: GP | Performed by: PHYSICAL THERAPY ASSISTANT

## 2018-11-13 PROCEDURE — 25000132 ZZH RX MED GY IP 250 OP 250 PS 637: Mod: GY | Performed by: PHYSICIAN ASSISTANT

## 2018-11-13 PROCEDURE — 97530 THERAPEUTIC ACTIVITIES: CPT | Mod: GP | Performed by: PHYSICAL THERAPY ASSISTANT

## 2018-11-13 PROCEDURE — 80048 BASIC METABOLIC PNL TOTAL CA: CPT | Performed by: ORTHOPAEDIC SURGERY

## 2018-11-13 PROCEDURE — 40000133 ZZH STATISTIC OT WARD VISIT

## 2018-11-13 PROCEDURE — 12000007 ZZH R&B INTERMEDIATE

## 2018-11-13 PROCEDURE — 85018 HEMOGLOBIN: CPT | Performed by: ORTHOPAEDIC SURGERY

## 2018-11-13 PROCEDURE — 97535 SELF CARE MNGMENT TRAINING: CPT | Mod: GO

## 2018-11-13 RX ORDER — HYDROXYZINE HYDROCHLORIDE 25 MG/1
25 TABLET, FILM COATED ORAL EVERY 6 HOURS PRN
Status: DISCONTINUED | OUTPATIENT
Start: 2018-11-13 | End: 2018-11-14 | Stop reason: HOSPADM

## 2018-11-13 RX ORDER — OXYCODONE HYDROCHLORIDE 5 MG/1
TABLET ORAL
Qty: 50 TABLET | Refills: 0 | Status: SHIPPED | OUTPATIENT
Start: 2018-11-13 | End: 2019-12-09

## 2018-11-13 RX ORDER — ACETAMINOPHEN 325 MG/1
975 TABLET ORAL EVERY 8 HOURS
Qty: 250 TABLET | Refills: 0 | Status: SHIPPED | OUTPATIENT
Start: 2018-11-14 | End: 2019-12-09

## 2018-11-13 RX ORDER — AMOXICILLIN 250 MG
1-2 CAPSULE ORAL 2 TIMES DAILY PRN
Qty: 60 TABLET | Refills: 0 | Status: SHIPPED | OUTPATIENT
Start: 2018-11-13 | End: 2019-04-16

## 2018-11-13 RX ORDER — HYDROXYZINE HYDROCHLORIDE 25 MG/1
25 TABLET, FILM COATED ORAL EVERY 6 HOURS PRN
Qty: 50 TABLET | Refills: 0 | Status: SHIPPED | OUTPATIENT
Start: 2018-11-13 | End: 2019-12-09

## 2018-11-13 RX ORDER — HYDROXYZINE HYDROCHLORIDE 50 MG/1
50 TABLET, FILM COATED ORAL EVERY 6 HOURS PRN
Status: DISCONTINUED | OUTPATIENT
Start: 2018-11-13 | End: 2018-11-14 | Stop reason: HOSPADM

## 2018-11-13 RX ADMIN — OXYCODONE HYDROCHLORIDE 10 MG: 5 TABLET ORAL at 21:21

## 2018-11-13 RX ADMIN — ISOSORBIDE MONONITRATE 60 MG: 60 TABLET, EXTENDED RELEASE ORAL at 08:11

## 2018-11-13 RX ADMIN — OXYCODONE HYDROCHLORIDE 10 MG: 5 TABLET ORAL at 17:25

## 2018-11-13 RX ADMIN — ASPIRIN 325 MG: 325 TABLET, DELAYED RELEASE ORAL at 21:21

## 2018-11-13 RX ADMIN — OXYCODONE HYDROCHLORIDE 10 MG: 5 TABLET ORAL at 09:17

## 2018-11-13 RX ADMIN — CELECOXIB 200 MG: 200 CAPSULE ORAL at 08:11

## 2018-11-13 RX ADMIN — FERROUS GLUCONATE 324 MG: 324 TABLET ORAL at 08:11

## 2018-11-13 RX ADMIN — SENNOSIDES AND DOCUSATE SODIUM 1 TABLET: 8.6; 5 TABLET ORAL at 21:21

## 2018-11-13 RX ADMIN — ACETAMINOPHEN 975 MG: 325 TABLET, FILM COATED ORAL at 09:17

## 2018-11-13 RX ADMIN — SODIUM CHLORIDE, POTASSIUM CHLORIDE, SODIUM LACTATE AND CALCIUM CHLORIDE: 600; 310; 30; 20 INJECTION, SOLUTION INTRAVENOUS at 00:39

## 2018-11-13 RX ADMIN — CEFAZOLIN SODIUM 2 G: 2 INJECTION, SOLUTION INTRAVENOUS at 01:33

## 2018-11-13 RX ADMIN — SENNOSIDES AND DOCUSATE SODIUM 1 TABLET: 8.6; 5 TABLET ORAL at 08:12

## 2018-11-13 RX ADMIN — ASPIRIN 325 MG: 325 TABLET, DELAYED RELEASE ORAL at 08:11

## 2018-11-13 RX ADMIN — OXYCODONE HYDROCHLORIDE 5 MG: 5 TABLET ORAL at 01:32

## 2018-11-13 RX ADMIN — ROSUVASTATIN CALCIUM 40 MG: 20 TABLET, FILM COATED ORAL at 08:11

## 2018-11-13 RX ADMIN — OXYCODONE HYDROCHLORIDE 10 MG: 5 TABLET ORAL at 13:21

## 2018-11-13 RX ADMIN — ACETAMINOPHEN 975 MG: 325 TABLET, FILM COATED ORAL at 16:49

## 2018-11-13 RX ADMIN — ACETAMINOPHEN 975 MG: 325 TABLET, FILM COATED ORAL at 01:32

## 2018-11-13 ASSESSMENT — ACTIVITIES OF DAILY LIVING (ADL)
PREVIOUS_RESPONSIBILITIES: MEAL PREP;HOUSEKEEPING;LAUNDRY;SHOPPING;MEDICATION MANAGEMENT;FINANCES;DRIVING
ADLS_ACUITY_SCORE: 8
IADL_COMMENTS: PT HAS SUPPORT OF SPOUSE FOR IADLS AS NEEDED UPON RETURN HOME

## 2018-11-13 NOTE — PLAN OF CARE
Problem: Patient Care Overview  Goal: Plan of Care/Patient Progress Review  Discharge Planner PT   Patient plan for discharge: home with OP starting Monday  Current status: S for bed mobility and sits-stand gait with RW to 75 feet with no KI ROM ~90 in seated position  Barriers to return to prior living situation: none anticipated after stairs are done  Recommendations for discharge: Home with OP PT  per plan established by the PT.    Rationale for recommendations: anticipate will met PT goals POD#2       Entered by: Jessica Payan 11/13/2018 10:35 AM

## 2018-11-13 NOTE — PLAN OF CARE
Problem: Patient Care Overview  Goal: Plan of Care/Patient Progress Review  OT: Order received, eval completed and treatment initiated. Pt is a 78 year old male s/p L TKA. Pt lives with spouse in [facility], stairs to enter/within, walk in shower, RTS. Pt reports indep in all ADLs, IADLs and mobility tasks with no AD at baseline. Pt has support of spouse as needed upon return home.     Discharge Planner OT   Patient plan for discharge: Home  Current status: Pt completed sit > stand from bedside chair to FWW x2 trials with CGA/SBA. Pt ambulated to/from BR FWW level with CGA/SBA. Pt educated on toilet transfer technique, able to demo with SBA and use of grab bar. Pt educated on walk in shower transfer technique, completed with CGA for safety. Pt completed bed mobility sit > supine with SBA. Pt educated on compensatory technique for LB dressing, able to thread and don underwear while seated/standing with SBA. Pt completed toileting tasks and grooming/hygiene tasks with SBA. Pt and spouse educated on home safety modifications/recommendations, DME/AE, car transfer technique and safe activities post TKA, verbalized understanding. Pt with no further questions/concerns for OT. No further skilled OT needs.    Barriers to return to prior living situation: Stairs (PT to address)  Recommendations for discharge: Home w/ spouse assist as needed for IADLs  Rationale for recommendations: Anticipate pt will continue to progress to return to [facility] PLOF. Pt has good support of spouse as needed.        Entered by: Mago Harvey 11/13/2018 11:59 AM       Occupational Therapy Discharge Summary    Reason for therapy discharge:    All goals and outcomes met, no further needs identified.    Progress towards therapy goal(s). See goals on Care Plan in [facility] electronic health record for goal details.  Goals met    Therapy recommendation(s):    No further skilled OT needs.

## 2018-11-13 NOTE — PROGRESS NOTES
11/13/18 1134   Quick Adds   Type of Visit Initial Occupational Therapy Evaluation   Living Environment   Lives With spouse   [place]   Home Accessibility stairs to enter home;stairs within home   Number of Stairs to Enter Home 2  (no rail, plaform steps)   Number of Stairs Within Home 12  (one rail)   Transportation Available car;family or friend will provide   Living Environment Comment Pt lives with spouse in [facility], stairs to enter/within, walk in shower, RTS   Self-Care   Usual Activity Tolerance good   Current Activity Tolerance moderate   Equipment Currently Used at Home walker, rolling;cane, straight   Functional Level Prior   Ambulation 0-->independent   Transferring 0-->independent   Toileting 0-->independent   Bathing 0-->independent   Dressing 0-->independent   Eating 0-->independent   Communication 0-->understands/communicates without difficulty   Swallowing 0-->swallows foods/liquids without difficulty   Cognition 0 - no cognition issues reported   Fall history within last six months no   Which of the above functional risks had a recent onset or change? transferring;toileting;bathing;dressing   Prior Functional Level Comment Pt reports indep in all ADLs, IADLs and mobility tasks with no AD at baseline. Pt has support of spouse as needed upon return home.    General Information   Onset of Illness/Injury or Date of Surgery - Date 11/12/18   Referring Physician Matt Schaefer MD   Patient/Family Goals Statement Pt's goal is to d/c home   Additional Occupational Profile Info/Pertinent History of Current Problem Per chart: Pt is a 78 year old male s/p L TKA   Precautions/Limitations fall precautions   Weight-Bearing Status - LLE weight-bearing as tolerated   Cognitive Status Examination   Orientation orientation to person, place and time   Level of Consciousness alert   Able to Follow Commands WNL/WFL   Personal Safety (Cognitive) WNL/WFL   Pain Assessment    Patient Currently in Pain Yes, see Vital Sign flowsheet  (7/10 pain in L knee)   Range of Motion (ROM)   ROM Comment WFL   Strength   Strength Comments WFL   Hand Strength   Hand Strength Comments WFL   Coordination   Upper Extremity Coordination No deficits were identified   Mobility   Bed Mobility Comments Initiated - refer to OT daily note for details   Bed Mobility Skill: Sit to Supine   Level of [place]: Sit/Supine stand-by assist   Physical Assist/Nonphysical Assist: Sit/Supine 1 person assist   Bed Mobility Skill: Supine to Sit   Level of [place]: Supine/Sit stand-by assist   Physical Assist/Nonphysical Assist: Supine/Sit 1 person assist   Transfer Skills   Transfer Comments Initiated - refer to OT daily note for details   Transfer Skill: Bed to Chair/Chair to Bed   Level of [place]: Bed to Chair contact guard   Physical Assist/Nonphysical Assist: Bed to Chair 1 person assist   Weight-Bearing Restrictions weight-bearing as tolerated   Assistive Device - Transfer Skill Bed to Chair Chair to Bed Rehab Eval rolling walker   Transfer Skill: Sit to Stand   Level of [place]: Sit/Stand stand-by assist   Physical Assist/Nonphysical Assist: Sit/Stand 1 person assist   Transfer Skill: Sit to Stand weight-bearing as tolerated   Assistive Device for Transfer: Sit/Stand rolling walker   Toilet Transfer   Toilet Transfer Comments Initiated - refer to OT daily note for details   Transfer Skill: Toilet Transfer   Level of [place]: Toilet contact guard   Physical Assist/Nonphysical Assist: Toilet 1 person assist   Weight-Bearing Restrictions: Toilet weight-bearing as tolerated   Assistive Device rolling walker   Balance   Balance Comments CGA/SBA while in stance FWW level   Upper Body Dressing   Level of [place]: Dress Upper Body stand-by assist   Physical Assist/Nonphysical Assist: Dress Upper Body 1 person assist   Lower Body Dressing   Level of [place]: Dress Lower Body contact guard    Physical Assist/Nonphysical Assist: Dress Lower Body 1 person assist   Toileting   Level of [place]: Toilet stand-by assist   Physical Assist/Nonphysical Assist: Toilet 1 person assist   Grooming   Level of [place]: Grooming stand-by assist   Physical Assist/Nonphysical Assist: Grooming 1 person assist   Instrumental Activities of Daily Living (IADL)   Previous Responsibilities meal prep;housekeeping;laundry;shopping;medication management;finances;driving   IADL Comments Pt has support of spouse for IADLs as needed upon return home   Activities of Daily Living Analysis   Impairments Contributing to Impaired Activities of Daily Living pain;strength decreased   ADL Comments Pt presents to OT below baseline level of functioning in all areas of self cares including bathing, dressing, grooming and toileting   General Therapy Interventions   Planned Therapy Interventions ADL retraining;IADL retraining;strengthening;transfer training   Clinical Impression   Criteria for Skilled Therapeutic Interventions Met yes, treatment indicated   OT Diagnosis Impaired ADLs, IADLs and mobility tasks   Influenced by the following impairments Decreased strength and functional activity tolerance, pain   Assessment of Occupational Performance 5 or more Performance Deficits   Identified Performance Deficits Bathing, dressing, grooming, toileting, homemaking, transfers   Clinical Decision Making (Complexity) Low complexity   Therapy Frequency daily   Predicted Duration of Therapy Intervention (days/wks) eval and 1x treat   Anticipated Discharge Disposition Home with Assist   Risks and Benefits of Treatment have been explained. Yes   Patient, Family & other staff in agreement with plan of care Yes   Clinical Impression Comments Pt would benefit from skilled OT to maximize safety and indep in all ADLs, IADLs and mobility tasks due to current deficits impacting function. Pt in agreement to eval and 1x treat.    [facility]  "AM-PAC  \"6 Clicks\" Daily Activity Inpatient Short Form   1. Putting on and taking off regular lower body clothing? 3 - A Little   2. Bathing (including washing, rinsing, drying)? 3 - A Little   3. Toileting, which includes using toilet, bedpan or urinal? 3 - A Little   4. Putting on and taking off regular upper body clothing? 4 - None   5. Taking care of personal grooming such as brushing teeth? 4 - None   6. Eating meals? 4 - None   Daily Activity Raw Score (Score out of 24.Lower scores equate to lower levels of function) 21   Total Evaluation Time   Total Evaluation Time (Minutes) 10     "

## 2018-11-13 NOTE — PLAN OF CARE
Problem: Knee Arthroplasty (Total, Partial) (Adult)  Goal: Signs and Symptoms of Listed Potential Problems Will be Absent, Minimized or Managed (Knee Arthroplasty)  Signs and symptoms of listed potential problems will be absent, minimized or managed by discharge/transition of care (reference Knee Arthroplasty (Total, Partial) (Adult) CPG).   Outcome: Improving  Orientation:A&ox3  Vs: bp elevated. bp meds given this am. Ra.  Tele:sb 40's.  LS: clear  GI: bs+ + gas  : bradshaw removed. Dtv by 3pm  Skin: left knee drsg d/i. hemovac removed.  Activity: up with assist of 1 with gait belt and walker.  Pain: oxycodone 10mg x2.  Plan: pt/ot following. Pain management.

## 2018-11-13 NOTE — PROGRESS NOTES
[facility]    Hospitalist Progress Note      Assessment & Plan   Iván Nicholas is a 78 year old male with a history of coronary artery disease, hyperlipidemia, prostate cancer, BPH, hypertension, gout, depression, stroke, and osteoarthritis who presents with left total knee arthroplasty.     1.  Left total knee arthroplasty, on 11/12/18.    - see op note for details  - routine post op care per ortho  - on ASA for dvt prophy    2.  Bradycardia.    This chronic for the patient, per review of his pre-op note as well as cardiology visit. He is on atenolol and propranolol as outpatient. Given he is on 2 BB, plan to discontinue his atenolol and continue only on propranolol tomorrow  - follow-up with PCP and also cardiology   - continue to monitor     3. Coronary artery disease.    - Continue aspirin, on higher dose for DVT prophy currently  - continue with Imdur, and rosuvastatin.    - Hold atenolol and propranolol as above     4.  Hypertension.  Continue Imdur.  Hold atenolol and propranolol due to bradycardia.  Have IV hydralazine available as needed.     5.  Hyperlipidemia.  Continue rosuvastatin.     6.  Acute kidney injury on chronic kidney disease-II    - baseline around 1.  Cr 1.45 on admission and down to 1.34  - f/u with bmp tomorrow.   - currently on Celebrex post op, if increasing cr, stop this tomorrow     DVT Prophylaxis: ASA  Code Status: Prior    Disposition: Expected discharge per orthopedics    Diana Diaz MD  Text Page  (7am to 6pm)    Interval History   Pain is controlled.   Bradycardic, but denies light headedness, chest pain.     -Data reviewed today: I reviewed all new labs and imaging results over the last 24 hours. I personally reviewed no images or EKG's today.    Physical Exam   Temp: 95.3  F (35.2  C) Temp src: Oral BP: 117/66 Pulse: (!) 47 Heart Rate: 50 Resp: (P) 16 SpO2: 97 % O2 Device: None (Room air)    Vitals:    10/25/18 1137 11/08/18 1421 11/12/18 0558    Weight: 95.3 kg (210 lb) 99.8 kg (220 lb 0.3 oz) 98.4 kg (217 lb)     Vital Signs with Ranges  Temp:  [95.3  F (35.2  C)-96.9  F (36.1  C)] 95.3  F (35.2  C)  Pulse:  [45-47] 47  Heart Rate:  [46-50] 50  Resp:  [16] (P) 16  BP: (117-164)/(61-78) 117/66  SpO2:  [92 %-97 %] 97 %  I/O last 3 completed shifts:  In: 1366 [P.O.:550; I.V.:816]  Out: 2390 [Urine:1800; Drains:590]    Constitutional: Alert, awake and no apparent distress  Respiratory: CTAB, no wheezing  Cardiovascular: salvador, regular  GI: soft and non-tender  Skin/Integumen: warm and dry  Other:  left LE/knee: ace wrapped    Medications     lactated ringers Stopped (11/13/18 1100)       acetaminophen  975 mg Oral Q8H     aspirin  325 mg Oral BID     celecoxib  200 mg Oral Daily     ferrous gluconate  324 mg Oral Daily     isosorbide mononitrate  60 mg Oral Daily     rosuvastatin  40 mg Oral Daily     senna-docusate  1 tablet Oral BID    Or     senna-docusate  2 tablet Oral BID     sodium chloride (PF)  3 mL Intracatheter Q8H     sodium chloride (PF)  3 mL Intracatheter Q8H       Data     Recent Labs  Lab 11/13/18  0739 11/12/18  0649   HGB 10.8*  --      --    POTASSIUM 4.9 4.4   CHLORIDE 105  --    CO2 30  --    BUN 27  --    CR 1.34* 1.45*   ANIONGAP 4  --    DONELL 8.2*  --    *  --        No results found for this or any previous visit (from the past 24 hour(s)).

## 2018-11-13 NOTE — PLAN OF CARE
Problem: Patient Care Overview  Goal: Plan of Care/Patient Progress Review  Outcome: Improving  Nancy RN [phone]  Pt A&O, able to make needs known.  VSS on RA.  Up with assist of 1, walker and kinjal.  Pain controlled with scheduled tylenol and prn oxycodone.  Was placed on remote tele for bradycardia - per pt his baseline HR is in the 40s.  Siddiqui in place with adequate output, hemovac patent.  Ace wrap CDI.  IV infusing.  Tolerating regular diet.  Will continue to monitor.

## 2018-11-14 ENCOUNTER — APPOINTMENT (OUTPATIENT)
Dept: PHYSICAL THERAPY | Facility: CLINIC | Age: 78
DRG: 470 | End: 2018-11-14
Attending: ORTHOPAEDIC SURGERY
Payer: MEDICARE

## 2018-11-14 VITALS
WEIGHT: 217 LBS | OXYGEN SATURATION: 91 % | HEIGHT: 71 IN | BODY MASS INDEX: 30.38 KG/M2 | SYSTOLIC BLOOD PRESSURE: 156 MMHG | DIASTOLIC BLOOD PRESSURE: 72 MMHG | HEART RATE: 48 BPM | RESPIRATION RATE: 18 BRPM | TEMPERATURE: 96.5 F

## 2018-11-14 LAB
ANION GAP SERPL CALCULATED.3IONS-SCNC: 4 MMOL/L (ref 3–14)
BUN SERPL-MCNC: 28 MG/DL (ref 7–30)
CALCIUM SERPL-MCNC: 8.3 MG/DL (ref 8.5–10.1)
CHLORIDE SERPL-SCNC: 104 MMOL/L (ref 94–109)
CO2 SERPL-SCNC: 31 MMOL/L (ref 20–32)
CREAT SERPL-MCNC: 1.39 MG/DL (ref 0.66–1.25)
GFR SERPL CREATININE-BSD FRML MDRD: 49 ML/MIN/1.7M2
GLUCOSE SERPL-MCNC: 108 MG/DL (ref 70–99)
HGB BLD-MCNC: 10.2 G/DL (ref 13.3–17.7)
POTASSIUM SERPL-SCNC: 4.8 MMOL/L (ref 3.4–5.3)
SODIUM SERPL-SCNC: 139 MMOL/L (ref 133–144)

## 2018-11-14 PROCEDURE — 97116 GAIT TRAINING THERAPY: CPT | Mod: GP | Performed by: PHYSICAL THERAPIST

## 2018-11-14 PROCEDURE — A9270 NON-COVERED ITEM OR SERVICE: HCPCS | Mod: GY | Performed by: PHYSICIAN ASSISTANT

## 2018-11-14 PROCEDURE — 25000132 ZZH RX MED GY IP 250 OP 250 PS 637: Mod: GY | Performed by: PHYSICIAN ASSISTANT

## 2018-11-14 PROCEDURE — 36415 COLL VENOUS BLD VENIPUNCTURE: CPT | Performed by: ORTHOPAEDIC SURGERY

## 2018-11-14 PROCEDURE — 25000132 ZZH RX MED GY IP 250 OP 250 PS 637: Mod: GY | Performed by: ORTHOPAEDIC SURGERY

## 2018-11-14 PROCEDURE — A9270 NON-COVERED ITEM OR SERVICE: HCPCS | Mod: GY | Performed by: ORTHOPAEDIC SURGERY

## 2018-11-14 PROCEDURE — 40000193 ZZH STATISTIC PT WARD VISIT: Performed by: PHYSICAL THERAPIST

## 2018-11-14 PROCEDURE — 97110 THERAPEUTIC EXERCISES: CPT | Mod: GP | Performed by: PHYSICAL THERAPIST

## 2018-11-14 PROCEDURE — 99207 ZZC CDG-MDM COMPONENT: MEETS LOW - DOWN CODED: CPT | Performed by: INTERNAL MEDICINE

## 2018-11-14 PROCEDURE — 80048 BASIC METABOLIC PNL TOTAL CA: CPT | Performed by: ORTHOPAEDIC SURGERY

## 2018-11-14 PROCEDURE — 99232 SBSQ HOSP IP/OBS MODERATE 35: CPT | Performed by: INTERNAL MEDICINE

## 2018-11-14 PROCEDURE — 85018 HEMOGLOBIN: CPT | Performed by: ORTHOPAEDIC SURGERY

## 2018-11-14 RX ADMIN — ROSUVASTATIN CALCIUM 40 MG: 20 TABLET, FILM COATED ORAL at 08:38

## 2018-11-14 RX ADMIN — SENNOSIDES AND DOCUSATE SODIUM 1 TABLET: 8.6; 5 TABLET ORAL at 08:39

## 2018-11-14 RX ADMIN — ISOSORBIDE MONONITRATE 60 MG: 60 TABLET, EXTENDED RELEASE ORAL at 08:39

## 2018-11-14 RX ADMIN — FERROUS GLUCONATE 324 MG: 324 TABLET ORAL at 08:38

## 2018-11-14 RX ADMIN — ASPIRIN 325 MG: 325 TABLET, DELAYED RELEASE ORAL at 08:39

## 2018-11-14 RX ADMIN — ACETAMINOPHEN 975 MG: 325 TABLET, FILM COATED ORAL at 09:12

## 2018-11-14 RX ADMIN — OXYCODONE HYDROCHLORIDE 10 MG: 5 TABLET ORAL at 08:39

## 2018-11-14 RX ADMIN — ACETAMINOPHEN 975 MG: 325 TABLET, FILM COATED ORAL at 01:14

## 2018-11-14 RX ADMIN — OXYCODONE HYDROCHLORIDE 5 MG: 5 TABLET ORAL at 05:14

## 2018-11-14 RX ADMIN — CELECOXIB 200 MG: 200 CAPSULE ORAL at 08:39

## 2018-11-14 RX ADMIN — OXYCODONE HYDROCHLORIDE 5 MG: 5 TABLET ORAL at 01:16

## 2018-11-14 ASSESSMENT — ACTIVITIES OF DAILY LIVING (ADL)
ADLS_ACUITY_SCORE: 8

## 2018-11-14 NOTE — PLAN OF CARE
Problem: Patient Care Overview  Goal: Plan of Care/Patient Progress Review  Outcome: Adequate for Discharge Date Met: 11/14/18  Pt ok to discharge to home. Pt has Physical therapy this am; ok to discharge to home with outpatient PT ordered. Reviewed all discharge instructions and follow up times. Gave DONNY stockings. Reviewed medications in detail. Pt frustrated this am due to bed alarm being on when he attempted to ambulate self to the bathroom. Reviewed safety reasons for bed alarm. No further questions at this time.

## 2018-11-14 NOTE — PLAN OF CARE
Problem: Patient Care Overview  Goal: Plan of Care/Patient Progress Review  Discharge Planner PT   Patient plan for discharge: Pt to go home and have OP PT  Current status: Pt has Guicho bed mobility, [place]icho/SBA transfers using walker, pt follows HEP with cues and CGA to [place] on some exercises. ROM 4-94. Pt ambulates with no KI, 200ft with walker and SBA. Pt ascended and descended full flight of stairs with rail and cane, CGA and cues one step at a time.  Barriers to return to prior living situation: stairs at home  Recommendations for discharge: Home with OP PT. Pt does not need afternoon session if planning to discharge sooner  Rationale for recommendations: Pt making progress as expected and will need OP PT to maximize functional mobility and left knee ROM       Entered by: Jahaira Jade 11/14/2018 10:00 AM

## 2018-11-14 NOTE — PROGRESS NOTES
"Orthopedic Surgery  11/14/2018  POD 2    S: Patient voices no unexpected ortho complaints today. Denies chest pain or shortness of breath. More pain last night.    O: Blood pressure 168/68, pulse (!) 48, temperature 95.9  F (35.5  C), temperature source Oral, resp. rate 18, height 1.8 m (5' 10.87\"), weight 98.4 kg (217 lb), SpO2 96 %.  Lab Results   Component Value Date    HGB 10.8 11/13/2018     Lab Results   Component Value Date    INR 0.99 11/10/2016     I/O last 3 completed shifts:  In: 550 [P.O.:550]  Out: 1740 [Urine:1400; Drains:340]  Distal extremity CMSI bilaterally.  Calves are negative bilaterally, both soft and nontender.  The dressing is C/D/I.      A: Mr. Nicholas is doing well status post Procedure(s):  Left total knee arthroplasty.    P: Continue physical therapy. Continue DVT pphx with ASA due to high mobility and low risk factors for DVT. Anticipate discharge to home later today.will try hydroxyzine.    Daniela Jordan PA-C  [phone]  "

## 2018-11-14 NOTE — PROGRESS NOTES
[facility]    Hospitalist Progress Note      Assessment & Plan   Iván Nicholas is a 78 year old male with a history of coronary artery disease, hyperlipidemia, prostate cancer, BPH, hypertension, gout, depression, stroke, and osteoarthritis who presents with left total knee arthroplasty.     1.  Left total knee arthroplasty, on 11/12/18.    - see op note for details  - routine post op care per ortho  - on ASA for dvt prophy    2.  Bradycardia, improved   Per review of his pre-op note as well as recent cardiology visit, HR has been documented as 50. He is on atenolol (for bp) and propranolol (for essential tremor) as outpatient. HR improved to 60-70s with holding off both medications.  I suspect his bradycarida is due to medications.  Given he is on 2 BB, plan to discontinue his atenolol and continue only on propranolol at discharge  - follow-up with PCP in 1 week, discussed with patient  - continue to monitor     3. Coronary artery disease, HTN, Hyperlipidemia    - Continue aspirin, on higher dose for DVT prophy currently  - continue with Imdur, and rosuvastatin.    - atenolol stopped due to bradycardia as above. He is continuing on propranolol      4.  Acute kidney injury on chronic kidney disease-II    - baseline around 1.  Cr 1.45 on admission and down to 1.39 on day of discharge. He has received celebrex postop.    - advised patient to avoid NSAIDs. Continue with ASA for DVT prophy as recommended  - f/u BMP in 1 week with PCP    5. Essential tremor: continue with Propranolol at discharge    Communications: discussed with RN     DVT Prophylaxis: ASA  Code Status: Prior    Disposition: Expected discharge per orthopedics today. Okay to discharge from medicine stand point. Discharge med recs are complete and f/u instruction with PCP and labs entered.     Diana Diaz MD  Text Page  (7am to 6pm)    Interval History   Pain is controlled. Denies chest pain or shortness of breath. HR  has improved to 60-70s overnight.   Plans to discharge home today.      -Data reviewed today: I reviewed all new labs and imaging results over the last 24 hours. I personally reviewed no images or EKG's today.    Physical Exam   Temp: 96.5  F (35.8  C) Temp src: Oral BP: 156/72 Pulse: (!) 48 Heart Rate: 71 Resp: 18 SpO2: 91 % O2 Device: None (Room air)    Vitals:    10/25/18 1137 11/08/18 1421 11/12/18 0558   Weight: 95.3 kg (210 lb) 99.8 kg (220 lb 0.3 oz) 98.4 kg (217 lb)     Vital Signs with Ranges  Temp:  [95.9  F (35.5  C)-96.5  F (35.8  C)] 96.5  F (35.8  C)  Pulse:  [47-48] 48  Heart Rate:  [54-71] 71  Resp:  [16-18] 18  BP: (117-168)/(61-72) 156/72  SpO2:  [91 %-96 %] 91 %  I/O last 3 completed shifts:  In: 750 [P.O.:750]  Out: 750 [Urine:650; Drains:100]    Constitutional: Alert, awake and no apparent distress  Respiratory: CTAB, no wheezing  Cardiovascular: regular rate and rhythm  GI: soft and non-tender  Skin/Integumen: warm and dry  Other:  left LE/knee: ace wrapped    Medications       acetaminophen  975 mg Oral Q8H     aspirin  325 mg Oral BID     ferrous gluconate  324 mg Oral Daily     isosorbide mononitrate  60 mg Oral Daily     rosuvastatin  40 mg Oral Daily     senna-docusate  1 tablet Oral BID    Or     senna-docusate  2 tablet Oral BID     sodium chloride (PF)  3 mL Intracatheter Q8H       Data     Recent Labs  Lab 11/14/18  0631 11/13/18  0739 11/12/18  0649   HGB 10.2* 10.8*  --     139  --    POTASSIUM 4.8 4.9 4.4   CHLORIDE 104 105  --    CO2 31 30  --    BUN 28 27  --    CR 1.39* 1.34* 1.45*   ANIONGAP 4 4  --    DONELL 8.3* 8.2*  --    * 126*  --        No results found for this or any previous visit (from the past 24 hour(s)).

## 2018-11-14 NOTE — PLAN OF CARE
Problem: Patient Care Overview  Goal: Plan of Care/Patient Progress Review  Noc RN- Pt resting between cares, 5mg oxycodone given for pain, Pt up to BR with assist 1. Vital signs stable. Remote tele SB prolonged QT. Pt anxious at times with dyspnea on exertion

## 2018-11-14 NOTE — PLAN OF CARE
Problem: Patient Care Overview  Goal: Plan of Care/Patient Progress Review  Physical Therapy Discharge Summary    Reason for therapy discharge:    Discharged to home with outpatient therapy.    Progress towards therapy goal(s). See goals on Care Plan in [facility] electronic health record for goal details.  Goals partially met.  Barriers to achieving goals:   discharge from facility.    Therapy recommendation(s):    Continued therapy is recommended.  Rationale/Recommendations:  Pt still is below baseline and needs OP PT as well as HEP to maximize mobility and knee ROM and strength.

## 2018-11-14 NOTE — PLAN OF CARE
Problem: Patient Care Overview  Goal: Plan of Care/Patient Progress Review  Outcome: Improving  Pt A&O, able to make needs known.  VSS on RA.  Pain controlled with prn oxycodone and scheduled tylenol.  Up with assist of 1, walker and gb.  CMS intact.  Ace wrap CDI.  Voiding adequate amounts.  Post void residual 23mL.  IV saline locked.  Tolerating regular diet.  Will continue to monitor, pt to discharge home Wednesday.

## 2018-11-15 ENCOUNTER — HOSPITAL ENCOUNTER (EMERGENCY)
Facility: CLINIC | Age: 78
Discharge: HOME | End: 2018-11-15
Attending: EMERGENCY MEDICINE | Admitting: EMERGENCY MEDICINE
Payer: MEDICARE

## 2018-11-15 VITALS
OXYGEN SATURATION: 94 % | RESPIRATION RATE: 20 BRPM | DIASTOLIC BLOOD PRESSURE: 80 MMHG | HEART RATE: 60 BPM | SYSTOLIC BLOOD PRESSURE: 152 MMHG | TEMPERATURE: 98 F

## 2018-11-15 DIAGNOSIS — R46.89 BEHAVIORAL CHANGE: ICD-10-CM

## 2018-11-15 LAB
ALBUMIN UR-MCNC: 30 MG/DL
ANION GAP SERPL CALCULATED.3IONS-SCNC: 4 MMOL/L (ref 3–14)
APPEARANCE UR: CLEAR
BASOPHILS # BLD AUTO: 0 10E9/L (ref 0–0.2)
BASOPHILS NFR BLD AUTO: 0.3 %
BILIRUB UR QL STRIP: NEGATIVE
BUN SERPL-MCNC: 22 MG/DL (ref 7–30)
CALCIUM SERPL-MCNC: 8.3 MG/DL (ref 8.5–10.1)
CHLORIDE SERPL-SCNC: 105 MMOL/L (ref 94–109)
CO2 SERPL-SCNC: 31 MMOL/L (ref 20–32)
COLOR UR AUTO: YELLOW
CREAT SERPL-MCNC: 1.24 MG/DL (ref 0.66–1.25)
DIFFERENTIAL METHOD BLD: ABNORMAL
EOSINOPHIL # BLD AUTO: 0.3 10E9/L (ref 0–0.7)
EOSINOPHIL NFR BLD AUTO: 5.4 %
ERYTHROCYTE [DISTWIDTH] IN BLOOD BY AUTOMATED COUNT: 12.8 % (ref 10–15)
GFR SERPL CREATININE-BSD FRML MDRD: 56 ML/MIN/1.7M2
GLUCOSE SERPL-MCNC: 120 MG/DL (ref 70–99)
GLUCOSE UR STRIP-MCNC: NEGATIVE MG/DL
GRAN CASTS #/AREA URNS LPF: 1 /LPF
HCT VFR BLD AUTO: 29.9 % (ref 40–53)
HGB BLD-MCNC: 9.7 G/DL (ref 13.3–17.7)
HGB UR QL STRIP: NEGATIVE
HYALINE CASTS #/AREA URNS LPF: 5 /LPF (ref 0–2)
IMM GRANULOCYTES # BLD: 0 10E9/L (ref 0–0.4)
IMM GRANULOCYTES NFR BLD: 0.3 %
KETONES UR STRIP-MCNC: NEGATIVE MG/DL
LEUKOCYTE ESTERASE UR QL STRIP: NEGATIVE
LYMPHOCYTES # BLD AUTO: 1 10E9/L (ref 0.8–5.3)
LYMPHOCYTES NFR BLD AUTO: 16.6 %
MCH RBC QN AUTO: 33.7 PG (ref 26.5–33)
MCHC RBC AUTO-ENTMCNC: 32.4 G/DL (ref 31.5–36.5)
MCV RBC AUTO: 104 FL (ref 78–100)
MONOCYTES # BLD AUTO: 0.7 10E9/L (ref 0–1.3)
MONOCYTES NFR BLD AUTO: 12 %
MUCOUS THREADS #/AREA URNS LPF: PRESENT /LPF
NEUTROPHILS # BLD AUTO: 3.9 10E9/L (ref 1.6–8.3)
NEUTROPHILS NFR BLD AUTO: 65.4 %
NITRATE UR QL: NEGATIVE
NRBC # BLD AUTO: 0 10*3/UL
NRBC BLD AUTO-RTO: 0 /100
PH UR STRIP: 5 PH (ref 5–7)
PLATELET # BLD AUTO: 115 10E9/L (ref 150–450)
POTASSIUM SERPL-SCNC: 4.1 MMOL/L (ref 3.4–5.3)
RBC # BLD AUTO: 2.88 10E12/L (ref 4.4–5.9)
RBC #/AREA URNS AUTO: 2 /HPF (ref 0–2)
SODIUM SERPL-SCNC: 140 MMOL/L (ref 133–144)
SOURCE: ABNORMAL
SP GR UR STRIP: 1.02 (ref 1–1.03)
TROPONIN I SERPL-MCNC: <0.015 UG/L (ref 0–0.04)
UROBILINOGEN UR STRIP-MCNC: 2 MG/DL (ref 0–2)
WBC # BLD AUTO: 6 10E9/L (ref 4–11)
WBC #/AREA URNS AUTO: 2 /HPF (ref 0–5)

## 2018-11-15 PROCEDURE — 81001 URINALYSIS AUTO W/SCOPE: CPT | Performed by: EMERGENCY MEDICINE

## 2018-11-15 PROCEDURE — 99284 EMERGENCY DEPT VISIT MOD MDM: CPT

## 2018-11-15 PROCEDURE — 84484 ASSAY OF TROPONIN QUANT: CPT | Performed by: EMERGENCY MEDICINE

## 2018-11-15 PROCEDURE — 85025 COMPLETE CBC W/AUTO DIFF WBC: CPT | Performed by: EMERGENCY MEDICINE

## 2018-11-15 PROCEDURE — 80048 BASIC METABOLIC PNL TOTAL CA: CPT | Performed by: EMERGENCY MEDICINE

## 2018-11-15 PROCEDURE — 93005 ELECTROCARDIOGRAM TRACING: CPT

## 2018-11-15 ASSESSMENT — ENCOUNTER SYMPTOMS
FEVER: 0
SHORTNESS OF BREATH: 0
CONFUSION: 1
DIZZINESS: 1
PALPITATIONS: 1

## 2018-11-15 NOTE — ED PROVIDER NOTES
History     Chief Complaint:  Dizziness      HPI   Iván Nicholas is a 78 year old male who presents to the emergency department today for evaluation of dizziness. The patient reports he was discharged yesterday for a total knee replacement. He reports he was frustrated while waiting to be discharged due to troubles with medications and not being able to have total control over going to the bathroom when he wanted. Since the surgery he has been having episodes of dizziness, confusion, and palpitations. Today, he had another episode of dizziness and he felt like he knew something wasn't right and that he felt agitated. His wife adds that she has never seen him act like this before, and that the agitation isn't like him. He did take his beta blocker because he felt his heart racing, and he feels as if he has gotten better. This was held during hospitalization due to bradycardia, though notes suggested pt was due to restart the propranolol. Due to these symptoms he presented to the emergency department today. He has been getting around with a cane and has had no complications with his knee. Of note, his last narcotic dose was this morning. He has had multiple previous surgeries, and believes that he was on oxycodone after these and never had any troubles.  He denies chest pain, fever, and shortness of breath.          Allergies:  Hmg-Coa-R Inhibitors        Medications:    ALLOPURINOL PO  aspirin 81 MG EC tablet  ezetimibe (ZETIA) 10 MG tablet  hydrOXYzine (ATARAX) 25 MG tablet  isosorbide mononitrate (IMDUR) 60 MG 24 hr tablet  Multiple Vitamins-Minerals (CENTRUM SILVER) per tablet  nitroglycerin (NITROSTAT) 0.4 MG SL tablet  oxyCODONE IR (ROXICODONE) 5 MG tablet  primidone (MYSOLINE) 250 MG tablet  propranolol (INDERAL LA) 80 MG 24 hr capsule  rosuvastatin (CRESTOR) 40 MG tablet  senna-docusate (SENOKOT-S;PERICOLACE) 8.6-50 MG per tablet      Past Medical History:    Arthritis  Carotid artery  stenosis  Cerebral artery occlusion with cerebra infarction  Cholesterol serum elevated  Colon polyps  Color blind  CAD  Decreased hearing  Depression  Gout  Hypertension  LUTS  Impotence  Left bundle branch block  Onychomycosis of toe nail  Hyperlipidemia  Paroxysmal ventricular tachycardia  Stented coronary artery      Past Surgical History:    Angioplasty  Arthroplasty knee  Colonoscopy  Coronary Angiography adult order  Endarterectomy Carotid  Heart catheter, angioplasty  Orthopedic surgery  Phacoemulsification cornea with standard intraocular lens implant      Family History:    Heart Disease  Emphysema  CAD      Social History:  The patient was accompanied to the ED by wife.  Smoking Status: Former Smoker  Smokeless Tobacco: Never Used  Alcohol Use: No   Marital Status:   [2]       Review of Systems   Constitutional: Negative for fever.   Respiratory: Negative for shortness of breath.    Cardiovascular: Positive for palpitations. Negative for chest pain.   Neurological: Positive for dizziness.   Psychiatric/Behavioral: Positive for confusion.   All other systems reviewed and are negative.        Physical Exam   First Vitals:  BP: (!) 187/110  Pulse: 60  Heart Rate: 59  Temp: 98  F (36.7  C)  Resp: 20  SpO2: 91 %      Physical Exam  Nursing note and vitals reviewed.  Constitutional: Cooperative.   HENT:   Mouth/Throat: Moist mucous membranes.   Eyes: EOMI, nonicteric sclera  Cardiovascular: Normal rate, regular rhythm, no murmurs, rubs, or gallops  Pulmonary/Chest: Effort normal and breath sounds normal. No respiratory distress. No wheezes. No rales.   Abdominal: Soft. Nontender, nondistended, no guarding or rigidity. BS present.   Musculoskeletal: Normal range of motion.   Neurological: Alert. Moves all extremities spontaneously. LLE is swollen and warm consistent with postop state.   Skin: Skin is warm and dry. No rash noted.   Psychiatric: Normal mood and affect.    Emergency Department Course      Laboratory:  Laboratory findings were communicated with the patient who voiced understanding of the findings.    CBC: WBC 6.0, HGB 9.7 (L),  (L)   BMP: Glucose 120 (H), GFR Estimate 56 (L), Calcium 8.3 (L) o/w WNL (Creatinine 1.24)   Troponin (Collected 1655): <0.015     UA: Yellow and Clear. Protein Albumin Urine 30, Mucous Urine Present, Hyaline Casts 5 (H), Granular Casts 1 o/w WNL      Emergency Department Course:  Nursing notes and vitals reviewed.  1743: I performed an exam of the patient as documented above.   IV was inserted and blood was drawn for laboratory testing, results above.   1854 Patient rechecked and updated.    1915 Patient rechecked and updated.    Findings and plan explained to the Patient. Patient discharged home with instructions regarding supportive care, medications, and reasons to return. The importance of close follow-up was reviewed.   I personally reviewed the laboratory  results with the Patient and answered all related questions prior to discharge.   Impression & Plan      Medical Decision Making:  Pt presents with multiple complaints with his wife. Following his surgery, pt has been more agitated than normal. Broad differential considered including recovery from surgery/hospitalization, medication side effect, infection, among other etiologies. No signs of infection on exam or on lab work. Pt has had oxycodone before without difficulty. Certainly narcotics are associated with behavioral changes. Made 2 recommendations to patient: 1. Attempt to wean off narcotics as soon as he can. 2. Restart propranolol. Given propranolol crosses blood/brain barrier and it was stopped perioperatively, he could be having changes due to this as well. Pt has long history of bradycardia and his other beta blocker was stopped. Believe it's safe to restart at this time. He is in stable condition at the time of discharge, indications for return to the ED were discussed as well as follow up. All  questions were answered and he and his wife are in agreement with the plan.      Diagnosis:    ICD-10-CM    1. Behavioral change R46.89        Disposition:  Discharged to home.    Scribe Disclosure:  I, Sujatha Jannet, am serving as a scribe at 5:10 PM on 11/15/2018 to document services personally performed by Kun Frazier MD based on my observations and the provider's statements to me.    Sujatha Power  11/15/2018   [facility] EMERGENCY DEPARTMENT       Kun Frazier MD  11/17/18 0754

## 2018-11-15 NOTE — ED TRIAGE NOTES
"Patient states he had been feeling dizziness, confusion and \"heart beating out of control\" Patient states he was discharged yesterday from hospital after a total knee replacement. ABC Intact alert and no distress.   "

## 2018-11-15 NOTE — ED AVS SNAPSHOT
[facility] Emergency Department    [address]    [facility] [address]    Phone:  [phone]    Fax:  [phone]                                       Iván Nicholas   MRN: [id number]    Department:  [facility] Emergency Department   Date of Visit:  11/15/2018           Patient Information     Date Of Birth          1940        Your diagnoses for this visit were:     Behavioral change        You were seen by Kun Frazier MD.      Follow-up Information     Follow up with Stephan Nice MD. Schedule an appointment as soon as possible for a visit in 3 days.    Specialty:  Family Practice    Contact information:    [facility]  [address]  [facility] [address]  [phone]          Follow up with [facility] Emergency Department.    Specialty:  EMERGENCY MEDICINE    Why:  As needed, If symptoms worsen    Contact information:    [address]  [facility] [id number] [phone]        Discharge Instructions         We recommend restarting your propranolol and trying to wean off your oxycodone. Follow-up with your normal doctor early next week or return to emergency department over weekend for worsening symptoms.     Symptoms With Uncertain Cause (Adult)  You have been examined, and tests may have been done. However, the exact cause of your symptoms is still not certain. Watch for any new symptoms or worsening of your condition. Another exam or more testing at a later time may be needed. Unless told otherwise, you can go back to your normal routine. Continue to take prescribed medicines as directed. Contact your healthcare provider if you have questions or concerns.   Follow-up care  Follow up with your healthcare provider if your symptoms do not begin to improve in the next few days, or as advised by our staff.   When to seek medical advice  Call your healthcare provider if your symptoms get worse or  if new symptoms appear.  Date Last Reviewed: 10/1/2017    [id number] [employer]. [address] [place] [address], [place] [address]. All rights reserved. This information is not intended as a substitute for professional medical care. Always follow your healthcare professional's instructions.          24 Hour Appointment Hotline       To make an appointment at any [facility], call [phone] ([phone]). If you don't have a family doctor or clinic, we will help you find one. [facility] clinics are conveniently located to serve the needs of you and your family.             Review of your medicines      Our records show that you are taking the medicines listed below. If these are incorrect, please call your family doctor or clinic.        Dose / Directions Last dose taken    acetaminophen 325 MG tablet   Commonly known as:  TYLENOL   Dose:  975 mg   Quantity:  250 tablet        Take 3 tablets (975 mg) by mouth every 8 hours   Refills:  0        ALLOPURINOL PO   Dose:  100 mg        Take 100 mg by mouth daily. To prevent gout attacks, recently started.   Refills:  0        aspirin 81 MG EC tablet   Dose:  81 mg   Quantity:  60 tablet        Take 1 tablet (81 mg) by mouth 2 times daily   Refills:  0        * CENTRUM SILVER per tablet   Dose:  1 tablet        Take 1 tablet by mouth daily.   Refills:  0        * PRESERVISION AREDS 2 Caps        Take by mouth 2 times daily   Refills:  0        CRESTOR 40 MG tablet   Dose:  40 mg   Quantity:  30 tablet   Generic drug:  rosuvastatin        Take 1 tablet by mouth daily.   Refills:  1        ezetimibe 10 MG tablet   Commonly known as:  ZETIA   Dose:  10 mg   Quantity:  90 tablet        Take 1 tablet (10 mg) by mouth daily   Refills:  3        hydrOXYzine 25 MG tablet   Commonly known as:  ATARAX   Dose:  25 mg   Quantity:  50 tablet        Take 1 tablet (25 mg) by mouth every 6 hours as needed for other (adjuvant pain)   Refills:  0        isosorbide  mononitrate 60 MG 24 hr tablet   Commonly known as:  IMDUR   Dose:  60 mg   Quantity:  90 tablet        Take 1 tablet (60 mg) by mouth daily   Refills:  1        nitroGLYcerin 0.4 MG sublingual tablet   Commonly known as:  NITROSTAT   Dose:  0.4 mg   Quantity:  25 tablet        Place 1 tablet (0.4 mg) under the tongue every 5 minutes as needed for chest pain   Refills:  11        oxyCODONE IR 5 MG tablet   Commonly known as:  ROXICODONE   Quantity:  50 tablet        1 tab po q 4 hrs prn pain scale 3-6,  2 tabs po q 4hrs prn pain scale 7-10   Refills:  0        primidone 250 MG tablet   Commonly known as:  MYSOLINE   Dose:  250 mg        Take 250 mg by mouth 2 times daily   Refills:  0        propranolol 80 MG 24 hr capsule   Commonly known as:  INDERAL LA   Dose:  80 mg        Take 80 mg by mouth daily   Refills:  0        senna-docusate 8.6-50 MG per tablet   Commonly known as:  SENOKOT-S;PERICOLACE   Dose:  1-2 tablet   Quantity:  60 tablet        Take 1-2 tablets by mouth 2 times daily as needed for constipation   Refills:  0        * Notice:  This list has 2 medication(s) that are the same as other medications prescribed for you. Read the directions carefully, and ask your doctor or other care provider to review them with you.            Procedures and tests performed during your visit     Basic metabolic panel    CBC with platelets differential    EKG 12 lead    Troponin I    UA with Microscopic reflex to Culture      Orders Needing Specimen Collection     None      Pending Results     Date and Time Order Name Status Description    11/15/2018 1911 EKG 12 lead Preliminary             Pending Culture Results     No orders found from 11/13/2018 to 11/16/2018.            Pending Results Instructions     If you had any lab results that were not finalized at the time of your Discharge, you can call the ED Lab Result RN at [phone]. You will be contacted by this team for any positive Lab results or changes in  treatment. The nurses are available 7 days a week from 10A to 6:30P.  You can leave a message 24 hours per day and they will return your call.        Test Results From Your Hospital Stay        11/15/2018  6:22 PM      Component Results     Component Value Ref Range & Units Status    WBC 6.0 4.0 - 11.0 10e9/L Final    RBC Count 2.88 (L) 4.4 - 5.9 10e12/L Final    Hemoglobin 9.7 (L) 13.3 - 17.7 g/dL Final    Hematocrit 29.9 (L) 40.0 - 53.0 % Final     (H) 78 - 100 fl Final    MCH 33.7 (H) 26.5 - 33.0 pg Final    MCHC 32.4 31.5 - 36.5 g/dL Final    RDW 12.8 10.0 - 15.0 % Final    Platelet Count 115 (L) 150 - 450 10e9/L Final    Diff Method Automated Method  Final    % Neutrophils 65.4 % Final    % Lymphocytes 16.6 % Final    % Monocytes 12.0 % Final    % Eosinophils 5.4 % Final    % Basophils 0.3 % Final    % Immature Granulocytes 0.3 % Final    Nucleated RBCs 0 0 /100 Final    Absolute Neutrophil 3.9 1.6 - 8.3 10e9/L Final    Absolute Lymphocytes 1.0 0.8 - 5.3 10e9/L Final    Absolute Monocytes 0.7 0.0 - 1.3 10e9/L Final    Absolute Eosinophils 0.3 0.0 - 0.7 10e9/L Final    Absolute Basophils 0.0 0.0 - 0.2 10e9/L Final    Abs Immature Granulocytes 0.0 0 - 0.4 10e9/L Final    Absolute Nucleated RBC 0.0  Final         11/15/2018  6:39 PM      Component Results     Component Value Ref Range & Units Status    Sodium 140 133 - 144 mmol/L Final    Potassium 4.1 3.4 - 5.3 mmol/L Final    Chloride 105 94 - 109 mmol/L Final    Carbon Dioxide 31 20 - 32 mmol/L Final    Anion Gap 4 3 - 14 mmol/L Final    Glucose 120 (H) 70 - 99 mg/dL Final    Urea Nitrogen 22 7 - 30 mg/dL Final    Creatinine 1.24 0.66 - 1.25 mg/dL Final    GFR Estimate 56 (L) >60 mL/min/1.7m2 Final    Non  GFR Calc    GFR Estimate If Black 68 >60 mL/min/1.7m2 Final    African American GFR Calc    Calcium 8.3 (L) 8.5 - 10.1 mg/dL Final         11/15/2018  6:30 PM      Component Results     Component Value Ref Range & Units Status    Color  Urine Yellow  Final    Appearance Urine Clear  Final    Glucose Urine Negative NEG^Negative mg/dL Final    Bilirubin Urine Negative NEG^Negative Final    Ketones Urine Negative NEG^Negative mg/dL Final    Specific Gravity Urine 1.024 1.003 - 1.035 Final    Blood Urine Negative NEG^Negative Final    pH Urine 5.0 5.0 - 7.0 pH Final    Protein Albumin Urine 30 (A) NEG^Negative mg/dL Final    Urobilinogen mg/dL 2.0 0.0 - 2.0 mg/dL Final    Nitrite Urine Negative NEG^Negative Final    Leukocyte Esterase Urine Negative NEG^Negative Final    Source Midstream Urine  Final    WBC Urine 2 0 - 5 /HPF Final    RBC Urine 2 0 - 2 /HPF Final    Mucous Urine Present (A) NEG^Negative /LPF Final    Hyaline Casts 5 (H) 0 - 2 /LPF Final    Granular Casts 1 (A) NEG^Negative /LPF Final         11/15/2018  6:39 PM      Component Results     Component Value Ref Range & Units Status    Troponin I ES <0.015 0.000 - 0.045 ug/L Final    The 99th percentile for upper reference range is 0.045 ug/L.  Troponin values   in the range of 0.045 - 0.120 ug/L may be associated with risks of adverse   clinical events.                  Clinical Quality Measure: Blood Pressure Screening     Your blood pressure was checked while you were in the emergency department today. The last reading we obtained was  BP: 152/80 . Please read the guidelines below about what these numbers mean and what you should do about them.  If your systolic blood pressure (the top number) is less than 120 and your diastolic blood pressure (the bottom number) is less than 80, then your blood pressure is normal. There is nothing more that you need to do about it.  If your systolic blood pressure (the top number) is 120-139 or your diastolic blood pressure (the bottom number) is 80-89, your blood pressure may be higher than it should be. You should have your blood pressure rechecked within a year by a primary care provider.  If your systolic blood pressure (the top number) is 140 or  "greater or your diastolic blood pressure (the bottom number) is 90 or greater, you may have high blood pressure. High blood pressure is treatable, but if left untreated over time it can put you at risk for heart attack, stroke, or kidney failure. You should have your blood pressure rechecked by a primary care provider within the next 4 weeks.  If your provider in the emergency department today gave you specific instructions to follow-up with your doctor or provider even sooner than that, you should follow that instruction and not wait for up to 4 weeks for your follow-up visit.        Thank you for choosing [facility]       Thank you for choosing [facility] for your care. Our goal is always to provide you with excellent care. Hearing back from our patients is one way we can continue to improve our services. Please take a few minutes to complete the written survey that you may receive in the mail after you visit with us. Thank you!        [facility] Information     [facility] lets you send messages to your doctor, view your test results, renew your prescriptions, schedule appointments and more. To sign up, go to [email or web address] . Click on \"Log in\" on the left side of the screen, which will take you to the Welcome page. Then click on \"Sign up Now\" on the right side of the page.     You will be asked to enter the access code listed below, as well as some personal information. Please follow the directions to create your username and password.     Your access code is: [email or web address]  Expires: 2019 10:37 AM     Your access code will  in 90 days. If you need help or a new code, please call your [facility] clinic or [phone].        Care EveryWhere ID     This is your Care EveryWhere ID. This could be used by other organizations to access your [facility] medical records  [id number]        Equal Access to Services     SILVANA ISRAEL AH: elin Teixeira qaybta kaalmada adeegyada, waxay " martín ramos ah. So [facility] [phone].    ATENCIÓN: Si habla español, tiene a mckeon disposición servicios gratuitos de asistencia lingüística. Llame al [phone].    We comply with applicable federal civil rights laws and [place] laws. We do not discriminate on the basis of race, color, national origin, age, disability, sex, sexual orientation, or gender identity.            After Visit Summary       This is your record. Keep this with you and show to your community pharmacist(s) and doctor(s) at your next visit.

## 2018-11-15 NOTE — ED AVS SNAPSHOT
[facility] Emergency Department    [address]    [facility] [address]    Phone:  [phone]    Fax:  [phone]                                       Iván Nicholas   MRN: [id number]    Department:  [facility] Emergency Department   Date of Visit:  11/15/2018           After Visit Summary Signature Page     I have received my discharge instructions, and my questions have been answered. I have discussed any challenges I see with this plan with the nurse or doctor.    ..........................................................................................................................................  Patient/Patient Representative Signature      ..........................................................................................................................................  Patient Representative Print Name and Relationship to Patient    ..................................................               ................................................  Date                                   Time    ..........................................................................................................................................  Reviewed by Signature/Title    ...................................................              ..............................................  Date                                               Time          22EPIC Rev 08/18

## 2018-11-16 LAB — INTERPRETATION ECG - MUSE: NORMAL

## 2018-11-16 NOTE — DISCHARGE INSTRUCTIONS
We recommend restarting your propranolol and trying to wean off your oxycodone. Follow-up with your normal doctor early next week or return to emergency department over weekend for worsening symptoms.     Symptoms With Uncertain Cause (Adult)  You have been examined, and tests may have been done. However, the exact cause of your symptoms is still not certain. Watch for any new symptoms or worsening of your condition. Another exam or more testing at a later time may be needed. Unless told otherwise, you can go back to your normal routine. Continue to take prescribed medicines as directed. Contact your healthcare provider if you have questions or concerns.   Follow-up care  Follow up with your healthcare provider if your symptoms do not begin to improve in the next few days, or as advised by our staff.   When to seek medical advice  Call your healthcare provider if your symptoms get worse or if new symptoms appear.  Date Last Reviewed: 10/1/2017    [id number] [employer]. [address] [place] [address], [place] [address]. All rights reserved. This information is not intended as a substitute for professional medical care. Always follow your healthcare professional's instructions.

## 2018-11-28 NOTE — DISCHARGE SUMMARY
Diagnosis: left DJD knee  Procedure: left Cemented total knee replacement  Surgeon: Matt Schaefer MD  Patient underwent total knee replacement without complication. Patient had typical transient post-op anemia. Patient's hospital stay included physical therapy and DVT prophylaxis. After discussion with patient regarding no history of DVT and current high mobility, patient is discharged with ASA for home DVT pphx. Patient will follow -up in the office 10-14days post-op for wound check. Please refer to chart for any other specifics of this hospital stay.  Daniela Jordan PA-C

## 2019-04-12 ENCOUNTER — TELEPHONE (OUTPATIENT)
Dept: CARDIOLOGY | Facility: CLINIC | Age: 79
End: 2019-04-12

## 2019-04-12 NOTE — TELEPHONE ENCOUNTER
Call from patient, he states he has noticed an increase in his angina since seeing Dr. Mendoza last fall. He is waking up at night every 2-3 days and needs up to 2 NTG to stop the discomfort. He can also trigger the same discomfort and dyspnea when walking up a hill.   He thinks he has lost some weight since winter but also thinks he has some peripheral edema. He checks his weight weekly. He does not have a home BP cuff.  Patient saw PCP @ [place] in February and states his imdur was increased to 120mg daily (double the dose).  Reviewed with patient to seek ER assessment if he has to use 3 or more NTG. Offered patient FLORENCIA visit on 4/16/19.  Will message Dr. Mendoza to review

## 2019-04-16 ENCOUNTER — OFFICE VISIT (OUTPATIENT)
Dept: CARDIOLOGY | Facility: CLINIC | Age: 79
End: 2019-04-16
Payer: COMMERCIAL

## 2019-04-16 VITALS
BODY MASS INDEX: 31.94 KG/M2 | HEIGHT: 70 IN | DIASTOLIC BLOOD PRESSURE: 86 MMHG | SYSTOLIC BLOOD PRESSURE: 164 MMHG | WEIGHT: 223.1 LBS | HEART RATE: 53 BPM

## 2019-04-16 DIAGNOSIS — R60.9 EDEMA, UNSPECIFIED TYPE: ICD-10-CM

## 2019-04-16 DIAGNOSIS — E78.00 PURE HYPERCHOLESTEROLEMIA: ICD-10-CM

## 2019-04-16 DIAGNOSIS — I10 BENIGN ESSENTIAL HYPERTENSION: ICD-10-CM

## 2019-04-16 DIAGNOSIS — I44.7 LEFT BUNDLE BRANCH BLOCK: ICD-10-CM

## 2019-04-16 DIAGNOSIS — I25.118 CORONARY ARTERY DISEASE OF NATIVE ARTERY OF NATIVE HEART WITH STABLE ANGINA PECTORIS (H): Primary | ICD-10-CM

## 2019-04-16 PROCEDURE — 99215 OFFICE O/P EST HI 40 MIN: CPT | Performed by: PHYSICIAN ASSISTANT

## 2019-04-16 PROCEDURE — 93000 ELECTROCARDIOGRAM COMPLETE: CPT | Performed by: PHYSICIAN ASSISTANT

## 2019-04-16 RX ORDER — ISOSORBIDE MONONITRATE 120 MG/1
120 TABLET, EXTENDED RELEASE ORAL DAILY
Qty: 30 TABLET | Refills: 3 | Status: SHIPPED | OUTPATIENT
Start: 2019-04-16 | End: 2019-04-30

## 2019-04-16 RX ORDER — ISOSORBIDE MONONITRATE 120 MG/1
120 TABLET, EXTENDED RELEASE ORAL DAILY
COMMUNITY
End: 2019-04-16

## 2019-04-16 RX ORDER — AMLODIPINE BESYLATE 5 MG/1
5 TABLET ORAL DAILY
Qty: 30 TABLET | Refills: 3 | Status: SHIPPED | OUTPATIENT
Start: 2019-04-16 | End: 2019-07-23

## 2019-04-16 ASSESSMENT — MIFFLIN-ST. JEOR: SCORE: 1733.22

## 2019-04-16 NOTE — PATIENT INSTRUCTIONS
Today's Plan:   1) Schedule angiogram.   2) Start amlodipine- take one tablet one time daily.   3) Come back for follow up after your angiogram procedure with repeat non-fasting labs prior.   4) If your symptoms get worse (more frequent, lasting longer, or have additional symptoms of nausea/sweating), call 911.       If you have questions or concerns please call my nurse team at [phone].     Scheduling phone number: [phone]  Reminder: Please bring in all current medications, over the counter supplements and vitamin bottles to your next appointment.    It was a pleasure seeing you today!     Gina Vivas  4/16/2019

## 2019-04-16 NOTE — LETTER
4/16/2019    Stephan Nice MD  [facility] [address]  [place] [address]    RE: Iván Nicholas       Dear Colleague,    I had the pleasure of seeing Iván Nicholas in the [facility].    Primary Cardiologist: Dr. Mendoza    Reason for Visit: Chest discomfort     History of Present Illness:   This is a pleasant 79-year-old gentleman with past medical history notable for coronary artery disease (angioplasty of his LAD back in 1991, LAD stenting in early 2012 with a jailed diagonal vessel, later that year repeat coronary angiogram was performed which showed stable diagonal vessel with FFR of 0.8 and therefore medical therapy was recommended; nuclear stress study in 11/2018 demonstrating no evidence of ischemia or infarct), hyperlipidemia, hypertension, carotid artery disease (history of right carotid endarterectomy in 2016, left bundle branch block, history of tobacco use, and chronic angina since his last angiogram that was treated with moderate dose of Imdur.    He presents to clinic today, stating he has noticed increased episodes of chest tightness over the last 2 months.  This is somewhat atypical in that it occurs mainly at night.  He describes it as pressure in the center of his chest with radiation to both of his arms.  It does go away with nitroglycerin.  Sometimes he will have it when he is walking but this is not consistent.  His symptoms are somewhat reminiscent of his previous episodes prior to his stents.  One time he has taken up to 3 nitroglycerin tablets with complete resolution of his symptoms.  He states that over the last 1 month it has been at a higher frequency but it is consistent.  He denies any further progression in the severity, duration, frequency, or associated symptoms such as nausea/diaphoresis.  He states that usually with 1 or 2 nitroglycerin tablets it resolves.  He was seen by his primary care provider in  2/2019 (2 months ago) at which time his Imdur was further titrated  to 120 mg daily.  His symptoms were discussed with Dr. Mendoza who recommended follow-up for consideration of coronary angiogram.  He denies history of bleeding issues, contrast dye allergy, or problems with anesthesia.  No previous history of CVA/TIA.    Assessment and Plan:  This is a pleasant 79-year-old gentleman with past medical history notable for coronary artery disease (angioplasty of his LAD back in 1991, LAD stenting in early 2012 with a jailed diagonal vessel, later that year repeat coronary angiogram was performed which showed stable diagonal vessel with FFR of 0.8 and therefore medical therapy was recommended; nuclear stress study in 11/2018 demonstrating no evidence of ischemia or infarct), hyperlipidemia, hypertension, carotid artery disease (history of right carotid endarterectomy in 2016, left bundle branch block, history of tobacco use, and chronic angina since his last angiogram that was treated with moderate dose of Imdur.    Given his increased angina over the last 2 months that is relieved with nitroglycerin we will go ahead and arrange for repeat coronary angiogram later this week or early next week for more definitive evaluation.  I will also add amlodipine to his regimen for further antianginal therapy.  He is on the maximum dose of Imdur now and I cannot further titrate his propanolol due to low resting heart rates.  Even though he has increased symptoms it is somewhat stable and there is has been no further increase in severity/duration/frequency over the last 4 weeks.  I did however instruct him to be seen in emergency department if he has any further progression in his symptoms  between now and his angiogram.  He verbalized understanding.  I did refill his sublingual nitroglycerin prescription as well.  The addition of amlodipine will also help with his blood pressure which is high today.  I did recheck it myself and  it came down to 140/75.  We went over risk and benefits of coronary angiogram including MI, death, and stroke.  He verbalized understanding and was in agreement with proceeding with coronary angiogram.  He will continue to take his baby aspirin.  He will be n.p.o. starting the evening before his coronary angiogram.  His lipid panel shows okay control of his lipids on a combination of Zetia and rosuvastatin 40 mg.  Basic metabolic panel shows slight renal dysfunction with normal electrolytes.  His last hemoglobin is from November demonstrating slight reduction from his baseline of 13.  That was at the time of his knee surgery.  He denies any acute bleeding issues at this time.  He has no history of bleeding issues.  As stated above he has no history of contrast dye allergy and therefore he will not need premedication.  I will see him after his coronary angiogram for routine follow-up with repeat basic metabolic panel at that time.    Thank you for allowing me to participate in the care of this pleasant patient today.    This note was completed in part using Dragon voice recognition software. Although reviewed after completion, some word and grammatical errors may occur.    Orders this Visit:  No orders of the defined types were placed in this encounter.    No orders of the defined types were placed in this encounter.    There are no discontinued medications.      No diagnosis found.    CURRENT MEDICATIONS:  Current Outpatient Medications   Medication Sig Dispense Refill     acetaminophen (TYLENOL) 325 MG tablet Take 3 tablets (975 mg) by mouth every 8 hours 250 tablet 0     ALLOPURINOL PO Take 100 mg by mouth daily. To prevent gout attacks, recently started.        ezetimibe (ZETIA) 10 MG tablet Take 1 tablet (10 mg) by mouth daily 90 tablet 3     hydrOXYzine (ATARAX) 25 MG tablet Take 1 tablet (25 mg) by mouth every 6 hours as needed for other (adjuvant pain) 50 tablet 0     isosorbide mononitrate (IMDUR) 60 MG 24  hr tablet Take 1 tablet (60 mg) by mouth daily 90 tablet 1     Multiple Vitamins-Minerals (CENTRUM SILVER) per tablet Take 1 tablet by mouth daily.       Multiple Vitamins-Minerals (PRESERVISION AREDS 2) CAPS Take by mouth 2 times daily       nitroglycerin (NITROSTAT) 0.4 MG SL tablet Place 1 tablet (0.4 mg) under the tongue every 5 minutes as needed for chest pain 25 tablet 11     oxyCODONE IR (ROXICODONE) 5 MG tablet 1 tab po q 4 hrs prn pain scale 3-6,   2 tabs po q 4hrs prn pain scale 7-10 50 tablet 0     primidone (MYSOLINE) 250 MG tablet Take 250 mg by mouth 2 times daily       propranolol (INDERAL LA) 80 MG 24 hr capsule Take 80 mg by mouth daily       rosuvastatin (CRESTOR) 40 MG tablet Take 1 tablet by mouth daily. 30 tablet 1     senna-docusate (SENOKOT-S;PERICOLACE) 8.6-50 MG per tablet Take 1-2 tablets by mouth 2 times daily as needed for constipation 60 tablet 0       ALLERGIES     Allergies   Allergen Reactions     Hmg-Coa-R Inhibitors Muscle Pain (Myalgia)     lipitor       PAST MEDICAL HISTORY:  Past Medical History:   Diagnosis Date     Arthritis      Carotid artery stenosis     Right, s/p right CEA 11/9/2016     Cerebral artery occlusion with cerebral infarction (H) 2016    TIA -  Endarterectomy...No residual     Cholesterol serum elevated      Colon polyps      Color blind      Coronary artery disease     2/2012 - MERARI to mid LAD     Decreased hearing      Depression      Gout      Hypertension      Hypertrophy of prostate without urinary obstruction and other lower urinary tract symptoms (LUTS)      Impotence      Left bundle branch block      Onychomycosis of toenail      Other and unspecified hyperlipidemia      Paroxysmal ventricular tachycardia (H)     with stress test 2012       PAST SURGICAL HISTORY:  Past Surgical History:   Procedure Laterality Date     ANGIOPLASTY  1991     ARTHROPLASTY KNEE Left 11/12/2018    Procedure: Left total knee arthroplasty;  Surgeon: Matt Schaefer MD;   Location:  OR     COLONOSCOPY  2011    Procedure:COLONOSCOPY; COLONOSCOPY; Surgeon:EMMANUELLE MOSER; Location: GI     CORONARY ANGIOGRAPHY ADULT ORDER  ,2012 - stent to proximal LAD,  - Stent to mid LAD     ENDARTERECTOMY CAROTID Right 11/10/2016    Procedure: ENDARTERECTOMY CAROTID;  Surgeon: Paco Suresh MD;  Location:  OR     HEART CATH, ANGIOPLASTY       ORTHOPEDIC SURGERY       PHACOEMULSIFICATION CLEAR CORNEA WITH STANDARD INTRAOCULAR LENS IMPLANT  2013    Procedure: PHACOEMULSIFICATION CLEAR CORNEA WITH STANDARD INTRAOCULAR LENS IMPLANT;  RIGHT PHACOEMULSIFICATION CLEAR CORNEA WITH STANDARD INTRAOCULAR LENS IMPLANT ;  Surgeon: Luisito Juan MD;  Location: [facility]       FAMILY HISTORY:  Family History   Problem Relation Age of Onset     Heart Disease Mother 83     Heart Disease Father 69        emphysema     Coronary Artery Disease Brother      Coronary Artery Disease Sister        SOCIAL HISTORY:  Social History     Socioeconomic History     Marital status:      Spouse name: Not on file     Number of children: Not on file     Years of education: Not on file     Highest education level: Not on file   Occupational History     Not on file   Social Needs     Financial resource strain: Not on file     Food insecurity:     Worry: Not on file     Inability: Not on file     Transportation needs:     Medical: Not on file     Non-medical: Not on file   Tobacco Use     Smoking status: Former Smoker     Packs/day: 0.50     Years: 20.00     Pack years: 10.00     Types: Cigarettes     Last attempt to quit: 1990     Years since quittin.3     Smokeless tobacco: Never Used   Substance and Sexual Activity     Alcohol use: Yes     Alcohol/week: 0.0 oz     Comment: daily - drinks x2     Drug use: No     Sexual activity: Never   Lifestyle     Physical activity:     Days per week: Not on file     Minutes per session: Not on file     Stress: Not on file  "  Relationships     Social connections:     Talks on phone: Not on file     Gets together: Not on file     Attends Christian service: Not on file     Active member of club or organization: Not on file     Attends meetings of clubs or organizations: Not on file     Relationship status: Not on file     Intimate partner violence:     Fear of current or ex partner: Not on file     Emotionally abused: Not on file     Physically abused: Not on file     Forced sexual activity: Not on file   Other Topics Concern     Parent/sibling w/ CABG, MI or angioplasty before 65F 55M? Not Asked      Service Not Asked     Blood Transfusions Not Asked     Caffeine Concern No     Comment: 1-2 cups daily     Occupational Exposure Not Asked     Hobby Hazards Not Asked     Sleep Concern No     Stress Concern No     Weight Concern Not Asked     Special Diet No     Comment: trying to watch sodium intake     Back Care Not Asked     Exercise No     Comment: some walking     Bike Helmet Not Asked     Seat Belt Not Asked     Self-Exams Not Asked   Social History Narrative     Not on file       Review of Systems:  Skin:        Eyes:       ENT:       Respiratory:       Cardiovascular:       Gastroenterology:      Genitourinary:       Musculoskeletal:       Neurologic:       Psychiatric:       Heme/Lymph/Imm:       Endocrine:         Physical Exam:  Vitals: /86   Pulse 53   Ht 1.778 m (5' 10\")   Wt 101.2 kg (223 lb 1.6 oz)   BMI 32.01 kg/m        GEN:  NAD  NECK: No JVD  C/V:  Regular rate and rhythm, no murmur, rub or gallop.  RESP: Clear to auscultation bilaterally without wheezing, rales, or rhonchi.  GI: Abdomen soft, nontender, nondistended. No HSM appreciated.   EXTREM: 1+ LE edema (R>L)- patient tells me this is chronic.   NEURO: Alert and oriented, cooperative. No obvious focal deficits.   PSYCH: Normal affect.  SKIN: Warm and dry.       Recent Lab Results:  LIPID RESULTS:  Lab Results   Component Value Date    CHOL 160 " 08/13/2018    HDL 61 08/13/2018    LDL 82 08/13/2018    TRIG 83 08/13/2018    CHOLHDLRATIO 2.7 09/14/2015       LIVER ENZYME RESULTS:  Lab Results   Component Value Date    AST 27 01/24/2017    ALT 23 01/24/2017       CBC RESULTS:  Lab Results   Component Value Date    WBC 6.0 11/15/2018    RBC 2.88 (L) 11/15/2018    HGB 9.7 (L) 11/15/2018    HCT 29.9 (L) 11/15/2018     (H) 11/15/2018    MCH 33.7 (H) 11/15/2018    MCHC 32.4 11/15/2018    RDW 12.8 11/15/2018     (L) 11/15/2018       BMP RESULTS:  Lab Results   Component Value Date     11/15/2018    POTASSIUM 4.1 11/15/2018    CHLORIDE 105 11/15/2018    CO2 31 11/15/2018    ANIONGAP 4 11/15/2018     (H) 11/15/2018    BUN 22 11/15/2018    CR 1.24 11/15/2018    GFRESTIMATED 56 (L) 11/15/2018    GFRESTBLACK 68 11/15/2018    DONELL 8.3 (L) 11/15/2018        A1C RESULTS:  Lab Results   Component Value Date    A1C 5.2 11/10/2016       INR RESULTS:  Lab Results   Component Value Date    INR 0.99 11/10/2016    INR 1.01 11/08/2016         Thank you for allowing me to participate in the care of your patient.    Sincerely,     Gina Vivas PA-C     [facility]

## 2019-04-16 NOTE — PROGRESS NOTES
Primary Cardiologist: Dr. Mendoza    Reason for Visit: Chest discomfort     History of Present Illness:   This is a pleasant 79-year-old gentleman with past medical history notable for coronary artery disease (angioplasty of his LAD back in 1991, LAD stenting in early 2012 with a jailed diagonal vessel, later that year repeat coronary angiogram was performed which showed stable diagonal vessel with FFR of 0.8 and therefore medical therapy was recommended; nuclear stress study in 11/2018 demonstrating no evidence of ischemia or infarct), hyperlipidemia, hypertension, carotid artery disease (history of right carotid endarterectomy in 2016, left bundle branch block, history of tobacco use, and chronic angina since his last angiogram that was treated with moderate dose of Imdur.    He presents to clinic today, stating he has noticed increased episodes of chest tightness over the last 2 months.  This is somewhat atypical in that it occurs mainly at night.  He describes it as pressure in the center of his chest with radiation to both of his arms.  It does go away with nitroglycerin.  Sometimes he will have it when he is walking but this is not consistent.  His symptoms are somewhat reminiscent of his previous episodes prior to his stents.  One time he has taken up to 3 nitroglycerin tablets with complete resolution of his symptoms.  He states that over the last 1 month it has been at a higher frequency but it is consistent.  He denies any further progression in the severity, duration, frequency, or associated symptoms such as nausea/diaphoresis.  He states that usually with 1 or 2 nitroglycerin tablets it resolves.  He was seen by his primary care provider in 2/2019 (2 months ago) at which time his Imdur was further titrated  to 120 mg daily.  His symptoms were discussed with Dr. Mendoza who recommended follow-up for consideration of coronary angiogram.  He denies history of bleeding issues, contrast dye allergy, or  problems with anesthesia.  No previous history of CVA/TIA.    Assessment and Plan:  This is a pleasant 79-year-old gentleman with past medical history notable for coronary artery disease (angioplasty of his LAD back in 1991, LAD stenting in early 2012 with a jailed diagonal vessel, later that year repeat coronary angiogram was performed which showed stable diagonal vessel with FFR of 0.8 and therefore medical therapy was recommended; nuclear stress study in 11/2018 demonstrating no evidence of ischemia or infarct), hyperlipidemia, hypertension, carotid artery disease (history of right carotid endarterectomy in 2016, left bundle branch block, history of tobacco use, and chronic angina since his last angiogram that was treated with moderate dose of Imdur.    Given his increased angina over the last 2 months that is relieved with nitroglycerin we will go ahead and arrange for repeat coronary angiogram later this week or early next week for more definitive evaluation.  I will also add amlodipine to his regimen for further antianginal therapy.  He is on the maximum dose of Imdur now and I cannot further titrate his propanolol due to low resting heart rates.  Even though he has increased symptoms it is somewhat stable and there is has been no further increase in severity/duration/frequency over the last 4 weeks.  I did however instruct him to be seen in emergency department if he has any further progression in his symptoms  between now and his angiogram.  He verbalized understanding.  I did refill his sublingual nitroglycerin prescription as well.  The addition of amlodipine will also help with his blood pressure which is high today.  I did recheck it myself and it came down to 140/75.  We went over risk and benefits of coronary angiogram including MI, death, and stroke.  He verbalized understanding and was in agreement with proceeding with coronary angiogram.  He will continue to take his baby aspirin.  He will be n.p.o.  starting the evening before his coronary angiogram.  His lipid panel shows okay control of his lipids on a combination of Zetia and rosuvastatin 40 mg.  Basic metabolic panel shows slight renal dysfunction with normal electrolytes.  His last hemoglobin is from November demonstrating slight reduction from his baseline of 13.  That was at the time of his knee surgery.  He denies any acute bleeding issues at this time.  He has no history of bleeding issues.  As stated above he has no history of contrast dye allergy and therefore he will not need premedication.  I will see him after his coronary angiogram for routine follow-up with repeat basic metabolic panel at that time.    Thank you for allowing me to participate in the care of this pleasant patient today.    This note was completed in part using Dragon voice recognition software. Although reviewed after completion, some word and grammatical errors may occur.    Orders this Visit:  No orders of the defined types were placed in this encounter.    No orders of the defined types were placed in this encounter.    There are no discontinued medications.      No diagnosis found.    CURRENT MEDICATIONS:  Current Outpatient Medications   Medication Sig Dispense Refill     acetaminophen (TYLENOL) 325 MG tablet Take 3 tablets (975 mg) by mouth every 8 hours 250 tablet 0     ALLOPURINOL PO Take 100 mg by mouth daily. To prevent gout attacks, recently started.        ezetimibe (ZETIA) 10 MG tablet Take 1 tablet (10 mg) by mouth daily 90 tablet 3     hydrOXYzine (ATARAX) 25 MG tablet Take 1 tablet (25 mg) by mouth every 6 hours as needed for other (adjuvant pain) 50 tablet 0     isosorbide mononitrate (IMDUR) 60 MG 24 hr tablet Take 1 tablet (60 mg) by mouth daily 90 tablet 1     Multiple Vitamins-Minerals (CENTRUM SILVER) per tablet Take 1 tablet by mouth daily.       Multiple Vitamins-Minerals (PRESERVISION AREDS 2) CAPS Take by mouth 2 times daily       nitroglycerin  (NITROSTAT) 0.4 MG SL tablet Place 1 tablet (0.4 mg) under the tongue every 5 minutes as needed for chest pain 25 tablet 11     oxyCODONE IR (ROXICODONE) 5 MG tablet 1 tab po q 4 hrs prn pain scale 3-6,   2 tabs po q 4hrs prn pain scale 7-10 50 tablet 0     primidone (MYSOLINE) 250 MG tablet Take 250 mg by mouth 2 times daily       propranolol (INDERAL LA) 80 MG 24 hr capsule Take 80 mg by mouth daily       rosuvastatin (CRESTOR) 40 MG tablet Take 1 tablet by mouth daily. 30 tablet 1     senna-docusate (SENOKOT-S;PERICOLACE) 8.6-50 MG per tablet Take 1-2 tablets by mouth 2 times daily as needed for constipation 60 tablet 0       ALLERGIES     Allergies   Allergen Reactions     Hmg-Coa-R Inhibitors Muscle Pain (Myalgia)     lipitor       PAST MEDICAL HISTORY:  Past Medical History:   Diagnosis Date     Arthritis      Carotid artery stenosis     Right, s/p right CEA 11/9/2016     Cerebral artery occlusion with cerebral infarction (H) 2016    TIA -  Endarterectomy...No residual     Cholesterol serum elevated      Colon polyps      Color blind      Coronary artery disease     2/2012 - MERARI to mid LAD     Decreased hearing      Depression      Gout      Hypertension      Hypertrophy of prostate without urinary obstruction and other lower urinary tract symptoms (LUTS)      Impotence      Left bundle branch block      Onychomycosis of toenail      Other and unspecified hyperlipidemia      Paroxysmal ventricular tachycardia (H)     with stress test 2012       PAST SURGICAL HISTORY:  Past Surgical History:   Procedure Laterality Date     ANGIOPLASTY  1991     ARTHROPLASTY KNEE Left 11/12/2018    Procedure: Left total knee arthroplasty;  Surgeon: Matt Schaefer MD;  Location:  OR     COLONOSCOPY  12/13/2011    Procedure:COLONOSCOPY; COLONOSCOPY; Surgeon:EMMANUELLE MOSER; Location: GI     CORONARY ANGIOGRAPHY ADULT ORDER  1991,2012 1991 - stent to proximal LAD, 2012 - Stent to mid LAD     ENDARTERECTOMY CAROTID  Right 11/10/2016    Procedure: ENDARTERECTOMY CAROTID;  Surgeon: Paco Suresh MD;  Location:  OR     HEART CATH, ANGIOPLASTY       ORTHOPEDIC SURGERY       PHACOEMULSIFICATION CLEAR CORNEA WITH STANDARD INTRAOCULAR LENS IMPLANT  2013    Procedure: PHACOEMULSIFICATION CLEAR CORNEA WITH STANDARD INTRAOCULAR LENS IMPLANT;  RIGHT PHACOEMULSIFICATION CLEAR CORNEA WITH STANDARD INTRAOCULAR LENS IMPLANT ;  Surgeon: Luisito Juan MD;  Location: [facility]       FAMILY HISTORY:  Family History   Problem Relation Age of Onset     Heart Disease Mother 83     Heart Disease Father 69        emphysema     Coronary Artery Disease Brother      Coronary Artery Disease Sister        SOCIAL HISTORY:  Social History     Socioeconomic History     Marital status:      Spouse name: Not on file     Number of children: Not on file     Years of education: Not on file     Highest education level: Not on file   Occupational History     Not on file   Social Needs     Financial resource strain: Not on file     Food insecurity:     Worry: Not on file     Inability: Not on file     Transportation needs:     Medical: Not on file     Non-medical: Not on file   Tobacco Use     Smoking status: Former Smoker     Packs/day: 0.50     Years: 20.00     Pack years: 10.00     Types: Cigarettes     Last attempt to quit: 1990     Years since quittin.3     Smokeless tobacco: Never Used   Substance and Sexual Activity     Alcohol use: Yes     Alcohol/week: 0.0 oz     Comment: daily - drinks x2     Drug use: No     Sexual activity: Never   Lifestyle     Physical activity:     Days per week: Not on file     Minutes per session: Not on file     Stress: Not on file   Relationships     Social connections:     Talks on phone: Not on file     Gets together: Not on file     Attends Denominational service: Not on file     Active member of club or organization: Not on file     Attends meetings of clubs or organizations: Not on file      "Relationship status: Not on file     Intimate partner violence:     Fear of current or ex partner: Not on file     Emotionally abused: Not on file     Physically abused: Not on file     Forced sexual activity: Not on file   Other Topics Concern     Parent/sibling w/ CABG, MI or angioplasty before 65F 55M? Not Asked      Service Not Asked     Blood Transfusions Not Asked     Caffeine Concern No     Comment: 1-2 cups daily     Occupational Exposure Not Asked     Hobby Hazards Not Asked     Sleep Concern No     Stress Concern No     Weight Concern Not Asked     Special Diet No     Comment: trying to watch sodium intake     Back Care Not Asked     Exercise No     Comment: some walking     Bike Helmet Not Asked     Seat Belt Not Asked     Self-Exams Not Asked   Social History Narrative     Not on file       Review of Systems:  Skin:        Eyes:       ENT:       Respiratory:       Cardiovascular:       Gastroenterology:      Genitourinary:       Musculoskeletal:       Neurologic:       Psychiatric:       Heme/Lymph/Imm:       Endocrine:         Physical Exam:  Vitals: /86   Pulse 53   Ht 1.778 m (5' 10\")   Wt 101.2 kg (223 lb 1.6 oz)   BMI 32.01 kg/m       GEN:  NAD  NECK: No JVD  C/V:  Regular rate and rhythm, no murmur, rub or gallop.  RESP: Clear to auscultation bilaterally without wheezing, rales, or rhonchi.  GI: Abdomen soft, nontender, nondistended. No HSM appreciated.   EXTREM: 1+ LE edema (R>L)- patient tells me this is chronic.   NEURO: Alert and oriented, cooperative. No obvious focal deficits.   PSYCH: Normal affect.  SKIN: Warm and dry.       Recent Lab Results:  LIPID RESULTS:  Lab Results   Component Value Date    CHOL 160 08/13/2018    HDL 61 08/13/2018    LDL 82 08/13/2018    TRIG 83 08/13/2018    CHOLHDLRATIO 2.7 09/14/2015       LIVER ENZYME RESULTS:  Lab Results   Component Value Date    AST 27 01/24/2017    ALT 23 01/24/2017       CBC RESULTS:  Lab Results   Component Value Date    " WBC 6.0 11/15/2018    RBC 2.88 (L) 11/15/2018    HGB 9.7 (L) 11/15/2018    HCT 29.9 (L) 11/15/2018     (H) 11/15/2018    MCH 33.7 (H) 11/15/2018    MCHC 32.4 11/15/2018    RDW 12.8 11/15/2018     (L) 11/15/2018       BMP RESULTS:  Lab Results   Component Value Date     11/15/2018    POTASSIUM 4.1 11/15/2018    CHLORIDE 105 11/15/2018    CO2 31 11/15/2018    ANIONGAP 4 11/15/2018     (H) 11/15/2018    BUN 22 11/15/2018    CR 1.24 11/15/2018    GFRESTIMATED 56 (L) 11/15/2018    GFRESTBLACK 68 11/15/2018    DONELL 8.3 (L) 11/15/2018        A1C RESULTS:  Lab Results   Component Value Date    A1C 5.2 11/10/2016       INR RESULTS:  Lab Results   Component Value Date    INR 0.99 11/10/2016    INR 1.01 11/08/2016           Gina Vivas PA-C   April 16, 2019

## 2019-04-19 ENCOUNTER — TELEPHONE (OUTPATIENT)
Dept: CARDIOLOGY | Facility: CLINIC | Age: 79
End: 2019-04-19

## 2019-04-19 RX ORDER — POTASSIUM CHLORIDE 1500 MG/1
20 TABLET, EXTENDED RELEASE ORAL
Status: CANCELLED | OUTPATIENT
Start: 2019-04-19

## 2019-04-19 RX ORDER — LIDOCAINE 40 MG/G
CREAM TOPICAL
Status: CANCELLED | OUTPATIENT
Start: 2019-04-19

## 2019-04-19 RX ORDER — SODIUM CHLORIDE 9 MG/ML
INJECTION, SOLUTION INTRAVENOUS CONTINUOUS
Status: CANCELLED | OUTPATIENT
Start: 2019-04-19

## 2019-04-19 RX ORDER — ASPIRIN 81 MG/1
81 TABLET ORAL DAILY
Status: CANCELLED | OUTPATIENT
Start: 2019-04-19

## 2019-04-19 NOTE — TELEPHONE ENCOUNTER
Attempted to contact patient to review pre-procedures for angiogram on 4/23/19 at [facility]. Left message for patient to call back.    Contacted patient to review pre-cath procedures: patient is scheduled for coronary angiogram (left)  on 4/23/19 . Patient's arrival time is 1000 and procedure time is 1200 @ [facility]. Patient is aware to be NPO except for medications. Patient has arranged for transportation and 24 hour f/u care. Patient has no known contract dye allergy. Patient is not diabetic. Patient is not taking anti-coagulation medication. Patient's renal function is WNL for procedure. Patient will continue daily aspirin dose 81. Order for procedure entered.

## 2019-04-23 ENCOUNTER — HOSPITAL ENCOUNTER (OUTPATIENT)
Facility: CLINIC | Age: 79
Discharge: HOME | End: 2019-04-23
Admitting: INTERNAL MEDICINE
Payer: COMMERCIAL

## 2019-04-23 ENCOUNTER — SURGERY (OUTPATIENT)
Age: 79
End: 2019-04-23
Payer: COMMERCIAL

## 2019-04-23 VITALS
DIASTOLIC BLOOD PRESSURE: 66 MMHG | OXYGEN SATURATION: 97 % | SYSTOLIC BLOOD PRESSURE: 146 MMHG | HEIGHT: 70 IN | HEART RATE: 57 BPM | BODY MASS INDEX: 30.64 KG/M2 | TEMPERATURE: 96.5 F | RESPIRATION RATE: 16 BRPM | WEIGHT: 214 LBS

## 2019-04-23 DIAGNOSIS — Z98.61 POSTSURGICAL PERCUTANEOUS TRANSLUMINAL CORONARY ANGIOPLASTY STATUS: ICD-10-CM

## 2019-04-23 DIAGNOSIS — I25.118 CORONARY ARTERY DISEASE OF NATIVE ARTERY OF NATIVE HEART WITH STABLE ANGINA PECTORIS (H): ICD-10-CM

## 2019-04-23 DIAGNOSIS — I25.10 CORONARY ARTERY DISEASE INVOLVING NATIVE CORONARY ARTERY, ANGINA PRESENCE UNSPECIFIED, UNSPECIFIED WHETHER NATIVE OR TRANSPLANTED HEART: ICD-10-CM

## 2019-04-23 PROBLEM — Z98.890 STATUS POST CORONARY ANGIOGRAM: Status: ACTIVE | Noted: 2019-04-23

## 2019-04-23 LAB
ANION GAP SERPL CALCULATED.3IONS-SCNC: 4 MMOL/L (ref 3–14)
APTT PPP: 34 SEC (ref 22–37)
BUN SERPL-MCNC: 21 MG/DL (ref 7–30)
CALCIUM SERPL-MCNC: 8.7 MG/DL (ref 8.5–10.1)
CHLORIDE SERPL-SCNC: 108 MMOL/L (ref 94–109)
CO2 SERPL-SCNC: 30 MMOL/L (ref 20–32)
CREAT SERPL-MCNC: 1.05 MG/DL (ref 0.66–1.25)
ERYTHROCYTE [DISTWIDTH] IN BLOOD BY AUTOMATED COUNT: 15.8 % (ref 10–15)
GFR SERPL CREATININE-BSD FRML MDRD: 67 ML/MIN/{1.73_M2}
GLUCOSE SERPL-MCNC: 95 MG/DL (ref 70–99)
HCT VFR BLD AUTO: 39.4 % (ref 40–53)
HGB BLD-MCNC: 12.6 G/DL (ref 13.3–17.7)
INR PPP: 1.05 (ref 0.86–1.14)
KCT BLD-ACNC: 298 SEC (ref 75–150)
MCH RBC QN AUTO: 31.3 PG (ref 26.5–33)
MCHC RBC AUTO-ENTMCNC: 32 G/DL (ref 31.5–36.5)
MCV RBC AUTO: 98 FL (ref 78–100)
PLATELET # BLD AUTO: 136 10E9/L (ref 150–450)
POTASSIUM SERPL-SCNC: 4.2 MMOL/L (ref 3.4–5.3)
RBC # BLD AUTO: 4.02 10E12/L (ref 4.4–5.9)
SODIUM SERPL-SCNC: 142 MMOL/L (ref 133–144)
WBC # BLD AUTO: 6.2 10E9/L (ref 4–11)

## 2019-04-23 PROCEDURE — 25000128 H RX IP 250 OP 636: Performed by: INTERNAL MEDICINE

## 2019-04-23 PROCEDURE — 25800030 ZZH RX IP 258 OP 636: Performed by: INTERNAL MEDICINE

## 2019-04-23 PROCEDURE — 85027 COMPLETE CBC AUTOMATED: CPT | Performed by: INTERNAL MEDICINE

## 2019-04-23 PROCEDURE — 85610 PROTHROMBIN TIME: CPT | Performed by: INTERNAL MEDICINE

## 2019-04-23 PROCEDURE — 99152 MOD SED SAME PHYS/QHP 5/>YRS: CPT | Performed by: INTERNAL MEDICINE

## 2019-04-23 PROCEDURE — 80048 BASIC METABOLIC PNL TOTAL CA: CPT | Performed by: INTERNAL MEDICINE

## 2019-04-23 PROCEDURE — 93010 ELECTROCARDIOGRAM REPORT: CPT | Performed by: INTERNAL MEDICINE

## 2019-04-23 PROCEDURE — C1874 STENT, COATED/COV W/DEL SYS: HCPCS | Performed by: INTERNAL MEDICINE

## 2019-04-23 PROCEDURE — C1894 INTRO/SHEATH, NON-LASER: HCPCS | Performed by: INTERNAL MEDICINE

## 2019-04-23 PROCEDURE — C1725 CATH, TRANSLUMIN NON-LASER: HCPCS | Performed by: INTERNAL MEDICINE

## 2019-04-23 PROCEDURE — C1769 GUIDE WIRE: HCPCS | Performed by: INTERNAL MEDICINE

## 2019-04-23 PROCEDURE — 27210794 ZZH OR GENERAL SUPPLY STERILE: Performed by: INTERNAL MEDICINE

## 2019-04-23 PROCEDURE — 85730 THROMBOPLASTIN TIME PARTIAL: CPT | Performed by: INTERNAL MEDICINE

## 2019-04-23 PROCEDURE — 36415 COLL VENOUS BLD VENIPUNCTURE: CPT | Performed by: INTERNAL MEDICINE

## 2019-04-23 PROCEDURE — 85347 COAGULATION TIME ACTIVATED: CPT

## 2019-04-23 PROCEDURE — 40000065 ZZH STATISTIC EKG NON-CHARGEABLE

## 2019-04-23 PROCEDURE — 25000125 ZZHC RX 250: Performed by: INTERNAL MEDICINE

## 2019-04-23 PROCEDURE — 93454 CORONARY ARTERY ANGIO S&I: CPT | Mod: 26 | Performed by: INTERNAL MEDICINE

## 2019-04-23 PROCEDURE — C1887 CATHETER, GUIDING: HCPCS | Performed by: INTERNAL MEDICINE

## 2019-04-23 PROCEDURE — 99153 MOD SED SAME PHYS/QHP EA: CPT | Performed by: INTERNAL MEDICINE

## 2019-04-23 PROCEDURE — 92928 PRQ TCAT PLMT NTRAC ST 1 LES: CPT | Mod: RC | Performed by: INTERNAL MEDICINE

## 2019-04-23 PROCEDURE — C9600 PERC DRUG-EL COR STENT SING: HCPCS | Mod: RC | Performed by: INTERNAL MEDICINE

## 2019-04-23 PROCEDURE — 93005 ELECTROCARDIOGRAM TRACING: CPT

## 2019-04-23 PROCEDURE — 93454 CORONARY ARTERY ANGIO S&I: CPT | Performed by: INTERNAL MEDICINE

## 2019-04-23 PROCEDURE — 25000132 ZZH RX MED GY IP 250 OP 250 PS 637: Performed by: INTERNAL MEDICINE

## 2019-04-23 PROCEDURE — 40000275 ZZH STATISTIC RCP TIME EA 10 MIN

## 2019-04-23 DEVICE — STENT SYNERGY DRUG ELUTING 3.50X16MM  H7493926016350: Type: IMPLANTABLE DEVICE | Status: FUNCTIONAL

## 2019-04-23 RX ORDER — CLOPIDOGREL BISULFATE 75 MG/1
TABLET ORAL
Status: DISCONTINUED | OUTPATIENT
Start: 2019-04-23 | End: 2019-04-23 | Stop reason: HOSPADM

## 2019-04-23 RX ORDER — EPTIFIBATIDE 2 MG/ML
180 INJECTION, SOLUTION INTRAVENOUS EVERY 10 MIN PRN
Status: DISCONTINUED | OUTPATIENT
Start: 2019-04-23 | End: 2019-04-23 | Stop reason: HOSPADM

## 2019-04-23 RX ORDER — VERAPAMIL HYDROCHLORIDE 2.5 MG/ML
INJECTION, SOLUTION INTRAVENOUS
Status: DISCONTINUED | OUTPATIENT
Start: 2019-04-23 | End: 2019-04-23 | Stop reason: HOSPADM

## 2019-04-23 RX ORDER — ARGATROBAN 1 MG/ML
350 INJECTION, SOLUTION INTRAVENOUS
Status: DISCONTINUED | OUTPATIENT
Start: 2019-04-23 | End: 2019-04-23 | Stop reason: HOSPADM

## 2019-04-23 RX ORDER — VERAPAMIL HYDROCHLORIDE 2.5 MG/ML
INJECTION, SOLUTION INTRAVENOUS
Status: DISCONTINUED
Start: 2019-04-23 | End: 2019-04-23 | Stop reason: HOSPADM

## 2019-04-23 RX ORDER — LIDOCAINE 40 MG/G
CREAM TOPICAL
Status: DISCONTINUED | OUTPATIENT
Start: 2019-04-23 | End: 2019-04-23 | Stop reason: HOSPADM

## 2019-04-23 RX ORDER — ASPIRIN 81 MG/1
81 TABLET ORAL DAILY
Status: DISCONTINUED | OUTPATIENT
Start: 2019-04-23 | End: 2019-04-23 | Stop reason: HOSPADM

## 2019-04-23 RX ORDER — NALOXONE HYDROCHLORIDE 0.4 MG/ML
.2-.4 INJECTION, SOLUTION INTRAMUSCULAR; INTRAVENOUS; SUBCUTANEOUS
Status: DISCONTINUED | OUTPATIENT
Start: 2019-04-23 | End: 2019-04-23 | Stop reason: HOSPADM

## 2019-04-23 RX ORDER — FENTANYL CITRATE 50 UG/ML
INJECTION, SOLUTION INTRAMUSCULAR; INTRAVENOUS
Status: DISCONTINUED
Start: 2019-04-23 | End: 2019-04-23 | Stop reason: HOSPADM

## 2019-04-23 RX ORDER — SODIUM CHLORIDE 9 MG/ML
INJECTION, SOLUTION INTRAVENOUS CONTINUOUS
Status: DISCONTINUED | OUTPATIENT
Start: 2019-04-23 | End: 2019-04-23 | Stop reason: HOSPADM

## 2019-04-23 RX ORDER — ATROPINE SULFATE 0.1 MG/ML
0.5 INJECTION INTRAVENOUS EVERY 5 MIN PRN
Status: DISCONTINUED | OUTPATIENT
Start: 2019-04-23 | End: 2019-04-23 | Stop reason: HOSPADM

## 2019-04-23 RX ORDER — HEPARIN SODIUM 1000 [USP'U]/ML
INJECTION, SOLUTION INTRAVENOUS; SUBCUTANEOUS
Status: DISCONTINUED
Start: 2019-04-23 | End: 2019-04-23 | Stop reason: HOSPADM

## 2019-04-23 RX ORDER — EPTIFIBATIDE 2 MG/ML
2 INJECTION, SOLUTION INTRAVENOUS CONTINUOUS PRN
Status: DISCONTINUED | OUTPATIENT
Start: 2019-04-23 | End: 2019-04-23 | Stop reason: HOSPADM

## 2019-04-23 RX ORDER — FENTANYL CITRATE 50 UG/ML
INJECTION, SOLUTION INTRAMUSCULAR; INTRAVENOUS
Status: DISCONTINUED | OUTPATIENT
Start: 2019-04-23 | End: 2019-04-23 | Stop reason: HOSPADM

## 2019-04-23 RX ORDER — IOPAMIDOL 755 MG/ML
INJECTION, SOLUTION INTRAVASCULAR
Status: DISCONTINUED | OUTPATIENT
Start: 2019-04-23 | End: 2019-04-23 | Stop reason: HOSPADM

## 2019-04-23 RX ORDER — NITROGLYCERIN 5 MG/ML
VIAL (ML) INTRAVENOUS
Status: DISCONTINUED | OUTPATIENT
Start: 2019-04-23 | End: 2019-04-23 | Stop reason: HOSPADM

## 2019-04-23 RX ORDER — FENTANYL CITRATE 50 UG/ML
25-50 INJECTION, SOLUTION INTRAMUSCULAR; INTRAVENOUS
Status: DISCONTINUED | OUTPATIENT
Start: 2019-04-23 | End: 2019-04-23 | Stop reason: HOSPADM

## 2019-04-23 RX ORDER — POTASSIUM CHLORIDE 1500 MG/1
20 TABLET, EXTENDED RELEASE ORAL
Status: DISCONTINUED | OUTPATIENT
Start: 2019-04-23 | End: 2019-04-23 | Stop reason: HOSPADM

## 2019-04-23 RX ORDER — NITROGLYCERIN 0.4 MG/1
0.4 TABLET SUBLINGUAL EVERY 5 MIN PRN
Status: DISCONTINUED | OUTPATIENT
Start: 2019-04-23 | End: 2019-04-23 | Stop reason: HOSPADM

## 2019-04-23 RX ORDER — NITROGLYCERIN 5 MG/ML
VIAL (ML) INTRAVENOUS
Status: DISCONTINUED
Start: 2019-04-23 | End: 2019-04-23 | Stop reason: HOSPADM

## 2019-04-23 RX ORDER — CLOPIDOGREL BISULFATE 75 MG/1
75 TABLET ORAL DAILY
Qty: 90 TABLET | Refills: 3 | Status: SHIPPED | OUTPATIENT
Start: 2019-04-24 | End: 2020-04-09

## 2019-04-23 RX ORDER — HYDROCODONE BITARTRATE AND ACETAMINOPHEN 5; 325 MG/1; MG/1
1-2 TABLET ORAL EVERY 4 HOURS PRN
Status: DISCONTINUED | OUTPATIENT
Start: 2019-04-23 | End: 2019-04-23 | Stop reason: HOSPADM

## 2019-04-23 RX ORDER — ACETAMINOPHEN 325 MG/1
325-650 TABLET ORAL EVERY 4 HOURS PRN
Status: DISCONTINUED | OUTPATIENT
Start: 2019-04-23 | End: 2019-04-23 | Stop reason: HOSPADM

## 2019-04-23 RX ORDER — NALOXONE HYDROCHLORIDE 0.4 MG/ML
.1-.4 INJECTION, SOLUTION INTRAMUSCULAR; INTRAVENOUS; SUBCUTANEOUS
Status: DISCONTINUED | OUTPATIENT
Start: 2019-04-23 | End: 2019-04-23 | Stop reason: HOSPADM

## 2019-04-23 RX ORDER — CLOPIDOGREL 300 MG/1
TABLET, FILM COATED ORAL
Status: DISCONTINUED
Start: 2019-04-23 | End: 2019-04-23 | Stop reason: HOSPADM

## 2019-04-23 RX ORDER — FLUMAZENIL 0.1 MG/ML
0.2 INJECTION, SOLUTION INTRAVENOUS
Status: DISCONTINUED | OUTPATIENT
Start: 2019-04-23 | End: 2019-04-23 | Stop reason: HOSPADM

## 2019-04-23 RX ORDER — DOPAMINE HYDROCHLORIDE 160 MG/100ML
2-20 INJECTION, SOLUTION INTRAVENOUS CONTINUOUS PRN
Status: DISCONTINUED | OUTPATIENT
Start: 2019-04-23 | End: 2019-04-23 | Stop reason: HOSPADM

## 2019-04-23 RX ORDER — HEPARIN SODIUM 1000 [USP'U]/ML
INJECTION, SOLUTION INTRAVENOUS; SUBCUTANEOUS
Status: DISCONTINUED | OUTPATIENT
Start: 2019-04-23 | End: 2019-04-23 | Stop reason: HOSPADM

## 2019-04-23 RX ORDER — ARGATROBAN 1 MG/ML
150 INJECTION, SOLUTION INTRAVENOUS
Status: DISCONTINUED | OUTPATIENT
Start: 2019-04-23 | End: 2019-04-23 | Stop reason: HOSPADM

## 2019-04-23 RX ORDER — LIDOCAINE HYDROCHLORIDE 10 MG/ML
INJECTION, SOLUTION EPIDURAL; INFILTRATION; INTRACAUDAL; PERINEURAL
Status: DISCONTINUED
Start: 2019-04-23 | End: 2019-04-23 | Stop reason: HOSPADM

## 2019-04-23 RX ORDER — NOREPINEPHRINE BITARTRATE/D5W 16MG/250ML
.03-.4 PLASTIC BAG, INJECTION (ML) INTRAVENOUS CONTINUOUS PRN
Status: DISCONTINUED | OUTPATIENT
Start: 2019-04-23 | End: 2019-04-23 | Stop reason: HOSPADM

## 2019-04-23 RX ORDER — CLOPIDOGREL 300 MG/1
TABLET, FILM COATED ORAL
Status: DISCONTINUED
Start: 2019-04-23 | End: 2019-04-23 | Stop reason: WASHOUT

## 2019-04-23 RX ORDER — DOBUTAMINE HYDROCHLORIDE 200 MG/100ML
2-20 INJECTION INTRAVENOUS CONTINUOUS PRN
Status: DISCONTINUED | OUTPATIENT
Start: 2019-04-23 | End: 2019-04-23 | Stop reason: HOSPADM

## 2019-04-23 RX ORDER — CLOPIDOGREL BISULFATE 75 MG/1
75 TABLET ORAL ONCE
Status: DISCONTINUED | OUTPATIENT
Start: 2019-04-24 | End: 2019-04-23 | Stop reason: HOSPADM

## 2019-04-23 RX ORDER — NITROGLYCERIN 20 MG/100ML
.07-2 INJECTION INTRAVENOUS CONTINUOUS PRN
Status: DISCONTINUED | OUTPATIENT
Start: 2019-04-23 | End: 2019-04-23 | Stop reason: HOSPADM

## 2019-04-23 RX ADMIN — LIDOCAINE HYDROCHLORIDE 1 MG: 10 INJECTION, SOLUTION INFILTRATION; PERINEURAL at 12:33

## 2019-04-23 RX ADMIN — MIDAZOLAM 1 MG: 1 INJECTION INTRAMUSCULAR; INTRAVENOUS at 12:33

## 2019-04-23 RX ADMIN — VERAPAMIL HYDROCHLORIDE 2.5 MG: 2.5 INJECTION, SOLUTION INTRAVENOUS at 12:34

## 2019-04-23 RX ADMIN — SODIUM CHLORIDE: 9 INJECTION, SOLUTION INTRAVENOUS at 10:39

## 2019-04-23 RX ADMIN — FENTANYL CITRATE 50 MCG: 50 INJECTION, SOLUTION INTRAMUSCULAR; INTRAVENOUS at 12:46

## 2019-04-23 RX ADMIN — ASPIRIN 81 MG: 81 TABLET, COATED ORAL at 14:15

## 2019-04-23 RX ADMIN — CLOPIDOGREL BISULFATE 600 MG: 75 TABLET ORAL at 12:41

## 2019-04-23 RX ADMIN — IOPAMIDOL 160 ML: 755 INJECTION, SOLUTION INTRAVENOUS at 13:03

## 2019-04-23 RX ADMIN — NITROGLYCERIN 400 MCG: 5 INJECTION, SOLUTION INTRAVENOUS at 12:33

## 2019-04-23 RX ADMIN — FENTANYL CITRATE 50 MCG: 50 INJECTION, SOLUTION INTRAMUSCULAR; INTRAVENOUS at 12:32

## 2019-04-23 RX ADMIN — HEPARIN SODIUM 4000 UNITS: 1000 INJECTION, SOLUTION INTRAVENOUS; SUBCUTANEOUS at 12:39

## 2019-04-23 RX ADMIN — SODIUM CHLORIDE: 9 INJECTION, SOLUTION INTRAVENOUS at 14:16

## 2019-04-23 RX ADMIN — HEPARIN SODIUM 5000 UNITS: 1000 INJECTION, SOLUTION INTRAVENOUS; SUBCUTANEOUS at 12:34

## 2019-04-23 ASSESSMENT — MIFFLIN-ST. JEOR: SCORE: 1691.95

## 2019-04-23 NOTE — PRE-PROCEDURE
I have examined the patient, reviewed the history, medications and pre procedural tests.He has known CAD sp old PCI LAD with chest discomfort suspicious for angina, progressive despite medical therapy and a negative nuclear stress test in 12/2018 I have explained to the patient the risks of death, MI, stroke, hematoma, possible urgent bypass surgery for failed PCI, use of stents, thienopyridine agents, possible peripheral vascular complications, arrhythmia, the use of FFR in clinical decision-making and alternative of medical therapy alone in regards to left heart catheterization, left ventriculography, coronary angiography, and possible percutaneous coronary intervention. The patient voiced understanding and wishes to proceed. The patient has a good right radial pulse, normal ulnar pulse and a normal Del's sign.

## 2019-04-23 NOTE — Clinical Note
Stent deployed in the distal right coronary artery. Max pressure = 11 miguel. Total duration = 30 seconds.

## 2019-04-23 NOTE — Clinical Note
The first balloon was inserted into the right coronary artery and distal right coronary artery.Max pressure = 12 miguel. Total duration = 14 seconds.     Max pressure = 20 miguel. Total duration = 15 seconds.    Balloon reinflated a second time: Max pressure = 20 miguel. Total duration = 15 seconds.  Balloon reinflated a third time: Max pressure = 20 miguel. Total duration = 15 seconds.

## 2019-04-23 NOTE — PLAN OF CARE
PRIMARY DIAGNOSIS: TR BAND RECOVERY   OUTPATIENT/OBSERVATION GOALS TO BE MET BEFORE DISCHARGE:  1. TR band status: in place  2. Radial pulse and CMS (circulation, motion, sensation) are WDL: Yes  3. Bleeding or hematoma present at site: Yes, small amount of oozing at site.    4. Activity restriction education reviewed with patient: Yes.   5. Stable vital signs:  Yes  6. ADLs back to baseline:  Yes  7. Activity and level of assistance: Up with standby assistance.  8. Interpretation of rhythm per telemetry tech: Normal sinus  and Sinus bradycardia    VSS  Protocol followed in removal air from TR band. Patient experienced a small amount of oozing; requiring re-inflation of TR band.  Patient education reinforced regarding movement of affected extremity during TR band removal process.  Plan:  Continue to follow TR band protocol for removal.  Provide supportive cares.       Discharge Planner Nurse   Safe discharge environment identified: Yes  Barriers to discharge: No, pending safe removal of TR band with no complications       Entered by: Karen M. Lesch 04/23/2019 16:30 PM          Please review provider order for any additional goals.   Nurse to notify provider when observation goals have been met and patient is ready for discharge.

## 2019-04-23 NOTE — DISCHARGE INSTRUCTIONS
Going Home after an Angioplasty or Stent Placement (Cardiac)  ______________________________________________    Patient Name: Iván Nicholas  Date of Procedure: April 23, 2019    Doctor: Chanda    After you go home:    Have an adult stay with you for 24 hours.    Drink plenty of fluids.    You may eat your normal diet, unless your doctor tells you otherwise.    For 24 hours:    Relax and take it easy.    Do NOT smoke.    Do NOT make any important or legal decisions.    Do NOT drive or operate machines at home or at work.    Do NOT drink alcohol.    Remove the Band-Aid after 24 hours. If there is minor oozing, apply another Band-aid and remove it after 12 hours.    For 2 days, do NOT have sex or do any heavy exercise.    Do NOT take a bath, or use a hot tub or pool for at least 3 days. You may shower.    Care of wrist or arm site  It is normal to have soreness at the puncture site and mild tingling in your hand for up to 3 days.    For 2 days, do not use your hand or arm to support your weight (such as rising from a chair) or bend your wrist (such as lifting a garage door).    For 2 days, do not lift more than 5 pounds or exercise your arm (tennis, golf or bowling).      If you start bleeding from the site in your arm:    Sit down and press firmly on the site with your fingers for 10 minutes. Call your doctor as soon as you can.    If the bleeding stops, sit still and keep your wrist straight for 2 hours.    Medicines    If you have started taking Plavix or Effient, do not stop taking it until you talk to your heart doctor (cardiologist).    If you are on metformin (Glucophage), do not restart it until you have blood tests (within 2 to 3 days after discharge). When your doctor tells you it is safe, you may restart the metformin.    If you have stopped any other medicines, check with your nurse or provider about when to restart them.    Call 911 right away if you have bleeding that is heavy or does not  stop.    Call your doctor if:    You have a large or growing hard lump around the site.    The site is red, swollen, hot or tender.    Blood or fluid is draining from the site.    You have chills or a fever greater than 101 F (38 C).    Your leg or arm feels numb or cool.    You have hives, a rash or unusual itching.      [facility] [place]:  [phone] (7 days a week)      We will contact you tomorrow for follow-up. Number where we can reach you: Home:  [phone], cell:  [phone]

## 2019-04-23 NOTE — PROCEDURES
Procedure  1) CAG  2) PCI distal RCA 16 x 3.5 everolimus eluting Synergy stent    Meds asa plavix 600 IVUFH      Findings  LMCA, LAD, CX RI no significant stenosis stent in LAD widely patent  RCA   40 % proximal  Subtotal distal new lesion    We used a AL 1 guide, 0.14 runthrough wire and predilated with 3.5 x 8 mm balloon, placed stent and post dilated to 20 BRAIN    Post No residual stenosis. LORETTA 3 flow    [place]

## 2019-04-23 NOTE — Clinical Note
The first balloon was inserted into the right coronary artery and distal right coronary artery.Max pressure = 8 miguel. Total duration = 15 seconds.     Max pressure = 12 miguel. Total duration = 25 seconds.    Balloon reinflated a second time: Max pressure = 12 miguel. Total duration = 25 seconds.  Balloon reinflated a third time: Max pressure = 12 miguel. Total duration = 15 seconds.  Balloon reinflated a fourth time: Max pressure = 12 miguel. Total duration = 20 seconds.

## 2019-04-23 NOTE — PROGRESS NOTES
Pt returned to floor Alert and orientated. CMS intact to R hand/arm. Pulse weak but palpable. Tolerating clears. Ambulated to the bathroom. Radial Sheath in place.       Will continue to monitor.

## 2019-04-23 NOTE — PLAN OF CARE
ROOM # 205    Living Situation (if not independent, order SW consult): Home with wife  Facility name:  : [facility]    Activity level at baseline: Independent  Activity level on admit: Independent      Patient registered to observation; given Patient Bill of Rights; given the opportunity to ask questions about observation status and their plan of care.  Patient has been oriented to the observation room, bathroom and call light is in place.    Discussed discharge goals and expectations with patient/family.

## 2019-04-24 NOTE — PLAN OF CARE
Patient's After Visit Summary was reviewed with patient and/or spouse   Patient verbalized understanding of After Visit Summary, recommended follow up and was given an opportunity to ask questions.   Discharge medications sent home with patient/family:  Patient instructed to pickup discharge medications at [facility] location   Discharged with spouse.      OBSERVATION patient END time: 1926

## 2019-04-25 LAB — INTERPRETATION ECG - MUSE: NORMAL

## 2019-04-30 ENCOUNTER — OFFICE VISIT (OUTPATIENT)
Dept: CARDIOLOGY | Facility: CLINIC | Age: 79
End: 2019-04-30
Attending: PHYSICIAN ASSISTANT
Payer: COMMERCIAL

## 2019-04-30 ENCOUNTER — DOCUMENTATION ONLY (OUTPATIENT)
Dept: CARDIOLOGY | Facility: CLINIC | Age: 79
End: 2019-04-30

## 2019-04-30 ENCOUNTER — HOSPITAL ENCOUNTER (OUTPATIENT)
Dept: CARDIAC REHAB | Facility: CLINIC | Age: 79
End: 2019-04-30
Attending: INTERNAL MEDICINE
Payer: COMMERCIAL

## 2019-04-30 VITALS
HEIGHT: 70 IN | SYSTOLIC BLOOD PRESSURE: 144 MMHG | WEIGHT: 218.7 LBS | BODY MASS INDEX: 31.31 KG/M2 | HEART RATE: 56 BPM | OXYGEN SATURATION: 96 % | DIASTOLIC BLOOD PRESSURE: 76 MMHG

## 2019-04-30 DIAGNOSIS — I25.118 CORONARY ARTERY DISEASE OF NATIVE ARTERY OF NATIVE HEART WITH STABLE ANGINA PECTORIS (H): ICD-10-CM

## 2019-04-30 DIAGNOSIS — I25.10 CORONARY ARTERY DISEASE INVOLVING NATIVE CORONARY ARTERY, ANGINA PRESENCE UNSPECIFIED, UNSPECIFIED WHETHER NATIVE OR TRANSPLANTED HEART: ICD-10-CM

## 2019-04-30 LAB
ANION GAP SERPL CALCULATED.3IONS-SCNC: 4 MMOL/L (ref 3–14)
BUN SERPL-MCNC: 33 MG/DL (ref 7–30)
CALCIUM SERPL-MCNC: 8.6 MG/DL (ref 8.5–10.1)
CHLORIDE SERPL-SCNC: 106 MMOL/L (ref 94–109)
CO2 SERPL-SCNC: 28 MMOL/L (ref 20–32)
CREAT SERPL-MCNC: 1.14 MG/DL (ref 0.66–1.25)
GFR SERPL CREATININE-BSD FRML MDRD: 61 ML/MIN/{1.73_M2}
GLUCOSE SERPL-MCNC: 106 MG/DL (ref 70–99)
POTASSIUM SERPL-SCNC: 4.3 MMOL/L (ref 3.4–5.3)
SODIUM SERPL-SCNC: 138 MMOL/L (ref 133–144)

## 2019-04-30 PROCEDURE — 93797 PHYS/QHP OP CAR RHAB WO ECG: CPT | Mod: XU

## 2019-04-30 PROCEDURE — 80048 BASIC METABOLIC PNL TOTAL CA: CPT | Performed by: INTERNAL MEDICINE

## 2019-04-30 PROCEDURE — 99214 OFFICE O/P EST MOD 30 MIN: CPT | Performed by: PHYSICIAN ASSISTANT

## 2019-04-30 PROCEDURE — 40000575 ZZH STATISTIC OP CARDIAC VISIT #2

## 2019-04-30 PROCEDURE — 93798 PHYS/QHP OP CAR RHAB W/ECG: CPT

## 2019-04-30 PROCEDURE — 40000116 ZZH STATISTIC OP CR VISIT

## 2019-04-30 PROCEDURE — 36415 COLL VENOUS BLD VENIPUNCTURE: CPT | Performed by: INTERNAL MEDICINE

## 2019-04-30 RX ORDER — PANTOPRAZOLE SODIUM 40 MG/1
40 TABLET, DELAYED RELEASE ORAL DAILY
COMMUNITY

## 2019-04-30 RX ORDER — ISOSORBIDE MONONITRATE 120 MG/1
120 TABLET, EXTENDED RELEASE ORAL DAILY
Qty: 30 TABLET | Refills: 3 | Status: SHIPPED | OUTPATIENT
Start: 2019-04-30 | End: 2019-11-14

## 2019-04-30 RX ORDER — DOXYCYCLINE 100 MG/1
100 CAPSULE ORAL 2 TIMES DAILY
COMMUNITY
End: 2019-12-09

## 2019-04-30 ASSESSMENT — MIFFLIN-ST. JEOR: SCORE: 1713.27

## 2019-04-30 NOTE — PROGRESS NOTES
Pt called, left vm advising he is taking Plavix.   Advised Jenaro Shook PA-C who saw pt earlier today.   Jazmyne WRAY

## 2019-04-30 NOTE — LETTER
2019    Stephan Nice MD  [facility] [address]  [place] [address]    RE: Iván Nicholas       Dear Colleague,    I had the pleasure of seeing Iván Nicholas in the [facility].    CARDIOLOGY CLINIC PROGRESS NOTE    DOS: 2019      Iván Nicholas  : 1940, 79 year old  MRN: [id number]      History:  I had the pleasure of meeting Iávn Nicholas today in the cardiology clinic for follow up of a recent cardiac catheterization.   He is a patient of Dr. Mendoza.     Iván is a pleasant 79-year-old gentleman with past medical history notable for coronary artery disease.  He had angioplasty of his LAD back in , LAD stenting in early  with a jailed diagonal vessel, later that year repeat coronary angiogram was performed which showed stable diagonal vessel with FFR of 0.8 and therefore medical therapy was recommended.  A nuclear stress study in 2018 demonstrated no evidence of ischemia or infarct.  Also hyperlipidemia, hypertension, carotid artery disease (history of right carotid endarterectomy in ), left bundle branch block, history of tobacco use, and chronic angina since his last angiogram that was treated with moderate dose of Imdur.     He was seen by PCP 2019.  Imdur increased to 120 mg daily.     He was seen by Gina Vivas PA-C on 19 due to increased episodes of chest tightness at night over the last 2 months.   This was not always exertional.  His sxs would go away with nitroglycerin.   Sxs were somewhat reminiscent of his previous episodes prior to his stents.   Amlodipine was added for elevated BP.  Cardiac catheterization was ordered.     He underwent cardiac catheterization 19.  95% distal RCA s/p MERARI.  Started on Plavix.    His BP was 133/60 during the case.     Interval History:   He presents today for follow up.   He brings in a med list from 19.   He is not certain whether he is taking Plavix.   No chest pains.   No bleeding concerns.   BP is better, but not quite ideal.  He is coughing - PCP just started Protonix.     ROS:  Skin:  Negative     Eyes:  Positive for glasses;color blindness  ENT:  Positive for hearing loss;postnasal drainage  Respiratory:  Positive for cough  Cardiovascular:  Negative    Gastroenterology: Negative    Genitourinary:  Positive for    Musculoskeletal:  Positive for arthritis;joint pain  Neurologic:  Positive for numbness or tingling of feet  Psychiatric:  Negative    Heme/Lymph/Imm:  Positive for allergies  Endocrine:  Negative      PAST MEDICAL HISTORY:  Past Medical History:   Diagnosis Date     Arthritis      Carotid artery stenosis     Right, s/p right CEA 11/9/2016     Cerebral artery occlusion with cerebral infarction (H) 2016    TIA -  Endarterectomy...No residual     Cholesterol serum elevated      Colon polyps      Color blind      Coronary artery disease     2/2012 - MERARI to mid LAD     Decreased hearing      Depression      Gout      Hypertension      Hypertrophy of prostate without urinary obstruction and other lower urinary tract symptoms (LUTS)      Impotence      Left bundle branch block      Onychomycosis of toenail      Other and unspecified hyperlipidemia      Paroxysmal ventricular tachycardia (H)     with stress test 2012       PAST SURGICAL HISTORY:  Past Surgical History:   Procedure Laterality Date     ANGIOPLASTY  1991     ARTHROPLASTY KNEE Left 11/12/2018    Procedure: Left total knee arthroplasty;  Surgeon: Matt Schaefer MD;  Location:  OR     COLONOSCOPY  12/13/2011    Procedure:COLONOSCOPY; COLONOSCOPY; Surgeon:EMMANUELLE MOSER; Location: GI     CORONARY ANGIOGRAPHY ADULT ORDER  1991,2012 1991 - stent to proximal LAD, 2012 - Stent to mid LAD     CV HEART CATHETERIZATION WITH POSSIBLE INTERVENTION Left 4/23/2019    Procedure: Coronary Angiogram;  Surgeon: Percy Armas MD;  Location:   HEART CARDIAC CATH LAB     CV PCI ANGIOPLASTY N/A 2019    Procedure: PCI Angioplasty;  Surgeon: Percy Armas MD;  Location:  HEART CARDIAC CATH LAB     ENDARTERECTOMY CAROTID Right 11/10/2016    Procedure: ENDARTERECTOMY CAROTID;  Surgeon: Paco Suresh MD;  Location:  OR     HEART CATH, ANGIOPLASTY       ORTHOPEDIC SURGERY       PHACOEMULSIFICATION CLEAR CORNEA WITH STANDARD INTRAOCULAR LENS IMPLANT  2013    Procedure: PHACOEMULSIFICATION CLEAR CORNEA WITH STANDARD INTRAOCULAR LENS IMPLANT;  RIGHT PHACOEMULSIFICATION CLEAR CORNEA WITH STANDARD INTRAOCULAR LENS IMPLANT ;  Surgeon: Luisito Juan MD;  Location: [facility]       SOCIAL HISTORY:  Social History     Socioeconomic History     Marital status:      Spouse name: Not on file     Number of children: Not on file     Years of education: Not on file     Highest education level: Not on file   Occupational History     Not on file   Social Needs     Financial resource strain: Not on file     Food insecurity:     Worry: Not on file     Inability: Not on file     Transportation needs:     Medical: Not on file     Non-medical: Not on file   Tobacco Use     Smoking status: Former Smoker     Packs/day: 0.50     Years: 20.00     Pack years: 10.00     Types: Cigarettes     Last attempt to quit: 1990     Years since quittin.3     Smokeless tobacco: Never Used   Substance and Sexual Activity     Alcohol use: Yes     Alcohol/week: 0.0 oz     Comment: daily - drinks x2     Drug use: No     Sexual activity: Never   Lifestyle     Physical activity:     Days per week: Not on file     Minutes per session: Not on file     Stress: Not on file   Relationships     Social connections:     Talks on phone: Not on file     Gets together: Not on file     Attends Voodoo service: Not on file     Active member of club or organization: Not on file     Attends meetings of clubs or organizations: Not on file     Relationship status:  Not on file     Intimate partner violence:     Fear of current or ex partner: Not on file     Emotionally abused: Not on file     Physically abused: Not on file     Forced sexual activity: Not on file   Other Topics Concern     Parent/sibling w/ CABG, MI or angioplasty before 65F 55M? Not Asked      Service Not Asked     Blood Transfusions Not Asked     Caffeine Concern No     Comment: 1-2 cups daily     Occupational Exposure Not Asked     Hobby Hazards Not Asked     Sleep Concern No     Stress Concern No     Weight Concern Not Asked     Special Diet No     Comment: trying to watch sodium intake     Back Care Not Asked     Exercise No     Comment: some walking     Bike Helmet Not Asked     Seat Belt Not Asked     Self-Exams Not Asked   Social History Narrative     Not on file       FAMILY HISTORY:  Family History   Problem Relation Age of Onset     Heart Disease Mother 83     Heart Disease Father 69        emphysema     Coronary Artery Disease Brother      Coronary Artery Disease Sister        MEDS:   Current Outpatient Medications on File Prior to Visit:  acetaminophen (TYLENOL) 325 MG tablet Take 3 tablets (975 mg) by mouth every 8 hours   ALLOPURINOL PO Take 100 mg by mouth daily. To prevent gout attacks, recently started.    amLODIPine (NORVASC) 5 MG tablet Take 1 tablet (5 mg) by mouth daily   aspirin (ASA) 81 MG tablet Take 81 mg by mouth daily   clopidogrel (PLAVIX) 75 MG tablet Take 1 tablet (75 mg) by mouth daily   doxycycline hyclate (VIBRAMYCIN) 100 MG capsule Take 100 mg by mouth 2 times daily   ezetimibe (ZETIA) 10 MG tablet Take 1 tablet (10 mg) by mouth daily   isosorbide mononitrate CR (IMDUR) 120 MG 24 HR ER tablet Take 1 tablet (120 mg) by mouth daily   Multiple Vitamins-Minerals (CENTRUM SILVER) per tablet Take 1 tablet by mouth daily.   Multiple Vitamins-Minerals (PRESERVISION AREDS 2) CAPS Take by mouth 2 times daily   nitroglycerin (NITROSTAT) 0.4 MG SL tablet Place 1 tablet (0.4 mg)  "under the tongue every 5 minutes as needed for chest pain   pantoprazole (PROTONIX) 40 MG EC tablet Take 40 mg by mouth daily   propranolol (INDERAL LA) 80 MG 24 hr capsule Take 80 mg by mouth daily   rosuvastatin (CRESTOR) 40 MG tablet Take 1 tablet by mouth daily.   hydrOXYzine (ATARAX) 25 MG tablet Take 1 tablet (25 mg) by mouth every 6 hours as needed for other (adjuvant pain) (Patient not taking: Reported on 4/16/2019)   oxyCODONE IR (ROXICODONE) 5 MG tablet 1 tab po q 4 hrs prn pain scale 3-6, 2 tabs po q 4hrs prn pain scale 7-10 (Patient not taking: Reported on 4/16/2019)   primidone (MYSOLINE) 250 MG tablet Take 250 mg by mouth 2 times daily     No current facility-administered medications on file prior to visit.     ALLERGIES:   Allergies   Allergen Reactions     Hmg-Coa-R Inhibitors Muscle Pain (Myalgia)     lipitor       PHYSICAL EXAM:  Vitals: /76 (BP Location: Right arm, Patient Position: Chair, Cuff Size: Adult Large)   Pulse 56   Ht 1.778 m (5' 10\")   Wt 99.2 kg (218 lb 11.2 oz)   SpO2 96%   BMI 31.38 kg/m     Constitutional:  cooperative, alert and oriented, well developed, well nourished, in no acute distress        Skin:  warm and dry to the touch, no apparent skin lesions or masses noted   right CEA scar    Head:  normocephalic, no masses or lesions        Eyes:  pupils equal and round;conjunctivae and lids unremarkable;sclera white;no xanthalasma        ENT:  no pallor or cyanosis, dentition good        Neck:  no carotid bruit;JVP normal   prominent carotid pulsations at base of neck bilaterally. Well-healed right carotid endarterectomy scar    Respiratory:  normal breath sounds, clear to auscultation, normal A-P diameter, normal symmetry, normal respiratory excursion, no use of accessory muscles        Cardiac: regular rhythm;normal S1 and S2       systolic murmur;grade 1;RUSB          GI:  abdomen soft;BS normoactive        Vascular: pulses full and equal                              "         Extremities and Musculoskeletal:  no spinal abnormalities noted;normal muscle strength and tone;no edema        Neurological:  no gross motor deficits;affect appropriate            LABS/DATA:  I reviewed the following:  Component      Latest Ref Rng & Units 4/30/2019   Sodium      133 - 144 mmol/L 138   Potassium      3.4 - 5.3 mmol/L 4.3   Chloride      94 - 109 mmol/L 106   Carbon Dioxide      20 - 32 mmol/L 28   Anion Gap      3 - 14 mmol/L 4   Glucose      70 - 99 mg/dL 106 (H)   Urea Nitrogen      7 - 30 mg/dL 33 (H)   Creatinine      0.66 - 1.25 mg/dL 1.14   GFR Estimate      >60 mL/min/1.73:m2 61   GFR Estimate If Black      >60 mL/min/1.73:m2 70   Calcium      8.5 - 10.1 mg/dL 8.6         ASSESSMENT/PLAN:  CAD  - Angioplasty of LAD back in 1991, LAD stenting in early 2012 with a jailed diagonal vessel, later that year repeat coronary angiogram was performed which showed stable diagonal vessel with FFR of 0.8 and therefore medical therapy was recommended; nuclear stress study in 11/2018 demonstrating no evidence of ischemia or infarct  - Now with progressive angina  Cardiac cath 4/23/19: 95% distal RCA s/p MERARI  - No anginal sxs  - No bleeding  - Post cath BMP today shows BUN is up a little from baseline.  Last was 21 now 33.  He will drink more f;uids today and tomorrow.  Will repeat in a couple weeks.   - ASA 81 mg lifelong, Plavix x 1 year minimum, propranolol 80 mg, amlodipine 5 mg, Imdur 120 mg.  He is not certain if he started Plavix.  He will go home and confirm.  We discussed the importance of this medication for his new stent.    - Cardiac rehab  - Mediterranean style diet      HTN  - 4/16/19 BP was 164/86.  Amlodipine 5 mg was started  - During cath BP was 133/60  - BP today is 144/76.  Continue current regimen.  He will come have a RN check in 2 weeks to see what his BP is      Hyperlipidemia  - FLP 8/13/18 shows okay control of his lipids (LDL 82) on a combination of Zetia and rosuvastatin  40 mg      Carotid artery disease  - History of right carotid endarterectomy in 2016  - Last imaging 8/2018  - Continue ASA, statin      Coughing  - 3 months  - Evaluation through PCP, just started Protonix             Follow up:  RN visit in 2 weeks to check BP.  Will repeat BMP at that time.   Dr. Mendoza in August with labs.       Thank you for allowing me to participate in the care of your patient.    Sincerely,     Jenaro Shook PA-C     [facility]

## 2019-04-30 NOTE — PROGRESS NOTES
CARDIOLOGY CLINIC PROGRESS NOTE    DOS: 2019      Iván Nicholas  : 1940, 79 year old  MRN: [id number]      History:  I had the pleasure of meeting Iván Nicholas today in the cardiology clinic for follow up of a recent cardiac catheterization.   He is a patient of Dr. Mendoza.     Iván is a pleasant 79-year-old gentleman with past medical history notable for coronary artery disease.  He had angioplasty of his LAD back in , LAD stenting in early  with a jailed diagonal vessel, later that year repeat coronary angiogram was performed which showed stable diagonal vessel with FFR of 0.8 and therefore medical therapy was recommended.  A nuclear stress study in 2018 demonstrated no evidence of ischemia or infarct.  Also hyperlipidemia, hypertension, carotid artery disease (history of right carotid endarterectomy in ), left bundle branch block, history of tobacco use, and chronic angina since his last angiogram that was treated with moderate dose of Imdur.     He was seen by PCP 2019.  Imdur increased to 120 mg daily.     He was seen by Gina Vivas PA-C on 19 due to increased episodes of chest tightness at night over the last 2 months.  This was not always exertional.  His sxs would go away with nitroglycerin.  Sxs were somewhat reminiscent of his previous episodes prior to his stents.  Amlodipine was added for elevated BP.  Cardiac catheterization was ordered.     He underwent cardiac catheterization 19.  95% distal RCA s/p MERARI.  Started on Plavix.    His BP was 133/60 during the case.     Interval History:   He presents today for follow up.   He brings in a med list from 19.  He is not certain whether he is taking Plavix.   No chest pains.   No bleeding concerns.   BP is better, but not quite ideal.  He is coughing - PCP just started Protonix.     ROS:  Skin:  Negative     Eyes:  Positive for glasses;color blindness  ENT:  Positive for  hearing loss;postnasal drainage  Respiratory:  Positive for cough  Cardiovascular:  Negative    Gastroenterology: Negative    Genitourinary:  Positive for    Musculoskeletal:  Positive for arthritis;joint pain  Neurologic:  Positive for numbness or tingling of feet  Psychiatric:  Negative    Heme/Lymph/Imm:  Positive for allergies  Endocrine:  Negative      PAST MEDICAL HISTORY:  Past Medical History:   Diagnosis Date     Arthritis      Carotid artery stenosis     Right, s/p right CEA 11/9/2016     Cerebral artery occlusion with cerebral infarction (H) 2016    TIA -  Endarterectomy...No residual     Cholesterol serum elevated      Colon polyps      Color blind      Coronary artery disease     2/2012 - MERARI to mid LAD     Decreased hearing      Depression      Gout      Hypertension      Hypertrophy of prostate without urinary obstruction and other lower urinary tract symptoms (LUTS)      Impotence      Left bundle branch block      Onychomycosis of toenail      Other and unspecified hyperlipidemia      Paroxysmal ventricular tachycardia (H)     with stress test 2012       PAST SURGICAL HISTORY:  Past Surgical History:   Procedure Laterality Date     ANGIOPLASTY  1991     ARTHROPLASTY KNEE Left 11/12/2018    Procedure: Left total knee arthroplasty;  Surgeon: Matt Schaefer MD;  Location:  OR     COLONOSCOPY  12/13/2011    Procedure:COLONOSCOPY; COLONOSCOPY; Surgeon:EMMANUELLE MOSER; Location: GI     CORONARY ANGIOGRAPHY ADULT ORDER  1991,2012 1991 - stent to proximal LAD, 2012 - Stent to mid LAD     CV HEART CATHETERIZATION WITH POSSIBLE INTERVENTION Left 4/23/2019    Procedure: Coronary Angiogram;  Surgeon: Percy Armas MD;  Location: [facility] CARDIAC CATH LAB     CV PCI ANGIOPLASTY N/A 4/23/2019    Procedure: PCI Angioplasty;  Surgeon: Percy Armas MD;  Location:  HEART CARDIAC CATH LAB     ENDARTERECTOMY CAROTID Right 11/10/2016    Procedure: ENDARTERECTOMY CAROTID;  Surgeon:  Paco Suersh MD;  Location:  OR     HEART CATH, ANGIOPLASTY       ORTHOPEDIC SURGERY       PHACOEMULSIFICATION CLEAR CORNEA WITH STANDARD INTRAOCULAR LENS IMPLANT  2013    Procedure: PHACOEMULSIFICATION CLEAR CORNEA WITH STANDARD INTRAOCULAR LENS IMPLANT;  RIGHT PHACOEMULSIFICATION CLEAR CORNEA WITH STANDARD INTRAOCULAR LENS IMPLANT ;  Surgeon: Luisito Juan MD;  Location: [facility]       SOCIAL HISTORY:  Social History     Socioeconomic History     Marital status:      Spouse name: Not on file     Number of children: Not on file     Years of education: Not on file     Highest education level: Not on file   Occupational History     Not on file   Social Needs     Financial resource strain: Not on file     Food insecurity:     Worry: Not on file     Inability: Not on file     Transportation needs:     Medical: Not on file     Non-medical: Not on file   Tobacco Use     Smoking status: Former Smoker     Packs/day: 0.50     Years: 20.00     Pack years: 10.00     Types: Cigarettes     Last attempt to quit: 1990     Years since quittin.3     Smokeless tobacco: Never Used   Substance and Sexual Activity     Alcohol use: Yes     Alcohol/week: 0.0 oz     Comment: daily - drinks x2     Drug use: No     Sexual activity: Never   Lifestyle     Physical activity:     Days per week: Not on file     Minutes per session: Not on file     Stress: Not on file   Relationships     Social connections:     Talks on phone: Not on file     Gets together: Not on file     Attends Pentecostalism service: Not on file     Active member of club or organization: Not on file     Attends meetings of clubs or organizations: Not on file     Relationship status: Not on file     Intimate partner violence:     Fear of current or ex partner: Not on file     Emotionally abused: Not on file     Physically abused: Not on file     Forced sexual activity: Not on file   Other Topics Concern     Parent/sibling w/ CABG, MI or  angioplasty before 65F 55M? Not Asked      Service Not Asked     Blood Transfusions Not Asked     Caffeine Concern No     Comment: 1-2 cups daily     Occupational Exposure Not Asked     Hobby Hazards Not Asked     Sleep Concern No     Stress Concern No     Weight Concern Not Asked     Special Diet No     Comment: trying to watch sodium intake     Back Care Not Asked     Exercise No     Comment: some walking     Bike Helmet Not Asked     Seat Belt Not Asked     Self-Exams Not Asked   Social History Narrative     Not on file       FAMILY HISTORY:  Family History   Problem Relation Age of Onset     Heart Disease Mother 83     Heart Disease Father 69        emphysema     Coronary Artery Disease Brother      Coronary Artery Disease Sister        MEDS:   Current Outpatient Medications on File Prior to Visit:  acetaminophen (TYLENOL) 325 MG tablet Take 3 tablets (975 mg) by mouth every 8 hours   ALLOPURINOL PO Take 100 mg by mouth daily. To prevent gout attacks, recently started.    amLODIPine (NORVASC) 5 MG tablet Take 1 tablet (5 mg) by mouth daily   aspirin (ASA) 81 MG tablet Take 81 mg by mouth daily   clopidogrel (PLAVIX) 75 MG tablet Take 1 tablet (75 mg) by mouth daily   doxycycline hyclate (VIBRAMYCIN) 100 MG capsule Take 100 mg by mouth 2 times daily   ezetimibe (ZETIA) 10 MG tablet Take 1 tablet (10 mg) by mouth daily   isosorbide mononitrate CR (IMDUR) 120 MG 24 HR ER tablet Take 1 tablet (120 mg) by mouth daily   Multiple Vitamins-Minerals (CENTRUM SILVER) per tablet Take 1 tablet by mouth daily.   Multiple Vitamins-Minerals (PRESERVISION AREDS 2) CAPS Take by mouth 2 times daily   nitroglycerin (NITROSTAT) 0.4 MG SL tablet Place 1 tablet (0.4 mg) under the tongue every 5 minutes as needed for chest pain   pantoprazole (PROTONIX) 40 MG EC tablet Take 40 mg by mouth daily   propranolol (INDERAL LA) 80 MG 24 hr capsule Take 80 mg by mouth daily   rosuvastatin (CRESTOR) 40 MG tablet Take 1 tablet by mouth  "daily.   hydrOXYzine (ATARAX) 25 MG tablet Take 1 tablet (25 mg) by mouth every 6 hours as needed for other (adjuvant pain) (Patient not taking: Reported on 4/16/2019)   oxyCODONE IR (ROXICODONE) 5 MG tablet 1 tab po q 4 hrs prn pain scale 3-6, 2 tabs po q 4hrs prn pain scale 7-10 (Patient not taking: Reported on 4/16/2019)   primidone (MYSOLINE) 250 MG tablet Take 250 mg by mouth 2 times daily     No current facility-administered medications on file prior to visit.     ALLERGIES:   Allergies   Allergen Reactions     Hmg-Coa-R Inhibitors Muscle Pain (Myalgia)     lipitor       PHYSICAL EXAM:  Vitals: /76 (BP Location: Right arm, Patient Position: Chair, Cuff Size: Adult Large)   Pulse 56   Ht 1.778 m (5' 10\")   Wt 99.2 kg (218 lb 11.2 oz)   SpO2 96%   BMI 31.38 kg/m    Constitutional:  cooperative, alert and oriented, well developed, well nourished, in no acute distress        Skin:  warm and dry to the touch, no apparent skin lesions or masses noted   right CEA scar    Head:  normocephalic, no masses or lesions        Eyes:  pupils equal and round;conjunctivae and lids unremarkable;sclera white;no xanthalasma        ENT:  no pallor or cyanosis, dentition good        Neck:  no carotid bruit;JVP normal   prominent carotid pulsations at base of neck bilaterally. Well-healed right carotid endarterectomy scar    Respiratory:  normal breath sounds, clear to auscultation, normal A-P diameter, normal symmetry, normal respiratory excursion, no use of accessory muscles        Cardiac: regular rhythm;normal S1 and S2       systolic murmur;grade 1;RUSB          GI:  abdomen soft;BS normoactive        Vascular: pulses full and equal                                      Extremities and Musculoskeletal:  no spinal abnormalities noted;normal muscle strength and tone;no edema        Neurological:  no gross motor deficits;affect appropriate            LABS/DATA:  I reviewed the following:  Component      Latest Ref Rng & " Units 4/30/2019   Sodium      133 - 144 mmol/L 138   Potassium      3.4 - 5.3 mmol/L 4.3   Chloride      94 - 109 mmol/L 106   Carbon Dioxide      20 - 32 mmol/L 28   Anion Gap      3 - 14 mmol/L 4   Glucose      70 - 99 mg/dL 106 (H)   Urea Nitrogen      7 - 30 mg/dL 33 (H)   Creatinine      0.66 - 1.25 mg/dL 1.14   GFR Estimate      >60 mL/min/1.73:m2 61   GFR Estimate If Black      >60 mL/min/1.73:m2 70   Calcium      8.5 - 10.1 mg/dL 8.6         ASSESSMENT/PLAN:  CAD  - Angioplasty of LAD back in 1991, LAD stenting in early 2012 with a jailed diagonal vessel, later that year repeat coronary angiogram was performed which showed stable diagonal vessel with FFR of 0.8 and therefore medical therapy was recommended; nuclear stress study in 11/2018 demonstrating no evidence of ischemia or infarct  - Now with progressive angina  Cardiac cath 4/23/19: 95% distal RCA s/p MERARI  - No anginal sxs  - No bleeding  - Post cath BMP today shows BUN is up a little from baseline.  Last was 21 now 33.  He will drink more f;uids today and tomorrow.  Will repeat in a couple weeks.   - ASA 81 mg lifelong, Plavix x 1 year minimum, propranolol 80 mg, amlodipine 5 mg, Imdur 120 mg.  He is not certain if he started Plavix.  He will go home and confirm.  We discussed the importance of this medication for his new stent.    - Cardiac rehab  - Mediterranean style diet      HTN  - 4/16/19 BP was 164/86.  Amlodipine 5 mg was started  - During cath BP was 133/60  - BP today is 144/76.  Continue current regimen.  He will come have a RN check in 2 weeks to see what his BP is      Hyperlipidemia  - FLP 8/13/18 shows okay control of his lipids (LDL 82) on a combination of Zetia and rosuvastatin 40 mg      Carotid artery disease  - History of right carotid endarterectomy in 2016  - Last imaging 8/2018  - Continue ASA, statin      Coughing  - 3 months  - Evaluation through PCP, just started Protonix             Follow up:  RN visit in 2 weeks to check  BP.  Will repeat BMP at that time.   Dr. Mendoza in August with labs.     NESTOR [facility]ena[facility]

## 2019-04-30 NOTE — LETTER
2019    Stephan Nice MD  [facility] [address]  [place] [address]    RE: Iván Nicholas       Dear Colleague,    I had the pleasure of seeing Iván Nicholas in the [facility].    CARDIOLOGY CLINIC PROGRESS NOTE    DOS: 2019      Iván Nicholas  : 1940, 79 year old  MRN: [id number]      History:  I had the pleasure of meeting Iván Nicholas today in the cardiology clinic for follow up of a recent cardiac catheterization.   He is a patient of Dr. Mendoza.     Iván is a pleasant 79-year-old gentleman with past medical history notable for coronary artery disease.  He had angioplasty of his LAD back in , LAD stenting in early  with a jailed diagonal vessel, later that year repeat coronary angiogram was performed which showed stable diagonal vessel with FFR of 0.8 and therefore medical therapy was recommended.  A nuclear stress study in 2018 demonstrated no evidence of ischemia or infarct.  Also hyperlipidemia, hypertension, carotid artery disease (history of right carotid endarterectomy in ), left bundle branch block, history of tobacco use, and chronic angina since his last angiogram that was treated with moderate dose of Imdur.     He was seen by PCP 2019.  Imdur increased to 120 mg daily.     He was seen by Gina Vivas PA-C on 19 due to increased episodes of chest tightness at night over the last 2 months.   This was not always exertional.  His sxs would go away with nitroglycerin.   Sxs were somewhat reminiscent of his previous episodes prior to his stents.   Amlodipine was added for elevated BP.  Cardiac catheterization was ordered.     He underwent cardiac catheterization 19.  95% distal RCA s/p MERARI.  Started on Plavix.    His BP was 133/60 during the case.     Interval History:   He presents today for follow up.   He brings in a med list from 19.   He is not certain whether he is taking Plavix.   No chest pains.   No bleeding concerns.   BP is better, but not quite ideal.  He is coughing - PCP just started Protonix.     ROS:  Skin:  Negative     Eyes:  Positive for glasses;color blindness  ENT:  Positive for hearing loss;postnasal drainage  Respiratory:  Positive for cough  Cardiovascular:  Negative    Gastroenterology: Negative    Genitourinary:  Positive for    Musculoskeletal:  Positive for arthritis;joint pain  Neurologic:  Positive for numbness or tingling of feet  Psychiatric:  Negative    Heme/Lymph/Imm:  Positive for allergies  Endocrine:  Negative      PAST MEDICAL HISTORY:  Past Medical History:   Diagnosis Date     Arthritis      Carotid artery stenosis     Right, s/p right CEA 11/9/2016     Cerebral artery occlusion with cerebral infarction (H) 2016    TIA -  Endarterectomy...No residual     Cholesterol serum elevated      Colon polyps      Color blind      Coronary artery disease     2/2012 - MERARI to mid LAD     Decreased hearing      Depression      Gout      Hypertension      Hypertrophy of prostate without urinary obstruction and other lower urinary tract symptoms (LUTS)      Impotence      Left bundle branch block      Onychomycosis of toenail      Other and unspecified hyperlipidemia      Paroxysmal ventricular tachycardia (H)     with stress test 2012       PAST SURGICAL HISTORY:  Past Surgical History:   Procedure Laterality Date     ANGIOPLASTY  1991     ARTHROPLASTY KNEE Left 11/12/2018    Procedure: Left total knee arthroplasty;  Surgeon: Matt Schaefer MD;  Location:  OR     COLONOSCOPY  12/13/2011    Procedure:COLONOSCOPY; COLONOSCOPY; Surgeon:EMMANUELLE OMSER; Location: GI     CORONARY ANGIOGRAPHY ADULT ORDER  1991,2012 1991 - stent to proximal LAD, 2012 - Stent to mid LAD     CV HEART CATHETERIZATION WITH POSSIBLE INTERVENTION Left 4/23/2019    Procedure: Coronary Angiogram;  Surgeon: Percy Armas MD;  Location:   HEART CARDIAC CATH LAB     CV PCI ANGIOPLASTY N/A 2019    Procedure: PCI Angioplasty;  Surgeon: Percy Armas MD;  Location:  HEART CARDIAC CATH LAB     ENDARTERECTOMY CAROTID Right 11/10/2016    Procedure: ENDARTERECTOMY CAROTID;  Surgeon: Paco Suresh MD;  Location:  OR     HEART CATH, ANGIOPLASTY       ORTHOPEDIC SURGERY       PHACOEMULSIFICATION CLEAR CORNEA WITH STANDARD INTRAOCULAR LENS IMPLANT  2013    Procedure: PHACOEMULSIFICATION CLEAR CORNEA WITH STANDARD INTRAOCULAR LENS IMPLANT;  RIGHT PHACOEMULSIFICATION CLEAR CORNEA WITH STANDARD INTRAOCULAR LENS IMPLANT ;  Surgeon: Luisito Juan MD;  Location: [facility]       SOCIAL HISTORY:  Social History     Socioeconomic History     Marital status:      Spouse name: Not on file     Number of children: Not on file     Years of education: Not on file     Highest education level: Not on file   Occupational History     Not on file   Social Needs     Financial resource strain: Not on file     Food insecurity:     Worry: Not on file     Inability: Not on file     Transportation needs:     Medical: Not on file     Non-medical: Not on file   Tobacco Use     Smoking status: Former Smoker     Packs/day: 0.50     Years: 20.00     Pack years: 10.00     Types: Cigarettes     Last attempt to quit: 1990     Years since quittin.3     Smokeless tobacco: Never Used   Substance and Sexual Activity     Alcohol use: Yes     Alcohol/week: 0.0 oz     Comment: daily - drinks x2     Drug use: No     Sexual activity: Never   Lifestyle     Physical activity:     Days per week: Not on file     Minutes per session: Not on file     Stress: Not on file   Relationships     Social connections:     Talks on phone: Not on file     Gets together: Not on file     Attends Yazidism service: Not on file     Active member of club or organization: Not on file     Attends meetings of clubs or organizations: Not on file     Relationship status:  Not on file     Intimate partner violence:     Fear of current or ex partner: Not on file     Emotionally abused: Not on file     Physically abused: Not on file     Forced sexual activity: Not on file   Other Topics Concern     Parent/sibling w/ CABG, MI or angioplasty before 65F 55M? Not Asked      Service Not Asked     Blood Transfusions Not Asked     Caffeine Concern No     Comment: 1-2 cups daily     Occupational Exposure Not Asked     Hobby Hazards Not Asked     Sleep Concern No     Stress Concern No     Weight Concern Not Asked     Special Diet No     Comment: trying to watch sodium intake     Back Care Not Asked     Exercise No     Comment: some walking     Bike Helmet Not Asked     Seat Belt Not Asked     Self-Exams Not Asked   Social History Narrative     Not on file       FAMILY HISTORY:  Family History   Problem Relation Age of Onset     Heart Disease Mother 83     Heart Disease Father 69        emphysema     Coronary Artery Disease Brother      Coronary Artery Disease Sister        MEDS:   Current Outpatient Medications on File Prior to Visit:  acetaminophen (TYLENOL) 325 MG tablet Take 3 tablets (975 mg) by mouth every 8 hours   ALLOPURINOL PO Take 100 mg by mouth daily. To prevent gout attacks, recently started.    amLODIPine (NORVASC) 5 MG tablet Take 1 tablet (5 mg) by mouth daily   aspirin (ASA) 81 MG tablet Take 81 mg by mouth daily   clopidogrel (PLAVIX) 75 MG tablet Take 1 tablet (75 mg) by mouth daily   doxycycline hyclate (VIBRAMYCIN) 100 MG capsule Take 100 mg by mouth 2 times daily   ezetimibe (ZETIA) 10 MG tablet Take 1 tablet (10 mg) by mouth daily   isosorbide mononitrate CR (IMDUR) 120 MG 24 HR ER tablet Take 1 tablet (120 mg) by mouth daily   Multiple Vitamins-Minerals (CENTRUM SILVER) per tablet Take 1 tablet by mouth daily.   Multiple Vitamins-Minerals (PRESERVISION AREDS 2) CAPS Take by mouth 2 times daily   nitroglycerin (NITROSTAT) 0.4 MG SL tablet Place 1 tablet (0.4 mg)  "under the tongue every 5 minutes as needed for chest pain   pantoprazole (PROTONIX) 40 MG EC tablet Take 40 mg by mouth daily   propranolol (INDERAL LA) 80 MG 24 hr capsule Take 80 mg by mouth daily   rosuvastatin (CRESTOR) 40 MG tablet Take 1 tablet by mouth daily.   hydrOXYzine (ATARAX) 25 MG tablet Take 1 tablet (25 mg) by mouth every 6 hours as needed for other (adjuvant pain) (Patient not taking: Reported on 4/16/2019)   oxyCODONE IR (ROXICODONE) 5 MG tablet 1 tab po q 4 hrs prn pain scale 3-6, 2 tabs po q 4hrs prn pain scale 7-10 (Patient not taking: Reported on 4/16/2019)   primidone (MYSOLINE) 250 MG tablet Take 250 mg by mouth 2 times daily     No current facility-administered medications on file prior to visit.     ALLERGIES:   Allergies   Allergen Reactions     Hmg-Coa-R Inhibitors Muscle Pain (Myalgia)     lipitor       PHYSICAL EXAM:  Vitals: /76 (BP Location: Right arm, Patient Position: Chair, Cuff Size: Adult Large)   Pulse 56   Ht 1.778 m (5' 10\")   Wt 99.2 kg (218 lb 11.2 oz)   SpO2 96%   BMI 31.38 kg/m     Constitutional:  cooperative, alert and oriented, well developed, well nourished, in no acute distress        Skin:  warm and dry to the touch, no apparent skin lesions or masses noted   right CEA scar    Head:  normocephalic, no masses or lesions        Eyes:  pupils equal and round;conjunctivae and lids unremarkable;sclera white;no xanthalasma        ENT:  no pallor or cyanosis, dentition good        Neck:  no carotid bruit;JVP normal   prominent carotid pulsations at base of neck bilaterally. Well-healed right carotid endarterectomy scar    Respiratory:  normal breath sounds, clear to auscultation, normal A-P diameter, normal symmetry, normal respiratory excursion, no use of accessory muscles        Cardiac: regular rhythm;normal S1 and S2       systolic murmur;grade 1;RUSB          GI:  abdomen soft;BS normoactive        Vascular: pulses full and equal                              "         Extremities and Musculoskeletal:  no spinal abnormalities noted;normal muscle strength and tone;no edema        Neurological:  no gross motor deficits;affect appropriate            LABS/DATA:  I reviewed the following:  Component      Latest Ref Rng & Units 4/30/2019   Sodium      133 - 144 mmol/L 138   Potassium      3.4 - 5.3 mmol/L 4.3   Chloride      94 - 109 mmol/L 106   Carbon Dioxide      20 - 32 mmol/L 28   Anion Gap      3 - 14 mmol/L 4   Glucose      70 - 99 mg/dL 106 (H)   Urea Nitrogen      7 - 30 mg/dL 33 (H)   Creatinine      0.66 - 1.25 mg/dL 1.14   GFR Estimate      >60 mL/min/1.73:m2 61   GFR Estimate If Black      >60 mL/min/1.73:m2 70   Calcium      8.5 - 10.1 mg/dL 8.6         ASSESSMENT/PLAN:  CAD  - Angioplasty of LAD back in 1991, LAD stenting in early 2012 with a jailed diagonal vessel, later that year repeat coronary angiogram was performed which showed stable diagonal vessel with FFR of 0.8 and therefore medical therapy was recommended; nuclear stress study in 11/2018 demonstrating no evidence of ischemia or infarct  - Now with progressive angina  Cardiac cath 4/23/19: 95% distal RCA s/p MERARI  - No anginal sxs  - No bleeding  - Post cath BMP today shows BUN is up a little from baseline.  Last was 21 now 33.  He will drink more f;uids today and tomorrow.  Will repeat in a couple weeks.   - ASA 81 mg lifelong, Plavix x 1 year minimum, propranolol 80 mg, amlodipine 5 mg, Imdur 120 mg.  He is not certain if he started Plavix.  He will go home and confirm.  We discussed the importance of this medication for his new stent.    - Cardiac rehab  - Mediterranean style diet      HTN  - 4/16/19 BP was 164/86.  Amlodipine 5 mg was started  - During cath BP was 133/60  - BP today is 144/76.  Continue current regimen.  He will come have a RN check in 2 weeks to see what his BP is      Hyperlipidemia  - FLP 8/13/18 shows okay control of his lipids (LDL 82) on a combination of Zetia and rosuvastatin  40 mg      Carotid artery disease  - History of right carotid endarterectomy in 2016  - Last imaging 8/2018  - Continue ASA, statin      Coughing  - 3 months  - Evaluation through PCP, just started Protonix             Follow up:  RN visit in 2 weeks to check BP.  Will repeat BMP at that time.   Dr. Mendoza in August with labs.     Jenaro Shook PA-C    Thank you for allowing me to participate in the care of your patient.      Sincerely,     Jenaro Shook PA-C     [facility]    cc:   Gina Vivas PA-C  [address] [place] [address]

## 2019-05-03 ENCOUNTER — HOSPITAL ENCOUNTER (OUTPATIENT)
Dept: CARDIAC REHAB | Facility: CLINIC | Age: 79
End: 2019-05-03
Attending: INTERNAL MEDICINE
Payer: COMMERCIAL

## 2019-05-03 PROCEDURE — 40000116 ZZH STATISTIC OP CR VISIT: Performed by: REHABILITATION PRACTITIONER

## 2019-05-03 PROCEDURE — 93798 PHYS/QHP OP CAR RHAB W/ECG: CPT | Performed by: REHABILITATION PRACTITIONER

## 2019-05-06 ENCOUNTER — HOSPITAL ENCOUNTER (OUTPATIENT)
Dept: CARDIAC REHAB | Facility: CLINIC | Age: 79
End: 2019-05-06
Attending: INTERNAL MEDICINE
Payer: COMMERCIAL

## 2019-05-06 PROCEDURE — 40000116 ZZH STATISTIC OP CR VISIT: Performed by: OCCUPATIONAL THERAPIST

## 2019-05-06 PROCEDURE — 93798 PHYS/QHP OP CAR RHAB W/ECG: CPT | Performed by: OCCUPATIONAL THERAPIST

## 2019-05-08 ENCOUNTER — HOSPITAL ENCOUNTER (OUTPATIENT)
Dept: CARDIAC REHAB | Facility: CLINIC | Age: 79
End: 2019-05-08
Attending: INTERNAL MEDICINE
Payer: COMMERCIAL

## 2019-05-08 PROCEDURE — 40000116 ZZH STATISTIC OP CR VISIT

## 2019-05-08 PROCEDURE — 93798 PHYS/QHP OP CAR RHAB W/ECG: CPT

## 2019-05-13 ENCOUNTER — ALLIED HEALTH/NURSE VISIT (OUTPATIENT)
Dept: CARDIOLOGY | Facility: CLINIC | Age: 79
End: 2019-05-13
Attending: PHYSICIAN ASSISTANT
Payer: COMMERCIAL

## 2019-05-13 ENCOUNTER — CARE COORDINATION (OUTPATIENT)
Dept: CARDIOLOGY | Facility: CLINIC | Age: 79
End: 2019-05-13

## 2019-05-13 VITALS — SYSTOLIC BLOOD PRESSURE: 138 MMHG | HEART RATE: 48 BPM | DIASTOLIC BLOOD PRESSURE: 68 MMHG

## 2019-05-13 DIAGNOSIS — I25.118 CORONARY ARTERY DISEASE OF NATIVE ARTERY OF NATIVE HEART WITH STABLE ANGINA PECTORIS (H): ICD-10-CM

## 2019-05-13 DIAGNOSIS — R00.1 BRADYCARDIA: Primary | ICD-10-CM

## 2019-05-13 DIAGNOSIS — I10 ESSENTIAL HYPERTENSION, BENIGN: Primary | ICD-10-CM

## 2019-05-13 LAB
ANION GAP SERPL CALCULATED.3IONS-SCNC: 3 MMOL/L (ref 3–14)
BUN SERPL-MCNC: 30 MG/DL (ref 7–30)
CALCIUM SERPL-MCNC: 8.3 MG/DL (ref 8.5–10.1)
CHLORIDE SERPL-SCNC: 111 MMOL/L (ref 94–109)
CO2 SERPL-SCNC: 28 MMOL/L (ref 20–32)
CREAT SERPL-MCNC: 1.06 MG/DL (ref 0.66–1.25)
GFR SERPL CREATININE-BSD FRML MDRD: 66 ML/MIN/{1.73_M2}
GLUCOSE SERPL-MCNC: 101 MG/DL (ref 70–99)
POTASSIUM SERPL-SCNC: 4.4 MMOL/L (ref 3.4–5.3)
SODIUM SERPL-SCNC: 142 MMOL/L (ref 133–144)

## 2019-05-13 PROCEDURE — 36415 COLL VENOUS BLD VENIPUNCTURE: CPT | Performed by: PHYSICIAN ASSISTANT

## 2019-05-13 PROCEDURE — 80048 BASIC METABOLIC PNL TOTAL CA: CPT | Performed by: PHYSICIAN ASSISTANT

## 2019-05-13 NOTE — PROGRESS NOTES
Pt in clinic for nurse visit BP check and BMP per Jenaro Shook PA-C.     Last OV 4/30/19 w/ Anah post-angio, BP was 144/76. BMP showed BUN elevated from 21 to 33. Pt had prev started amlodipine 5 mg daily on 4/16/19. NO changes made. Advised to follow-up x2 weeks w/ nurse visit.     Today pt took amlodipine 5 mg and Imdur 120 mg at 0945.   Took Manual BP at 1035 AM: 138/68  HR:48  Pt is asymptomatic. No concerns.   BMP result below compared to previous:  Component      Latest Ref Rng & Units 4/30/2019 5/13/2019   Sodium      133 - 144 mmol/L 138 142   Potassium      3.4 - 5.3 mmol/L 4.3 4.4   Chloride      94 - 109 mmol/L 106 111 (H)   Carbon Dioxide      20 - 32 mmol/L 28 28   Anion Gap      3 - 14 mmol/L 4 3   Glucose      70 - 99 mg/dL 106 (H) 101 (H)   Urea Nitrogen      7 - 30 mg/dL 33 (H) 30   Creatinine      0.66 - 1.25 mg/dL 1.14 1.06   GFR Estimate      >60 mL/min/1.73:m2 61 66   GFR Estimate If Black      >60 mL/min/1.73:m2 70 77   Calcium      8.5 - 10.1 mg/dL 8.6 8.3 (L)     Primary Cardiologist: Dr. Mendoza  Routing to ordering provider: Jenaro Shook PA-C   Follow-up and labs ordered for 8/2019 (not scheduled)  Jazmyne WRAY

## 2019-05-13 NOTE — NURSING NOTE
Pt in clinic for nurse visit, BP/HR check and BMP per Jenaro Shook PA-C.   Please see care coordination encounter for details.   Jazmyne WRAY

## 2019-05-14 NOTE — PROGRESS NOTES
Called pt, left detailed  w/Jenaro Tolberters PA-C recommendations. Advised to call me back to setup holter.     Entered order for 24 hr holter.   Jazmyne WRAY

## 2019-05-14 NOTE — PROGRESS NOTES
Labs is improving.   BP is better.   HR is slow, but no sxs.   Continue current medications.     He is due to see Dr. Mendoza 8/2019.  Have him wear a 24 H Holter before just to see how blunted his HRs are. Also repeat BMP before that visit.     Jenaro Shook PA-C 5/14/2019 8:55 AM

## 2019-05-22 ENCOUNTER — HOSPITAL ENCOUNTER (OUTPATIENT)
Dept: CARDIAC REHAB | Facility: CLINIC | Age: 79
End: 2019-05-22
Attending: INTERNAL MEDICINE
Payer: COMMERCIAL

## 2019-05-22 PROCEDURE — 40000116 ZZH STATISTIC OP CR VISIT: Performed by: REHABILITATION PRACTITIONER

## 2019-05-22 PROCEDURE — 93798 PHYS/QHP OP CAR RHAB W/ECG: CPT | Performed by: REHABILITATION PRACTITIONER

## 2019-05-24 ENCOUNTER — HOSPITAL ENCOUNTER (OUTPATIENT)
Dept: CARDIAC REHAB | Facility: CLINIC | Age: 79
End: 2019-05-24
Attending: INTERNAL MEDICINE
Payer: COMMERCIAL

## 2019-05-24 PROCEDURE — 40000116 ZZH STATISTIC OP CR VISIT

## 2019-05-24 PROCEDURE — 93798 PHYS/QHP OP CAR RHAB W/ECG: CPT

## 2019-05-29 ENCOUNTER — HOSPITAL ENCOUNTER (OUTPATIENT)
Dept: CARDIAC REHAB | Facility: CLINIC | Age: 79
End: 2019-05-29
Attending: INTERNAL MEDICINE
Payer: COMMERCIAL

## 2019-05-29 PROCEDURE — 93798 PHYS/QHP OP CAR RHAB W/ECG: CPT

## 2019-05-29 PROCEDURE — 40000116 ZZH STATISTIC OP CR VISIT

## 2019-05-31 ENCOUNTER — HOSPITAL ENCOUNTER (OUTPATIENT)
Dept: CARDIAC REHAB | Facility: CLINIC | Age: 79
End: 2019-05-31
Attending: INTERNAL MEDICINE
Payer: COMMERCIAL

## 2019-05-31 PROCEDURE — 93798 PHYS/QHP OP CAR RHAB W/ECG: CPT

## 2019-05-31 PROCEDURE — 40000116 ZZH STATISTIC OP CR VISIT

## 2019-06-03 ENCOUNTER — HOSPITAL ENCOUNTER (OUTPATIENT)
Dept: CARDIAC REHAB | Facility: CLINIC | Age: 79
End: 2019-06-03
Attending: INTERNAL MEDICINE
Payer: COMMERCIAL

## 2019-06-03 PROCEDURE — 40000116 ZZH STATISTIC OP CR VISIT: Performed by: OCCUPATIONAL THERAPIST

## 2019-06-03 PROCEDURE — 93798 PHYS/QHP OP CAR RHAB W/ECG: CPT | Performed by: OCCUPATIONAL THERAPIST

## 2019-06-06 ENCOUNTER — HOSPITAL ENCOUNTER (OUTPATIENT)
Dept: CARDIAC REHAB | Facility: CLINIC | Age: 79
End: 2019-06-06
Attending: INTERNAL MEDICINE
Payer: COMMERCIAL

## 2019-06-06 PROCEDURE — 40000116 ZZH STATISTIC OP CR VISIT: Performed by: REHABILITATION PRACTITIONER

## 2019-06-06 PROCEDURE — 93798 PHYS/QHP OP CAR RHAB W/ECG: CPT | Performed by: REHABILITATION PRACTITIONER

## 2019-06-10 ENCOUNTER — HOSPITAL ENCOUNTER (OUTPATIENT)
Dept: CARDIAC REHAB | Facility: CLINIC | Age: 79
End: 2019-06-10
Attending: INTERNAL MEDICINE
Payer: COMMERCIAL

## 2019-06-10 PROCEDURE — 40000116 ZZH STATISTIC OP CR VISIT: Performed by: OCCUPATIONAL THERAPIST

## 2019-06-10 PROCEDURE — 93798 PHYS/QHP OP CAR RHAB W/ECG: CPT | Performed by: OCCUPATIONAL THERAPIST

## 2019-06-24 ENCOUNTER — HOSPITAL ENCOUNTER (OUTPATIENT)
Dept: CARDIAC REHAB | Facility: CLINIC | Age: 79
End: 2019-06-24
Attending: INTERNAL MEDICINE
Payer: COMMERCIAL

## 2019-06-24 PROCEDURE — 40000116 ZZH STATISTIC OP CR VISIT

## 2019-06-24 PROCEDURE — 93798 PHYS/QHP OP CAR RHAB W/ECG: CPT

## 2019-06-24 PROCEDURE — 93797 PHYS/QHP OP CAR RHAB WO ECG: CPT | Performed by: REHABILITATION PRACTITIONER

## 2019-06-24 PROCEDURE — 40000575 ZZH STATISTIC OP CARDIAC VISIT #2: Performed by: REHABILITATION PRACTITIONER

## 2019-06-25 DIAGNOSIS — I65.21 STENOSIS OF RIGHT INTERNAL CAROTID ARTERY: Primary | ICD-10-CM

## 2019-07-23 DIAGNOSIS — I10 BENIGN ESSENTIAL HYPERTENSION: ICD-10-CM

## 2019-07-23 RX ORDER — AMLODIPINE BESYLATE 5 MG/1
5 TABLET ORAL DAILY
Qty: 90 TABLET | Refills: 2 | Status: SHIPPED | OUTPATIENT
Start: 2019-07-23 | End: 2019-11-14

## 2019-09-16 ENCOUNTER — HOSPITAL ENCOUNTER (OUTPATIENT)
Dept: ULTRASOUND IMAGING | Facility: CLINIC | Age: 79
Discharge: HOME | End: 2019-09-16
Attending: SURGERY | Admitting: SURGERY
Payer: COMMERCIAL

## 2019-09-16 ENCOUNTER — OFFICE VISIT (OUTPATIENT)
Dept: OTHER | Facility: CLINIC | Age: 79
End: 2019-09-16
Attending: SURGERY
Payer: COMMERCIAL

## 2019-09-16 VITALS
RESPIRATION RATE: 16 BRPM | WEIGHT: 218 LBS | BODY MASS INDEX: 31.21 KG/M2 | HEART RATE: 82 BPM | DIASTOLIC BLOOD PRESSURE: 75 MMHG | HEIGHT: 70 IN | SYSTOLIC BLOOD PRESSURE: 174 MMHG

## 2019-09-16 DIAGNOSIS — I65.21 CAROTID ARTERY STENOSIS, SYMPTOMATIC, RIGHT: Primary | ICD-10-CM

## 2019-09-16 DIAGNOSIS — I65.21 STENOSIS OF RIGHT INTERNAL CAROTID ARTERY: ICD-10-CM

## 2019-09-16 PROCEDURE — 99213 OFFICE O/P EST LOW 20 MIN: CPT | Mod: ZP | Performed by: SURGERY

## 2019-09-16 PROCEDURE — 93880 EXTRACRANIAL BILAT STUDY: CPT

## 2019-09-16 PROCEDURE — G0463 HOSPITAL OUTPT CLINIC VISIT: HCPCS | Mod: 25

## 2019-09-16 ASSESSMENT — MIFFLIN-ST. JEOR: SCORE: 1710.09

## 2019-09-16 NOTE — PROGRESS NOTES
"Iván Nicholas is a 79 year old male who presents for:  Chief Complaint   Patient presents with     RECHECK     Tor carotid (10:45 VHC; 11:30 KMK) History of right carotid endarterectomy on 11/10/16; 1 year follow up to 8-3-18 appointment with Dr. Suresh *rodney        Vitals:    Vitals:    09/16/19 1125   BP: (!) 174/75   BP Location: Left arm   Patient Position: Chair   Cuff Size: Adult Regular   Pulse: 82   Resp: 16   Weight: 218 lb (98.9 kg)   Height: 5' 10\" (1.778 m)       BMI:  Estimated body mass index is 31.28 kg/m  as calculated from the following:    Height as of this encounter: 5' 10\" (1.778 m).    Weight as of this encounter: 218 lb (98.9 kg).    Pain Score:  Data Unavailable        Jose Francisco Rangel CMA    "

## 2019-09-18 NOTE — PROGRESS NOTES
Mr. Nicholas is a very pleasant 79-year-old male who underwent right carotid endarterectomy in November 2016 for severe symptomatic disease.  He had suffered from an episode of left upper extremity weakness and numbness consistent with a transient ischemic attack.    Since I last saw him he underwent right coronary artery balloon angioplasty and stenting.  This was for unstable angina.  Procedure was performed in April 2019.    Since his carotid endarterectomy he has not had any amaurosis fugax or transient ischemic attacks.    He is on aspirin and Plavix and 40 mg of rosuvastatin.    Bilateral duplex sonography was performed today and reviewed by myself.    PROCEDURE:  Bilateral carotid doppler ultrasound     DATE OF PROCEDURE:   9/16/2019 11:23 AM     CLINICAL HISTORY/INDICATION:    history of bilateral carotid artery stenosis; Stenosis of right  internal carotid artery     COMPARISON:  Carotid ultrasound 8/3/2018     TECHNIQUE:  Grayscale, color-flow and spectral waveform analysis with velocities  were performed of the bilateral carotid arteries.      FINDINGS:     Right:   Plaque: There is scattered hyperechoic plaque about the bifurcation     Right ICA Proximal PSV/EDV:  33/11  cm/sec.  Right ICA Mid PSV/EDV:  59/17 cm/sec.  Right ICA Dist PSV/EDV:  47/15 cm/sec.  Right ICA/CCA PSV Ratio:  1.0    These indicate less than 50% diameter stenosis of the right ICA.    Right Vertebral: antegrade flow  Right ECA: antegrade flow     Left:   Plaque: Shadowing hyperechoic plaque about the bifurcation.     Left ICA Proximal PSV/EDV: 93/17  cm/sec.  Left ICA Mid PSV/EDV:  81/22 cm/sec.  Left ICA Dist PSV/EDV: 71/22 cm/sec.  Left ICA/CCA PSV Ratio:  1.3    These indicate less than 50% diameter stenosis of the left ICA.    Left Vertebral: antegrade flow  Left ECA: antegrade flow                                                                      IMPRESSION:    1. Less than 50% stenosis of the right internal carotid  artery  relative to the normal diameter internal carotid artery.  2. Less than 50% stenosis of the left internal carotid artery relative  to the normal diameter internal carotid artery..     Degree of stenosis measured relative to the diameter of the normal  internal carotid artery per NASCET or NASCET type criteria     <50% stenosis  -ICA PSV <125 cm/sec and plaque or intimal thickening is visible  sonographically.   -ICA/CCA PSV ratio <2.0 and ICA EDV < 40 cm/sec     50-69% stenosis  -ICA -230 cm/sec and plaque visible sonographically  -ICA/CCA PSC ratio 2.0-4.0 and ICA EDV of  cm/sec     70% or greater stenosis  -ICA PSV is >230 cm/sec and visible plaque and luminal narrowing are  seen at grayscale and color doppler  -ICA/CCA PSV ratio >4 and ICA EDV >100 cm/sec     LILLI AMAYA MD    I went over the findings of the duplex sonography with him in detail.  It is been 3 years since his carotid endarterectomy and there is no evidence of recurrence on the right or progression on the left.    I do not think further continued surveillance with duplex sonography is necessary.  He can follow-up as needed.

## 2019-11-12 ENCOUNTER — PRE VISIT (OUTPATIENT)
Dept: CARDIOLOGY | Facility: CLINIC | Age: 79
End: 2019-11-12

## 2019-11-13 DIAGNOSIS — I25.118 CORONARY ARTERY DISEASE OF NATIVE ARTERY OF NATIVE HEART WITH STABLE ANGINA PECTORIS (H): Chronic | ICD-10-CM

## 2019-11-13 LAB
ANION GAP SERPL CALCULATED.3IONS-SCNC: 3 MMOL/L (ref 3–14)
BUN SERPL-MCNC: 29 MG/DL (ref 7–30)
CALCIUM SERPL-MCNC: 8.7 MG/DL (ref 8.5–10.1)
CHLORIDE SERPL-SCNC: 108 MMOL/L (ref 94–109)
CHOLEST SERPL-MCNC: 156 MG/DL
CO2 SERPL-SCNC: 30 MMOL/L (ref 20–32)
CREAT SERPL-MCNC: 1.07 MG/DL (ref 0.66–1.25)
GFR SERPL CREATININE-BSD FRML MDRD: 65 ML/MIN/{1.73_M2}
GLUCOSE SERPL-MCNC: 101 MG/DL (ref 70–99)
HDLC SERPL-MCNC: 76 MG/DL
LDLC SERPL CALC-MCNC: 69 MG/DL
NONHDLC SERPL-MCNC: 80 MG/DL
POTASSIUM SERPL-SCNC: 4.2 MMOL/L (ref 3.4–5.3)
SODIUM SERPL-SCNC: 141 MMOL/L (ref 133–144)
TRIGL SERPL-MCNC: 55 MG/DL

## 2019-11-13 PROCEDURE — 80061 LIPID PANEL: CPT | Performed by: INTERNAL MEDICINE

## 2019-11-13 PROCEDURE — 36415 COLL VENOUS BLD VENIPUNCTURE: CPT | Performed by: INTERNAL MEDICINE

## 2019-11-13 PROCEDURE — 80048 BASIC METABOLIC PNL TOTAL CA: CPT | Performed by: INTERNAL MEDICINE

## 2019-11-14 ENCOUNTER — OFFICE VISIT (OUTPATIENT)
Dept: CARDIOLOGY | Facility: CLINIC | Age: 79
End: 2019-11-14
Payer: COMMERCIAL

## 2019-11-14 VITALS
HEART RATE: 56 BPM | HEIGHT: 70 IN | BODY MASS INDEX: 33.28 KG/M2 | SYSTOLIC BLOOD PRESSURE: 136 MMHG | DIASTOLIC BLOOD PRESSURE: 72 MMHG | WEIGHT: 232.5 LBS

## 2019-11-14 DIAGNOSIS — I25.118 CORONARY ARTERY DISEASE OF NATIVE ARTERY OF NATIVE HEART WITH STABLE ANGINA PECTORIS (H): Primary | ICD-10-CM

## 2019-11-14 DIAGNOSIS — E66.09 CLASS 1 OBESITY DUE TO EXCESS CALORIES WITH SERIOUS COMORBIDITY AND BODY MASS INDEX (BMI) OF 31.0 TO 31.9 IN ADULT: Chronic | ICD-10-CM

## 2019-11-14 DIAGNOSIS — E66.811 CLASS 1 OBESITY DUE TO EXCESS CALORIES WITH SERIOUS COMORBIDITY AND BODY MASS INDEX (BMI) OF 31.0 TO 31.9 IN ADULT: Chronic | ICD-10-CM

## 2019-11-14 DIAGNOSIS — I10 ESSENTIAL HYPERTENSION, BENIGN: ICD-10-CM

## 2019-11-14 DIAGNOSIS — E78.00 PURE HYPERCHOLESTEROLEMIA: ICD-10-CM

## 2019-11-14 DIAGNOSIS — R60.0 PERIPHERAL EDEMA: ICD-10-CM

## 2019-11-14 PROCEDURE — 99214 OFFICE O/P EST MOD 30 MIN: CPT | Performed by: INTERNAL MEDICINE

## 2019-11-14 RX ORDER — LISINOPRIL 20 MG/1
20 TABLET ORAL DAILY
Qty: 90 TABLET | Refills: 3 | Status: ON HOLD | OUTPATIENT
Start: 2019-11-14 | End: 2019-12-12

## 2019-11-14 RX ORDER — ISOSORBIDE MONONITRATE 60 MG/1
60 TABLET, EXTENDED RELEASE ORAL DAILY
Qty: 30 TABLET | Refills: 11 | Status: SHIPPED | OUTPATIENT
Start: 2019-11-14 | End: 2020-10-16

## 2019-11-14 RX ORDER — CHLORTHALIDONE 25 MG/1
25 TABLET ORAL DAILY
Qty: 90 TABLET | Refills: 3 | Status: ON HOLD | OUTPATIENT
Start: 2019-11-14 | End: 2019-12-12

## 2019-11-14 ASSESSMENT — MIFFLIN-ST. JEOR: SCORE: 1775.86

## 2019-11-14 NOTE — LETTER
11/14/2019      Stephan Nice MD  [facility] [address]  [facility] [place] [address]      RE: Iván Nicholas       Dear Colleague,    I had the pleasure of seeing Iván Nicholas in the [facility].    Service Date: 11/14/2019      HISTORY OF PRESENT ILLNESS:  Mr. Nicholas is a very nice, 79-year-old gentleman who appears 10 years younger than his stated age.  He has coronary history dating back to 1991 when we performed angioplasty of his left anterior descending artery with subsequent stenting of his mid left anterior descending artery in 2012 due to crescendo angina and abnormal stress test.  We jailed the diagonal that did not appear to be significantly compromised, and stress tests since then have not appeared to show any ischemia.  However, he appears to have had chronic stable exertional angina at a high workload ever since.  We took him to the Cath Lab in 2012.  FFR of the jailed diagonal was 0.8, and we decided to treat him medically.  Most recently in April of this year, again due to an exertional angina syndrome, he was brought back to the Cath Lab where we stented his distal right coronary artery.      Iván states he has had no further chest discomfort since his stenting of his right coronary artery.  He thinks his dyspnea on exertion is improved.  He is having worsening ankle edema and states he has to get up multiple times at nighttime to go to the bathroom.  He notes no side effects or problems with his current medical regimen.  Overall, he feels that he is doing quite well.  He states he tries to get to the club and work out 3 times a week, but does not make it.  He unfortunately has gained 14 pounds of weight since this past summer.      ASSESSMENT AND PLAN:  Iván appears to be doing well from a cardiac standpoint without clinical evidence of ischemia, and I think we can decrease his Imdur from 120 mg.  I will decrease it to  60 mg, have him follow up with my FLORENCIA in 1 month and may consider decreasing it further and possibly eliminating it altogether.      He has no symptoms to suggest heart failure or significant arrhythmia.      Blood pressure is very well controlled at 136/72 with a pulse of 56.  However, he does have significant peripheral edema, 2+ on his right lower extremity, 1+ on his left.  I suspect this is probably his amlodipine.  At this time, I will stop his amlodipine and substitute lisinopril 20 mg and chlorthalidone 25 mg daily.  Again, I will have him follow up with my FLORENCIA with a basic metabolic profile in 1 month and make further adjustments as necessary.  We talked about the importance of a low-salt diet, elevating his legs and increasing his activity and that a lot of this, I suspect, is just dependent edema.  His last echocardiogram was in 2016 demonstrating a normal ejection fraction and no significant valvular pathology.  Pulmonary pressure at that time was estimated to be 35 mmHg plus right atrial pressure.  His IVC was not commented on.  We may want to repeat an echocardiogram down the road.     He also takes Aleve twice a day every day.  This also may be contributing to his peripheral edema.  I have asked if he could try decreasing this.     He does have a slow heart rate of 56.  In the back of my mind, I thought we may want to consider monitoring for chronotropic incompetence, although at this time it does not appear to be clinically important, as he does not appear to have any limitations.      Fasting lipid profile is excellent.  Total cholesterol 156, HDL 76, LDL 69, triglycerides 55.      We focused on his weight, which he is well into the obese category at 33.4 and the need to try to lose some weight.      Basic metabolic profile is normal with a creatinine of 1.07, BUN of 29, potassium of 4.2.  Obviously, we will have to recheck this in 1 month with the addition of a diuretic and an ACE inhibitor.      I  will have him follow up with my FLORENCIA in 6 months.  At that time, we can discuss stopping his Plavix.  At  this time, he needs to stay on dual antiplatelet therapy for at least 1 year.  I will plan on seeing him back myself in 1 year.  If he should have any problems, I would be glad to see him sooner.      Thank you for allowing me to participate in his care.         MIRELLA GOODE MD, [facility]             D: 2019   T: 2019   MT: dalila      Name:     GREG VALDERRAMA   MRN:      [id number]        Account:      [id number]   :      1940           Service Date: 2019      Document: [id number]           Outpatient Encounter Medications as of 2019   Medication Sig Dispense Refill     acetaminophen (TYLENOL) 325 MG tablet Take 3 tablets (975 mg) by mouth every 8 hours 250 tablet 0     ALLOPURINOL PO Take 100 mg by mouth daily. To prevent gout attacks, recently started.        aspirin (ASA) 81 MG tablet Take 81 mg by mouth daily       chlorthalidone (HYGROTON) 25 MG tablet Take 1 tablet (25 mg) by mouth daily 90 tablet 3     clopidogrel (PLAVIX) 75 MG tablet Take 1 tablet (75 mg) by mouth daily 90 tablet 3     ezetimibe (ZETIA) 10 MG tablet Take 1 tablet (10 mg) by mouth daily 90 tablet 3     isosorbide mononitrate (IMDUR) 60 MG 24 hr tablet Take 1 tablet (60 mg) by mouth daily 30 tablet 11     lisinopril (PRINIVIL/ZESTRIL) 20 MG tablet Take 1 tablet (20 mg) by mouth daily 90 tablet 3     Multiple Vitamins-Minerals (CENTRUM SILVER) per tablet Take 1 tablet by mouth daily.       Multiple Vitamins-Minerals (PRESERVISION AREDS 2) CAPS Take by mouth 2 times daily       nitroglycerin (NITROSTAT) 0.4 MG SL tablet Place 1 tablet (0.4 mg) under the tongue every 5 minutes as needed for chest pain 25 tablet 11     primidone (MYSOLINE) 250 MG tablet Take 250 mg by mouth 2 times daily       propranolol (INDERAL LA) 80 MG 24 hr capsule Take 80 mg by mouth daily       rosuvastatin (CRESTOR) 40 MG tablet  Take 1 tablet by mouth daily. 30 tablet 1     doxycycline hyclate (VIBRAMYCIN) 100 MG capsule Take 100 mg by mouth 2 times daily       hydrOXYzine (ATARAX) 25 MG tablet Take 1 tablet (25 mg) by mouth every 6 hours as needed for other (adjuvant pain) (Patient not taking: Reported on 11/14/2019) 50 tablet 0     oxyCODONE IR (ROXICODONE) 5 MG tablet 1 tab po q 4 hrs prn pain scale 3-6,   2 tabs po q 4hrs prn pain scale 7-10 (Patient not taking: Reported on 11/14/2019) 50 tablet 0     pantoprazole (PROTONIX) 40 MG EC tablet Take 40 mg by mouth daily       [DISCONTINUED] amLODIPine (NORVASC) 5 MG tablet Take 1 tablet (5 mg) by mouth daily 90 tablet 2     [DISCONTINUED] isosorbide mononitrate CR (IMDUR) 120 MG 24 HR ER tablet Take 1 tablet (120 mg) by mouth daily 30 tablet 3     No facility-administered encounter medications on file as of 11/14/2019.        Again, thank you for allowing me to participate in the care of your patient.      Sincerely,    Akhil Mendoza MD     [facility]

## 2019-11-14 NOTE — PROGRESS NOTES
Service Date: 11/14/2019      HISTORY OF PRESENT ILLNESS:  Mr. Nicholas is a very nice, 79-year-old gentleman who appears 10 years younger than his stated age.  He has coronary history dating back to 1991 when we performed angioplasty of his left anterior descending artery with subsequent stenting of his mid left anterior descending artery in 2012 due to crescendo angina and abnormal stress test.  We jailed the diagonal that did not appear to be significantly compromised, and stress tests since then have not appeared to show any ischemia.  However, he appears to have had chronic stable exertional angina at a high workload ever since.  We took him to the Cath Lab in 2012.  FFR of the jailed diagonal was 0.8, and we decided to treat him medically.  Most recently in April of this year, again due to an exertional angina syndrome, he was brought back to the Cath Lab where we stented his distal right coronary artery.      Iván states he has had no further chest discomfort since his stenting of his right coronary artery.  He thinks his dyspnea on exertion is improved.  He is having worsening ankle edema and states he has to get up multiple times at nighttime to go to the bathroom.  He notes no side effects or problems with his current medical regimen.  Overall, he feels that he is doing quite well.  He states he tries to get to the club and work out 3 times a week, but does not make it.  He unfortunately has gained 14 pounds of weight since this past summer.      ASSESSMENT AND PLAN:  Iván appears to be doing well from a cardiac standpoint without clinical evidence of ischemia, and I think we can decrease his Imdur from 120 mg.  I will decrease it to 60 mg, have him follow up with my FLORENCIA in 1 month and may consider decreasing it further and possibly eliminating it altogether.      He has no symptoms to suggest heart failure or significant arrhythmia.      Blood pressure is very well controlled at 136/72 with a pulse  of 56.  However, he does have significant peripheral edema, 2+ on his right lower extremity, 1+ on his left.  I suspect this is probably his amlodipine.  At this time, I will stop his amlodipine and substitute lisinopril 20 mg and chlorthalidone 25 mg daily.  Again, I will have him follow up with my FLORENCIA with a basic metabolic profile in 1 month and make further adjustments as necessary.  We talked about the importance of a low-salt diet, elevating his legs and increasing his activity and that a lot of this, I suspect, is just dependent edema.  His last echocardiogram was in 2016 demonstrating a normal ejection fraction and no significant valvular pathology.  Pulmonary pressure at that time was estimated to be 35 mmHg plus right atrial pressure.  His IVC was not commented on.  We may want to repeat an echocardiogram down the road.     He also takes Aleve twice a day every day.  This also may be contributing to his peripheral edema.  I have asked if he could try decreasing this.     He does have a slow heart rate of 56.  In the back of my mind, I thought we may want to consider monitoring for chronotropic incompetence, although at this time it does not appear to be clinically important, as he does not appear to have any limitations.      Fasting lipid profile is excellent.  Total cholesterol 156, HDL 76, LDL 69, triglycerides 55.      We focused on his weight, which he is well into the obese category at 33.4 and the need to try to lose some weight.      Basic metabolic profile is normal with a creatinine of 1.07, BUN of 29, potassium of 4.2.  Obviously, we will have to recheck this in 1 month with the addition of a diuretic and an ACE inhibitor.      I will have him follow up with my FLORENCIA in 6 months.  At that time, we can discuss stopping his Plavix.  At  this time, he needs to stay on dual antiplatelet therapy for at least 1 year.  I will plan on seeing him back myself in 1 year.  If he should have any problems, I  would be glad to see him sooner.      Thank you for allowing me to participate in his care.         MIRELLA GOODE MD, [facility]             D: 2019   T: 2019   MT: dalila      Name:     GREG VALDERRAMA   MRN:      [id number]        Account:      [id number]   :      1940           Service Date: 2019      Document: [id number]

## 2019-11-14 NOTE — LETTER
11/14/2019    Stephan Nice MD  [facility] [address]  [place] [address]    RE: Iván Nicholas       Dear Colleague,    I had the pleasure of seeing Iván Nicholas in the [facility].    HPI and Plan:   See dictation    Orders Placed This Encounter   Procedures     Basic metabolic panel     Lipid Profile     Basic metabolic panel     Follow-Up with Cardiologist     Follow-Up with Cardiac Advanced Practice Provider     Follow-Up with Cardiac Advanced Practice Provider       Orders Placed This Encounter   Medications     isosorbide mononitrate (IMDUR) 60 MG 24 hr tablet     Sig: Take 1 tablet (60 mg) by mouth daily     Dispense:  30 tablet     Refill:  11     lisinopril (PRINIVIL/ZESTRIL) 20 MG tablet     Sig: Take 1 tablet (20 mg) by mouth daily     Dispense:  90 tablet     Refill:  3     chlorthalidone (HYGROTON) 25 MG tablet     Sig: Take 1 tablet (25 mg) by mouth daily     Dispense:  90 tablet     Refill:  3       Medications Discontinued During This Encounter   Medication Reason     amLODIPine (NORVASC) 5 MG tablet      isosorbide mononitrate CR (IMDUR) 120 MG 24 HR ER tablet          Encounter Diagnoses   Name Primary?     Coronary artery disease of native artery of native heart with stable angina pectoris (H) Yes     Essential hypertension, benign      Pure hypercholesterolemia      Class 1 obesity due to excess calories with serious comorbidity and body mass index (BMI) of 31.0 to 31.9 in adult      Peripheral edema        CURRENT MEDICATIONS:  Current Outpatient Medications   Medication Sig Dispense Refill     acetaminophen (TYLENOL) 325 MG tablet Take 3 tablets (975 mg) by mouth every 8 hours 250 tablet 0     ALLOPURINOL PO Take 100 mg by mouth daily. To prevent gout attacks, recently started.        aspirin (ASA) 81 MG tablet Take 81 mg by mouth daily       chlorthalidone (HYGROTON) 25 MG tablet Take 1 tablet (25 mg) by mouth  daily 90 tablet 3     clopidogrel (PLAVIX) 75 MG tablet Take 1 tablet (75 mg) by mouth daily 90 tablet 3     ezetimibe (ZETIA) 10 MG tablet Take 1 tablet (10 mg) by mouth daily 90 tablet 3     isosorbide mononitrate (IMDUR) 60 MG 24 hr tablet Take 1 tablet (60 mg) by mouth daily 30 tablet 11     lisinopril (PRINIVIL/ZESTRIL) 20 MG tablet Take 1 tablet (20 mg) by mouth daily 90 tablet 3     Multiple Vitamins-Minerals (CENTRUM SILVER) per tablet Take 1 tablet by mouth daily.       Multiple Vitamins-Minerals (PRESERVISION AREDS 2) CAPS Take by mouth 2 times daily       nitroglycerin (NITROSTAT) 0.4 MG SL tablet Place 1 tablet (0.4 mg) under the tongue every 5 minutes as needed for chest pain 25 tablet 11     primidone (MYSOLINE) 250 MG tablet Take 250 mg by mouth 2 times daily       propranolol (INDERAL LA) 80 MG 24 hr capsule Take 80 mg by mouth daily       rosuvastatin (CRESTOR) 40 MG tablet Take 1 tablet by mouth daily. 30 tablet 1     doxycycline hyclate (VIBRAMYCIN) 100 MG capsule Take 100 mg by mouth 2 times daily       hydrOXYzine (ATARAX) 25 MG tablet Take 1 tablet (25 mg) by mouth every 6 hours as needed for other (adjuvant pain) (Patient not taking: Reported on 11/14/2019) 50 tablet 0     oxyCODONE IR (ROXICODONE) 5 MG tablet 1 tab po q 4 hrs prn pain scale 3-6,   2 tabs po q 4hrs prn pain scale 7-10 (Patient not taking: Reported on 11/14/2019) 50 tablet 0     pantoprazole (PROTONIX) 40 MG EC tablet Take 40 mg by mouth daily         ALLERGIES     Allergies   Allergen Reactions     Hmg-Coa-R Inhibitors Muscle Pain (Myalgia)     lipitor       PAST MEDICAL HISTORY:  Past Medical History:   Diagnosis Date     Arthritis      Carotid artery stenosis     Right, s/p right CEA 11/9/2016     Cerebral artery occlusion with cerebral infarction (H) 2016    TIA -  Endarterectomy...No residual     Cholesterol serum elevated      Colon polyps      Color blind      Coronary artery disease     2/2012 - MERARI to mid LAD      Decreased hearing      Depression      Gout      Hypertension      Hypertrophy of prostate without urinary obstruction and other lower urinary tract symptoms (LUTS)      Impotence      Left bundle branch block      Onychomycosis of toenail      Other and unspecified hyperlipidemia      Paroxysmal ventricular tachycardia (H)     with stress test 2012       PAST SURGICAL HISTORY:  Past Surgical History:   Procedure Laterality Date     ANGIOPLASTY  1991     ARTHROPLASTY KNEE Left 11/12/2018    Procedure: Left total knee arthroplasty;  Surgeon: Matt Schaefer MD;  Location:  OR     COLONOSCOPY  12/13/2011    Procedure:COLONOSCOPY; COLONOSCOPY; Surgeon:EMMANUELLE MOSER; Location: GI     CORONARY ANGIOGRAPHY ADULT ORDER  1991,2012 1991 - stent to proximal LAD, 2012 - Stent to mid LAD     CV HEART CATHETERIZATION WITH POSSIBLE INTERVENTION Left 4/23/2019    Procedure: Coronary Angiogram;  Surgeon: Percy Armas MD;  Location:  HEART CARDIAC CATH LAB     CV PCI ANGIOPLASTY N/A 4/23/2019    Procedure: PCI Angioplasty;  Surgeon: Percy Armas MD;  Location: [facility] CARDIAC CATH LAB     ENDARTERECTOMY CAROTID Right 11/10/2016    Procedure: ENDARTERECTOMY CAROTID;  Surgeon: Paco Suresh MD;  Location: [facility]     HEART CATH, ANGIOPLASTY       ORTHOPEDIC SURGERY       PHACOEMULSIFICATION CLEAR CORNEA WITH STANDARD INTRAOCULAR LENS IMPLANT  12/19/2013    Procedure: PHACOEMULSIFICATION CLEAR CORNEA WITH STANDARD INTRAOCULAR LENS IMPLANT;  RIGHT PHACOEMULSIFICATION CLEAR CORNEA WITH STANDARD INTRAOCULAR LENS IMPLANT ;  Surgeon: Luisito Juan MD;  Location: [facility]       FAMILY HISTORY:  Family History   Problem Relation Age of Onset     Heart Disease Mother 83     Heart Disease Father 69        emphysema     Coronary Artery Disease Brother      Coronary Artery Disease Sister        SOCIAL HISTORY:  Social History     Socioeconomic History     Marital status:      Spouse name:  None     Number of children: None     Years of education: None     Highest education level: None   Occupational History     None   Social Needs     Financial resource strain: None     Food insecurity:     Worry: None     Inability: None     Transportation needs:     Medical: None     Non-medical: None   Tobacco Use     Smoking status: Former Smoker     Packs/day: 0.50     Years: 20.00     Pack years: 10.00     Types: Cigarettes     Last attempt to quit: 1990     Years since quittin.9     Smokeless tobacco: Never Used   Substance and Sexual Activity     Alcohol use: Yes     Alcohol/week: 0.0 standard drinks     Comment: daily - drinks x2     Drug use: No     Sexual activity: Never   Lifestyle     Physical activity:     Days per week: None     Minutes per session: None     Stress: None   Relationships     Social connections:     Talks on phone: None     Gets together: None     Attends Hinduism service: None     Active member of club or organization: None     Attends meetings of clubs or organizations: None     Relationship status: None     Intimate partner violence:     Fear of current or ex partner: None     Emotionally abused: None     Physically abused: None     Forced sexual activity: None   Other Topics Concern     Parent/sibling w/ CABG, MI or angioplasty before 65F 55M? Not Asked      Service Not Asked     Blood Transfusions Not Asked     Caffeine Concern No     Comment: 1-2 cups daily     Occupational Exposure Not Asked     Hobby Hazards Not Asked     Sleep Concern No     Stress Concern No     Weight Concern Not Asked     Special Diet No     Comment: trying to watch sodium intake     Back Care Not Asked     Exercise No     Comment: some walking     Bike Helmet Not Asked     Seat Belt Not Asked     Self-Exams Not Asked   Social History Narrative     None       Review of Systems:  Skin:  Negative       Eyes:  Positive for glasses;color blindness    ENT:  Positive for hearing loss   "  Respiratory:  Negative       Cardiovascular:    edema;Positive for    Gastroenterology: Negative      Genitourinary:  Positive for nocturia    Musculoskeletal:  Positive for arthritis;joint pain    Neurologic:  Negative      Psychiatric:  Negative      Heme/Lymph/Imm:  Positive for allergies    Endocrine:  Negative        Physical Exam:  Vitals: /72 (BP Location: Right arm, Patient Position: Sitting, Cuff Size: Adult Large)   Pulse 56   Ht 1.778 m (5' 10\")   Wt 105.5 kg (232 lb 8 oz)   BMI 33.36 kg/m       Constitutional:  cooperative, alert and oriented, well developed, well nourished, in no acute distress appears younger than stated age      Skin:  warm and dry to the touch, no apparent skin lesions or masses noted     right CEA scar    Head:  normocephalic, no masses or lesions        Eyes:  pupils equal and round, conjunctivae and lids unremarkable, sclera white, no xanthalasma, EOMS intact, no nystagmus        Lymph:      ENT:  no pallor or cyanosis, dentition good        Neck:  carotid pulses are full and equal bilaterally;no carotid bruit   prominent carotid pulsations at base of neck bilaterally. Well-healed right carotid endarterectomy scar    Respiratory:  normal breath sounds, clear to auscultation, normal A-P diameter, normal symmetry, normal respiratory excursion, no use of accessory muscles         Cardiac: regular rhythm;normal S1 and S2       systolic murmur;grade 1;RUSB        pulses full and equal                                        GI:           Extremities and Muscular Skeletal:  no spinal abnormalities noted;normal muscle strength and tone   bilateral LE edema;pitting;1+;2+;R greater than L          Neurological:  no gross motor deficits        Psych:  affect appropriate, oriented to time, person and place        CC  No referring provider defined for this encounter.                Thank you for allowing me to participate in the care of your patient.      Sincerely,     Akhil NAIDU" MD Kelly     [facility]    cc:   No referring provider defined for this encounter.

## 2019-11-14 NOTE — PROGRESS NOTES
HPI and Plan:   See dictation    Orders Placed This Encounter   Procedures     Basic metabolic panel     Lipid Profile     Basic metabolic panel     Follow-Up with Cardiologist     Follow-Up with Cardiac Advanced Practice Provider     Follow-Up with Cardiac Advanced Practice Provider       Orders Placed This Encounter   Medications     isosorbide mononitrate (IMDUR) 60 MG 24 hr tablet     Sig: Take 1 tablet (60 mg) by mouth daily     Dispense:  30 tablet     Refill:  11     lisinopril (PRINIVIL/ZESTRIL) 20 MG tablet     Sig: Take 1 tablet (20 mg) by mouth daily     Dispense:  90 tablet     Refill:  3     chlorthalidone (HYGROTON) 25 MG tablet     Sig: Take 1 tablet (25 mg) by mouth daily     Dispense:  90 tablet     Refill:  3       Medications Discontinued During This Encounter   Medication Reason     amLODIPine (NORVASC) 5 MG tablet      isosorbide mononitrate CR (IMDUR) 120 MG 24 HR ER tablet          Encounter Diagnoses   Name Primary?     Coronary artery disease of native artery of native heart with stable angina pectoris (H) Yes     Essential hypertension, benign      Pure hypercholesterolemia      Class 1 obesity due to excess calories with serious comorbidity and body mass index (BMI) of 31.0 to 31.9 in adult      Peripheral edema        CURRENT MEDICATIONS:  Current Outpatient Medications   Medication Sig Dispense Refill     acetaminophen (TYLENOL) 325 MG tablet Take 3 tablets (975 mg) by mouth every 8 hours 250 tablet 0     ALLOPURINOL PO Take 100 mg by mouth daily. To prevent gout attacks, recently started.        aspirin (ASA) 81 MG tablet Take 81 mg by mouth daily       chlorthalidone (HYGROTON) 25 MG tablet Take 1 tablet (25 mg) by mouth daily 90 tablet 3     clopidogrel (PLAVIX) 75 MG tablet Take 1 tablet (75 mg) by mouth daily 90 tablet 3     ezetimibe (ZETIA) 10 MG tablet Take 1 tablet (10 mg) by mouth daily 90 tablet 3     isosorbide mononitrate (IMDUR) 60 MG 24 hr tablet Take 1 tablet (60 mg) by  mouth daily 30 tablet 11     lisinopril (PRINIVIL/ZESTRIL) 20 MG tablet Take 1 tablet (20 mg) by mouth daily 90 tablet 3     Multiple Vitamins-Minerals (CENTRUM SILVER) per tablet Take 1 tablet by mouth daily.       Multiple Vitamins-Minerals (PRESERVISION AREDS 2) CAPS Take by mouth 2 times daily       nitroglycerin (NITROSTAT) 0.4 MG SL tablet Place 1 tablet (0.4 mg) under the tongue every 5 minutes as needed for chest pain 25 tablet 11     primidone (MYSOLINE) 250 MG tablet Take 250 mg by mouth 2 times daily       propranolol (INDERAL LA) 80 MG 24 hr capsule Take 80 mg by mouth daily       rosuvastatin (CRESTOR) 40 MG tablet Take 1 tablet by mouth daily. 30 tablet 1     doxycycline hyclate (VIBRAMYCIN) 100 MG capsule Take 100 mg by mouth 2 times daily       hydrOXYzine (ATARAX) 25 MG tablet Take 1 tablet (25 mg) by mouth every 6 hours as needed for other (adjuvant pain) (Patient not taking: Reported on 11/14/2019) 50 tablet 0     oxyCODONE IR (ROXICODONE) 5 MG tablet 1 tab po q 4 hrs prn pain scale 3-6,   2 tabs po q 4hrs prn pain scale 7-10 (Patient not taking: Reported on 11/14/2019) 50 tablet 0     pantoprazole (PROTONIX) 40 MG EC tablet Take 40 mg by mouth daily         ALLERGIES     Allergies   Allergen Reactions     Hmg-Coa-R Inhibitors Muscle Pain (Myalgia)     lipitor       PAST MEDICAL HISTORY:  Past Medical History:   Diagnosis Date     Arthritis      Carotid artery stenosis     Right, s/p right CEA 11/9/2016     Cerebral artery occlusion with cerebral infarction (H) 2016    TIA -  Endarterectomy...No residual     Cholesterol serum elevated      Colon polyps      Color blind      Coronary artery disease     2/2012 - MERARI to mid LAD     Decreased hearing      Depression      Gout      Hypertension      Hypertrophy of prostate without urinary obstruction and other lower urinary tract symptoms (LUTS)      Impotence      Left bundle branch block      Onychomycosis of toenail      Other and unspecified  hyperlipidemia      Paroxysmal ventricular tachycardia (H)     with stress test 2012       PAST SURGICAL HISTORY:  Past Surgical History:   Procedure Laterality Date     ANGIOPLASTY  1991     ARTHROPLASTY KNEE Left 11/12/2018    Procedure: Left total knee arthroplasty;  Surgeon: Matt Schaefer MD;  Location:  OR     COLONOSCOPY  12/13/2011    Procedure:COLONOSCOPY; COLONOSCOPY; Surgeon:EMMANUELLE MOSER; Location: GI     CORONARY ANGIOGRAPHY ADULT ORDER  1991,2012 1991 - stent to proximal LAD, 2012 - Stent to mid LAD     CV HEART CATHETERIZATION WITH POSSIBLE INTERVENTION Left 4/23/2019    Procedure: Coronary Angiogram;  Surgeon: Percy Armas MD;  Location:  HEART CARDIAC CATH LAB     CV PCI ANGIOPLASTY N/A 4/23/2019    Procedure: PCI Angioplasty;  Surgeon: Percy Armas MD;  Location:  HEART CARDIAC CATH LAB     ENDARTERECTOMY CAROTID Right 11/10/2016    Procedure: ENDARTERECTOMY CAROTID;  Surgeon: Paco Suresh MD;  Location:  OR     HEART CATH, ANGIOPLASTY       ORTHOPEDIC SURGERY       PHACOEMULSIFICATION CLEAR CORNEA WITH STANDARD INTRAOCULAR LENS IMPLANT  12/19/2013    Procedure: PHACOEMULSIFICATION CLEAR CORNEA WITH STANDARD INTRAOCULAR LENS IMPLANT;  RIGHT PHACOEMULSIFICATION CLEAR CORNEA WITH STANDARD INTRAOCULAR LENS IMPLANT ;  Surgeon: Luisito Juan MD;  Location: [facility]       FAMILY HISTORY:  Family History   Problem Relation Age of Onset     Heart Disease Mother 83     Heart Disease Father 69        emphysema     Coronary Artery Disease Brother      Coronary Artery Disease Sister        SOCIAL HISTORY:  Social History     Socioeconomic History     Marital status:      Spouse name: None     Number of children: None     Years of education: None     Highest education level: None   Occupational History     None   Social Needs     Financial resource strain: None     Food insecurity:     Worry: None     Inability: None     Transportation needs:      Medical: None     Non-medical: None   Tobacco Use     Smoking status: Former Smoker     Packs/day: 0.50     Years: 20.00     Pack years: 10.00     Types: Cigarettes     Last attempt to quit: 1990     Years since quittin.9     Smokeless tobacco: Never Used   Substance and Sexual Activity     Alcohol use: Yes     Alcohol/week: 0.0 standard drinks     Comment: daily - drinks x2     Drug use: No     Sexual activity: Never   Lifestyle     Physical activity:     Days per week: None     Minutes per session: None     Stress: None   Relationships     Social connections:     Talks on phone: None     Gets together: None     Attends [employer] service: None     Active member of club or organization: None     Attends meetings of clubs or organizations: None     Relationship status: None     Intimate partner violence:     Fear of current or ex partner: None     Emotionally abused: None     Physically abused: None     Forced sexual activity: None   Other Topics Concern     Parent/sibling w/ CABG, MI or angioplasty before 65F 55M? Not Asked      Service Not Asked     Blood Transfusions Not Asked     Caffeine Concern No     Comment: 1-2 cups daily     Occupational Exposure Not Asked     Hobby Hazards Not Asked     Sleep Concern No     Stress Concern No     Weight Concern Not Asked     Special Diet No     Comment: trying to watch sodium intake     Back Care Not Asked     Exercise No     Comment: some walking     Bike Helmet Not Asked     Seat Belt Not Asked     Self-Exams Not Asked   Social History Narrative     None       Review of Systems:  Skin:  Negative       Eyes:  Positive for glasses;color blindness    ENT:  Positive for hearing loss    Respiratory:  Negative       Cardiovascular:    edema;Positive for    Gastroenterology: Negative      Genitourinary:  Positive for nocturia    Musculoskeletal:  Positive for arthritis;joint pain    Neurologic:  Negative      Psychiatric:  Negative      Heme/Lymph/Imm:   "Positive for allergies    Endocrine:  Negative        Physical Exam:  Vitals: /72 (BP Location: Right arm, Patient Position: Sitting, Cuff Size: Adult Large)   Pulse 56   Ht 1.778 m (5' 10\")   Wt 105.5 kg (232 lb 8 oz)   BMI 33.36 kg/m      Constitutional:  cooperative, alert and oriented, well developed, well nourished, in no acute distress appears younger than stated age      Skin:  warm and dry to the touch, no apparent skin lesions or masses noted     right CEA scar    Head:  normocephalic, no masses or lesions        Eyes:  pupils equal and round, conjunctivae and lids unremarkable, sclera white, no xanthalasma, EOMS intact, no nystagmus        Lymph:      ENT:  no pallor or cyanosis, dentition good        Neck:  carotid pulses are full and equal bilaterally;no carotid bruit   prominent carotid pulsations at base of neck bilaterally. Well-healed right carotid endarterectomy scar    Respiratory:  normal breath sounds, clear to auscultation, normal A-P diameter, normal symmetry, normal respiratory excursion, no use of accessory muscles         Cardiac: regular rhythm;normal S1 and S2       systolic murmur;grade 1;RUSB        pulses full and equal                                        GI:           Extremities and Muscular Skeletal:  no spinal abnormalities noted;normal muscle strength and tone   bilateral LE edema;pitting;1+;2+;R greater than L          Neurological:  no gross motor deficits        Psych:  affect appropriate, oriented to time, person and place        CC  No referring provider defined for this encounter.              "

## 2019-12-09 ENCOUNTER — HOSPITAL ENCOUNTER (INPATIENT)
Facility: CLINIC | Age: 79
LOS: 3 days | Discharge: HOME | DRG: 281 | End: 2019-12-12
Attending: EMERGENCY MEDICINE | Admitting: HOSPITALIST
Payer: COMMERCIAL

## 2019-12-09 DIAGNOSIS — I10 ESSENTIAL HYPERTENSION, BENIGN: ICD-10-CM

## 2019-12-09 DIAGNOSIS — I25.118 CORONARY ARTERY DISEASE OF NATIVE ARTERY OF NATIVE HEART WITH STABLE ANGINA PECTORIS (H): ICD-10-CM

## 2019-12-09 DIAGNOSIS — R52 PAIN: ICD-10-CM

## 2019-12-09 DIAGNOSIS — R79.89 TROPONIN LEVEL ELEVATED: ICD-10-CM

## 2019-12-09 DIAGNOSIS — I44.7 LEFT BUNDLE BRANCH BLOCK: ICD-10-CM

## 2019-12-09 DIAGNOSIS — R60.0 PERIPHERAL EDEMA: ICD-10-CM

## 2019-12-09 DIAGNOSIS — G45.9 TIA (TRANSIENT ISCHEMIC ATTACK): ICD-10-CM

## 2019-12-09 DIAGNOSIS — E66.811 CLASS 1 OBESITY DUE TO EXCESS CALORIES WITH SERIOUS COMORBIDITY AND BODY MASS INDEX (BMI) OF 31.0 TO 31.9 IN ADULT: ICD-10-CM

## 2019-12-09 DIAGNOSIS — I25.10 CAD (CORONARY ARTERY DISEASE): ICD-10-CM

## 2019-12-09 DIAGNOSIS — Z96.652 STATUS POST TOTAL LEFT KNEE REPLACEMENT: ICD-10-CM

## 2019-12-09 DIAGNOSIS — E78.00 PURE HYPERCHOLESTEROLEMIA: ICD-10-CM

## 2019-12-09 DIAGNOSIS — N30.00 ACUTE CYSTITIS WITHOUT HEMATURIA: ICD-10-CM

## 2019-12-09 DIAGNOSIS — I24.9 ACS (ACUTE CORONARY SYNDROME) (H): ICD-10-CM

## 2019-12-09 DIAGNOSIS — I25.110 CORONARY ARTERY DISEASE INVOLVING NATIVE HEART WITH UNSTABLE ANGINA PECTORIS, UNSPECIFIED VESSEL OR LESION TYPE (H): ICD-10-CM

## 2019-12-09 DIAGNOSIS — E66.09 CLASS 1 OBESITY DUE TO EXCESS CALORIES WITH SERIOUS COMORBIDITY AND BODY MASS INDEX (BMI) OF 31.0 TO 31.9 IN ADULT: ICD-10-CM

## 2019-12-09 DIAGNOSIS — I65.21 CAROTID STENOSIS, RIGHT: ICD-10-CM

## 2019-12-09 DIAGNOSIS — R06.09 DOE (DYSPNEA ON EXERTION): ICD-10-CM

## 2019-12-09 DIAGNOSIS — R04.0 EPISTAXIS: ICD-10-CM

## 2019-12-09 DIAGNOSIS — I47.29 PAROXYSMAL VENTRICULAR TACHYCARDIA (H): Primary | ICD-10-CM

## 2019-12-09 LAB
ANION GAP SERPL CALCULATED.3IONS-SCNC: 5 MMOL/L (ref 3–14)
BASOPHILS # BLD AUTO: 0 10E9/L (ref 0–0.2)
BASOPHILS NFR BLD AUTO: 0.2 %
BUN SERPL-MCNC: 40 MG/DL (ref 7–30)
CALCIUM SERPL-MCNC: 9 MG/DL (ref 8.5–10.1)
CHLORIDE SERPL-SCNC: 104 MMOL/L (ref 94–109)
CO2 SERPL-SCNC: 29 MMOL/L (ref 20–32)
CREAT SERPL-MCNC: 1.33 MG/DL (ref 0.66–1.25)
DIFFERENTIAL METHOD BLD: NORMAL
EOSINOPHIL # BLD AUTO: 0.2 10E9/L (ref 0–0.7)
EOSINOPHIL NFR BLD AUTO: 1.7 %
ERYTHROCYTE [DISTWIDTH] IN BLOOD BY AUTOMATED COUNT: 15 % (ref 10–15)
GFR SERPL CREATININE-BSD FRML MDRD: 50 ML/MIN/{1.73_M2}
GLUCOSE SERPL-MCNC: 114 MG/DL (ref 70–99)
HCT VFR BLD AUTO: 43.6 % (ref 40–53)
HGB BLD-MCNC: 14.5 G/DL (ref 13.3–17.7)
IMM GRANULOCYTES # BLD: 0 10E9/L (ref 0–0.4)
IMM GRANULOCYTES NFR BLD: 0.2 %
LMWH PPP CHRO-ACNC: 0.73 IU/ML
LYMPHOCYTES # BLD AUTO: 1.3 10E9/L (ref 0.8–5.3)
LYMPHOCYTES NFR BLD AUTO: 13.3 %
MCH RBC QN AUTO: 32 PG (ref 26.5–33)
MCHC RBC AUTO-ENTMCNC: 33.3 G/DL (ref 31.5–36.5)
MCV RBC AUTO: 96 FL (ref 78–100)
MONOCYTES # BLD AUTO: 0.7 10E9/L (ref 0–1.3)
MONOCYTES NFR BLD AUTO: 6.7 %
NEUTROPHILS # BLD AUTO: 7.8 10E9/L (ref 1.6–8.3)
NEUTROPHILS NFR BLD AUTO: 77.9 %
NRBC # BLD AUTO: 0 10*3/UL
NRBC BLD AUTO-RTO: 0 /100
PLATELET # BLD AUTO: 166 10E9/L (ref 150–450)
POTASSIUM SERPL-SCNC: 4 MMOL/L (ref 3.4–5.3)
RBC # BLD AUTO: 4.53 10E12/L (ref 4.4–5.9)
SODIUM SERPL-SCNC: 138 MMOL/L (ref 133–144)
TROPONIN I SERPL-MCNC: 0.56 UG/L (ref 0–0.04)
TROPONIN I SERPL-MCNC: 2.22 UG/L (ref 0–0.04)
TROPONIN I SERPL-MCNC: 2.71 UG/L (ref 0–0.04)
WBC # BLD AUTO: 10 10E9/L (ref 4–11)

## 2019-12-09 PROCEDURE — 85520 HEPARIN ASSAY: CPT | Performed by: HOSPITALIST

## 2019-12-09 PROCEDURE — 96376 TX/PRO/DX INJ SAME DRUG ADON: CPT

## 2019-12-09 PROCEDURE — 84484 ASSAY OF TROPONIN QUANT: CPT | Performed by: HOSPITALIST

## 2019-12-09 PROCEDURE — 99207 ZZC CDG-MDM COMPONENT: MEETS MODERATE - UP CODED: CPT | Performed by: HOSPITALIST

## 2019-12-09 PROCEDURE — 93005 ELECTROCARDIOGRAM TRACING: CPT

## 2019-12-09 PROCEDURE — 80048 BASIC METABOLIC PNL TOTAL CA: CPT | Performed by: EMERGENCY MEDICINE

## 2019-12-09 PROCEDURE — 25000132 ZZH RX MED GY IP 250 OP 250 PS 637: Performed by: HOSPITALIST

## 2019-12-09 PROCEDURE — 85025 COMPLETE CBC W/AUTO DIFF WBC: CPT | Performed by: EMERGENCY MEDICINE

## 2019-12-09 PROCEDURE — 25000128 H RX IP 250 OP 636: Performed by: EMERGENCY MEDICINE

## 2019-12-09 PROCEDURE — 96366 THER/PROPH/DIAG IV INF ADDON: CPT

## 2019-12-09 PROCEDURE — 36415 COLL VENOUS BLD VENIPUNCTURE: CPT | Performed by: HOSPITALIST

## 2019-12-09 PROCEDURE — 99285 EMERGENCY DEPT VISIT HI MDM: CPT | Mod: 25

## 2019-12-09 PROCEDURE — 21000001 ZZH R&B HEART CARE

## 2019-12-09 PROCEDURE — 36415 COLL VENOUS BLD VENIPUNCTURE: CPT | Performed by: EMERGENCY MEDICINE

## 2019-12-09 PROCEDURE — 99221 1ST HOSP IP/OBS SF/LOW 40: CPT | Performed by: INTERNAL MEDICINE

## 2019-12-09 PROCEDURE — 96365 THER/PROPH/DIAG IV INF INIT: CPT

## 2019-12-09 PROCEDURE — 99223 1ST HOSP IP/OBS HIGH 75: CPT | Mod: AI | Performed by: HOSPITALIST

## 2019-12-09 PROCEDURE — 96368 THER/DIAG CONCURRENT INF: CPT

## 2019-12-09 PROCEDURE — 93010 ELECTROCARDIOGRAM REPORT: CPT | Performed by: INTERNAL MEDICINE

## 2019-12-09 PROCEDURE — 84484 ASSAY OF TROPONIN QUANT: CPT | Performed by: EMERGENCY MEDICINE

## 2019-12-09 RX ORDER — OXYCODONE HYDROCHLORIDE 5 MG/1
5 TABLET ORAL EVERY 6 HOURS PRN
Status: DISCONTINUED | OUTPATIENT
Start: 2019-12-09 | End: 2019-12-12 | Stop reason: HOSPADM

## 2019-12-09 RX ORDER — ASPIRIN 81 MG/1
81 TABLET ORAL DAILY
Status: DISCONTINUED | OUTPATIENT
Start: 2019-12-10 | End: 2019-12-12 | Stop reason: HOSPADM

## 2019-12-09 RX ORDER — NALOXONE HYDROCHLORIDE 0.4 MG/ML
.1-.4 INJECTION, SOLUTION INTRAMUSCULAR; INTRAVENOUS; SUBCUTANEOUS
Status: DISCONTINUED | OUTPATIENT
Start: 2019-12-09 | End: 2019-12-10

## 2019-12-09 RX ORDER — ACETAMINOPHEN 325 MG/1
650 TABLET ORAL EVERY 4 HOURS PRN
Status: DISCONTINUED | OUTPATIENT
Start: 2019-12-09 | End: 2019-12-10

## 2019-12-09 RX ORDER — ACETAMINOPHEN 650 MG/1
650 SUPPOSITORY RECTAL EVERY 4 HOURS PRN
Status: DISCONTINUED | OUTPATIENT
Start: 2019-12-09 | End: 2019-12-12 | Stop reason: HOSPADM

## 2019-12-09 RX ORDER — PANTOPRAZOLE SODIUM 40 MG/1
40 TABLET, DELAYED RELEASE ORAL DAILY
Status: DISCONTINUED | OUTPATIENT
Start: 2019-12-09 | End: 2019-12-12 | Stop reason: HOSPADM

## 2019-12-09 RX ORDER — LIDOCAINE 40 MG/G
CREAM TOPICAL
Status: DISCONTINUED | OUTPATIENT
Start: 2019-12-09 | End: 2019-12-12 | Stop reason: HOSPADM

## 2019-12-09 RX ORDER — CLOPIDOGREL BISULFATE 75 MG/1
75 TABLET ORAL DAILY
Status: DISCONTINUED | OUTPATIENT
Start: 2019-12-10 | End: 2019-12-12 | Stop reason: HOSPADM

## 2019-12-09 RX ORDER — AMOXICILLIN 250 MG
1 CAPSULE ORAL 2 TIMES DAILY PRN
Status: DISCONTINUED | OUTPATIENT
Start: 2019-12-09 | End: 2019-12-12 | Stop reason: HOSPADM

## 2019-12-09 RX ORDER — ALUMINA, MAGNESIA, AND SIMETHICONE 2400; 2400; 240 MG/30ML; MG/30ML; MG/30ML
30 SUSPENSION ORAL EVERY 4 HOURS PRN
Status: DISCONTINUED | OUTPATIENT
Start: 2019-12-09 | End: 2019-12-12 | Stop reason: HOSPADM

## 2019-12-09 RX ORDER — ONDANSETRON 4 MG/1
4 TABLET, ORALLY DISINTEGRATING ORAL EVERY 6 HOURS PRN
Status: DISCONTINUED | OUTPATIENT
Start: 2019-12-09 | End: 2019-12-12 | Stop reason: HOSPADM

## 2019-12-09 RX ORDER — ALLOPURINOL 100 MG/1
100 TABLET ORAL DAILY
Status: DISCONTINUED | OUTPATIENT
Start: 2019-12-09 | End: 2019-12-12 | Stop reason: HOSPADM

## 2019-12-09 RX ORDER — PRIMIDONE 250 MG/1
250 TABLET ORAL 2 TIMES DAILY
Status: DISCONTINUED | OUTPATIENT
Start: 2019-12-09 | End: 2019-12-12 | Stop reason: HOSPADM

## 2019-12-09 RX ORDER — HEPARIN SODIUM 10000 [USP'U]/100ML
1000 INJECTION, SOLUTION INTRAVENOUS CONTINUOUS
Status: DISCONTINUED | OUTPATIENT
Start: 2019-12-09 | End: 2019-12-09

## 2019-12-09 RX ORDER — MORPHINE SULFATE 2 MG/ML
2-4 INJECTION, SOLUTION INTRAMUSCULAR; INTRAVENOUS
Status: DISCONTINUED | OUTPATIENT
Start: 2019-12-09 | End: 2019-12-12 | Stop reason: HOSPADM

## 2019-12-09 RX ORDER — AMOXICILLIN 250 MG
2 CAPSULE ORAL 2 TIMES DAILY PRN
Status: DISCONTINUED | OUTPATIENT
Start: 2019-12-09 | End: 2019-12-12 | Stop reason: HOSPADM

## 2019-12-09 RX ORDER — ONDANSETRON 2 MG/ML
4 INJECTION INTRAMUSCULAR; INTRAVENOUS EVERY 6 HOURS PRN
Status: DISCONTINUED | OUTPATIENT
Start: 2019-12-09 | End: 2019-12-12 | Stop reason: HOSPADM

## 2019-12-09 RX ORDER — NITROGLYCERIN 20 MG/100ML
.07-2 INJECTION INTRAVENOUS CONTINUOUS
Status: DISCONTINUED | OUTPATIENT
Start: 2019-12-09 | End: 2019-12-09

## 2019-12-09 RX ORDER — HEPARIN SODIUM 10000 [USP'U]/100ML
700 INJECTION, SOLUTION INTRAVENOUS CONTINUOUS
Status: DISCONTINUED | OUTPATIENT
Start: 2019-12-09 | End: 2019-12-10

## 2019-12-09 RX ORDER — ROSUVASTATIN CALCIUM 20 MG/1
40 TABLET, COATED ORAL DAILY
Status: DISCONTINUED | OUTPATIENT
Start: 2019-12-09 | End: 2019-12-12 | Stop reason: HOSPADM

## 2019-12-09 RX ORDER — NITROGLYCERIN 20 MG/100ML
0.07-2 INJECTION INTRAVENOUS CONTINUOUS
Status: DISCONTINUED | OUTPATIENT
Start: 2019-12-09 | End: 2019-12-10

## 2019-12-09 RX ORDER — EZETIMIBE 10 MG/1
10 TABLET ORAL DAILY
Status: DISCONTINUED | OUTPATIENT
Start: 2019-12-10 | End: 2019-12-12 | Stop reason: HOSPADM

## 2019-12-09 RX ORDER — ACETAMINOPHEN 325 MG/1
975 TABLET ORAL EVERY 8 HOURS
Status: DISCONTINUED | OUTPATIENT
Start: 2019-12-09 | End: 2019-12-12 | Stop reason: HOSPADM

## 2019-12-09 RX ADMIN — ACETAMINOPHEN 975 MG: 325 TABLET, FILM COATED ORAL at 16:52

## 2019-12-09 RX ADMIN — ACETAMINOPHEN 975 MG: 325 TABLET, FILM COATED ORAL at 21:59

## 2019-12-09 RX ADMIN — NITROGLYCERIN 0.07 MCG/KG/MIN: 20 INJECTION INTRAVENOUS at 14:24

## 2019-12-09 RX ADMIN — ALLOPURINOL 100 MG: 100 TABLET ORAL at 16:52

## 2019-12-09 RX ADMIN — PRIMIDONE 250 MG: 250 TABLET ORAL at 21:58

## 2019-12-09 RX ADMIN — PANTOPRAZOLE SODIUM 40 MG: 40 TABLET, DELAYED RELEASE ORAL at 16:52

## 2019-12-09 RX ADMIN — Medication 5000 UNITS: at 14:26

## 2019-12-09 RX ADMIN — ROSUVASTATIN CALCIUM 40 MG: 20 TABLET, FILM COATED ORAL at 16:52

## 2019-12-09 RX ADMIN — HEPARIN SODIUM 1000 UNITS/HR: 10000 INJECTION, SOLUTION INTRAVENOUS at 14:25

## 2019-12-09 ASSESSMENT — MIFFLIN-ST. JEOR
SCORE: 1717.35
SCORE: 1741.84

## 2019-12-09 ASSESSMENT — ENCOUNTER SYMPTOMS
CHEST TIGHTNESS: 1
BACK PAIN: 1

## 2019-12-09 NOTE — PHARMACY-ADMISSION MEDICATION HISTORY
Pharmacy Medication History  Admission medication history interview status for the 12/9/2019  admission is complete. See EPIC admission navigator for prior to admission medications     Medication history sources: Patient and Patient's home med list  Medication history source reliability: Good  Adherence assessment: Good    Significant changes made to the medication list:  Removed oxycodone, primidone, doxycycline and hydroxyzine.      Additional medication history information:   Pt took Zetia today even though he usually takes at HS.    Medication reconciliation completed by provider prior to medication history? Yes    Time spent in this activity: 20 min      Prior to Admission medications    Medication Sig Last Dose Taking? Auth Provider   ALLOPURINOL PO Take 100 mg by mouth daily. To prevent gout attacks, recently started.  12/8/2019 at am Yes Unknown, Entered By History   aspirin (ASA) 81 MG tablet Take 81 mg by mouth daily 12/9/2019 at am Yes Reported, Patient   chlorthalidone (HYGROTON) 25 MG tablet Take 1 tablet (25 mg) by mouth daily 12/8/2019 at am Yes Akhil Mendoza MD   clopidogrel (PLAVIX) 75 MG tablet Take 1 tablet (75 mg) by mouth daily 12/9/2019 at Unknown time Yes Percy Armas MD   ezetimibe (ZETIA) 10 MG tablet Take 1 tablet (10 mg) by mouth daily  Patient taking differently: Take 10 mg by mouth At Bedtime  12/9/2019 at am Yes Akhil Mendoza MD   hypromellose-dextran (ARTIFICAL TEARS) 0.1-0.3 % ophthalmic solution Place 1 drop into both eyes 2 times daily as needed for dry eyes prn Yes Unknown, Entered By History   isosorbide mononitrate (IMDUR) 60 MG 24 hr tablet Take 1 tablet (60 mg) by mouth daily 12/9/2019 at am Yes Akhil Mendoza MD   lisinopril (PRINIVIL/ZESTRIL) 20 MG tablet Take 1 tablet (20 mg) by mouth daily 12/8/2019 at am Yes Akhil Mendoza MD   Multiple Vitamins-Minerals (CENTRUM SILVER) per tablet Take 1 tablet by mouth daily. 12/8/2019 at am Yes  Unknown, Entered By History   Multiple Vitamins-Minerals (PRESERVISION AREDS 2) CAPS Take by mouth 2 times daily 12/8/2019 at pm Yes Reported, Patient   nitroglycerin (NITROSTAT) 0.4 MG SL tablet Place 1 tablet (0.4 mg) under the tongue every 5 minutes as needed for chest pain prn Yes Akhil Mendoza MD   pantoprazole (PROTONIX) 40 MG EC tablet Take 40 mg by mouth daily 12/8/2019 at am Yes Reported, Patient   propranolol (INDERAL LA) 80 MG 24 hr capsule Take 80 mg by mouth daily 12/8/2019 at am Yes Unknown, Entered By History   rosuvastatin (CRESTOR) 40 MG tablet Take 1 tablet by mouth daily. 12/8/2019 at am Yes Akhil Mendoza MD

## 2019-12-09 NOTE — ED NOTES
Patient reports chest pressure that radiates into his back. Patient states discomfort is similar to when he had his stents placed in the past.

## 2019-12-09 NOTE — PLAN OF CARE
VSS, chest discomfort slowly improving with nitroglycerin gtt titration. Alert.oriented, Ind at baseline SBA with cords. Heparin infusing. Angio tomorrow.

## 2019-12-09 NOTE — ED NOTES
Bed: ED21  Expected date: 12/9/19  Expected time: 12:37 PM  Means of arrival: Ambulance  Comments:  Chest Pain

## 2019-12-09 NOTE — H&P
[facility]    History and Physical  Hospitalist       Date of Admission:  12/9/2019    Assessment & Plan   Iván Nicholas is a 79 year old male anticoagulated on Plavix with a complex cardiac history including hypertension, and coronary artery disease with a stented LAD and right coronary artery, most recently in 04/2019 who presents to the emergency department today for evaluation of chest pain with NSTEMI .    1. NSTEMI :   -Patient has history of coronary artery disease with history of stents recently last one  April 2019 in  RCA and older one in LAD .  -Presented with chest pain EKG no acute ST-T wave changes troponin at admission 0.5  -Currently chest pain improving patient was started on nitroglycerin drip and heparin infusion  -We will continue with aspirin Plavix nitroglycerin as needed morphine as needed continue with a statin  -Cardiology consultation for possible need for angiogram  -Trend the troponin  -Patient was advised to let us know for any chest pain shortness of breath  -We will get echocardiogram rule out motion and wall motion abnormality check EF    2. HTN : We will hold home medication in setting of nitroglycerin drip at this time    3.GOUT resume allopurinol    4. CKD creatinine at baseline around 1-1.1 at presentation 1.3 start IV fluid hold chlorthalidone            DVT Prophylaxis: Heparin SQ  Code Status: Full Code        Sourav Forman MD    Primary Care Physician   *Stephan Nice    Chief Complaint   Chest pain     History is obtained from the patient    History of Present Illness   Iván Nicholas is a 79 year old male anticoagulated on Plavix with a complex cardiac history including hypertension, and coronary artery disease with a stented LAD and right coronary artery, most recently in 04/2019 who presents to the emergency department today for evaluation of chest pain. The patient states he was sitting at his computer this morning when he  started to have a midsternal chest tightness. He endorses associated tightness in his back and arms. He took two Nitroglycerin but then noted they were , he found a newer bottle and took three more, he did not have a head rush or other effects from these. He took 324 of Aspirin and called EMS, with them he was given three more Nitroglycerin. Here, he states he has had minimal improvement in his symptoms, but notes they are still present. He states he has been working with his physicians to manage leg swelling, and notes minimal increase in his right leg swelling. His wife adds that the patient is very faithful with his appointments and medication usage. The patient states he has not recently had a cardiac stress test in some time now.  Chest pain EKG no acute ST-T wave changes troponin at admission 0.5  Currently chest pain improving patient was started on nitroglycerin drip and heparin infusion.        Past Medical History    Past Medical History:   Diagnosis Date     Arthritis      Carotid artery stenosis     Right, s/p right CEA 2016     Cerebral artery occlusion with cerebral infarction (H)     TIA -  Endarterectomy...No residual     Cholesterol serum elevated      Colon polyps      Color blind      Coronary artery disease     2012 - MERARI to mid LAD     Decreased hearing      Depression      Gout      Hypertension      Hypertrophy of prostate without urinary obstruction and other lower urinary tract symptoms (LUTS)      Impotence      Left bundle branch block      Onychomycosis of toenail      Other and unspecified hyperlipidemia      Paroxysmal ventricular tachycardia (H)     with stress test        Past Surgical History   Past Surgical History     Prior to Admission Medications   Prior to Admission Medications   Prescriptions Last Dose Informant Patient Reported? Taking?   ALLOPURINOL PO   Yes No   Sig: Take 100 mg by mouth daily. To prevent gout attacks, recently started.    Multiple  Vitamins-Minerals (CENTRUM SILVER) per tablet   Yes No   Sig: Take 1 tablet by mouth daily.   Multiple Vitamins-Minerals (PRESERVISION AREDS 2) CAPS   Yes No   Sig: Take by mouth 2 times daily   acetaminophen (TYLENOL) 325 MG tablet   No No   Sig: Take 3 tablets (975 mg) by mouth every 8 hours   aspirin (ASA) 81 MG tablet   Yes No   Sig: Take 81 mg by mouth daily   chlorthalidone (HYGROTON) 25 MG tablet   No No   Sig: Take 1 tablet (25 mg) by mouth daily   clopidogrel (PLAVIX) 75 MG tablet   No No   Sig: Take 1 tablet (75 mg) by mouth daily   doxycycline hyclate (VIBRAMYCIN) 100 MG capsule   Yes No   Sig: Take 100 mg by mouth 2 times daily   ezetimibe (ZETIA) 10 MG tablet   No No   Sig: Take 1 tablet (10 mg) by mouth daily   hydrOXYzine (ATARAX) 25 MG tablet   No No   Sig: Take 1 tablet (25 mg) by mouth every 6 hours as needed for other (adjuvant pain)   Patient not taking: Reported on 2019   isosorbide mononitrate (IMDUR) 60 MG 24 hr tablet   No No   Sig: Take 1 tablet (60 mg) by mouth daily   lisinopril (PRINIVIL/ZESTRIL) 20 MG tablet   No No   Sig: Take 1 tablet (20 mg) by mouth daily   nitroglycerin (NITROSTAT) 0.4 MG SL tablet   No No   Sig: Place 1 tablet (0.4 mg) under the tongue every 5 minutes as needed for chest pain   oxyCODONE IR (ROXICODONE) 5 MG tablet   No No   Si tab po q 4 hrs prn pain scale 3-6,   2 tabs po q 4hrs prn pain scale 7-10   Patient not taking: Reported on 2019   pantoprazole (PROTONIX) 40 MG EC tablet   Yes No   Sig: Take 40 mg by mouth daily   primidone (MYSOLINE) 250 MG tablet   Yes No   Sig: Take 250 mg by mouth 2 times daily   propranolol (INDERAL LA) 80 MG 24 hr capsule   Yes No   Sig: Take 80 mg by mouth daily   rosuvastatin (CRESTOR) 40 MG tablet   Yes No   Sig: Take 1 tablet by mouth daily.      Facility-Administered Medications: None     Allergies   Allergies   Allergen Reactions     Hmg-Coa-R Inhibitors Muscle Pain (Myalgia)     lipitor       Social History    Iván Nicholas  reports that he quit smoking about 29 years ago. His smoking use included cigarettes. He has a 10.00 pack-year smoking history. He has never used smokeless tobacco. He reports current alcohol use. He reports that he does not use drugs.    Family History   Iván Nicholas family history includes Coronary Artery Disease in his brother and sister; Heart Disease (age of onset: 69) in his father; Heart Disease (age of onset: 83) in his mother.    Review of Systems   The 10 point Review of Systems is negative other than noted in the HPI or here.     Physical Exam   Temp: 97.6  F (36.4  C) Temp src: Oral BP: (!) 163/99 Pulse: 60 Heart Rate: 57 Resp: 26 SpO2: 97 % O2 Device: None (Room air)    Vital Signs with Ranges  Temp:  [97.6  F (36.4  C)] 97.6  F (36.4  C)  Pulse:  [60] 60  Heart Rate:  [57] 57  Resp:  [18-26] 26  BP: (163-167)/(89-99) 163/99  SpO2:  [96 %-97 %] 97 %  225 lbs 0 oz    Constitutional: Awake, alert, cooperative, no apparent distress.  Eyes: Conjunctiva and pupils examined and normal.  HEENT: Moist mucous membranes, normal dentition.  Respiratory: Clear to auscultation bilaterally, no crackles or wheezing.  Cardiovascular: ,normal S1 and S2,  GI: Soft, non-distended, non-tender, normal bowel sounds.  Skin: No rashes, no cyanosis, no edema.  Neurologic: AXOX3 no focal       Data   Data reviewed today:  I personally reviewed no images or EKG's today.  Recent Labs   Lab 12/09/19  1259   WBC 10.0   HGB 14.5   MCV 96         POTASSIUM 4.0   CHLORIDE 104   CO2 29   BUN 40*   CR 1.33*   ANIONGAP 5   DONELL 9.0   *   TROPI 0.564*       Imaging:  No results found for this or any previous visit (from the past 24 hour(s)).

## 2019-12-09 NOTE — CONSULTS
Cardiology Consult Note          Assessment and Plan:     1) NSTEMI   Currently pain free.  Plan IV heparin, NTG, BB,   Angiogram in am.  Risks and benefits explained.    2) CAD  S/p recent MERARI to distal RCA 4/2019 Dr. Armas.  Mild-moderate disease elsewhere.     3) HTN- recently discontinued amlodipine secondary to swelling      4) LB BB  5) Hyperlipidemia  Crestor 40  6) H/o VT  7) CKD- stage 3 GFR 50          Interval History:     Mr. Hudson is a very pleasant 79-year-old followed by Dr. Will history of known coronary artery disease previous history of PCI to the LAD in 2012.  He did have a jailed diagonal branch from this stenting procedure.  He had stable then angina pectoris until most recently in April of this year when he had unstable symptoms.  He was found to have high-grade lesion of the distal right coronary artery and Dr. Armas was performed MERARI to this vessel with good result.  Since then he has done very well.  He is been trying to increase his exercise and going to the gym on occasion.  He was then in his usual state of health until today earlier when he was working on his computer had been doing nothing strenuous and developed his recurrent heart pain with chest pain radiation to the shoulders left upper shoulder area and arm and it was not resolved with nitroglycerin.  He therefore came into the emergency room at [facility].  On my seeing the patient he was on intravenous heparin nitroglycerin drip and his chest discomfort and just resolved.  His troponins were abnormal with initial level at .564..  He has been compliant with his medications.  He denies any fever chills cough melena recent trauma surgery or bleeding.                   Review of Systems:   The 10 point Review of Systems is negative other than noted in the HPI          Medications:   I have reviewed this patient's current medications             Physical Exam:   Blood pressure (!) 163/104, pulse 56,  "temperature 97.6  F (36.4  C), temperature source Oral, resp. rate (!) 31, height 1.778 m (5' 10\"), weight 99.6 kg (219 lb 9.6 oz), SpO2 98 %.        Vital Sign Ranges  Temperature Temp  Av.6  F (36.4  C)  Min: 97.6  F (36.4  C)  Max: 97.6  F (36.4  C)   Blood pressure Systolic (24hrs), Av , Min:145 , Max:167        Diastolic (24hrs), Av, Min:88, Max:101      Pulse Pulse  Av.3  Min: 52  Max: 60   Respirations Resp  Av.1  Min: 8  Max: 32   Pulse oximetry SpO2  Av.5 %  Min: 93 %  Max: 98 %       No intake or output data in the 24 hours ending 19 1707    Constitutional:   Alert and oriented, well developed, in no acute distress.   Skin:   Warm and dry to touch; no apparent skin lesions.   Head:   Normocephalic, atraumatic.   Eyes:   Pupils equal and round, conjunctivae and lids unremarkable, no xanthalasma.   Neck:   cartoid pulses are full and equalbilaterally, JVP normal, no carotid bruit.   Chest:   No tenderness to palpation.   Lungs:   No increased work of breathing, good air exchange, clear to auscultation bilaterally.   Cardiovascular:   Regular rhythm, S1 normal, S2 normal, No S3 or S4, no murmurs or rubs detected.   Abdomen:   Abdomen soft, bowel sounds normoactive, no hepatosplenomegaly, non-tender.   Peripheral Pulses:   Pulses full and equal in all extremities, no bruits auscultated.   Extremities and Back:   No clubbing, cyanosis.  No edema observed.   Neurological:   No gross motor deficits noted, affect appropriate, oriented to time, person and place.   Hematologic/Lymphatic:    Hematologic / Lymphatic: No cervical lymphadenopathy and no supraclavicular lymphadenopathy.   Additional Exam Findings:                    Data:     Lab Results   Component Value Date    WBC 10.0 2019    HGB 14.5 2019    HCT 43.6 2019     2019     2019    POTASSIUM 4.0 2019    CHLORIDE 104 2019    CO2 29 2019    BUN 40 (H) 2019 "    CR 1.33 (H) 12/09/2019     (H) 12/09/2019    TROPI 0.564 (HH) 12/09/2019    AST 27 01/24/2017    ALT 23 01/24/2017    ALKPHOS 58 01/24/2017    BILITOTAL 0.2 01/24/2017    INR 1.05 04/23/2019         EKG results:  Reviewed if available.   NSST changes

## 2019-12-09 NOTE — ED PROVIDER NOTES
History     Chief Complaint:  Chest Pain    The history is provided by the patient.      Iván Nicholas is a 79 year old male anticoagulated on Plavix with a complex cardiac history including hypertension, and coronary artery disease with a stented LAD and right coronary artery who presents to the emergency department today via EMS for evaluation of chest pain. The patient states he was sitting at his computer this morning when he started to have a midsternal chest tightness. He endorses associated tightness in his back and arms. He took two Nitroglycerin but then noted they were , he found a newer bottle and took three more, he did not have a head rush or other effects from these. He took 324 of Aspirin and called EMS, with them he was given three more Nitroglycerin. Here, he states he has had minimal improvement in his symptoms, but notes they are still present. He states he has been working with his physicians to manage leg swelling, and notes minimal increase in his baseline leg swelling. His wife adds that the patient is very faithful with his appointments and medication usage. The patient states he has not recently had a cardiac stress test in some time now.      Previous Results, 2109  Cardiac Catheterization with Stent  Left Main: The left main was selectively injected. There is a large vessel with minimal atherosclerotic change. There is a distal 5% narrowing.  Left Anterior Descending: The LAD was selectively injected. The proximal segment is irregular with a diffuse 30-40% narrowing. The mid LAD has a widely patent stent with no significant narrowing. There is a 20% narrowing at the ostium of the diagonal branch that extends out of the stent. The remainder of the LAD has no significant narrowing.  Left Circumflex: The circumflex was selectively injected. It is a nondominant vessel with no significant narrowing. There is a large ramus intermedius branch that bifurcates shortly  after its takeoff. There is an ostial 30% narrowing at the origin of the inferior ramus.  Right Coronary Artery: The right coronary was selectively injected. Is a large dominant vessel. The proximal segment has no significant narrowing. The initial portion of the mid vessel has a 40% diffuse calcified narrowing unchanged from last film. The distal right coronary has a focal subtotal narrowing before the crux. This subtotal narrowing is new since the patient's last film. The remainder of the right coronary has no significant narrowing.   Distal RCA Lesion: is 95% stenosed. Culprit lesion. The lesion is type B2 - medium risk and focal.      Allergies:  Hmg-Coa-R inhibitors     Medications:    Chlorthalidone  Aspirin, Baby  Imdur (60mg)  Zetia (10mg)  Protonix (40mg)  Propranolol (80mg)  Plavix  Lisinopril (30mg)  Rosuvastatin (40mg)  Allopurinol   Nitroglycerin    Past Medical History:    Arthritis  Cerebral artery occlusion with cerebral infarction  Colon polyps  Color blind  Coronary artery disease   Decreased hearing  Depression  Gout  Obesity   Malignant neoplasm of prostate   GERD without esophagitis   Hypertension  Hypertrophy of prostate without urinary obstruction or other lower urinary tract symptoms  Left bundle branch block  Onychomycosis of toenail  Hyperlipidemia  Paroxsymal ventricular tachycardia  TIA    Past Surgical History:    Angioplasty, x 2  Arthroplasty, left knee  Coronary angiography   Heart catheterization with possible intervention  PCI angioplasty  Carotid endarterectomy  Tonsillectomy  Flexible sigmoidoscopy  PTCA  Orthopedic surgery  Phacoemulsification clear cornea with standard IOL implant    Family History:    Heart disease- Mother & Father  Coronary artery disease- Brother & Sister  COPD  Stroke    Social History:  The patient was accompanied to the ED by his wife.  The patient's cardiologist is Dr. Mendoza  Smoking Status: Former smoker, quit 1990  Smokeless Tobacco: Never  "Used  Alcohol Use: Positive  Drug Use: Negative    Marital Status:       Review of Systems   Respiratory: Positive for chest tightness.    Cardiovascular: Positive for chest pain and leg swelling.   Musculoskeletal: Positive for back pain.   All other systems reviewed and are negative.      Physical Exam     Patient Vitals for the past 24 hrs:   BP Temp Temp src Pulse Heart Rate Resp SpO2 Height Weight   12/09/19 1255 (!) 163/99 97.6  F (36.4  C) Oral 60 57 26 97 % 1.778 m (5' 10\") 102.1 kg (225 lb)   12/09/19 1253 (!) 167/89 97.6  F (36.4  C) Oral 60 -- 18 96 % -- --      Physical Exam  General: Resting comfortably on the gurney  Head:  The scalp, face, and head appear normal  Eyes:  The pupils are equal, round, and reactive to light    There is no nystagmus    Extraocular muscles are intact    Conjunctivae and sclerae are normal  ENT:    The nose is normal    Pinnae are normal    The oropharynx is normal    Uvula is in the midline  Neck:  Normal range of motion    There is no rigidity noted    There is no midline cervical spine pain/tenderness    Trachea is in the midline    No mass is detected  CV:  Regular rate and underlying rhythm     Normal S1/S2, no S3/S4    No pathological murmur detected  Resp:  Lungs are clear    There is no tachypnea    Non-labored    No rales    No wheezing   GI:  Abdomen is soft, there is no rigidity    No distension    No tympani    No rebound tenderness     Non-surgical without peritoneal features  MS:  Normal muscular tone    Symmetric motor strength    No major joint effusions    No calf tenderness    2+ edema to the pretibial areas bilaterally  Skin:  No rash or acute skin lesions noted  Neuro: Speech is normal and fluent  Psych:  Awake. Alert.      Normal affect.  Appropriate interactions.  Lymph: No anterior cervical lymphadenopathy noted      Emergency Department Course     ECG:  ECG taken at 1247, ECG read at 1400  Sinus bradycardia  Septal infarct, age " undetermined  Abnormal ECG   Rate 58 bpm. MO interval 194. QRS duration 98. QT/QTc 460/451. P-R-T axes 48 35 59.     Laboratory:  Laboratory findings were communicated with the patient and family who voiced understanding of the findings.  CBC: AWNL (WBC 10.0, HGB 14.5, )  BMP: Glucose 114 (H), urea nitrogen 40 (H), GFR estimate 50 (L). O/w WNL (Creatinine 1.33)  Troponin (Collected 1259): 0.564 (H)    Interventions:  1424 Nitroglycerin infusion 0.07 mcg/kg/min IV  1425 Heparin infusion 25,000 units/hour IV  1426 Heparin loading dose 5,000 units IV  1558 Nitroglycerin infusion rate change to 0.15 mcg/kg/min IV    Emergency Department Course:  1247 An ECG was performed, results above.  1300 IV was inserted and blood was drawn for laboratory testing, results above.   1317 Nursing notes and vitals reviewed.  1347 I performed an exam of the patient as documented above.   1411 I spoke with Dr. Forman of the hospitalist service from [facility] regarding patient's presentation, findings, and plan of care.    1420 Patient was rechecked and updated with plan for admission.     Findings and plan explained to the Patient and spouse who consents to admission. Discussed the patient with Dr. Charles, who will admit the patient to a CICU bed for further monitoring, evaluation, and treatment.     I personally reviewed the laboratory results with the Patient and spouse and answered all related questions prior to admission.     Impression & Plan      Medical Decision Making:  This patient presents to the emergency department with chest discomfort.  His EKG is nonacute.  The patient does have two-vessel coronary artery disease with prior stents.  The patient had chest discomfort today that is reminiscent of his cardiac based pain from the past.  He has taken numerous nitroglycerin tablets as noted above.  With some improvement of symptoms.  The patient's Imdur has been reduced from 120 mg down to 60 mg.  Patient is noted to have a  troponin elevation indicative of unstable angina versus the possibility of an STEMI.  Patient is placed on intravenous heparin and nitroglycerin and admitted to CICU for interventional cardiology consultation.  Patient's primary cardiologist is Dr. Will.          Diagnosis:    ICD-10-CM    1. ACS (acute coronary syndrome) (H) I24.9    2. Coronary artery disease involving native heart with unstable angina pectoris, unspecified vessel or lesion type (H) I25.110    3. Troponin level elevated R79.89        Disposition:  The patient is admitted into the care of Dr. Forman.    Scribe Disclosure:  Traci MEHTA, am serving as a scribe at 1:26 PM on 12/9/2019 to document services personally performed by Percy Correia MD based on my observations and the provider's statements to me.    12/9/2019    EMERGENCY DEPARTMENT       Percy Correia MD  12/09/19 8447

## 2019-12-09 NOTE — ED NOTES
"[facility]  ED Nurse Handoff Report    ED Chief complaint: Chest Pain      ED Diagnosis:   Final diagnoses:   ACS (acute coronary syndrome) (H)   Coronary artery disease involving native heart with unstable angina pectoris, unspecified vessel or lesion type (H)   Troponin level elevated       Code Status: Not addressed by ED MD.    Allergies:   Allergies   Allergen Reactions     Hmg-Coa-R Inhibitors Muscle Pain (Myalgia)     lipitor       Activity level - Baseline/Home:  Independent  Activity Level - Current:   Independent    Patient's Preferred language: English.   Needed?: No    Isolation: No  Infection: Not Applicable  Bariatric?: No    Vital Signs:   Vitals:    12/09/19 1253 12/09/19 1255   BP: (!) 167/89 (!) 163/99   Pulse: 60 60   Resp: 18 26   Temp: 97.6  F (36.4  C) 97.6  F (36.4  C)   TempSrc: Oral Oral   SpO2: 96% 97%   Weight:  102.1 kg (225 lb)   Height:  1.778 m (5' 10\")       Cardiac Rhythm: ,   Cardiac  Cardiac Rhythm: Sinus bradycardia    Pain level: 0-10 Pain Scale: 3    Is this patient confused?: No   Does this patient have a guardian?  No         If yes, is there guardianship documents in the Epic \"Code/ACP\" activity?  N/A         Guardian Notified?  N/A  [place] - Suicide Severity Rating Scale Completed?  Yes  If yes, what color did the patient score?  White    Patient Report: Initial Complaint: Chest pain  Focused Assessment: Patient report Chest pressure since 1000 this am. Patient states discomfort is similar to when he had his stents placed. Denies SOB. Was given 3 NTG with relief of pain.  Tests Performed:   Results for orders placed or performed during the hospital encounter of 12/09/19   CBC with platelets differential     Status: None   Result Value Ref Range    WBC 10.0 4.0 - 11.0 10e9/L    RBC Count 4.53 4.4 - 5.9 10e12/L    Hemoglobin 14.5 13.3 - 17.7 g/dL    Hematocrit 43.6 40.0 - 53.0 %    MCV 96 78 - 100 fl    MCH 32.0 26.5 - 33.0 pg    MCHC 33.3 31.5 - " 36.5 g/dL    RDW 15.0 10.0 - 15.0 %    Platelet Count 166 150 - 450 10e9/L    Diff Method Automated Method     % Neutrophils 77.9 %    % Lymphocytes 13.3 %    % Monocytes 6.7 %    % Eosinophils 1.7 %    % Basophils 0.2 %    % Immature Granulocytes 0.2 %    Nucleated RBCs 0 0 /100    Absolute Neutrophil 7.8 1.6 - 8.3 10e9/L    Absolute Lymphocytes 1.3 0.8 - 5.3 10e9/L    Absolute Monocytes 0.7 0.0 - 1.3 10e9/L    Absolute Eosinophils 0.2 0.0 - 0.7 10e9/L    Absolute Basophils 0.0 0.0 - 0.2 10e9/L    Abs Immature Granulocytes 0.0 0 - 0.4 10e9/L    Absolute Nucleated RBC 0.0    Basic metabolic panel     Status: Abnormal   Result Value Ref Range    Sodium 138 133 - 144 mmol/L    Potassium 4.0 3.4 - 5.3 mmol/L    Chloride 104 94 - 109 mmol/L    Carbon Dioxide 29 20 - 32 mmol/L    Anion Gap 5 3 - 14 mmol/L    Glucose 114 (H) 70 - 99 mg/dL    Urea Nitrogen 40 (H) 7 - 30 mg/dL    Creatinine 1.33 (H) 0.66 - 1.25 mg/dL    GFR Estimate 50 (L) >60 mL/min/[1.73_m2]    GFR Estimate If Black 58 (L) >60 mL/min/[1.73_m2]    Calcium 9.0 8.5 - 10.1 mg/dL   Troponin I     Status: Abnormal   Result Value Ref Range    Troponin I ES 0.564 (HH) 0.000 - 0.045 ug/L       Abnormal Results:   Abnormal Labs Reviewed   BASIC METABOLIC PANEL - Abnormal; Notable for the following components:       Result Value    Glucose 114 (*)     Urea Nitrogen 40 (*)     Creatinine 1.33 (*)     GFR Estimate 50 (*)     GFR Estimate If Black 58 (*)     All other components within normal limits   TROPONIN I - Abnormal; Notable for the following components:    Troponin I ES 0.564 (*)     All other components within normal limits       Treatments provided: IV heparin and IV Nitroglycerin    Family Comments: Wife at bedside.    OBS brochure/video discussed/provided to patient/family: No              Name of person given brochure if not patient: N/A              Relationship to patient: N/A    ED Medications:   Medications   nitroGLYcerin 50 mg in D5W 250 mL (adult  std) infusion (has no administration in time range)       Drips infusing?:  No    For the majority of the shift this patient was Green.   Interventions performed were N/A.    Severe Sepsis OR Septic Shock Diagnosis Present: No    To be done/followed up on inpatient unit:  Continue to monitor    ED NURSE PHONE NUMBER: [phone]

## 2019-12-09 NOTE — ED NOTES
DATE:  12/9/2019   TIME OF RECEIPT FROM LAB:  9675  LAB TEST:  Troponin  LAB VALUE:  0.564  RESULTS GIVEN WITH READ-BACK TO (PROVIDER):  Dr. Correia  TIME LAB VALUE REPORTED TO PROVIDER:   9377

## 2019-12-09 NOTE — ED TRIAGE NOTES
Patient brought in by EMS from home. Patient states chest pressure started at 1000 this am. Patient took 5 of his  SL Nitroglycerin as well as 324 mg of aspirin.     EMS administered 3 SL nitroglycerin and patient's pain went from 8 to 3/10.

## 2019-12-10 ENCOUNTER — APPOINTMENT (OUTPATIENT)
Dept: CARDIOLOGY | Facility: CLINIC | Age: 79
DRG: 281 | End: 2019-12-10
Attending: HOSPITALIST
Payer: COMMERCIAL

## 2019-12-10 ENCOUNTER — SURGERY (OUTPATIENT)
Age: 79
End: 2019-12-10
Payer: COMMERCIAL

## 2019-12-10 PROBLEM — R79.89 TROPONIN LEVEL ELEVATED: Status: ACTIVE | Noted: 2019-12-09

## 2019-12-10 PROBLEM — Z96.652 STATUS POST TOTAL LEFT KNEE REPLACEMENT: Status: ACTIVE | Noted: 2019-12-09

## 2019-12-10 LAB
ALBUMIN UR-MCNC: 100 MG/DL
APPEARANCE UR: ABNORMAL
BACTERIA #/AREA URNS HPF: ABNORMAL /HPF
BILIRUB UR QL STRIP: NEGATIVE
COLOR UR AUTO: YELLOW
GLUCOSE UR STRIP-MCNC: NEGATIVE MG/DL
HGB UR QL STRIP: ABNORMAL
KETONES UR STRIP-MCNC: 5 MG/DL
LEUKOCYTE ESTERASE UR QL STRIP: ABNORMAL
LMWH PPP CHRO-ACNC: 0.2 IU/ML
NITRATE UR QL: NEGATIVE
PH UR STRIP: 7 PH (ref 5–7)
POTASSIUM SERPL-SCNC: 4.3 MMOL/L (ref 3.4–5.3)
RBC #/AREA URNS AUTO: 162 /HPF (ref 0–2)
SOURCE: ABNORMAL
SP GR UR STRIP: 1.02 (ref 1–1.03)
TROPONIN I SERPL-MCNC: 1.38 UG/L (ref 0–0.04)
UROBILINOGEN UR STRIP-MCNC: NORMAL MG/DL (ref 0–2)
WBC #/AREA URNS AUTO: >182 /HPF (ref 0–5)

## 2019-12-10 PROCEDURE — 99153 MOD SED SAME PHYS/QHP EA: CPT | Performed by: INTERNAL MEDICINE

## 2019-12-10 PROCEDURE — 93452 LEFT HRT CATH W/VENTRCLGRPHY: CPT | Performed by: INTERNAL MEDICINE

## 2019-12-10 PROCEDURE — 93306 TTE W/DOPPLER COMPLETE: CPT | Mod: 26 | Performed by: INTERNAL MEDICINE

## 2019-12-10 PROCEDURE — 25000128 H RX IP 250 OP 636: Performed by: INTERNAL MEDICINE

## 2019-12-10 PROCEDURE — 25000128 H RX IP 250 OP 636: Performed by: HOSPITALIST

## 2019-12-10 PROCEDURE — 93572 IV DOP VEL&/PRESS C FLO EA: CPT | Performed by: INTERNAL MEDICINE

## 2019-12-10 PROCEDURE — C1887 CATHETER, GUIDING: HCPCS | Performed by: INTERNAL MEDICINE

## 2019-12-10 PROCEDURE — 84484 ASSAY OF TROPONIN QUANT: CPT | Performed by: HOSPITALIST

## 2019-12-10 PROCEDURE — 93010 ELECTROCARDIOGRAM REPORT: CPT | Performed by: INTERNAL MEDICINE

## 2019-12-10 PROCEDURE — 25800030 ZZH RX IP 258 OP 636: Performed by: PHYSICIAN ASSISTANT

## 2019-12-10 PROCEDURE — 99232 SBSQ HOSP IP/OBS MODERATE 35: CPT | Performed by: INTERNAL MEDICINE

## 2019-12-10 PROCEDURE — 25000125 ZZHC RX 250: Performed by: INTERNAL MEDICINE

## 2019-12-10 PROCEDURE — 93572 IV DOP VEL&/PRESS C FLO EA: CPT | Mod: 26 | Performed by: INTERNAL MEDICINE

## 2019-12-10 PROCEDURE — 99152 MOD SED SAME PHYS/QHP 5/>YRS: CPT | Performed by: INTERNAL MEDICINE

## 2019-12-10 PROCEDURE — 99233 SBSQ HOSP IP/OBS HIGH 50: CPT | Mod: 25 | Performed by: INTERNAL MEDICINE

## 2019-12-10 PROCEDURE — C1894 INTRO/SHEATH, NON-LASER: HCPCS | Performed by: INTERNAL MEDICINE

## 2019-12-10 PROCEDURE — 21000001 ZZH R&B HEART CARE

## 2019-12-10 PROCEDURE — 25000132 ZZH RX MED GY IP 250 OP 250 PS 637: Performed by: HOSPITALIST

## 2019-12-10 PROCEDURE — 25500064 ZZH RX 255 OP 636: Performed by: HOSPITALIST

## 2019-12-10 PROCEDURE — 93454 CORONARY ARTERY ANGIO S&I: CPT | Performed by: INTERNAL MEDICINE

## 2019-12-10 PROCEDURE — C1725 CATH, TRANSLUMIN NON-LASER: HCPCS | Performed by: INTERNAL MEDICINE

## 2019-12-10 PROCEDURE — B2111ZZ FLUOROSCOPY OF MULTIPLE CORONARY ARTERIES USING LOW OSMOLAR CONTRAST: ICD-10-PCS | Performed by: INTERNAL MEDICINE

## 2019-12-10 PROCEDURE — 87088 URINE BACTERIA CULTURE: CPT | Performed by: HOSPITALIST

## 2019-12-10 PROCEDURE — 93571 IV DOP VEL&/PRESS C FLO 1ST: CPT | Mod: 26 | Performed by: INTERNAL MEDICINE

## 2019-12-10 PROCEDURE — 87186 SC STD MICRODIL/AGAR DIL: CPT | Performed by: HOSPITALIST

## 2019-12-10 PROCEDURE — 87086 URINE CULTURE/COLONY COUNT: CPT | Performed by: HOSPITALIST

## 2019-12-10 PROCEDURE — 27210794 ZZH OR GENERAL SUPPLY STERILE: Performed by: INTERNAL MEDICINE

## 2019-12-10 PROCEDURE — 85520 HEPARIN ASSAY: CPT | Performed by: HOSPITALIST

## 2019-12-10 PROCEDURE — 4A033BC MEASUREMENT OF ARTERIAL PRESSURE, CORONARY, PERCUTANEOUS APPROACH: ICD-10-PCS | Performed by: INTERNAL MEDICINE

## 2019-12-10 PROCEDURE — 93005 ELECTROCARDIOGRAM TRACING: CPT

## 2019-12-10 PROCEDURE — 36415 COLL VENOUS BLD VENIPUNCTURE: CPT | Performed by: HOSPITALIST

## 2019-12-10 PROCEDURE — 84132 ASSAY OF SERUM POTASSIUM: CPT | Performed by: HOSPITALIST

## 2019-12-10 PROCEDURE — C1769 GUIDE WIRE: HCPCS | Performed by: INTERNAL MEDICINE

## 2019-12-10 PROCEDURE — 25000132 ZZH RX MED GY IP 250 OP 250 PS 637: Performed by: PHYSICIAN ASSISTANT

## 2019-12-10 PROCEDURE — 40000264 ECHOCARDIOGRAM COMPLETE

## 2019-12-10 PROCEDURE — 81001 URINALYSIS AUTO W/SCOPE: CPT | Performed by: INTERNAL MEDICINE

## 2019-12-10 PROCEDURE — 93571 IV DOP VEL&/PRESS C FLO 1ST: CPT

## 2019-12-10 PROCEDURE — 93454 CORONARY ARTERY ANGIO S&I: CPT | Mod: 26 | Performed by: INTERNAL MEDICINE

## 2019-12-10 RX ORDER — ATROPINE SULFATE 0.1 MG/ML
0.5 INJECTION INTRAVENOUS EVERY 5 MIN PRN
Status: ACTIVE | OUTPATIENT
Start: 2019-12-10 | End: 2019-12-11

## 2019-12-10 RX ORDER — PROPRANOLOL HCL 60 MG
60 CAPSULE, EXTENDED RELEASE 24HR ORAL DAILY
Status: DISCONTINUED | OUTPATIENT
Start: 2019-12-10 | End: 2019-12-11

## 2019-12-10 RX ORDER — NALOXONE HYDROCHLORIDE 0.4 MG/ML
.1-.4 INJECTION, SOLUTION INTRAMUSCULAR; INTRAVENOUS; SUBCUTANEOUS
Status: DISCONTINUED | OUTPATIENT
Start: 2019-12-10 | End: 2019-12-12 | Stop reason: HOSPADM

## 2019-12-10 RX ORDER — SODIUM CHLORIDE 9 MG/ML
INJECTION, SOLUTION INTRAVENOUS CONTINUOUS
Status: DISCONTINUED | OUTPATIENT
Start: 2019-12-10 | End: 2019-12-11

## 2019-12-10 RX ORDER — NALOXONE HYDROCHLORIDE 0.4 MG/ML
.2-.4 INJECTION, SOLUTION INTRAMUSCULAR; INTRAVENOUS; SUBCUTANEOUS
Status: DISCONTINUED | OUTPATIENT
Start: 2019-12-10 | End: 2019-12-10

## 2019-12-10 RX ORDER — EPTIFIBATIDE 2 MG/ML
180 INJECTION, SOLUTION INTRAVENOUS EVERY 10 MIN PRN
Status: DISCONTINUED | OUTPATIENT
Start: 2019-12-10 | End: 2019-12-10 | Stop reason: HOSPADM

## 2019-12-10 RX ORDER — ARGATROBAN 1 MG/ML
350 INJECTION, SOLUTION INTRAVENOUS
Status: DISCONTINUED | OUTPATIENT
Start: 2019-12-10 | End: 2019-12-10 | Stop reason: HOSPADM

## 2019-12-10 RX ORDER — FENTANYL CITRATE 50 UG/ML
25-50 INJECTION, SOLUTION INTRAMUSCULAR; INTRAVENOUS
Status: ACTIVE | OUTPATIENT
Start: 2019-12-10 | End: 2019-12-11

## 2019-12-10 RX ORDER — DOBUTAMINE HYDROCHLORIDE 200 MG/100ML
2-20 INJECTION INTRAVENOUS CONTINUOUS PRN
Status: DISCONTINUED | OUTPATIENT
Start: 2019-12-10 | End: 2019-12-10 | Stop reason: HOSPADM

## 2019-12-10 RX ORDER — EPTIFIBATIDE 2 MG/ML
2 INJECTION, SOLUTION INTRAVENOUS CONTINUOUS PRN
Status: DISCONTINUED | OUTPATIENT
Start: 2019-12-10 | End: 2019-12-10 | Stop reason: HOSPADM

## 2019-12-10 RX ORDER — DOPAMINE HYDROCHLORIDE 160 MG/100ML
2-20 INJECTION, SOLUTION INTRAVENOUS CONTINUOUS PRN
Status: DISCONTINUED | OUTPATIENT
Start: 2019-12-10 | End: 2019-12-10 | Stop reason: HOSPADM

## 2019-12-10 RX ORDER — ACETAMINOPHEN 325 MG/1
650 TABLET ORAL EVERY 4 HOURS PRN
Status: DISCONTINUED | OUTPATIENT
Start: 2019-12-10 | End: 2019-12-12 | Stop reason: HOSPADM

## 2019-12-10 RX ORDER — ADENOSINE 3 MG/ML
INJECTION, SOLUTION INTRAVENOUS
Status: DISCONTINUED | OUTPATIENT
Start: 2019-12-10 | End: 2019-12-10 | Stop reason: HOSPADM

## 2019-12-10 RX ORDER — LIDOCAINE 40 MG/G
CREAM TOPICAL
Status: DISCONTINUED | OUTPATIENT
Start: 2019-12-10 | End: 2019-12-10

## 2019-12-10 RX ORDER — ARGATROBAN 1 MG/ML
150 INJECTION, SOLUTION INTRAVENOUS
Status: DISCONTINUED | OUTPATIENT
Start: 2019-12-10 | End: 2019-12-10 | Stop reason: HOSPADM

## 2019-12-10 RX ORDER — HEPARIN SODIUM 1000 [USP'U]/ML
INJECTION, SOLUTION INTRAVENOUS; SUBCUTANEOUS
Status: DISCONTINUED | OUTPATIENT
Start: 2019-12-10 | End: 2019-12-10 | Stop reason: HOSPADM

## 2019-12-10 RX ORDER — FLUMAZENIL 0.1 MG/ML
0.2 INJECTION, SOLUTION INTRAVENOUS
Status: ACTIVE | OUTPATIENT
Start: 2019-12-10 | End: 2019-12-11

## 2019-12-10 RX ORDER — HEPARIN SODIUM 10000 [USP'U]/100ML
100-1000 INJECTION, SOLUTION INTRAVENOUS CONTINUOUS PRN
Status: DISCONTINUED | OUTPATIENT
Start: 2019-12-10 | End: 2019-12-10 | Stop reason: HOSPADM

## 2019-12-10 RX ORDER — PHENYLEPHRINE HCL IN 0.9% NACL 50MG/250ML
.5-6 PLASTIC BAG, INJECTION (ML) INTRAVENOUS CONTINUOUS PRN
Status: DISCONTINUED | OUTPATIENT
Start: 2019-12-10 | End: 2019-12-10 | Stop reason: HOSPADM

## 2019-12-10 RX ORDER — SODIUM CHLORIDE 9 MG/ML
INJECTION, SOLUTION INTRAVENOUS CONTINUOUS
Status: DISCONTINUED | OUTPATIENT
Start: 2019-12-10 | End: 2019-12-10 | Stop reason: HOSPADM

## 2019-12-10 RX ORDER — VERAPAMIL HYDROCHLORIDE 2.5 MG/ML
INJECTION, SOLUTION INTRAVENOUS
Status: DISCONTINUED | OUTPATIENT
Start: 2019-12-10 | End: 2019-12-10 | Stop reason: HOSPADM

## 2019-12-10 RX ORDER — CEFTRIAXONE 1 G/1
1 INJECTION, POWDER, FOR SOLUTION INTRAMUSCULAR; INTRAVENOUS EVERY 24 HOURS
Status: DISCONTINUED | OUTPATIENT
Start: 2019-12-10 | End: 2019-12-12 | Stop reason: HOSPADM

## 2019-12-10 RX ORDER — NITROGLYCERIN 20 MG/100ML
.07-2 INJECTION INTRAVENOUS CONTINUOUS PRN
Status: DISCONTINUED | OUTPATIENT
Start: 2019-12-10 | End: 2019-12-10 | Stop reason: HOSPADM

## 2019-12-10 RX ORDER — METOPROLOL SUCCINATE 25 MG/1
25 TABLET, EXTENDED RELEASE ORAL DAILY
Status: DISCONTINUED | OUTPATIENT
Start: 2019-12-10 | End: 2019-12-10

## 2019-12-10 RX ORDER — POTASSIUM CHLORIDE 1500 MG/1
20 TABLET, EXTENDED RELEASE ORAL
Status: DISCONTINUED | OUTPATIENT
Start: 2019-12-10 | End: 2019-12-10 | Stop reason: HOSPADM

## 2019-12-10 RX ORDER — FENTANYL CITRATE 50 UG/ML
INJECTION, SOLUTION INTRAMUSCULAR; INTRAVENOUS
Status: DISCONTINUED | OUTPATIENT
Start: 2019-12-10 | End: 2019-12-10 | Stop reason: HOSPADM

## 2019-12-10 RX ORDER — NITROGLYCERIN 5 MG/ML
VIAL (ML) INTRAVENOUS
Status: DISCONTINUED | OUTPATIENT
Start: 2019-12-10 | End: 2019-12-10 | Stop reason: HOSPADM

## 2019-12-10 RX ORDER — NOREPINEPHRINE BITARTRATE 0.06 MG/ML
.03-.4 INJECTION, SOLUTION INTRAVENOUS CONTINUOUS PRN
Status: DISCONTINUED | OUTPATIENT
Start: 2019-12-10 | End: 2019-12-10 | Stop reason: HOSPADM

## 2019-12-10 RX ORDER — LORAZEPAM 2 MG/ML
0.5 INJECTION INTRAMUSCULAR
Status: DISCONTINUED | OUTPATIENT
Start: 2019-12-10 | End: 2019-12-10 | Stop reason: HOSPADM

## 2019-12-10 RX ORDER — LORAZEPAM 0.5 MG/1
0.5 TABLET ORAL
Status: DISCONTINUED | OUTPATIENT
Start: 2019-12-10 | End: 2019-12-10 | Stop reason: HOSPADM

## 2019-12-10 RX ADMIN — ROSUVASTATIN CALCIUM 40 MG: 20 TABLET, FILM COATED ORAL at 08:22

## 2019-12-10 RX ADMIN — PROPRANOLOL HYDROCHLORIDE 60 MG: 60 CAPSULE, EXTENDED RELEASE ORAL at 10:42

## 2019-12-10 RX ADMIN — EZETIMIBE 10 MG: 10 TABLET ORAL at 08:22

## 2019-12-10 RX ADMIN — ALLOPURINOL 100 MG: 100 TABLET ORAL at 08:22

## 2019-12-10 RX ADMIN — ACETAMINOPHEN 975 MG: 325 TABLET, FILM COATED ORAL at 08:21

## 2019-12-10 RX ADMIN — HUMAN ALBUMIN MICROSPHERES AND PERFLUTREN 9 ML: 10; .22 INJECTION, SOLUTION INTRAVENOUS at 16:02

## 2019-12-10 RX ADMIN — NITROGLYCERIN 200 MCG: 5 INJECTION, SOLUTION INTRAVENOUS at 13:04

## 2019-12-10 RX ADMIN — HEPARIN SODIUM 5000 UNITS: 1000 INJECTION, SOLUTION INTRAVENOUS; SUBCUTANEOUS at 13:06

## 2019-12-10 RX ADMIN — ASPIRIN 325 MG: 325 TABLET, DELAYED RELEASE ORAL at 08:22

## 2019-12-10 RX ADMIN — MIDAZOLAM 1 MG: 1 INJECTION INTRAMUSCULAR; INTRAVENOUS at 13:02

## 2019-12-10 RX ADMIN — ADENOSINE 60 MCG: 3 INJECTION, SOLUTION INTRAVENOUS at 13:22

## 2019-12-10 RX ADMIN — SODIUM CHLORIDE: 9 INJECTION, SOLUTION INTRAVENOUS at 09:53

## 2019-12-10 RX ADMIN — MIDAZOLAM 0.5 MG: 1 INJECTION INTRAMUSCULAR; INTRAVENOUS at 13:13

## 2019-12-10 RX ADMIN — LIDOCAINE HYDROCHLORIDE 2 ML: 10 INJECTION, SOLUTION EPIDURAL; INFILTRATION; INTRACAUDAL; PERINEURAL at 13:03

## 2019-12-10 RX ADMIN — ADENOSINE 60 MCG: 3 INJECTION, SOLUTION INTRAVENOUS at 13:20

## 2019-12-10 RX ADMIN — PANTOPRAZOLE SODIUM 40 MG: 40 TABLET, DELAYED RELEASE ORAL at 08:22

## 2019-12-10 RX ADMIN — PRIMIDONE 250 MG: 250 TABLET ORAL at 08:22

## 2019-12-10 RX ADMIN — PRIMIDONE 250 MG: 250 TABLET ORAL at 20:57

## 2019-12-10 RX ADMIN — VERAPAMIL HYDROCHLORIDE 2.5 MG: 2.5 INJECTION, SOLUTION INTRAVENOUS at 13:04

## 2019-12-10 RX ADMIN — ADENOSINE 60 MCG: 3 INJECTION, SOLUTION INTRAVENOUS at 13:21

## 2019-12-10 RX ADMIN — FENTANYL CITRATE 25 MCG: 50 INJECTION, SOLUTION INTRAMUSCULAR; INTRAVENOUS at 13:04

## 2019-12-10 RX ADMIN — FENTANYL CITRATE 50 MCG: 50 INJECTION, SOLUTION INTRAMUSCULAR; INTRAVENOUS at 13:02

## 2019-12-10 RX ADMIN — FENTANYL CITRATE 25 MCG: 50 INJECTION, SOLUTION INTRAMUSCULAR; INTRAVENOUS at 13:12

## 2019-12-10 RX ADMIN — ACETAMINOPHEN 975 MG: 325 TABLET, FILM COATED ORAL at 16:46

## 2019-12-10 RX ADMIN — ADENOSINE 60 MCG: 3 INJECTION, SOLUTION INTRAVENOUS at 13:27

## 2019-12-10 RX ADMIN — HEPARIN SODIUM 2000 UNITS: 1000 INJECTION, SOLUTION INTRAVENOUS; SUBCUTANEOUS at 13:14

## 2019-12-10 RX ADMIN — ADENOSINE 60 MCG: 3 INJECTION, SOLUTION INTRAVENOUS at 13:25

## 2019-12-10 RX ADMIN — ADENOSINE 60 MCG: 3 INJECTION, SOLUTION INTRAVENOUS at 13:28

## 2019-12-10 RX ADMIN — NITROGLYCERIN 0.47 MCG/KG/MIN: 20 INJECTION INTRAVENOUS at 08:29

## 2019-12-10 RX ADMIN — CLOPIDOGREL BISULFATE 75 MG: 75 TABLET ORAL at 08:22

## 2019-12-10 RX ADMIN — MIDAZOLAM 0.5 MG: 1 INJECTION INTRAMUSCULAR; INTRAVENOUS at 13:04

## 2019-12-10 RX ADMIN — CEFTRIAXONE SODIUM 1 G: 1 INJECTION, POWDER, FOR SOLUTION INTRAMUSCULAR; INTRAVENOUS at 09:53

## 2019-12-10 ASSESSMENT — MIFFLIN-ST. JEOR: SCORE: 1718.26

## 2019-12-10 NOTE — PLAN OF CARE
[id number]: A&Ox4. VSS on RA. Denies CP or shortness of breath. On nitroglycerin drip @ 0.47mcg/kg/hr. Heparin drip @ 7. Tele SB/SR 50-60s. Trop peaked at 2.706. up SBA. NPO @ 0000. Plan for angio 12-10.

## 2019-12-10 NOTE — PLAN OF CARE
A/O, up in room SBA. VSS on RA. Tele: SB/SR, HR 50's-60's. IV Rocephin started for UTI. Right radial site post-angio C/D/I, CMS intact. Echo done. Pt denies CP/SOB. Plan for cardiac rehab tomorrow.

## 2019-12-10 NOTE — PRE-PROCEDURE
GENERAL PRE-PROCEDURE:     ASA Class:  Class 3- Severe systemic disease, definite functional limitations

## 2019-12-10 NOTE — PROGRESS NOTES
[facility]  Cardiology Progress Note    Date of Service (when I saw the patient): 12/10/2019  Primary Cardiologist: Dr. Mendoza       Interval History:   He denies recurrent CP, SOB, or palpitations. No new concerns or questions.     ----------------------------------------------------------------------------------------    Assessment:  Iván Nicholas is a 79 year old male who was admitted on 12/9/2019 with chest discomfort.     NSTEMI  -- IV heparin  -- currently chest pain free  -- echo stable     CAD  -- Hx of multiple PCI's  (previous history of PCI to the LAD in 2012 and distal RCA in 4/2019)    HTN  -- controlled     HLD  -- high intensity statin and zetia  -- LDL 69    UTI   -- appreciate hospitalist service     CKD   -- stable    ----------------------------------------------------------------------------------------    Plan:  -- coronary angiogram today (consent signed)  -- resume propanolol but at reduced dose (60 mg daily)  -- cardiac rehab on discharge     ----------------------------------------------------------------------------------------  Physical Exam   Temp: 98.2  F (36.8  C) Temp src: Oral BP: (!) 161/93 Pulse: 56 Heart Rate: 67 Resp: 12 SpO2: 97 % O2 Device: None (Room air)    Vitals:    12/09/19 1255 12/09/19 1622 12/10/19 0613   Weight: 102.1 kg (225 lb) 99.6 kg (219 lb 9.6 oz) 99.7 kg (219 lb 12.8 oz)     GEN:  NAD  HEENT: Mucous membranes moist.  NECK:  No JVD.  C/V:  Regular rate and rhythm, no murmur, rub or gallop.   RESP: CTA, min  GI: Abdomen soft, nontender, nondistended.    EXTREM: No LE edema.   NEURO: Alert and oriented, cooperative.   PSYCH: Normal affect.  SKIN: Warm and dry.   VASC: 2+ radial and dorsalis pedis pulses bilaterally.      Medications     Continuing ACE inhibitor/ARB/ARNI from home medication list OR ACE inhibitor/ARB order already placed during this visit       - MEDICATION INSTRUCTIONS -       - MEDICATION INSTRUCTIONS -       -  MEDICATION INSTRUCTIONS -       HEParin 700 Units/hr (12/10/19 0800)     - MEDICATION INSTRUCTIONS -       nitroGLYcerin 0.47 mcg/kg/min (12/10/19 0829)     - MEDICATION INSTRUCTIONS -       - MEDICATION INSTRUCTIONS -       sodium chloride         acetaminophen  975 mg Oral Q8H     allopurinol  100 mg Oral Daily     aspirin  325 mg Oral Daily     aspirin  81 mg Oral Daily     cefTRIAXone  1 g Intravenous Q24H     clopidogrel  75 mg Oral Daily     ezetimibe  10 mg Oral Daily     pantoprazole  40 mg Oral Daily     primidone  250 mg Oral BID     propranolol ER  60 mg Oral Daily     rosuvastatin  40 mg Oral Daily     sodium chloride (PF)  3 mL Intracatheter Q8H       Data   Reviewed.     Tele: NSR with intermittent sinus bradycardia      Gina Vivas PA-C   12/10/2019  Pager: [phone]

## 2019-12-10 NOTE — PROVIDER NOTIFICATION
MD Notification    Notified Person: MD    Notified Person Name: Darrel    Notification Date/Time: 12/10/19 0623    Notification Interaction: amcon text page    Purpose of Notification: Urine cloudy, request for UA.    Orders Received:    Comments:

## 2019-12-10 NOTE — PROGRESS NOTES
[facility]    Hospitalist Progress Note    Assessment & Plan   Iván Nicholas is a 79 year old male anticoagulated on Plavix with a complex cardiac history including hypertension, and coronary artery disease with a stented LAD and right coronary artery, most recently in 04/2019 who presents to the emergency department today for evaluation of chest pain with NSTEMI .     1. NSTEMI :   -Patient has history of coronary artery disease with history of stents recently last one  April 2019 in  RCA and older one in LAD .  Seen by cardiology. Troponin with initial uptrend. Planning on cor angio today.  -Currently on heparin, crestor, nitroglycerine gtt, plavix, asa, zetia    2. UTI  Nursing noted cloudy urine prompting urinalysis.  Appears grossly infected, upon questioning he does mention dysuria for the past couple of days.  Start IV rocephin pending cultures.     3. HTN : We will hold home medication in setting of nitroglycerin drip at this time     4.GOUT resume allopurinol     5. CKD creatinine at baseline around 1-1.1 at presentation 1.3 start IV fluid hold chlorthalidone    DVT Prophylaxis: Currently on heparin infusion.  Code Status: Full Code    Disposition: Pending angiogram. 1-2 more days.    Fernanda Bowers MD  Text Page (7am to 6pm)    Interval History   Chart reviewed, patient seen. No current pain.  Does report some dysuria in the past few days.    -Data reviewed today: I reviewed all new labs and imaging results over the last 24 hours. I personally reviewed no images or EKG's today.    Physical Exam   Temp: 98.2  F (36.8  C) Temp src: Oral BP: 126/81 Pulse: 56 Heart Rate: 56 Resp: 22 SpO2: 98 % O2 Device: None (Room air)    Vitals:    12/09/19 1255 12/09/19 1622 12/10/19 0613   Weight: 102.1 kg (225 lb) 99.6 kg (219 lb 9.6 oz) 99.7 kg (219 lb 12.8 oz)     Vital Signs with Ranges  Temp:  [97.6  F (36.4  C)-98.2  F (36.8  C)] 98.2  F (36.8  C)  Pulse:  [52-60] 56  Heart Rate:   [53-74] 56  Resp:  [8-32] 22  BP: (121-167)/() 126/81  SpO2:  [93 %-98 %] 98 %  I/O last 3 completed shifts:  In: 540 [P.O.:540]  Out: 725 [Urine:725]    Constitutional: Awake, alert, cooperative, no apparent distress  Respiratory: Clear to auscultation bilaterally, no crackles or wheezing  Cardiovascular: Regular rate and rhythm, normal S1 and S2  GI: Normal bowel sounds, soft, non-distended, non-tender  Skin/Integumen: No rashes, no cyanosis, no edema  Other:      Medications     Continuing ACE inhibitor/ARB/ARNI from home medication list OR ACE inhibitor/ARB order already placed during this visit       - MEDICATION INSTRUCTIONS -       - MEDICATION INSTRUCTIONS -       - MEDICATION INSTRUCTIONS -       HEParin 700 Units/hr (12/10/19 0012)     - MEDICATION INSTRUCTIONS -       nitroGLYcerin 0.47 mcg/kg/min (12/09/19 1915)     - MEDICATION INSTRUCTIONS -       - MEDICATION INSTRUCTIONS -       sodium chloride         acetaminophen  975 mg Oral Q8H     allopurinol  100 mg Oral Daily     aspirin  325 mg Oral Daily     aspirin  81 mg Oral Daily     clopidogrel  75 mg Oral Daily     ezetimibe  10 mg Oral Daily     pantoprazole  40 mg Oral Daily     primidone  250 mg Oral BID     rosuvastatin  40 mg Oral Daily     sodium chloride (PF)  3 mL Intracatheter Q8H       Data   Recent Labs   Lab 12/10/19  0612 12/10/19  0237 12/09/19  2030 12/09/19  1652 12/09/19  1259   WBC  --   --   --   --  10.0   HGB  --   --   --   --  14.5   MCV  --   --   --   --  96   PLT  --   --   --   --  166   NA  --   --   --   --  138   POTASSIUM 4.3  --   --   --  4.0   CHLORIDE  --   --   --   --  104   CO2  --   --   --   --  29   BUN  --   --   --   --  40*   CR  --   --   --   --  1.33*   ANIONGAP  --   --   --   --  5   DONELL  --   --   --   --  9.0   GLC  --   --   --   --  114*   TROPI  --  1.378* 2.223* 2.706* 0.564*       Imaging:   No results found for this or any previous visit (from the past 24 hour(s)).

## 2019-12-11 ENCOUNTER — APPOINTMENT (OUTPATIENT)
Dept: OCCUPATIONAL THERAPY | Facility: CLINIC | Age: 79
DRG: 281 | End: 2019-12-11
Attending: HOSPITALIST
Payer: COMMERCIAL

## 2019-12-11 LAB
ANION GAP SERPL CALCULATED.3IONS-SCNC: 4 MMOL/L (ref 3–14)
BACTERIA SPEC CULT: ABNORMAL
BASOPHILS # BLD AUTO: 0 10E9/L (ref 0–0.2)
BASOPHILS NFR BLD AUTO: 0 %
BUN SERPL-MCNC: 27 MG/DL (ref 7–30)
CALCIUM SERPL-MCNC: 8.5 MG/DL (ref 8.5–10.1)
CHLORIDE SERPL-SCNC: 104 MMOL/L (ref 94–109)
CO2 SERPL-SCNC: 28 MMOL/L (ref 20–32)
CREAT SERPL-MCNC: 1.06 MG/DL (ref 0.66–1.25)
DIFFERENTIAL METHOD BLD: ABNORMAL
EOSINOPHIL # BLD AUTO: 0.1 10E9/L (ref 0–0.7)
EOSINOPHIL NFR BLD AUTO: 1 %
ERYTHROCYTE [DISTWIDTH] IN BLOOD BY AUTOMATED COUNT: 15.3 % (ref 10–15)
GFR SERPL CREATININE-BSD FRML MDRD: 66 ML/MIN/{1.73_M2}
GLUCOSE SERPL-MCNC: 116 MG/DL (ref 70–99)
HCT VFR BLD AUTO: 45 % (ref 40–53)
HGB BLD-MCNC: 15.3 G/DL (ref 13.3–17.7)
INTERPRETATION ECG - MUSE: NORMAL
INTERPRETATION ECG - MUSE: NORMAL
LYMPHOCYTES # BLD AUTO: 1.1 10E9/L (ref 0.8–5.3)
LYMPHOCYTES NFR BLD AUTO: 13 %
Lab: ABNORMAL
MCH RBC QN AUTO: 32.3 PG (ref 26.5–33)
MCHC RBC AUTO-ENTMCNC: 34 G/DL (ref 31.5–36.5)
MCV RBC AUTO: 95 FL (ref 78–100)
MONOCYTES # BLD AUTO: 0.7 10E9/L (ref 0–1.3)
MONOCYTES NFR BLD AUTO: 9 %
NEUTROPHILS # BLD AUTO: 6.3 10E9/L (ref 1.6–8.3)
NEUTROPHILS NFR BLD AUTO: 77 %
PLATELET # BLD AUTO: 177 10E9/L (ref 150–450)
PLATELET # BLD EST: ABNORMAL 10*3/UL
POTASSIUM SERPL-SCNC: 4.1 MMOL/L (ref 3.4–5.3)
RBC # BLD AUTO: 4.73 10E12/L (ref 4.4–5.9)
RBC MORPH BLD: ABNORMAL
SODIUM SERPL-SCNC: 136 MMOL/L (ref 133–144)
SPECIMEN SOURCE: ABNORMAL
WBC # BLD AUTO: 8.2 10E9/L (ref 4–11)

## 2019-12-11 PROCEDURE — 25000125 ZZHC RX 250: Performed by: INTERNAL MEDICINE

## 2019-12-11 PROCEDURE — 25000128 H RX IP 250 OP 636: Performed by: INTERNAL MEDICINE

## 2019-12-11 PROCEDURE — 25000132 ZZH RX MED GY IP 250 OP 250 PS 637: Performed by: HOSPITALIST

## 2019-12-11 PROCEDURE — 99232 SBSQ HOSP IP/OBS MODERATE 35: CPT | Performed by: INTERNAL MEDICINE

## 2019-12-11 PROCEDURE — 97110 THERAPEUTIC EXERCISES: CPT | Mod: GO | Performed by: OCCUPATIONAL THERAPIST

## 2019-12-11 PROCEDURE — 25000132 ZZH RX MED GY IP 250 OP 250 PS 637: Performed by: INTERNAL MEDICINE

## 2019-12-11 PROCEDURE — 85025 COMPLETE CBC W/AUTO DIFF WBC: CPT | Performed by: HOSPITALIST

## 2019-12-11 PROCEDURE — 97165 OT EVAL LOW COMPLEX 30 MIN: CPT | Mod: GO | Performed by: OCCUPATIONAL THERAPIST

## 2019-12-11 PROCEDURE — 36415 COLL VENOUS BLD VENIPUNCTURE: CPT | Performed by: PHYSICIAN ASSISTANT

## 2019-12-11 PROCEDURE — 25000132 ZZH RX MED GY IP 250 OP 250 PS 637: Performed by: PHYSICIAN ASSISTANT

## 2019-12-11 PROCEDURE — 97535 SELF CARE MNGMENT TRAINING: CPT | Mod: GO | Performed by: OCCUPATIONAL THERAPIST

## 2019-12-11 PROCEDURE — 99233 SBSQ HOSP IP/OBS HIGH 50: CPT | Performed by: PHYSICIAN ASSISTANT

## 2019-12-11 PROCEDURE — 36415 COLL VENOUS BLD VENIPUNCTURE: CPT | Performed by: HOSPITALIST

## 2019-12-11 PROCEDURE — 21000001 ZZH R&B HEART CARE

## 2019-12-11 PROCEDURE — 80048 BASIC METABOLIC PNL TOTAL CA: CPT | Performed by: PHYSICIAN ASSISTANT

## 2019-12-11 RX ORDER — METOPROLOL SUCCINATE 50 MG/1
50 TABLET, EXTENDED RELEASE ORAL DAILY
Status: DISCONTINUED | OUTPATIENT
Start: 2019-12-11 | End: 2019-12-12 | Stop reason: HOSPADM

## 2019-12-11 RX ORDER — ISOSORBIDE MONONITRATE 60 MG/1
60 TABLET, EXTENDED RELEASE ORAL DAILY
Status: DISCONTINUED | OUTPATIENT
Start: 2019-12-11 | End: 2019-12-12 | Stop reason: HOSPADM

## 2019-12-11 RX ORDER — LISINOPRIL 20 MG/1
20 TABLET ORAL DAILY
Status: DISCONTINUED | OUTPATIENT
Start: 2019-12-12 | End: 2019-12-11

## 2019-12-11 RX ORDER — OXYMETAZOLINE HYDROCHLORIDE 0.05 G/100ML
2 SPRAY NASAL 2 TIMES DAILY
Status: DISCONTINUED | OUTPATIENT
Start: 2019-12-11 | End: 2019-12-11

## 2019-12-11 RX ORDER — OXYMETAZOLINE HYDROCHLORIDE 0.05 G/100ML
3 SPRAY NASAL ONCE
Status: DISCONTINUED | OUTPATIENT
Start: 2019-12-11 | End: 2019-12-11

## 2019-12-11 RX ORDER — OXYMETAZOLINE HYDROCHLORIDE 0.05 G/100ML
2 SPRAY NASAL 2 TIMES DAILY PRN
Status: DISCONTINUED | OUTPATIENT
Start: 2019-12-11 | End: 2019-12-12 | Stop reason: HOSPADM

## 2019-12-11 RX ORDER — LISINOPRIL 5 MG/1
5 TABLET ORAL DAILY
Status: DISCONTINUED | OUTPATIENT
Start: 2019-12-12 | End: 2019-12-12 | Stop reason: HOSPADM

## 2019-12-11 RX ORDER — LISINOPRIL 5 MG/1
5 TABLET ORAL DAILY
Status: DISCONTINUED | OUTPATIENT
Start: 2019-12-11 | End: 2019-12-11

## 2019-12-11 RX ADMIN — ALLOPURINOL 100 MG: 100 TABLET ORAL at 09:34

## 2019-12-11 RX ADMIN — ASPIRIN 81 MG: 81 TABLET, DELAYED RELEASE ORAL at 09:34

## 2019-12-11 RX ADMIN — OXYMETAZOLINE HYDROCHLORIDE 2 SPRAY: 0.5 SPRAY NASAL at 13:25

## 2019-12-11 RX ADMIN — ISOSORBIDE MONONITRATE 60 MG: 60 TABLET, EXTENDED RELEASE ORAL at 11:46

## 2019-12-11 RX ADMIN — ROSUVASTATIN CALCIUM 40 MG: 20 TABLET, FILM COATED ORAL at 09:33

## 2019-12-11 RX ADMIN — PANTOPRAZOLE SODIUM 40 MG: 40 TABLET, DELAYED RELEASE ORAL at 09:34

## 2019-12-11 RX ADMIN — Medication 1 SPRAY: at 16:55

## 2019-12-11 RX ADMIN — CLOPIDOGREL BISULFATE 75 MG: 75 TABLET ORAL at 09:34

## 2019-12-11 RX ADMIN — PRIMIDONE 250 MG: 250 TABLET ORAL at 20:57

## 2019-12-11 RX ADMIN — EZETIMIBE 10 MG: 10 TABLET ORAL at 09:34

## 2019-12-11 RX ADMIN — METOPROLOL SUCCINATE 50 MG: 50 TABLET, EXTENDED RELEASE ORAL at 09:34

## 2019-12-11 RX ADMIN — LISINOPRIL 5 MG: 5 TABLET ORAL at 09:34

## 2019-12-11 RX ADMIN — Medication 1 SPRAY: at 20:58

## 2019-12-11 RX ADMIN — CEFTRIAXONE SODIUM 1 G: 1 INJECTION, POWDER, FOR SOLUTION INTRAMUSCULAR; INTRAVENOUS at 09:39

## 2019-12-11 RX ADMIN — PRIMIDONE 250 MG: 250 TABLET ORAL at 09:34

## 2019-12-11 ASSESSMENT — MIFFLIN-ST. JEOR: SCORE: 1710.09

## 2019-12-11 NOTE — PROGRESS NOTES
[facility]  Cardiology Progress Note    Date of Service (when I saw the patient): 12/11/2019  Primary Cardiologist: Dr. Mendoza       Interval History:   Nose bleed this morning. Gets this once in a while. No new symptoms. No recurrent CP.     ----------------------------------------------------------------------------------------    Assessment:  Iván Nicholas is a 79 year old male who was admitted on 12/9/2019 with chest discomfort.     NSTEMI  -- repeat cor angiogram does not show any new lesions  -- medical management    Systolic Cardiomyopathy (stress?)   -- Echo showed LVEF 35% with WMA suggestive for stress cardiomyopathy  -- last echo was in 2016 showed preserved biventricular function     CAD  -- Hx of multiple PCI's  (previous history of PCI to the LAD in 2012 and distal RCA in 4/2019)    HTN  -- high today    HLD  -- high intensity statin and zetia  -- LDL 69    UTI   -- appreciate hospitalist service     CKD   -- stable    ----------------------------------------------------------------------------------------    Plan:  -- Switch propanolol to metoprolol succinate 50 mg daily   -- Start lisinopril 5 mg   -- Check BMP today   -- Cardiology follow up in 1-2 weeks with repeat BMP   -- Echo in 1 month to check EF    ----------------------------------------------------------------------------------------  Physical Exam   Temp: 98.1  F (36.7  C) Temp src: Oral BP: (!) 150/78 Pulse: 53 Heart Rate: 53 Resp: 20 SpO2: 98 % O2 Device: None (Room air) Oxygen Delivery: 4 LPM  Vitals:    12/09/19 1622 12/10/19 0613 12/11/19 0500   Weight: 99.6 kg (219 lb 9.6 oz) 99.7 kg (219 lb 12.8 oz) 98.9 kg (218 lb)     GEN:  NAD  HEENT: Mucous membranes moist.  NECK:  No JVD.  C/V:  Regular rate and rhythm, no murmur, rub or gallop.   RESP: CTA, min  GI: Abdomen soft, nontender, nondistended.    EXTREM: No LE edema.   NEURO: Alert and oriented, cooperative.   PSYCH: Normal affect.  SKIN: Warm and dry.    VASC: 2+ radial and dorsalis pedis pulses bilaterally.      Medications     Continuing ACE inhibitor/ARB/ARNI from home medication list OR ACE inhibitor/ARB order already placed during this visit       - MEDICATION INSTRUCTIONS -       - MEDICATION INSTRUCTIONS -       - MEDICATION INSTRUCTIONS -       - MEDICATION INSTRUCTIONS -       - MEDICATION INSTRUCTIONS -       sodium chloride Stopped (12/10/19 7655)       acetaminophen  975 mg Oral Q8H     allopurinol  100 mg Oral Daily     aspirin  81 mg Oral Daily     cefTRIAXone  1 g Intravenous Q24H     clopidogrel  75 mg Oral Daily     ezetimibe  10 mg Oral Daily     lisinopril  5 mg Oral Daily     metoprolol succinate ER  50 mg Oral Daily     pantoprazole  40 mg Oral Daily     primidone  250 mg Oral BID     rosuvastatin  40 mg Oral Daily     sodium chloride (PF)  3 mL Intracatheter Q8H       Data   Reviewed.     Tele: NSR with intermittent sinus bradycardia      Gina Vivas PA-C   12/11/2019  Pager: [phone]

## 2019-12-11 NOTE — PROGRESS NOTES
Reviewed BP response to re-initiation of PTA Imdur. BP as low as 96/70. Pt felt dizzy with cardiac rehab. Most recent /74. Will reduce Lisinopril dosing scheduled for tomorrow from 20 mg to 5 mg po every day. Pt received 5 mg today. Can uptitrate Lisinopril as able. Remainder of antihypertensives as per my progress note.

## 2019-12-11 NOTE — PROGRESS NOTES
[facility]    Medicine Progress Note - Hospitalist Service       Date of Admission:  12/9/2019    Assessment & Plan   Iván Nicholas is a 79 year old male with PMHx of CAD s/p PCI to LAD (2012) and distal RCA (4/2019), HTN, hyperlipidemia, CKD III, and gout who presented to the ED on 12/9/19 for further evaluation of chest pain. Admitted with NSTEMI.      NSTEMI   HFrEF, not in acute exacerbation, new  CAD s/p PCI to LAD (2012) and distal RCA (4/2019)  HTN, hyperlipidemia: Pain started on day of admission while pt was sitting at computer. Described as a midsternal chest tightness with associated back and arm tightness. EMS was called and pt was given  mg X1 and three sublingual nitroglycerin. Trop peak 2.223. BMP with mildly elevated creatinine 1.33. CBC unremarkable. EKG on admission with NSR, no overt ST depression, elevation or T wave abnormalities, repeat EKG with TWI in V2-6. Started on nitroglyerin gtt and heparin gtt on admission. Echocardiogram with EF 36%, severe hypokinesis of the apical wall segments with relatively preserved wall motion in basal segments--note significant change from prior echo from 11/2016. Angiogram 12/10/19 with moderate on-flow limiting LAD/diagonal and ramus disease, patent distal RCA stent.   - Cardiology following, appreciate assistance   - ASA 81 mg po every day, Plavix 75 mg po every day, Zetia 10 mg po every day, Crestor 40 mg po every day   - Lisinopril 5 mg po every day ordered 12/11, discussed with cardiology, plan to increase to PTA dosing of 20 mg po every day, first dose 12/12/19  - Toprol XL 50 mg po every day (new, in place of PTA Propranolol)  - Note PTA Chlorthalidone 25 mg po every day and Imdur 60 mg po every day on hold since admission. Discussed with Cardiology, restart Imdur 12/11/19   - Telemetry   - Strict I&Os, daily weights   - CBC and BMP pending     Vitals:    12/09/19 1622 12/10/19 0613 12/11/19 0500   Weight: 99.6 kg  (219 lb 9.6 oz) 99.7 kg (219 lb 12.8 oz) 98.9 kg (218 lb)     Intake/Output Summary (Last 24 hours) at 12/11/2019 1102  Last data filed at 12/11/2019 1000  Gross per 24 hour   Intake 1649 ml   Output 1250 ml   Net 399 ml     Epistaxis, right nare: Started 0830 on 12/11. Pt without prior hx of epistaxis. No recent URI, nasal trauma, or reported nose-picking. Maintained on Aspirin and Plavix due to CAD and stent in 4/2019 above. Was also maintained on IV heparin on admission up until angiogram 12/10.   - Seems to be slowing down, monitor closely. Try afrin. If continues will need to ask house officer to evaluate and consider placing rhino rocket.   - Hgb pending      UTI: UA with large LE, >182 WBCs. Urine culture with [id number] colonies of E. Coli. Reports dysuria X a few days.   - Continue IV Ceftriaxone, follow urine culture sensitivities       Gout: Continue Allopurinol 100 mg po every day.      CKD II: Baseline creatinine 1.0-1.1. Hydrated with IVF on admission.    - Repeat BMP   - PTA Chlorthalidone on hold since admission     GERD: Continue PTA Protonix     Essential tremor: Continue PTA Primidone      Diet: Combination Diet Low Saturated Fat Na <2400mg Diet    DVT Prophylaxis: Pneumatic Compression Devices and Ambulate every shift  Siddiqui Catheter: not present  Code Status: Full Code      Disposition Plan   Expected discharge: Tomorrow, recommended to prior living arrangement once cardiac medications are fully optimized and epistaxis is resolved. .  Entered: Arianne Marlow PA-C 12/11/2019, 9:07 AM     The patient's care was discussed with the Attending Physician, Dr. Foley, Bedside Nurse and Patient.    Arianne Marlow PA-C  Hospitalist Service  [facility]  ______________________________________________________________________    Interval History   Left sided epistaxis began at 0830, slowly improving. No hx of epistaxis. No recent URI, nose-blowing or  nose-picking reported. Pt denies dizziness or lightheadedness. No chest pain. Cardiology following. Denies any recent particularly stressful or emotional events in his life.     Data reviewed today: I reviewed all medications, new labs and imaging results over the last 24 hours.     Physical Exam   Vital Signs: Temp: 98.2  F (36.8  C) Temp src: Oral BP: (!) 167/88 Pulse: 53 Heart Rate: 56 Resp: 16 SpO2: 99 % O2 Device: None (Room air) Oxygen Delivery: 4 LPM  Weight: 218 lbs 0 oz    CONSTITUTIONAL: Pt laying in bed, dressed in hospital garb. Appears comfortable, right-sided epistaxis noted. Cooperative with interview.  HEENT: Normocephalic, atraumatic. No appreciable posterior pharyngeal drainage moist; negative for ulcerations, erythema, or exudates.  Dentition in good repair.   CARDIOVASCULAR: RRR, no murmurs, rubs, or extra heart sounds appreciated. Pulses +2/4 and regular in upper and lower extremities, bilaterally.   RESPIRATORY: No increased work of breathing.  CTA, bilat; no wheezes, rales, or rhonchi appreciated.  GASTROINTESTINAL:  Abdomen soft, non-distended. BS auscultated in all four quadrants. Negative for tenderness to palpation.  No masses or organomegaly noted.  MUSCULOSKELETAL: No gross deformities noted. Normal muscle tone.   HEMATOLOGIC/LYMPHATIC/IMMUNOLOGIC: Negative for lower extremity edema, bilaterally.  NEUROLOGIC: Alert and oriented to person, place, and time.  strength intact. No focal neuro deficits   SKIN: Warm, dry, intact. No jaundice noted. Negative for suspicious lesions, rashes, bruising, open sores or abrasions.     Data   Recent Labs   Lab 12/11/19  1040 12/10/19  0612 12/10/19  0237 12/09/19  2030 12/09/19  1652 12/09/19  1259   WBC  --   --   --   --   --  10.0   HGB  --   --   --   --   --  14.5   MCV  --   --   --   --   --  96   PLT  --   --   --   --   --  166     --   --   --   --  138   POTASSIUM 4.1 4.3  --   --   --  4.0   CHLORIDE 104  --   --   --   --  104    CO2 28  --   --   --   --  29   BUN 27  --   --   --   --  40*   CR 1.06  --   --   --   --  1.33*   ANIONGAP 4  --   --   --   --  5   DONELL 8.5  --   --   --   --  9.0   *  --   --   --   --  114*   TROPI  --   --  1.378* 2.223* 2.706* 0.564*     Recent Results (from the past 24 hour(s))   Cardiac Catheterization    Narrative    1. Moderate non-flow limiting LAD/diagonal and Ramus disease  2. Patent distal RCA stent   Echocardiogram Complete    Narrative    [id number]  [id number]  [id number]^GREGORY^GENE^BIRGIT           [facility]  Echocardiography Laboratory  [address] [place] [address]        Name: GREG VALDERRAMA  MRN: [id number]  : 1940  Study Date: 12/10/2019 03:24 PM  Age: 79 yrs  Gender: Male  Patient Location: [facility]  Reason For Study: Chest Pain, Chest Tightness, Chest Pressure  Ordering Physician: GENE JENKINS  Referring Physician: Stephan Nice  Performed By: Suma Waddell RDCS     BSA: 2.2 m2  Height: 70 in  Weight: 219 lb  HR: 60  BP: 151/92 mmHg  _____________________________________________________________________________  __        Procedure  Complete Portable Echo Adult. Optison (NDC #0374-8250) given intravenously.  _____________________________________________________________________________  __        Interpretation Summary     Left ventricular systolic function is moderate to severely reduced. LVEF 36%  based on biplane 2D tracing.  Severe hypokinesis of the apical wall segments with relatively preserved wall  motion in the basal wall segments.  This pattern can be seen in stress cardiomyopathy or multivessel coronary  artery disease.  Right ventricle is normal in structure, function and size.  No significant valvular abnormalities.     This study was compared to a prior TTE from 2016, left ventricular  systolic function has decreased.     Inpatient cardiology team  notified.  _____________________________________________________________________________  __        Left Ventricle  The left ventricle is normal in size. There is concentric remodeling present.  Left ventricular systolic function is moderate to severely reduced. LVEF 36%  based on biplane 2D tracing. Grade II or moderate diastolic dysfunction.  Severe hypokinesis of the apical wall segments, mild hypokinesis of the mid  wall segments, and preserved wall motion in the basal wall segments.  This pattern can be seen in stress cardiomyopathy or multivessel coronary  artery disease. There is no thrombus seen in the left ventricle.     Right Ventricle  The right ventricle is normal in structure, function and size.     Atria  The left atrium is mildly dilated. Right atrial size is normal.     Mitral Valve  The mitral valve is normal in structure and function.        Tricuspid Valve  There is trace to mild tricuspid regurgitation. The right ventricular systolic  pressure is approximated at 39mmHg plus the right atrial pressure.     Aortic Valve  The aortic valve is trileaflet. There is mild trileaflet aortic sclerosis.  There is trace aortic regurgitation.     Pulmonic Valve  There is trace pulmonic valvular regurgitation.     Vessels  The aortic root is normal size. Normal size ascending aorta. Inferior vena  cava not well visualized for estimation of right atrial pressure.     Pericardium  There is no pericardial effusion.     _____________________________________________________________________________  __  MMode/2D Measurements & Calculations  IVSd: 1.2 cm  LVIDd: 4.6 cm  LVIDs: 2.9 cm  LVPWd: 1.2 cm  FS: 37.7 %  LV mass(C)d: 207.7 grams  LV mass(C)dI: 95.7 grams/m2     Ao root diam: 3.2 cm  LA dimension: 4.6 cm  asc Aorta Diam: 3.7 cm  LA/Ao: 1.4  LA Volume (BP): 83.3 ml  LA Volume Index (BP): 38.4 ml/m2  RWT: 0.52        Doppler Measurements & Calculations  MV E max patsy: 54.3 cm/sec  MV A max patsy: 87.8 cm/sec  MV E/A:  0.62  MV dec slope: 256.5 cm/sec2     PA acc time: 0.11 sec  TR max patsy: 313.8 cm/sec  TR max P.4 mmHg  E/E' avg: 15.4  Lateral E/e': 11.9  Medial E/e': 18.9           _____________________________________________________________________________  __           Report approved by: Micheal Cavazos 12/10/2019 04:32 PM        Medications     Continuing ACE inhibitor/ARB/ARNI from home medication list OR ACE inhibitor/ARB order already placed during this visit       - MEDICATION INSTRUCTIONS -       - MEDICATION INSTRUCTIONS -       - MEDICATION INSTRUCTIONS -       - MEDICATION INSTRUCTIONS -       - MEDICATION INSTRUCTIONS -         acetaminophen  975 mg Oral Q8H     allopurinol  100 mg Oral Daily     aspirin  81 mg Oral Daily     cefTRIAXone  1 g Intravenous Q24H     clopidogrel  75 mg Oral Daily     ezetimibe  10 mg Oral Daily     isosorbide mononitrate  60 mg Oral Daily     [START ON 2019] lisinopril  20 mg Oral Daily     metoprolol succinate ER  50 mg Oral Daily     pantoprazole  40 mg Oral Daily     primidone  250 mg Oral BID     rosuvastatin  40 mg Oral Daily     sodium chloride (PF)  3 mL Intracatheter Q8H

## 2019-12-11 NOTE — PROGRESS NOTES
Pt with nose bleed, right nare. Stopped and re-started x2. Pressure held on bridge of nose and ice applied for 15 minutes, but nose bleed continues to trickle. Arianne Marlow PA-C notified and will evaluate pt.

## 2019-12-11 NOTE — SIGNIFICANT EVENT
House Provider Note    I came to evaluate this patient for persistent nose bleed which began at ~0800 this morning. The patient is a 80 yo man with known essential hypertension, gout, CKD, GERD, and coronary artery disease s/p PCI - stented LAD and right coronary artery most recently in 04/2019, taking dual antiplatelet therapy (aspirin and plavix)  who was admitted from [facility] ED 12/9/2019 given chest pain with NSTEMI.     Before my evaluation, the patient has had a persistent nose bleed since 0800. The bleeding began after the patient started blowing his nose due to congestion. The patient has tried blocking his nose with a tissue; however, the bleeding persists whenever the tissue is removed. The patient is swallowing blood as well. He denied chest pain or shortness of breath.     During my evaluation, the patient still had a tissue plug to his right nares. He reported that he was still swallowing some blood but not short of breath.     Exam:  Gen: NAD  HEENT: kleenex with dried blood in right nares, small amount of red blood present in oropharynx    Neck: supple  Lungs: CTAB, no W/R/R, no accessory muscle use   CV: RRR, no M/G/R  Abd: non tender, non distended  Ext: no clubbing/cyanosis/edema  Skin: no obvious bruising or open wounds   Neuro: AOx3    Labs/Imaging Reviewed    A/P:  My impression is that the patient has epistaxis due to exposed anterior nasal vessel; however, a small exposed posterior nasopharyngeal vessel can't be excluded. Exacerbating factors include dry air and dual anti platelet therapy. Will first pursue more aggressive medical therapy before consulting ENT (may need nasal packing with Rhino rocket +/- scoping nasopharynx w/ vessel ligation).   -afrin spray ordered, will need 2 sprays to right nares   -patient instructed to pinch alae tightly against septum and hold for ~10 minutes   -nasal saline ordered   -will need ENT consult if aforementioned interventions don't resolve nose bleed     I  spent 20 minutes on the unit/floor managing the care of Iván Jacksonodore Cristopher. Over 50% of my time was spent counseling the patient and/or coordinating care regarding services listed in this note.    Todd Aguilar MD  House Provider/[facility]

## 2019-12-11 NOTE — PLAN OF CARE
A&Ox4 w/ VSS. RA. Tele is SR. Right radial site CDI, soft. Good CMS in right arm. Denies pain. Up SBA. Will continue POC.

## 2019-12-11 NOTE — PROGRESS NOTES
It appears patient had been on lisinopril 20 mg but this was held on admission- his BP is still suboptimal. We will have him resume lisinopril 20 mg and his pta Imdur 60 mg. He has follow up in cardiology in 1 week with repeat BMP.    Gina Vivas PA-C   12/11/2019  Pager: [phone]

## 2019-12-11 NOTE — PLAN OF CARE
OT/CR: attempted to see pt twice today, pt's nose bleeding, nursing aware.    PM: Attempted, pt's nose still bleeding, nursing present and aware.

## 2019-12-11 NOTE — PROGRESS NOTES
Nose continues to bleed slowly. Arianne Marlow PA-C updated. Also discussed with Dr. Aguilar who will evaluate patient. Afrin nasal spray ordered and will be given. Continue to monitor.

## 2019-12-11 NOTE — PROGRESS NOTES
12/11/19 1414   Quick Adds   Type of Visit Initial Occupational Therapy Evaluation   Living Environment   Lives With spouse   Living Arrangements house   Home Accessibility stairs to enter home;stairs within home   Number of Stairs, Main Entrance 2   Number of Stairs, Within Home, Primary   (15, basement, )   Transportation Anticipated car, drives self;family or friend will provide   Self-Care   Regular Exercise Yes   Activity/Exercise Type   (Silver Sneakers 1-2x/wk)   Functional Level   Ambulation 0-->independent   Transferring 0-->independent   Toileting 0-->independent   Bathing 0-->independent   Dressing 0-->independent   Eating 0-->independent   Communication 0-->understands/communicates without difficulty   Swallowing 0-->swallows foods/liquids without difficulty   Cognition 0 - no cognition issues reported   Fall history within last six months no   General Information   Onset of Illness/Injury or Date of Surgery - Date 12/09/19   Referring Physician Sourav Forman MD   Patient/Family Goals Statement home   Additional Occupational Profile Info/Pertinent History of Current Problem Iván Nicholas is a 79 year old male who was admitted on 12/9/2019 with chest discomfort. repeat cor angiogram does not show any new lesions, medical mgmt.  Echo showed LVEF 35% with WMA suggestive for stress cardiomyopathy   Precautions/Limitations fall precautions  (post angio precautions)   Heart Disease Risk Factors High blood pressure;Dislipidemia;Overweight;Stress;Family history;Medical history;Gender;Age   General Info Comments Pt's nose stopped bleeding, OK for OT/CR   Cognitive Status Examination   Orientation orientation to person, place and time   Level of Consciousness alert   Follows Commands (Cognition) WNL   Memory intact   Visual Perception   Visual Perception Wears glasses  (for reading)   Sensory Examination   Sensory Quick Adds No deficits were identified   Pain Assessment   Patient Currently in  "Pain No   Transfer Skills   Transfer Comments Pt I with ADL's and functional mobility at this time SBA due to lightheadedness, once resolves pt will be independent.    Instrumental Activities of Daily Living (IADL)   Previous Responsibilities yardwork;driving;finances;medication management;shopping;laundry;housekeeping;meal prep   Activities of Daily Living Analysis   Impairments Contributing to Impaired Activities of Daily Living post surgical precautions  (lightheadedness, decreased activity tolerance)   General Therapy Interventions   Planned Therapy Interventions home program guidelines;progressive activity/exercise;risk factor education   Clinical Impression   Criteria for Skilled Therapeutic Interventions Met yes, treatment indicated   OT Diagnosis decreased IADL's   Influenced by the following impairments impaired activity tolerance, lightheadedness   Assessment of Occupational Performance 1-3 Performance Deficits   Identified Performance Deficits impaired I with strenous IADL's ie yardwork, vacuuming, etc   Clinical Decision Making (Complexity) Low complexity   Therapy Frequency Daily   Predicted Duration of Therapy Intervention (days/wks) 2 days   Anticipated Discharge Disposition Home with Outpatient Therapy  (possible OP CR)   Risks and Benefits of Treatment have been explained. Yes   Patient, Family & other staff in agreement with plan of care Yes   [facility] AM-PAC  \"6 Clicks\" Daily Activity Inpatient Short Form   1. Putting on and taking off regular lower body clothing? 4 - None   2. Bathing (including washing, rinsing, drying)? 3 - A Little   3. Toileting, which includes using toilet, bedpan or urinal? 3 - A Little   4. Putting on and taking off regular upper body clothing? 4 - None   5. Taking care of personal grooming such as brushing teeth? 4 - None   6. Eating meals? 4 - None   Daily Activity Raw Score (Score out of 24.Lower scores equate to lower levels of function) 22   Total Evaluation " Time   Total Evaluation Time (Minutes) 10

## 2019-12-11 NOTE — PLAN OF CARE
Discharge Planner OT   Patient plan for discharge: home  Current status: Eval completed and treatment initiated. Pt lives in a house with his wife and reports I with ADL/IADL's and mobility. Pt admitted due to chest pain, s/p angio, indicates no cuprit lesion and cards recommend medical mgmt, EF was found to be 36%, per cards possible stress cardiomyopathy.     Pt had nose bleed all am and unable to see pt, however, in pm bleeding stopped. Attempted to ambulate, however, pt reports feeling lightheaded, orthostatics taken and noted BP dropped from sitting to standing, see vital sign flow sheet. Deferred walking at this time and pt encouraged to sit in chair to drink fluids and pt's wife ordered food, nursing, notified and will walk with pt if appropriate later. Pt and wife educated in CAD risk factors, pt receptive to info.   Barriers to return to prior living situation: none   Recommendations for discharge: home with A with strenuous IADL's ie snow removal, vacuuming, etc and possible OP CR, pending cards and possible stress CM diagnosis, per chart  Rationale for recommendations: pt unable to ambulate during session due to lightheadedness but will walk with nursing later as able. Possible OP CR warranted  for monitored progressive exercise and risk factor education and modification pending MD at discharge.         Entered by: Verna Hooper 12/11/2019 3:15 PM

## 2019-12-12 ENCOUNTER — APPOINTMENT (OUTPATIENT)
Dept: OCCUPATIONAL THERAPY | Facility: CLINIC | Age: 79
DRG: 281 | End: 2019-12-12
Payer: COMMERCIAL

## 2019-12-12 VITALS
RESPIRATION RATE: 20 BRPM | WEIGHT: 218.3 LBS | SYSTOLIC BLOOD PRESSURE: 137 MMHG | DIASTOLIC BLOOD PRESSURE: 73 MMHG | OXYGEN SATURATION: 100 % | HEART RATE: 55 BPM | BODY MASS INDEX: 31.25 KG/M2 | TEMPERATURE: 98.1 F | HEIGHT: 70 IN

## 2019-12-12 LAB
ANION GAP SERPL CALCULATED.3IONS-SCNC: 2 MMOL/L (ref 3–14)
BASOPHILS # BLD AUTO: 0 10E9/L (ref 0–0.2)
BASOPHILS NFR BLD AUTO: 0.2 %
BUN SERPL-MCNC: 38 MG/DL (ref 7–30)
CALCIUM SERPL-MCNC: 8.5 MG/DL (ref 8.5–10.1)
CHLORIDE SERPL-SCNC: 101 MMOL/L (ref 94–109)
CO2 SERPL-SCNC: 30 MMOL/L (ref 20–32)
CREAT SERPL-MCNC: 1.43 MG/DL (ref 0.66–1.25)
DIFFERENTIAL METHOD BLD: ABNORMAL
EOSINOPHIL # BLD AUTO: 0.2 10E9/L (ref 0–0.7)
EOSINOPHIL NFR BLD AUTO: 3.4 %
ERYTHROCYTE [DISTWIDTH] IN BLOOD BY AUTOMATED COUNT: 15.3 % (ref 10–15)
GFR SERPL CREATININE-BSD FRML MDRD: 46 ML/MIN/{1.73_M2}
GLUCOSE SERPL-MCNC: 90 MG/DL (ref 70–99)
HCT VFR BLD AUTO: 39.7 % (ref 40–53)
HGB BLD-MCNC: 13.2 G/DL (ref 13.3–17.7)
IMM GRANULOCYTES # BLD: 0 10E9/L (ref 0–0.4)
IMM GRANULOCYTES NFR BLD: 0.2 %
LYMPHOCYTES # BLD AUTO: 1.5 10E9/L (ref 0.8–5.3)
LYMPHOCYTES NFR BLD AUTO: 26.8 %
MCH RBC QN AUTO: 31.7 PG (ref 26.5–33)
MCHC RBC AUTO-ENTMCNC: 33.2 G/DL (ref 31.5–36.5)
MCV RBC AUTO: 95 FL (ref 78–100)
MONOCYTES # BLD AUTO: 0.7 10E9/L (ref 0–1.3)
MONOCYTES NFR BLD AUTO: 12.1 %
NEUTROPHILS # BLD AUTO: 3.2 10E9/L (ref 1.6–8.3)
NEUTROPHILS NFR BLD AUTO: 57.3 %
NRBC # BLD AUTO: 0 10*3/UL
NRBC BLD AUTO-RTO: 0 /100
PLATELET # BLD AUTO: 135 10E9/L (ref 150–450)
POTASSIUM SERPL-SCNC: 4.7 MMOL/L (ref 3.4–5.3)
RBC # BLD AUTO: 4.16 10E12/L (ref 4.4–5.9)
SODIUM SERPL-SCNC: 133 MMOL/L (ref 133–144)
WBC # BLD AUTO: 5.6 10E9/L (ref 4–11)

## 2019-12-12 PROCEDURE — 99239 HOSP IP/OBS DSCHRG MGMT >30: CPT | Performed by: HOSPITALIST

## 2019-12-12 PROCEDURE — 25000132 ZZH RX MED GY IP 250 OP 250 PS 637: Performed by: HOSPITALIST

## 2019-12-12 PROCEDURE — 85025 COMPLETE CBC W/AUTO DIFF WBC: CPT | Performed by: PHYSICIAN ASSISTANT

## 2019-12-12 PROCEDURE — 97110 THERAPEUTIC EXERCISES: CPT | Mod: GO | Performed by: OCCUPATIONAL THERAPIST

## 2019-12-12 PROCEDURE — 80048 BASIC METABOLIC PNL TOTAL CA: CPT | Performed by: PHYSICIAN ASSISTANT

## 2019-12-12 PROCEDURE — 25000132 ZZH RX MED GY IP 250 OP 250 PS 637: Performed by: PHYSICIAN ASSISTANT

## 2019-12-12 PROCEDURE — 25000128 H RX IP 250 OP 636: Performed by: INTERNAL MEDICINE

## 2019-12-12 PROCEDURE — 36415 COLL VENOUS BLD VENIPUNCTURE: CPT | Performed by: PHYSICIAN ASSISTANT

## 2019-12-12 PROCEDURE — 99232 SBSQ HOSP IP/OBS MODERATE 35: CPT | Performed by: INTERNAL MEDICINE

## 2019-12-12 RX ORDER — NITROGLYCERIN 0.4 MG/1
0.4 TABLET SUBLINGUAL EVERY 5 MIN PRN
Qty: 25 TABLET | Refills: 11 | Status: SHIPPED | OUTPATIENT
Start: 2019-12-12

## 2019-12-12 RX ORDER — OXYMETAZOLINE HYDROCHLORIDE 0.05 G/100ML
2 SPRAY NASAL 2 TIMES DAILY PRN
COMMUNITY
Start: 2019-12-12 | End: 2021-12-07

## 2019-12-12 RX ORDER — METOPROLOL SUCCINATE 50 MG/1
50 TABLET, EXTENDED RELEASE ORAL DAILY
Qty: 30 TABLET | Refills: 0 | Status: SHIPPED | OUTPATIENT
Start: 2019-12-13 | End: 2019-12-19

## 2019-12-12 RX ORDER — LISINOPRIL 5 MG/1
5 TABLET ORAL DAILY
Qty: 30 TABLET | Refills: 0 | Status: SHIPPED | OUTPATIENT
Start: 2019-12-13 | End: 2019-12-19

## 2019-12-12 RX ORDER — ACETAMINOPHEN 325 MG/1
650 TABLET ORAL EVERY 4 HOURS PRN
COMMUNITY
Start: 2019-12-12

## 2019-12-12 RX ORDER — CEFDINIR 300 MG/1
300 CAPSULE ORAL 2 TIMES DAILY
Qty: 8 CAPSULE | Refills: 0 | Status: SHIPPED | OUTPATIENT
Start: 2019-12-12 | End: 2019-12-19

## 2019-12-12 RX ADMIN — CEFTRIAXONE SODIUM 1 G: 1 INJECTION, POWDER, FOR SOLUTION INTRAMUSCULAR; INTRAVENOUS at 10:20

## 2019-12-12 RX ADMIN — ROSUVASTATIN CALCIUM 40 MG: 20 TABLET, FILM COATED ORAL at 09:00

## 2019-12-12 RX ADMIN — ALLOPURINOL 100 MG: 100 TABLET ORAL at 08:59

## 2019-12-12 RX ADMIN — EZETIMIBE 10 MG: 10 TABLET ORAL at 09:00

## 2019-12-12 RX ADMIN — LISINOPRIL 5 MG: 5 TABLET ORAL at 08:59

## 2019-12-12 RX ADMIN — PRIMIDONE 250 MG: 250 TABLET ORAL at 08:59

## 2019-12-12 RX ADMIN — ASPIRIN 81 MG: 81 TABLET, DELAYED RELEASE ORAL at 09:00

## 2019-12-12 RX ADMIN — PANTOPRAZOLE SODIUM 40 MG: 40 TABLET, DELAYED RELEASE ORAL at 08:59

## 2019-12-12 RX ADMIN — CLOPIDOGREL BISULFATE 75 MG: 75 TABLET ORAL at 09:00

## 2019-12-12 RX ADMIN — ISOSORBIDE MONONITRATE 60 MG: 60 TABLET, EXTENDED RELEASE ORAL at 08:59

## 2019-12-12 RX ADMIN — METOPROLOL SUCCINATE 50 MG: 50 TABLET, EXTENDED RELEASE ORAL at 08:59

## 2019-12-12 RX ADMIN — ACETAMINOPHEN 975 MG: 325 TABLET, FILM COATED ORAL at 08:59

## 2019-12-12 ASSESSMENT — MIFFLIN-ST. JEOR: SCORE: 1711.45

## 2019-12-12 NOTE — PROGRESS NOTES
Pt denied pain. vitals stable. IV removed w/o s/s of infection. Reviewed/gave d/c instructions including medications, limb restriction/site care and follow ups. Pt verbalized understanding. right radial site CDI, good distal pulse, no bruit or hematoma noted. Leaving unit with all belongings. New RX meds to be picked up at own pharmacy. Transport provided by Spouse, leaving unit via wheel chair.

## 2019-12-12 NOTE — PLAN OF CARE
Pt A&Ox4 w/ VSS. RA. Tele is SR. Denies pain. Right radial site CDI, good pulses. No nosebleeds overnight. Plan for early discharge this AM.

## 2019-12-12 NOTE — DISCHARGE SUMMARY
[facility]    Discharge Summary  Hospitalist    Date of Admission:  12/9/2019  Date of Discharge:  12/12/2019  Discharging Provider: Zi Foley MD  Date of Service (when I saw the patient): 12/12/19    Discharge Diagnoses   Acute NSTEMI   HFrEF, not in acute exacerbation, new  CAD s/p PCI to LAD (2012) and distal RCA (4/2019)  Hypertension  Hyperlipidemia  Epistaxis, right nare  UTI  Gout  CKD, stage II  GERD  Essential tremor    History of Present Illness   Iván Nicholas is a 79 year old male with PMHx of CAD s/p PCI to LAD (2012) and distal RCA (4/2019), HTN, hyperlipidemia, CKD III, and gout who presented to the ED on 12/9/19 for further evaluation of chest pain. Admitted with NSTEMI.     Hospital Course   Iván Nicholas was admitted on 12/9/2019.  The following problems were addressed during his hospitalization:    Acute NSTEMI   HFrEF, not in acute exacerbation, new  CAD s/p PCI to LAD (2012) and distal RCA (4/2019)  Hypertension  Hyperlipidemia  Pain started on day of admission while pt was sitting at computer. Described as a midsternal chest tightness with associated back and arm tightness. EMS was called and pt was given  mg X1 and three sublingual nitroglycerin. Trop peak 2.223. BMP with mildly elevated creatinine 1.33. CBC unremarkable. EKG on admission with NSR, no overt ST depression, elevation or T wave abnormalities, repeat EKG with TWI in V2-6. Started on nitroglyerin gtt and heparin gtt on admission. Echocardiogram with EF 36%, severe hypokinesis of the apical wall segments with relatively preserved wall motion in basal segments--note significant change from prior echo from 11/2016.  - Cardiology consulted, appreciate assistance  - Angiogram 12/10/19 with moderate on-flow limiting LAD/diagonal and ramus disease, patent distal RCA stent. Medical management recommended.  - Continued PTA ASA 81 mg po every day, Plavix 75 mg po every day, Imdur 60 mg  daily, Zetia 10 mg po every day, Crestor 40 mg po every day.  - Lisinopril decreased to 5 mg PO daily due to lower blood pressures and dizziness (resolved).  - Propranolol switched to Toprol XL per cardiology recommendations.  - PTA chlorthalidone discontinued.  - Doing well s/p angiogram. Has been ambulating with cardiac rehab without any difficulty/symptoms.  - Discharge home today.  - Follow-up with cardiology in 1-2 weeks with repeat BMP, cardiac rehab, and PCP.     Epistaxis, right nare  Started 0830 on 12/11. Pt without prior hx of epistaxis. No recent URI, nasal trauma, or reported nose-picking. Maintained on Aspirin and Plavix due to CAD and stent in 4/2019 above. Was also maintained on IV heparin on admission up until angiogram 12/10.  - Initially quite difficult to stop the bleeding.  - PRN afrin ordered.  - Nosebleed eventually stopped on afternoon of 12/11, has not had any recurrent bleeding since then.     UTI  UA with large LE, >182 WBCs. Urine culture with [id number] colonies of E. Coli. Reports dysuria X a few days.   - Treated with ceftriaxone in the hospital, switch to omnicef upon discharge to complete 7 day course of antibiotics.      Gout  - Continue PTA Allopurinol 100 mg daily.      CKD, stage II  Baseline creatinine 1.0-1.1. Hydrated with IVF on admission.    - Creatinine 1.43 on day of discharge.  - Recheck at follow-up appointment with cardiology.     GERD  - Continue PTA protonix.      Essential tremor  - No longer takes primidone.    Zi Foley MD    Significant Results and Procedures   Coronary angiogram on 12/10/19, see below.    Pending Results   None.    Code Status   Full Code       Primary Care Physician   Stephan Nice    Physical Exam   Temp: 98.1  F (36.7  C) Temp src: Oral BP: 137/73(after resting OT/CR) Pulse: 55 Heart Rate: 63 Resp: 20 SpO2: 100 % O2 Device: None (Room air)    Vitals:    12/10/19 0613 12/11/19 0500 12/12/19 0652   Weight: 99.7 kg (219 lb 12.8  oz) 98.9 kg (218 lb) 99 kg (218 lb 4.8 oz)     Vital Signs with Ranges  Temp:  [97.4  F (36.3  C)-98.1  F (36.7  C)] 98.1  F (36.7  C)  Pulse:  [55-74] 55  Heart Rate:  [62-75] 63  Resp:  [16-20] 20  BP: ()/(59-85) 137/73  SpO2:  [95 %-100 %] 100 %  I/O last 3 completed shifts:  In: 680 [P.O.:580; I.V.:100]  Out: -     Constitutional: awake, alert, cooperative, no apparent distress, and appears stated age  Respiratory: clear to auscultation bilaterally, no crackles or wheezing  Cardiovascular: regular rate and rhythm, normal S1 and S2, and no murmur noted  GI: normal bowel sounds, soft, non-distended, non-tender  Skin: warm, dry, small area of ecchymosis on right wrist at radial access site  Neurologic: awake, alert, oriented to name, place and time    Discharge Disposition   Discharged to home  Condition at discharge: Stable    Consultations This Hospital Stay   PHARMACY TO DOSE HEPARIN  CARDIAC REHAB IP CONSULT  PHARMACY TO DOSE HEPARIN  CARDIOLOGY IP CONSULT  PHARMACY IP CONSULT  PHARMACY IP CONSULT  SMOKING CESSATION PROGRAM IP CONSULT  SMOKING CESSATION PROGRAM IP CONSULT    Time Spent on this Encounter   I, Zi Foley MD, personally saw the patient today and spent greater than 30 minutes discharging this patient.    Discharge Orders      CARDIAC REHAB REFERRAL      Reason for your hospital stay    You were in the hospital for a heart attack (myocardial infarction).     Follow-up and recommended labs and tests     Follow up with primary care provider, Stephan Nice, within 7 days to evaluate medication change and for hospital follow- up.  No follow up labs or test are needed.     Activity    Your activity upon discharge: as directed by cardiac rehab     Full Code     Diet    Follow this diet upon discharge: Low Saturated Fat, Low Sodium (<2400mg)     Discharge Medications   Current Discharge Medication List      START taking these medications    Details   cefdinir (OMNICEF) 300 MG capsule  Take 1 capsule (300 mg) by mouth 2 times daily for 4 days  Qty: 8 capsule, Refills: 0    Associated Diagnoses: Acute cystitis without hematuria      metoprolol succinate ER (TOPROL-XL) 50 MG 24 hr tablet Take 1 tablet (50 mg) by mouth daily  Qty: 30 tablet, Refills: 0    Associated Diagnoses: Coronary artery disease of native artery of native heart with stable angina pectoris (H)      oxymetazoline (AFRIN) 0.05 % nasal spray Spray 2 sprays into both nostrils 2 times daily as needed for other (nose bleed)    Associated Diagnoses: Epistaxis         CONTINUE these medications which have CHANGED    Details   acetaminophen (TYLENOL) 325 MG tablet Take 2 tablets (650 mg) by mouth every 4 hours as needed for mild pain or headaches    Associated Diagnoses: Pain      lisinopril (PRINIVIL/ZESTRIL) 5 MG tablet Take 1 tablet (5 mg) by mouth daily  Qty: 30 tablet, Refills: 0    Associated Diagnoses: Coronary artery disease of native artery of native heart with stable angina pectoris (H)         CONTINUE these medications which have NOT CHANGED    Details   ALLOPURINOL PO Take 100 mg by mouth daily. To prevent gout attacks, recently started.       aspirin (ASA) 81 MG tablet Take 81 mg by mouth daily      clopidogrel (PLAVIX) 75 MG tablet Take 1 tablet (75 mg) by mouth daily  Qty: 90 tablet, Refills: 3    Associated Diagnoses: Coronary artery disease involving native coronary artery, angina presence unspecified, unspecified whether native or transplanted heart      ezetimibe (ZETIA) 10 MG tablet Take 1 tablet (10 mg) by mouth daily  Qty: 90 tablet, Refills: 3    Associated Diagnoses: Coronary artery disease of native artery of native heart with stable angina pectoris (H)      hypromellose-dextran (ARTIFICAL TEARS) 0.1-0.3 % ophthalmic solution Place 1 drop into both eyes 2 times daily as needed for dry eyes      isosorbide mononitrate (IMDUR) 60 MG 24 hr tablet Take 1 tablet (60 mg) by mouth daily  Qty: 30 tablet, Refills: 11     Associated Diagnoses: Coronary artery disease of native artery of native heart with stable angina pectoris (H)      Multiple Vitamins-Minerals (CENTRUM SILVER) per tablet Take 1 tablet by mouth daily.      Multiple Vitamins-Minerals (PRESERVISION AREDS 2) CAPS Take by mouth 2 times daily      nitroglycerin (NITROSTAT) 0.4 MG SL tablet Place 1 tablet (0.4 mg) under the tongue every 5 minutes as needed for chest pain  Qty: 25 tablet, Refills: 11    Associated Diagnoses: CAD (coronary artery disease)      pantoprazole (PROTONIX) 40 MG EC tablet Take 40 mg by mouth daily      rosuvastatin (CRESTOR) 40 MG tablet Take 1 tablet by mouth daily.  Qty: 30 tablet, Refills: 1    Associated Diagnoses: CAD (coronary artery disease)         STOP taking these medications       chlorthalidone (HYGROTON) 25 MG tablet Comments:   Reason for Stopping:         oxyCODONE IR (ROXICODONE) 5 MG tablet Comments:   Reason for Stopping:         primidone (MYSOLINE) 250 MG tablet Comments:   Reason for Stopping:         propranolol (INDERAL LA) 80 MG 24 hr capsule Comments:   Reason for Stopping:             Allergies   Allergies   Allergen Reactions     Hmg-Coa-R Inhibitors Muscle Pain (Myalgia)     lipitor     Data   Most Recent 3 CBC's:  Recent Labs   Lab Test 12/12/19  0528 12/11/19  1147 12/09/19  1259   WBC 5.6 8.2 10.0   HGB 13.2* 15.3 14.5   MCV 95 95 96   * 177 166      Most Recent 3 BMP's:  Recent Labs   Lab Test 12/12/19  0528 12/11/19  1040 12/10/19  0612 12/09/19  1259    136  --  138   POTASSIUM 4.7 4.1 4.3 4.0   CHLORIDE 101 104  --  104   CO2 30 28  --  29   BUN 38* 27  --  40*   CR 1.43* 1.06  --  1.33*   ANIONGAP 2* 4  --  5   DONELL 8.5 8.5  --  9.0   GLC 90 116*  --  114*     Most Recent 3 Troponin's:  Recent Labs   Lab Test 12/10/19  0237 12/09/19  2030 12/09/19  1652   TROPI 1.378* 2.223* 2.706*     Most Recent Cholesterol Panel:  Recent Labs   Lab Test 11/13/19  1007   CHOL 156   LDL 69   HDL 76   TRIG 55      Most Recent 6 Bacteria Isolates From Any Culture (See EPIC Reports for Culture Details):  Recent Labs   Lab Test 12/10/19  0700   CULT 10,000 to 50,000 colonies/mL  Escherichia coli  *     Results for orders placed or performed during the hospital encounter of 19   Echocardiogram Complete    Narrative    [id number]  [id number]  [id number]  [id number]^GREGORY^GENE^BIRGIT           [facility]  Echocardiography Laboratory  [address], [place] [address]        Name: GREG VALDERRAMA  MRN: [id number]  : 1940  Study Date: 12/10/2019 03:24 PM  Age: 79 yrs  Gender: Male  Patient Location: [facility]  Reason For Study: Chest Pain, Chest Tightness, Chest Pressure  Ordering Physician: GENE JENKINS  Referring Physician: Stephan Nice  Performed By: Suma Waddell RDCS     BSA: 2.2 m2  Height: 70 in  Weight: 219 lb  HR: 60  BP: 151/92 mmHg  _____________________________________________________________________________  __        Procedure  Complete Portable Echo Adult. Optison (NDC #[id number]) given intravenously.  _____________________________________________________________________________  __        Interpretation Summary     Left ventricular systolic function is moderate to severely reduced. LVEF 36%  based on biplane 2D tracing.  Severe hypokinesis of the apical wall segments with relatively preserved wall  motion in the basal wall segments.  This pattern can be seen in stress cardiomyopathy or multivessel coronary  artery disease.  Right ventricle is normal in structure, function and size.  No significant valvular abnormalities.     This study was compared to a prior TTE from 2016, left ventricular  systolic function has decreased.     Inpatient cardiology team notified.  _____________________________________________________________________________  __        Left Ventricle  The left ventricle is normal in size. There is concentric remodeling present.  Left ventricular  systolic function is moderate to severely reduced. LVEF 36%  based on biplane 2D tracing. Grade II or moderate diastolic dysfunction.  Severe hypokinesis of the apical wall segments, mild hypokinesis of the mid  wall segments, and preserved wall motion in the basal wall segments.  This pattern can be seen in stress cardiomyopathy or multivessel coronary  artery disease. There is no thrombus seen in the left ventricle.     Right Ventricle  The right ventricle is normal in structure, function and size.     Atria  The left atrium is mildly dilated. Right atrial size is normal.     Mitral Valve  The mitral valve is normal in structure and function.        Tricuspid Valve  There is trace to mild tricuspid regurgitation. The right ventricular systolic  pressure is approximated at 39mmHg plus the right atrial pressure.     Aortic Valve  The aortic valve is trileaflet. There is mild trileaflet aortic sclerosis.  There is trace aortic regurgitation.     Pulmonic Valve  There is trace pulmonic valvular regurgitation.     Vessels  The aortic root is normal size. Normal size ascending aorta. Inferior vena  cava not well visualized for estimation of right atrial pressure.     Pericardium  There is no pericardial effusion.     _____________________________________________________________________________  __  MMode/2D Measurements & Calculations  IVSd: 1.2 cm  LVIDd: 4.6 cm  LVIDs: 2.9 cm  LVPWd: 1.2 cm  FS: 37.7 %  LV mass(C)d: 207.7 grams  LV mass(C)dI: 95.7 grams/m2     Ao root diam: 3.2 cm  LA dimension: 4.6 cm  asc Aorta Diam: 3.7 cm  LA/Ao: 1.4  LA Volume (BP): 83.3 ml  LA Volume Index (BP): 38.4 ml/m2  RWT: 0.52        Doppler Measurements & Calculations  MV E max patsy: 54.3 cm/sec  MV A max patsy: 87.8 cm/sec  MV E/A: 0.62  MV dec slope: 256.5 cm/sec2     PA acc time: 0.11 sec  TR max patsy: 313.8 cm/sec  TR max P.4 mmHg  E/E' avg: 15.4  Lateral E/e': 11.9  Medial E/e': 18.9            _____________________________________________________________________________  __           Report approved by: Micheal Cavazos 12/10/2019 04:32 PM      Cardiac Catheterization    Narrative    1. Moderate non-flow limiting LAD/diagonal and Ramus disease  2. Patent distal RCA stent

## 2019-12-12 NOTE — PLAN OF CARE
Discharge Planner OT   Patient plan for discharge: home  Current status: pt ambulated for approx 10 mins and completed 15 steps, pt with no reports of lightheadedness, some SOB with stairs and BP hypertensive response to exercise, nursing notified. Pt ready to go home.  Barriers to return to prior living situation: none  Recommendations for discharge: home with family A with strenuous IADL's ie vacuuming, snow removal and OP CR at [facility].   Rationale for recommendations: pt reports feeling better today and recommend OP CR for monitored progressive exercise and risk factor education and modification at discharge, pending on MD hernandes and insurance.        Entered by: Verna Hooper 12/12/2019 10:59 AM      Occupational Therapy Discharge Summary    Reason for therapy discharge:    Discharged to home with outpatient therapy.    Progress towards therapy goal(s). See goals on Care Plan in [facility] electronic health record for goal details.  Goals partially met.  Barriers to achieving goals:   discharge from facility.    Therapy recommendation(s):    Continued therapy is recommended.  Rationale/Recommendations:  home with A with strenuous IADL's ie snow removal, etc and OP CR at [facility] if warranted for montiored progressive exercise and riskf actor education and modification. .

## 2019-12-12 NOTE — PLAN OF CARE
Persistent nose bleed from 0830 until 1330. Resolved with Afrin nasal spray. Sodium chloride nasal spray also given for nasal dryness. After the epistaxis resolved, pt initially felt lightheaded getting out of bed. Pt ate a late lunch and encouraged to drink fluids. Pt then able to walk in hallway x2, denies CP/SOB/dizziness/lightheadedness. Tele: SB/SR. VSS on RA. Right radial site ecchymotic, CMS intact. Plan for discharge home tomorrow.

## 2019-12-12 NOTE — PROGRESS NOTES
[facility]  Cardiology Progress Note    Date of Service (when I saw the patient): 12/12/2019  Primary Cardiologist: Dr. Mendoza       Interval History:   No recurrent epistaxis. No new issues.     ----------------------------------------------------------------------------------------    Assessment:  Iván Nicholas is a 79 year old male who was admitted on 12/9/2019 with chest discomfort.     NSTEMI  -- repeat cor angiogram does not show any new lesions  -- medical management    Systolic Cardiomyopathy (stress?)   -- Echo showed LVEF 35% with WMA suggestive for stress cardiomyopathy  -- last echo was in 2016 showed preserved biventricular function     CAD  -- Hx of multiple PCI's  (previous history of PCI to the LAD in 2012 and distal RCA in 4/2019)    HTN  -- better today (high but this is before his AM meds)    HLD  -- high intensity statin and zetia  -- LDL 69    UTI   -- appreciate hospitalist service     CKD   -- stable    ----------------------------------------------------------------------------------------    Plan:  -- Continue with metoprolol 50 mg daily and imdur 60 mg  -- Continue with lisinopril 5 mg (this is reduced from his pta dose)  -- pta chlorthalidone 25 mg discontinued  -- Cardiology follow up in 1-2 weeks with repeat BMP   -- Echo in 1 month to check EF    ----------------------------------------------------------------------------------------  Physical Exam   Temp: 98.1  F (36.7  C) Temp src: Oral BP: (!) 160/82 Pulse: 55 Heart Rate: 73 Resp: 20 SpO2: 100 % O2 Device: None (Room air)    Vitals:    12/10/19 0613 12/11/19 0500 12/12/19 0652   Weight: 99.7 kg (219 lb 12.8 oz) 98.9 kg (218 lb) 99 kg (218 lb 4.8 oz)     GEN:  NAD  HEENT: Mucous membranes moist.  NECK:  No JVD.  C/V:  Regular rate and rhythm, no murmur, rub or gallop.   RESP: CTA, min  GI: Abdomen soft, nontender, nondistended.    EXTREM: No LE edema.   NEURO: Alert and oriented, cooperative.   PSYCH:  Normal affect.  SKIN: Warm and dry.   VASC: 2+ radial and dorsalis pedis pulses bilaterally.      Medications     Continuing ACE inhibitor/ARB/ARNI from home medication list OR ACE inhibitor/ARB order already placed during this visit       - MEDICATION INSTRUCTIONS -       - MEDICATION INSTRUCTIONS -       - MEDICATION INSTRUCTIONS -       - MEDICATION INSTRUCTIONS -       - MEDICATION INSTRUCTIONS -         acetaminophen  975 mg Oral Q8H     allopurinol  100 mg Oral Daily     aspirin  81 mg Oral Daily     cefTRIAXone  1 g Intravenous Q24H     clopidogrel  75 mg Oral Daily     ezetimibe  10 mg Oral Daily     isosorbide mononitrate  60 mg Oral Daily     lisinopril  5 mg Oral Daily     metoprolol succinate ER  50 mg Oral Daily     pantoprazole  40 mg Oral Daily     primidone  250 mg Oral BID     rosuvastatin  40 mg Oral Daily     sodium chloride (PF)  3 mL Intracatheter Q8H       Data   Reviewed.     Tele: NSR with intermittent sinus bradycardia      Gina Vivas PA-C   12/12/2019  Pager: [phone]

## 2019-12-12 NOTE — DISCHARGE INSTRUCTIONS
Cardiac Angiogram Discharge Instructions - Radial    After you go home:      Have an adult stay with you until tomorrow.    Drink extra fluids for 2 days.    You may resume your normal diet.    No smoking       For 24 hours - due to the sedation you received:    Relax and take it easy.    Do NOT make any important or legal decisions.    Do NOT drive or operate machines at home or at work.    Do NOT drink alcohol.    Care of Wrist Puncture Site:      For the first 24 hrs - check the puncture site every 1-2 hours while awake.    It is normal to have soreness at the puncture site and mild tingling in your hand for up to 3 days.    Remove the bandaid after 24 hours. If there is minor oozing, apply another bandaid and remove it after 12 hours.    You may shower tomorrow.  Do NOT take a bath, or use a hot tub or pool for at least 3 days. Do NOT scrub the site. Do not use lotion or powder near the puncture site.           Activity:        For 2 days:     do not use your hand or arm to support your weight (such as rising from a chair)     do not bend your wrist (such as lifting a garage door).    do not lift more than 5 pounds or exercise your arm (such as tennis, golf or bowling).    Do NOT do any heavy activity such as exercise, lifting, or straining.     Bleeding:      If you start bleeding from the site in your wrist, sit down and press firmly on/above the site for 10 minutes.     Once bleeding stops, keep arm still for 2 hours.     Call [facility] as soon as you can.       Call 911 right away if you have heavy bleeding or bleeding that does not stop.      Medicines:      If you are taking an antiplatelet medication such as Plavix do not stop taking it until you talk to your cardiologist.        Take your medications, including blood thinners, unless your provider tells you not to.  If you take Coumadin (Warfarin), have your INR checked by your provider in  3-5 days. Call your clinic to schedule this.    If you have  stopped any medicines, check with your provider about when to restart them.    Follow Up Appointments:      Follow up with [facility] Practitioner at [facility] of patient preference in 7-10 days.    Call the clinic if:      You have a large or growing hard lump around the site.    The site is red, swollen, hot or tender.    Blood or fluid is draining from the site.    You have chills or a fever greater than 101 F (38 C).    Your arm feels numb, cool or changes color.    You have hives, a rash or unusual itching.    Any questions or concerns.          [facility] [place]:    [phone] [facility] (7 days a week)

## 2019-12-13 ENCOUNTER — TELEPHONE (OUTPATIENT)
Dept: CARDIOLOGY | Facility: CLINIC | Age: 79
End: 2019-12-13

## 2019-12-13 NOTE — TELEPHONE ENCOUNTER
Patient was evaluated by cardiology while inpatient for chest pain, elevated troponin-NSTEMI. 12/10/19 Moderate non-flow limiting LAD/diagonal and Ramus disease. Patent distal RCA stent. Called patient to discuss any post hospital d/c questions he may have, review medication changes, and confirm f/u appts. Patient denied any questions regarding new medications or changes with their PTA medications, although misplaced his AVS med list. Encouraged pt to continue to look for list. Can access via My Chart, and pt will try this next if unable to locate list. Writer verbally reviewed meds, medication additions and changes with pt over phone. Patient denied any SOB, chest pain, or light headedness. RRA cardiac cath site is without bleeding, swelling, redness or tenderness. Denies fever. RN confirmed with patient that he has an apt scheduled on 12/19/19 with MEHNAZ Jenaro Shook with labs prior. Patient advised to call clinic with any cardiac related questions or concerns prior to this mehnaz't. Patient verbalized understanding and agreed with plan.  ODALYS Medellin RN.

## 2019-12-15 ENCOUNTER — HEALTH MAINTENANCE LETTER (OUTPATIENT)
Age: 79
End: 2019-12-15

## 2019-12-19 ENCOUNTER — OFFICE VISIT (OUTPATIENT)
Dept: CARDIOLOGY | Facility: CLINIC | Age: 79
End: 2019-12-19
Attending: INTERNAL MEDICINE
Payer: COMMERCIAL

## 2019-12-19 VITALS
HEART RATE: 62 BPM | SYSTOLIC BLOOD PRESSURE: 134 MMHG | BODY MASS INDEX: 31.64 KG/M2 | WEIGHT: 221 LBS | DIASTOLIC BLOOD PRESSURE: 68 MMHG | HEIGHT: 70 IN

## 2019-12-19 DIAGNOSIS — I42.8 OTHER CARDIOMYOPATHY (H): Primary | ICD-10-CM

## 2019-12-19 DIAGNOSIS — I25.118 CORONARY ARTERY DISEASE OF NATIVE ARTERY OF NATIVE HEART WITH STABLE ANGINA PECTORIS (H): ICD-10-CM

## 2019-12-19 LAB
ANION GAP SERPL CALCULATED.3IONS-SCNC: 7 MMOL/L (ref 3–14)
BUN SERPL-MCNC: 25 MG/DL (ref 7–30)
CALCIUM SERPL-MCNC: 9 MG/DL (ref 8.5–10.1)
CHLORIDE SERPL-SCNC: 104 MMOL/L (ref 94–109)
CO2 SERPL-SCNC: 24 MMOL/L (ref 20–32)
CREAT SERPL-MCNC: 1.23 MG/DL (ref 0.66–1.25)
GFR SERPL CREATININE-BSD FRML MDRD: 55 ML/MIN/{1.73_M2}
GLUCOSE SERPL-MCNC: 139 MG/DL (ref 70–99)
POTASSIUM SERPL-SCNC: 4.2 MMOL/L (ref 3.4–5.3)
SODIUM SERPL-SCNC: 135 MMOL/L (ref 133–144)

## 2019-12-19 PROCEDURE — 99214 OFFICE O/P EST MOD 30 MIN: CPT | Performed by: PHYSICIAN ASSISTANT

## 2019-12-19 PROCEDURE — 36415 COLL VENOUS BLD VENIPUNCTURE: CPT | Performed by: INTERNAL MEDICINE

## 2019-12-19 PROCEDURE — 80048 BASIC METABOLIC PNL TOTAL CA: CPT | Performed by: INTERNAL MEDICINE

## 2019-12-19 RX ORDER — LOSARTAN POTASSIUM 25 MG/1
25 TABLET ORAL DAILY
Qty: 90 TABLET | Refills: 3 | Status: SHIPPED | OUTPATIENT
Start: 2019-12-19 | End: 2021-08-27

## 2019-12-19 RX ORDER — METOPROLOL SUCCINATE 50 MG/1
50 TABLET, EXTENDED RELEASE ORAL DAILY
Qty: 90 TABLET | Refills: 3 | Status: SHIPPED | OUTPATIENT
Start: 2019-12-19 | End: 2024-01-19

## 2019-12-19 ASSESSMENT — MIFFLIN-ST. JEOR: SCORE: 1723.7

## 2019-12-19 NOTE — LETTER
2019    Stephan Nice MD  [facility] [address]  [place] [address]    RE: Iván Tenorio Cristopher       Dear Colleague,    I had the pleasure of seeing Iván Nicholas in the [facility].    CARDIOLOGY CLINIC PROGRESS NOTE    DOS: 2019      Iván Nicholas  : 1940, 79 year old  MRN: [id number]      History:  I had the pleasure of following up with Iván Nicholas today in the cardiology clinic.   He is a patient of Dr. Mendoza.     Iván is a pleasant 79-year-old gentleman with past medical history notable for coronary artery disease.  He had angioplasty of his LAD back in , LAD stenting in early  with a jailed diagonal vessel, later that year repeat coronary angiogram was performed which showed stable diagonal vessel with FFR of 0.8 and therefore medical therapy was recommended.  A nuclear stress study in 2018 demonstrated no evidence of ischemia or infarct.  Also hyperlipidemia, hypertension, carotid artery disease (history of right carotid endarterectomy in ), left bundle branch block, history of tobacco use, and chronic angina since his last angiogram that was treated with moderate dose of Imdur.  Newly diagnosed nonischemic cardiomyopathy.     He was seen by Gina Vivas PA-C on 19 due to increased episodes of chest tightness at night over the last 2 months.  This was not always exertional.  His sxs would go away with nitroglycerin.  Sxs were somewhat reminiscent of his previous episodes prior to his stents.  Amlodipine was added for elevated BP.  He underwent cardiac catheterization 19.  95% distal RCA s/p MERARI.  Started on Plavix.      Interval History:   19 was last seen by Dr. Mendoza.  He had no chest pain at that time.  Weight was up and she had 2+ RLE edema, 1+ LLE edema.  Dr. Mendoza stopped amlodipine and started lisinopril 20 mg and chlorthalidone  25 mg daily.     12/9- 12/12/19 Hosp Admission at [facility] due to chest pain/tightness radiating to back and arms.   Dr. Morales consulted for NSTEMI.   12/10/19 cardiac cath:  no new lesions found.   12/10/19 Echo:  LVEF 35% with WMA suggestive of stress CM.     12/11/19 had epistaxis, resolved with Afrin.  Continued DAPT due to stent in 4/2019.  MEDS:  SWITCHED propranolol to Toprol XL 50 mg daily.   STOPPED Chlorthalidone.   DECREASED lisinopril from 20 to 5 mg.   Continued PTA Imdur 60 mg.       He presents today for follow up.   Weight is down 11 lbs from when he saw Dr. Mendoza.  Edema is resolved.  This could be combination of stopping amlodipine, taking the chlorthalidone for a few days, and since the admission he has been watching his diet.   No chest pain since he left the hospital except one episode last night.  He took SL nitroglycerin that did not help.  He does have a cough since starting the lisinopril.   BP is ok after med changes.      ROS:  Skin:  Negative lumps or bumps   Eyes:  Positive for glasses;color blindness  ENT:  Positive for hearing loss  Respiratory:  Negative cough;shortness of breath  Cardiovascular:  Negative edema;Positive for;fatigue  Gastroenterology: Negative heartburn  Genitourinary:  Positive for nocturia  Musculoskeletal:  Positive for arthritis;joint pain  Neurologic:  Negative    Psychiatric:  Negative excessive stress  Heme/Lymph/Imm:  Positive for allergies  Endocrine:  Negative      PAST MEDICAL HISTORY:  Past Medical History:   Diagnosis Date     Arthritis      Carotid artery stenosis     Right, s/p right CEA 11/9/2016     Cerebral artery occlusion with cerebral infarction (H) 2016    TIA -  Endarterectomy...No residual     Cholesterol serum elevated      Colon polyps      Color blind      Coronary artery disease     2/2012 - MERARI to mid LAD     Decreased hearing      Depression      Gout      Hypertension      Hypertrophy of prostate without urinary obstruction and other  lower urinary tract symptoms (LUTS)      Impotence      Left bundle branch block      Onychomycosis of toenail      Other and unspecified hyperlipidemia      Paroxysmal ventricular tachycardia (H)     with stress test 2012       PAST SURGICAL HISTORY:  Past Surgical History:   Procedure Laterality Date     ANGIOPLASTY  1991     ARTHROPLASTY KNEE Left 11/12/2018    Procedure: Left total knee arthroplasty;  Surgeon: Matt Schaefer MD;  Location:  OR     COLONOSCOPY  12/13/2011    Procedure:COLONOSCOPY; COLONOSCOPY; Surgeon:EMMANUELLE MOSER; Location: GI     CORONARY ANGIOGRAPHY ADULT ORDER  1991,2012 1991 - stent to proximal LAD, 2012 - Stent to mid LAD     CV HEART CATHETERIZATION WITH POSSIBLE INTERVENTION Left 4/23/2019    Procedure: Coronary Angiogram;  Surgeon: Percy Armas MD;  Location:  HEART CARDIAC CATH LAB     CV HEART CATHETERIZATION WITH POSSIBLE INTERVENTION N/A 12/10/2019    Procedure: Heart Catheterization with Possible Intervention;  Surgeon: Dajuan Duvall MD;  Location:  HEART CARDIAC CATH LAB     CV PCI ANGIOPLASTY N/A 4/23/2019    Procedure: PCI Angioplasty;  Surgeon: Percy Armas MD;  Location:  HEART CARDIAC CATH LAB     ENDARTERECTOMY CAROTID Right 11/10/2016    Procedure: ENDARTERECTOMY CAROTID;  Surgeon: Paco Suresh MD;  Location:  OR     HEART CATH, ANGIOPLASTY       ORTHOPEDIC SURGERY       PHACOEMULSIFICATION CLEAR CORNEA WITH STANDARD INTRAOCULAR LENS IMPLANT  12/19/2013    Procedure: PHACOEMULSIFICATION CLEAR CORNEA WITH STANDARD INTRAOCULAR LENS IMPLANT;  RIGHT PHACOEMULSIFICATION CLEAR CORNEA WITH STANDARD INTRAOCULAR LENS IMPLANT ;  Surgeon: Luisito Juan MD;  Location: [facility]       SOCIAL HISTORY:  Social History     Socioeconomic History     Marital status:      Spouse name: Not on file     Number of children: Not on file     Years of education: Not on file     Highest education level: Not on file   Occupational  History     Not on file   Social Needs     Financial resource strain: Not on file     Food insecurity:     Worry: Not on file     Inability: Not on file     Transportation needs:     Medical: Not on file     Non-medical: Not on file   Tobacco Use     Smoking status: Former Smoker     Packs/day: 0.50     Years: 20.00     Pack years: 10.00     Types: Cigarettes     Last attempt to quit: 1990     Years since quittin.3     Smokeless tobacco: Never Used   Substance and Sexual Activity     Alcohol use: Yes     Alcohol/week: 0.0 oz     Comment: daily - drinks x2     Drug use: No     Sexual activity: Never   Lifestyle     Physical activity:     Days per week: Not on file     Minutes per session: Not on file     Stress: Not on file   Relationships     Social connections:     Talks on phone: Not on file     Gets together: Not on file     Attends Congregational service: Not on file     Active member of club or organization: Not on file     Attends meetings of clubs or organizations: Not on file     Relationship status: Not on file     Intimate partner violence:     Fear of current or ex partner: Not on file     Emotionally abused: Not on file     Physically abused: Not on file     Forced sexual activity: Not on file   Other Topics Concern     Parent/sibling w/ CABG, MI or angioplasty before 65F 55M? Not Asked      Service Not Asked     Blood Transfusions Not Asked     Caffeine Concern No     Comment: 1-2 cups daily     Occupational Exposure Not Asked     Hobby Hazards Not Asked     Sleep Concern No     Stress Concern No     Weight Concern Not Asked     Special Diet No     Comment: trying to watch sodium intake     Back Care Not Asked     Exercise No     Comment: some walking     Bike Helmet Not Asked     Seat Belt Not Asked     Self-Exams Not Asked   Social History Narrative     Not on file       FAMILY HISTORY:  Family History   Problem Relation Age of Onset     Heart Disease Mother 83     Heart Disease Father  "69        emphysema     Coronary Artery Disease Brother      Coronary Artery Disease Sister        MEDS: acetaminophen (TYLENOL) 325 MG tablet, Take 2 tablets (650 mg) by mouth every 4 hours as needed for mild pain or headaches  ALLOPURINOL PO, Take 100 mg by mouth daily. To prevent gout attacks, recently started.   aspirin (ASA) 81 MG tablet, Take 81 mg by mouth daily  clopidogrel (PLAVIX) 75 MG tablet, Take 1 tablet (75 mg) by mouth daily  ezetimibe (ZETIA) 10 MG tablet, Take 1 tablet (10 mg) by mouth daily (Patient taking differently: Take 10 mg by mouth At Bedtime )  hypromellose-dextran (ARTIFICAL TEARS) 0.1-0.3 % ophthalmic solution, Place 1 drop into both eyes 2 times daily as needed for dry eyes  isosorbide mononitrate (IMDUR) 60 MG 24 hr tablet, Take 1 tablet (60 mg) by mouth daily  Multiple Vitamins-Minerals (CENTRUM SILVER) per tablet, Take 1 tablet by mouth daily.  Multiple Vitamins-Minerals (PRESERVISION AREDS 2) CAPS, Take by mouth 2 times daily  nitroGLYcerin (NITROSTAT) 0.4 MG sublingual tablet, Place 1 tablet (0.4 mg) under the tongue every 5 minutes as needed for chest pain  oxymetazoline (AFRIN) 0.05 % nasal spray, Spray 2 sprays into both nostrils 2 times daily as needed for other (nose bleed)  pantoprazole (PROTONIX) 40 MG EC tablet, Take 40 mg by mouth daily  rosuvastatin (CRESTOR) 40 MG tablet, Take 1 tablet by mouth daily.    No current facility-administered medications on file prior to visit.       ALLERGIES:   Allergies   Allergen Reactions     Hmg-Coa-R Inhibitors Muscle Pain (Myalgia)     lipitor       PHYSICAL EXAM:  Vitals: /68 (BP Location: Right arm, Patient Position: Sitting, Cuff Size: Adult Regular)   Pulse 62   Ht 1.778 m (5' 10\")   Wt 100.2 kg (221 lb)   BMI 31.71 kg/m     Constitutional:  cooperative, alert and oriented, well developed, well nourished, in no acute distress appears younger than stated age      Skin:  warm and dry to the touch, no apparent skin lesions " or masses noted   right CEA scar    Head:  normocephalic, no masses or lesions        Eyes:  pupils equal and round, conjunctivae and lids unremarkable, sclera white, no xanthalasma, EOMS intact, no nystagmus        ENT:  no pallor or cyanosis, dentition good        Neck:  JVP normal   prominent carotid pulsations at base of neck bilaterally. Well-healed right carotid endarterectomy scar    Respiratory:  normal breath sounds, clear to auscultation, normal A-P diameter, normal symmetry, normal respiratory excursion, no use of accessory muscles        Cardiac: regular rhythm;normal S1 and S2       systolic murmur;grade 1;RUSB          GI:  abdomen soft;BS normoactive        Vascular: pulses full and equal                                      Extremities and Musculoskeletal:  no spinal abnormalities noted;normal muscle strength and tone        Neurological:  no gross motor deficits            LABS/DATA:  I reviewed the following:  Echo 12/10/19:  Interpretation Summary     Left ventricular systolic function is moderate to severely reduced. LVEF 36%  based on biplane 2D tracing.  Severe hypokinesis of the apical wall segments with relatively preserved wall  motion in the basal wall segments.  This pattern can be seen in stress cardiomyopathy or multivessel coronary  artery disease.  Right ventricle is normal in structure, function and size.  No significant valvular abnormalities.     This study was compared to a prior TTE from 11/9/2016, left ventricular  systolic function has decreased.     Inpatient cardiology team notified.      Component      Latest Ref Rng & Units 12/19/2019   Sodium      133 - 144 mmol/L 135   Potassium      3.4 - 5.3 mmol/L 4.2   Chloride      94 - 109 mmol/L 104   Carbon Dioxide      20 - 32 mmol/L 24   Anion Gap      3 - 14 mmol/L 7   Glucose      70 - 99 mg/dL 139 (H)   Urea Nitrogen      7 - 30 mg/dL 25   Creatinine      0.66 - 1.25 mg/dL 1.23   GFR Estimate      >60 mL/min/1.73:m2 55 (L)    GFR Estimate If Black      >60 mL/min/1.73:m2 64   Calcium      8.5 - 10.1 mg/dL 9.0   * Was to be done after starting lisinopril and chlorthalidone.  Now off chlorthalidone, and BMP was followed inpatient after starting lisinopril.   But results today look ok after contrast dye from cath.       ASSESSMENT/PLAN:  CAD  - Angioplasty of LAD back in 1991, LAD stenting in early 2012 with a jailed diagonal vessel, later that year repeat coronary angiogram was performed which showed stable diagonal vessel with FFR of 0.8 and therefore medical therapy was recommended; nuclear stress study in 11/2018 demonstrating no evidence of ischemia or infarct  - Cardiac cath 4/23/19: 95% distal RCA s/p MERARI  - Recent admission with chest pain and NSTEMI.  Cath without new lesions.  Echo showed new decline in LVEF to 35%  - Currently on ASA 81 mg lifelong, Plavix x 1 year minimum from stenting, Toprol XL 50 mg, lisinopril 5 mg, Imdur 60 mg, Crestor 40 mg, Zetia 10 mg.   Switching lisinopril to losartan due to cough.   - Mediterranean style diet      New cardiomyopathy  - Echo 12/10/19 LVEF 35% with MWA suggestive of stress cardiomyopathy  - Cath 12/10/19 without new lesions  - Currently on Toprol XL 50 mg, lisinopril 5 mg  Due to cough, switching lisinopril to losartan  - Repeat echo after 1 month.  If still with low LVEF, then CMRI      HTN  - Currently on Imdur 60 mg, Toprol XL 50 mg, lisinopril 5 mg  Switching lisinopril to losartan due to cough  - BP controlled  - Follow      Hyperlipidemia  - FLP 11/13/19 shows LDL 69 on a combination of Zetia and rosuvastatin 40 mg      Carotid artery disease  - History of right carotid endarterectomy in 2016  - Last imaging 8/2018  - Continue ASA, statin            Follow up:  Echo, BMP and see me about 1/10/20   See cardiology FLORENCIA in May to discuss coming off Plavix   Dr. Mendoza in 11/2020       Thank you for allowing me to participate in the care of your patient.    Sincerely,     Jenaro NOGUERA  [facility]     [facility]

## 2019-12-19 NOTE — PATIENT INSTRUCTIONS
Talk with Dr. Nice to see if there is a different medication to use for tremors while you are off the propranolol (and on Toprol XL for the heart).     Blood pressure today looks good on the current regimen.    However, due to your cough, will we switch lisinopril to losartan.  Once the lisinopril runs out, have the pharmacy refill the losartan.     We will repeat an ultrasound of your heart mid January to check the pumping function.

## 2019-12-19 NOTE — PROGRESS NOTES
CARDIOLOGY CLINIC PROGRESS NOTE    DOS: 2019      Iván Nicholas  : 1940, 79 year old  MRN: [id number]      History:  I had the pleasure of following up with Iván Nicholas today in the cardiology clinic.   He is a patient of Dr. Mendoza.     Iván is a pleasant 79-year-old gentleman with past medical history notable for coronary artery disease.  He had angioplasty of his LAD back in , LAD stenting in early  with a jailed diagonal vessel, later that year repeat coronary angiogram was performed which showed stable diagonal vessel with FFR of 0.8 and therefore medical therapy was recommended.  A nuclear stress study in 2018 demonstrated no evidence of ischemia or infarct.  Also hyperlipidemia, hypertension, carotid artery disease (history of right carotid endarterectomy in ), left bundle branch block, history of tobacco use, and chronic angina since his last angiogram that was treated with moderate dose of Imdur.  Newly diagnosed nonischemic cardiomyopathy.     He was seen by Gina Vivas PA-C on 19 due to increased episodes of chest tightness at night over the last 2 months.  This was not always exertional.  His sxs would go away with nitroglycerin.  Sxs were somewhat reminiscent of his previous episodes prior to his stents.  Amlodipine was added for elevated BP.  He underwent cardiac catheterization 19.  95% distal RCA s/p MERARI.  Started on Plavix.      Interval History:   19 was last seen by Dr. Mendoza.  He had no chest pain at that time.  Weight was up and she had 2+ RLE edema, 1+ LLE edema.  Dr. Mendoza stopped amlodipine and started lisinopril 20 mg and chlorthalidone 25 mg daily.     - 19 Hosp Admission at [facility] due to chest pain/tightness radiating to back and arms.   Dr. Morales consulted for NSTEMI.   12/10/19 cardiac cath:  no new lesions found.   12/10/19 Echo:  LVEF 35% with WMA suggestive of stress CM.     19  had epistaxis, resolved with Afrin.  Continued DAPT due to stent in 4/2019.  MEDS:  SWITCHED propranolol to Toprol XL 50 mg daily.   STOPPED Chlorthalidone.   DECREASED lisinopril from 20 to 5 mg.   Continued PTA Imdur 60 mg.       He presents today for follow up.   Weight is down 11 lbs from when he saw Dr. Mendoza.  Edema is resolved.  This could be combination of stopping amlodipine, taking the chlorthalidone for a few days, and since the admission he has been watching his diet.   No chest pain since he left the hospital except one episode last night.  He took SL nitroglycerin that did not help.  He does have a cough since starting the lisinopril.   BP is ok after med changes.      ROS:  Skin:  Negative lumps or bumps   Eyes:  Positive for glasses;color blindness  ENT:  Positive for hearing loss  Respiratory:  Negative cough;shortness of breath  Cardiovascular:  Negative edema;Positive for;fatigue  Gastroenterology: Negative heartburn  Genitourinary:  Positive for nocturia  Musculoskeletal:  Positive for arthritis;joint pain  Neurologic:  Negative    Psychiatric:  Negative excessive stress  Heme/Lymph/Imm:  Positive for allergies  Endocrine:  Negative      PAST MEDICAL HISTORY:  Past Medical History:   Diagnosis Date     Arthritis      Carotid artery stenosis     Right, s/p right CEA 11/9/2016     Cerebral artery occlusion with cerebral infarction (H) 2016    TIA -  Endarterectomy...No residual     Cholesterol serum elevated      Colon polyps      Color blind      Coronary artery disease     2/2012 - MERARI to mid LAD     Decreased hearing      Depression      Gout      Hypertension      Hypertrophy of prostate without urinary obstruction and other lower urinary tract symptoms (LUTS)      Impotence      Left bundle branch block      Onychomycosis of toenail      Other and unspecified hyperlipidemia      Paroxysmal ventricular tachycardia (H)     with stress test 2012       PAST SURGICAL HISTORY:  Past Surgical  History:   Procedure Laterality Date     ANGIOPLASTY  1991     ARTHROPLASTY KNEE Left 11/12/2018    Procedure: Left total knee arthroplasty;  Surgeon: Matt Schaefer MD;  Location:  OR     COLONOSCOPY  12/13/2011    Procedure:COLONOSCOPY; COLONOSCOPY; Surgeon:EMMANUELLE MOSER; Location: GI     CORONARY ANGIOGRAPHY ADULT ORDER  1991,2012 1991 - stent to proximal LAD, 2012 - Stent to mid LAD     CV HEART CATHETERIZATION WITH POSSIBLE INTERVENTION Left 4/23/2019    Procedure: Coronary Angiogram;  Surgeon: Percy Armas MD;  Location:  HEART CARDIAC CATH LAB     CV HEART CATHETERIZATION WITH POSSIBLE INTERVENTION N/A 12/10/2019    Procedure: Heart Catheterization with Possible Intervention;  Surgeon: Dajuan Duvall MD;  Location:  HEART CARDIAC CATH LAB     CV PCI ANGIOPLASTY N/A 4/23/2019    Procedure: PCI Angioplasty;  Surgeon: Percy Armas MD;  Location:  HEART CARDIAC CATH LAB     ENDARTERECTOMY CAROTID Right 11/10/2016    Procedure: ENDARTERECTOMY CAROTID;  Surgeon: Paco Suresh MD;  Location:  OR     HEART CATH, ANGIOPLASTY       ORTHOPEDIC SURGERY       PHACOEMULSIFICATION CLEAR CORNEA WITH STANDARD INTRAOCULAR LENS IMPLANT  12/19/2013    Procedure: PHACOEMULSIFICATION CLEAR CORNEA WITH STANDARD INTRAOCULAR LENS IMPLANT;  RIGHT PHACOEMULSIFICATION CLEAR CORNEA WITH STANDARD INTRAOCULAR LENS IMPLANT ;  Surgeon: Luisito Juan MD;  Location: [facility]       SOCIAL HISTORY:  Social History     Socioeconomic History     Marital status:      Spouse name: Not on file     Number of children: Not on file     Years of education: Not on file     Highest education level: Not on file   Occupational History     Not on file   Social Needs     Financial resource strain: Not on file     Food insecurity:     Worry: Not on file     Inability: Not on file     Transportation needs:     Medical: Not on file     Non-medical: Not on file   Tobacco Use     Smoking status:  Former Smoker     Packs/day: 0.50     Years: 20.00     Pack years: 10.00     Types: Cigarettes     Last attempt to quit: 1990     Years since quittin.3     Smokeless tobacco: Never Used   Substance and Sexual Activity     Alcohol use: Yes     Alcohol/week: 0.0 oz     Comment: daily - drinks x2     Drug use: No     Sexual activity: Never   Lifestyle     Physical activity:     Days per week: Not on file     Minutes per session: Not on file     Stress: Not on file   Relationships     Social connections:     Talks on phone: Not on file     Gets together: Not on file     Attends Sikh service: Not on file     Active member of club or organization: Not on file     Attends meetings of clubs or organizations: Not on file     Relationship status: Not on file     Intimate partner violence:     Fear of current or ex partner: Not on file     Emotionally abused: Not on file     Physically abused: Not on file     Forced sexual activity: Not on file   Other Topics Concern     Parent/sibling w/ CABG, MI or angioplasty before 65F 55M? Not Asked      Service Not Asked     Blood Transfusions Not Asked     Caffeine Concern No     Comment: 1-2 cups daily     Occupational Exposure Not Asked     Hobby Hazards Not Asked     Sleep Concern No     Stress Concern No     Weight Concern Not Asked     Special Diet No     Comment: trying to watch sodium intake     Back Care Not Asked     Exercise No     Comment: some walking     Bike Helmet Not Asked     Seat Belt Not Asked     Self-Exams Not Asked   Social History Narrative     Not on file       FAMILY HISTORY:  Family History   Problem Relation Age of Onset     Heart Disease Mother 83     Heart Disease Father 69        emphysema     Coronary Artery Disease Brother      Coronary Artery Disease Sister        MEDS: acetaminophen (TYLENOL) 325 MG tablet, Take 2 tablets (650 mg) by mouth every 4 hours as needed for mild pain or headaches  ALLOPURINOL PO, Take 100 mg by mouth  "daily. To prevent gout attacks, recently started.   aspirin (ASA) 81 MG tablet, Take 81 mg by mouth daily  clopidogrel (PLAVIX) 75 MG tablet, Take 1 tablet (75 mg) by mouth daily  ezetimibe (ZETIA) 10 MG tablet, Take 1 tablet (10 mg) by mouth daily (Patient taking differently: Take 10 mg by mouth At Bedtime )  hypromellose-dextran (ARTIFICAL TEARS) 0.1-0.3 % ophthalmic solution, Place 1 drop into both eyes 2 times daily as needed for dry eyes  isosorbide mononitrate (IMDUR) 60 MG 24 hr tablet, Take 1 tablet (60 mg) by mouth daily  Multiple Vitamins-Minerals (CENTRUM SILVER) per tablet, Take 1 tablet by mouth daily.  Multiple Vitamins-Minerals (PRESERVISION AREDS 2) CAPS, Take by mouth 2 times daily  nitroGLYcerin (NITROSTAT) 0.4 MG sublingual tablet, Place 1 tablet (0.4 mg) under the tongue every 5 minutes as needed for chest pain  oxymetazoline (AFRIN) 0.05 % nasal spray, Spray 2 sprays into both nostrils 2 times daily as needed for other (nose bleed)  pantoprazole (PROTONIX) 40 MG EC tablet, Take 40 mg by mouth daily  rosuvastatin (CRESTOR) 40 MG tablet, Take 1 tablet by mouth daily.    No current facility-administered medications on file prior to visit.       ALLERGIES:   Allergies   Allergen Reactions     Hmg-Coa-R Inhibitors Muscle Pain (Myalgia)     lipitor       PHYSICAL EXAM:  Vitals: /68 (BP Location: Right arm, Patient Position: Sitting, Cuff Size: Adult Regular)   Pulse 62   Ht 1.778 m (5' 10\")   Wt 100.2 kg (221 lb)   BMI 31.71 kg/m    Constitutional:  cooperative, alert and oriented, well developed, well nourished, in no acute distress appears younger than stated age      Skin:  warm and dry to the touch, no apparent skin lesions or masses noted   right CEA scar    Head:  normocephalic, no masses or lesions        Eyes:  pupils equal and round, conjunctivae and lids unremarkable, sclera white, no xanthalasma, EOMS intact, no nystagmus        ENT:  no pallor or cyanosis, dentition good    "     Neck:  JVP normal   prominent carotid pulsations at base of neck bilaterally. Well-healed right carotid endarterectomy scar    Respiratory:  normal breath sounds, clear to auscultation, normal A-P diameter, normal symmetry, normal respiratory excursion, no use of accessory muscles        Cardiac: regular rhythm;normal S1 and S2       systolic murmur;grade 1;RUSB          GI:  abdomen soft;BS normoactive        Vascular: pulses full and equal                                      Extremities and Musculoskeletal:  no spinal abnormalities noted;normal muscle strength and tone        Neurological:  no gross motor deficits            LABS/DATA:  I reviewed the following:  Echo 12/10/19:  Interpretation Summary     Left ventricular systolic function is moderate to severely reduced. LVEF 36%  based on biplane 2D tracing.  Severe hypokinesis of the apical wall segments with relatively preserved wall  motion in the basal wall segments.  This pattern can be seen in stress cardiomyopathy or multivessel coronary  artery disease.  Right ventricle is normal in structure, function and size.  No significant valvular abnormalities.     This study was compared to a prior TTE from 11/9/2016, left ventricular  systolic function has decreased.     Inpatient cardiology team notified.      Component      Latest Ref Rng & Units 12/19/2019   Sodium      133 - 144 mmol/L 135   Potassium      3.4 - 5.3 mmol/L 4.2   Chloride      94 - 109 mmol/L 104   Carbon Dioxide      20 - 32 mmol/L 24   Anion Gap      3 - 14 mmol/L 7   Glucose      70 - 99 mg/dL 139 (H)   Urea Nitrogen      7 - 30 mg/dL 25   Creatinine      0.66 - 1.25 mg/dL 1.23   GFR Estimate      >60 mL/min/1.73:m2 55 (L)   GFR Estimate If Black      >60 mL/min/1.73:m2 64   Calcium      8.5 - 10.1 mg/dL 9.0   * Was to be done after starting lisinopril and chlorthalidone.  Now off chlorthalidone, and BMP was followed inpatient after starting lisinopril.   But results today look ok  after contrast dye from cath.       ASSESSMENT/PLAN:  CAD  - Angioplasty of LAD back in 1991, LAD stenting in early 2012 with a jailed diagonal vessel, later that year repeat coronary angiogram was performed which showed stable diagonal vessel with FFR of 0.8 and therefore medical therapy was recommended; nuclear stress study in 11/2018 demonstrating no evidence of ischemia or infarct  - Cardiac cath 4/23/19: 95% distal RCA s/p MERARI  - Recent admission with chest pain and NSTEMI.  Cath without new lesions.  Echo showed new decline in LVEF to 35%  - Currently on ASA 81 mg lifelong, Plavix x 1 year minimum from stenting, Toprol XL 50 mg, lisinopril 5 mg, Imdur 60 mg, Crestor 40 mg, Zetia 10 mg.   Switching lisinopril to losartan due to cough.   - Mediterranean style diet      New cardiomyopathy  - Echo 12/10/19 LVEF 35% with MWA suggestive of stress cardiomyopathy  - Cath 12/10/19 without new lesions  - Currently on Toprol XL 50 mg, lisinopril 5 mg  Due to cough, switching lisinopril to losartan  - Repeat echo after 1 month.  If still with low LVEF, then CMRI      HTN  - Currently on Imdur 60 mg, Toprol XL 50 mg, lisinopril 5 mg  Switching lisinopril to losartan due to cough  - BP controlled  - Follow      Hyperlipidemia  - FLP 11/13/19 shows LDL 69 on a combination of Zetia and rosuvastatin 40 mg      Carotid artery disease  - History of right carotid endarterectomy in 2016  - Last imaging 8/2018  - Continue ASA, statin            Follow up:  Echo, BMP and see me about 1/10/20   See cardiology FLORENCIA in May to discuss coming off Plavix   Dr. Mendoza in 11/2020     Jenaro Shook PA-C

## 2020-01-07 ENCOUNTER — HOSPITAL ENCOUNTER (OUTPATIENT)
Dept: CARDIAC REHAB | Facility: CLINIC | Age: 80
End: 2020-01-07
Attending: INTERNAL MEDICINE
Payer: COMMERCIAL

## 2020-01-07 DIAGNOSIS — I24.9 ACS (ACUTE CORONARY SYNDROME) (H): ICD-10-CM

## 2020-01-07 DIAGNOSIS — I25.118 CORONARY ARTERY DISEASE OF NATIVE ARTERY OF NATIVE HEART WITH STABLE ANGINA PECTORIS (H): ICD-10-CM

## 2020-01-07 PROCEDURE — 93798 PHYS/QHP OP CAR RHAB W/ECG: CPT

## 2020-01-07 PROCEDURE — 40000116 ZZH STATISTIC OP CR VISIT

## 2020-01-09 ENCOUNTER — HOSPITAL ENCOUNTER (OUTPATIENT)
Dept: CARDIAC REHAB | Facility: CLINIC | Age: 80
End: 2020-01-09
Attending: INTERNAL MEDICINE
Payer: COMMERCIAL

## 2020-01-09 PROCEDURE — 93798 PHYS/QHP OP CAR RHAB W/ECG: CPT

## 2020-01-09 PROCEDURE — 40000116 ZZH STATISTIC OP CR VISIT

## 2020-01-15 ENCOUNTER — HOSPITAL ENCOUNTER (OUTPATIENT)
Dept: CARDIAC REHAB | Facility: CLINIC | Age: 80
End: 2020-01-15
Attending: INTERNAL MEDICINE
Payer: COMMERCIAL

## 2020-01-15 PROCEDURE — 93798 PHYS/QHP OP CAR RHAB W/ECG: CPT

## 2020-01-15 PROCEDURE — 40000116 ZZH STATISTIC OP CR VISIT

## 2020-01-17 ENCOUNTER — HOSPITAL ENCOUNTER (OUTPATIENT)
Dept: CARDIAC REHAB | Facility: CLINIC | Age: 80
End: 2020-01-17
Attending: INTERNAL MEDICINE
Payer: COMMERCIAL

## 2020-01-17 PROCEDURE — 40000116 ZZH STATISTIC OP CR VISIT

## 2020-01-17 PROCEDURE — 93798 PHYS/QHP OP CAR RHAB W/ECG: CPT

## 2020-01-20 ENCOUNTER — HOSPITAL ENCOUNTER (OUTPATIENT)
Dept: CARDIOLOGY | Facility: CLINIC | Age: 80
Discharge: HOME | End: 2020-01-20
Attending: PHYSICIAN ASSISTANT | Admitting: PHYSICIAN ASSISTANT
Payer: COMMERCIAL

## 2020-01-20 DIAGNOSIS — I42.8 OTHER CARDIOMYOPATHY (H): ICD-10-CM

## 2020-01-20 LAB
ANION GAP SERPL CALCULATED.3IONS-SCNC: 4 MMOL/L (ref 3–14)
BUN SERPL-MCNC: 26 MG/DL (ref 7–30)
CALCIUM SERPL-MCNC: 8.8 MG/DL (ref 8.5–10.1)
CHLORIDE SERPL-SCNC: 105 MMOL/L (ref 94–109)
CO2 SERPL-SCNC: 29 MMOL/L (ref 20–32)
CREAT SERPL-MCNC: 1.06 MG/DL (ref 0.66–1.25)
GFR SERPL CREATININE-BSD FRML MDRD: 66 ML/MIN/{1.73_M2}
GLUCOSE SERPL-MCNC: 95 MG/DL (ref 70–99)
POTASSIUM SERPL-SCNC: 4.4 MMOL/L (ref 3.4–5.3)
SODIUM SERPL-SCNC: 138 MMOL/L (ref 133–144)

## 2020-01-20 PROCEDURE — 36415 COLL VENOUS BLD VENIPUNCTURE: CPT | Performed by: PHYSICIAN ASSISTANT

## 2020-01-20 PROCEDURE — 80048 BASIC METABOLIC PNL TOTAL CA: CPT | Performed by: PHYSICIAN ASSISTANT

## 2020-01-20 PROCEDURE — 93321 DOPPLER ECHO F-UP/LMTD STD: CPT

## 2020-01-20 PROCEDURE — 93308 TTE F-UP OR LMTD: CPT | Mod: 26 | Performed by: INTERNAL MEDICINE

## 2020-01-20 PROCEDURE — 93325 DOPPLER ECHO COLOR FLOW MAPG: CPT | Mod: 26 | Performed by: INTERNAL MEDICINE

## 2020-01-20 PROCEDURE — 93321 DOPPLER ECHO F-UP/LMTD STD: CPT | Mod: 26 | Performed by: INTERNAL MEDICINE

## 2020-01-21 ENCOUNTER — HOSPITAL ENCOUNTER (OUTPATIENT)
Dept: CARDIAC REHAB | Facility: CLINIC | Age: 80
End: 2020-01-21
Attending: INTERNAL MEDICINE
Payer: COMMERCIAL

## 2020-01-21 PROCEDURE — 93798 PHYS/QHP OP CAR RHAB W/ECG: CPT

## 2020-01-21 PROCEDURE — 40000116 ZZH STATISTIC OP CR VISIT

## 2020-01-22 ENCOUNTER — OFFICE VISIT (OUTPATIENT)
Dept: CARDIOLOGY | Facility: CLINIC | Age: 80
End: 2020-01-22
Attending: PHYSICIAN ASSISTANT
Payer: COMMERCIAL

## 2020-01-22 VITALS
BODY MASS INDEX: 32.71 KG/M2 | SYSTOLIC BLOOD PRESSURE: 128 MMHG | HEART RATE: 64 BPM | WEIGHT: 228.5 LBS | DIASTOLIC BLOOD PRESSURE: 70 MMHG | HEIGHT: 70 IN

## 2020-01-22 DIAGNOSIS — I42.8 OTHER CARDIOMYOPATHY (H): ICD-10-CM

## 2020-01-22 PROCEDURE — 99214 OFFICE O/P EST MOD 30 MIN: CPT | Performed by: PHYSICIAN ASSISTANT

## 2020-01-22 RX ORDER — PRIMIDONE 250 MG/1
500 TABLET ORAL DAILY
COMMUNITY
Start: 2020-01-22

## 2020-01-22 ASSESSMENT — MIFFLIN-ST. JEOR: SCORE: 1757.72

## 2020-01-22 NOTE — LETTER
2020    Stephan Nice MD  [facility] [address]  [place] [address]    RE: Iván Reyeskathy       Dear Colleague,    I had the pleasure of seeing Iván Nicholas in the [facility].    CARDIOLOGY CLINIC PROGRESS NOTE    DOS: 2020      Iván Nicholas  : 1940, 79 year old  MRN: [id number]      History:  I had the pleasure of following up with Iván Nicholas today in the cardiology clinic.   He is a patient of Dr. Mendoza.     Iván is a pleasant 79-year-old gentleman with past medical history notable for coronary artery disease.  He had angioplasty of his LAD back in , LAD stenting in early  with a jailed diagonal vessel, later that year repeat coronary angiogram was performed which showed stable diagonal vessel with FFR of 0.8 and therefore medical therapy was recommended.  A nuclear stress study in 2018 demonstrated no evidence of ischemia or infarct.  Also hyperlipidemia, hypertension, carotid artery disease (history of right carotid endarterectomy in ), left bundle branch block, history of tobacco use, and chronic angina since his last angiogram that was treated with moderate dose of Imdur.  Newly diagnosed nonischemic cardiomyopathy.     He was seen by Gina Vivas PA-C on 19 due to increased episodes of chest tightness at night over the last 2 months.  This was not always exertional.  His sxs would go away with nitroglycerin.  Sxs were somewhat reminiscent of his previous episodes prior to his stents.  Amlodipine was added for elevated BP.  He underwent cardiac catheterization 19.  95% distal RCA s/p MERARI.  Started on Plavix.      19 was seen by Dr. Mendoza.  He had no chest pain at that time.  Weight was up and he had 2+ RLE edema, 1+ LLE edema.  Dr. Mendoza stopped amlodipine and started lisinopril 20 mg and chlorthalidone 25 mg daily.     -  12/12/19 Hosp Admission [facility] the [facility] due to chest pain/tightness radiating to back and arms.   Dr. Morales consulted for NSTEMI.   12/10/19 cardiac cath:  no new lesions found.   12/10/19 Echo:  LVEF 35% with WMA suggestive of stress cardiomyopathy.     12/11/19 had epistaxis, resolved with Afrin.  Continued DAPT due to stent in 4/2019.  MEDS:  SWITCHED propranolol to Toprol XL 50 mg daily.   STOPPED Chlorthalidone.   DECREASED lisinopril from 20 to 5 mg.   Continued PTA Imdur 60 mg.     I saw him 12/19/19.  He had a cough since starting lisinopril, so we switched to losartan.     Interval History:   1/20/20 Echo: showed significant improvement in the LVEF from 36% to 55-60%.  There is mild to moderate concentric left ventricular hypertrophy.  BMP was WNL.      He presents today for follow up.   No chest pain.   No SOB, no orthopnea, no PND.  He has some mild edema in the right ankle that has been chronic and not changed or increased.   He is off the lisinopril, and the cough is gone.   He was started on primidone for the tremors since we switched propranolol to Toprol XL. This seems to be helping the tremors.   BP is ok after med changes.  He is sleeping well.       ROS:  Skin:  Negative     Eyes:  Positive for glasses;color blindness  ENT:  Positive for hearing loss  Respiratory:  Negative    Cardiovascular:    fatigue;Positive for  Gastroenterology: Negative    Genitourinary:  Positive for nocturia  Musculoskeletal:  Negative    Neurologic:  Negative    Psychiatric:  Negative    Heme/Lymph/Imm:  Negative    Endocrine:  Negative      PAST MEDICAL HISTORY:  Past Medical History:   Diagnosis Date     Arthritis      Carotid artery stenosis     Right, s/p right CEA 11/9/2016     Cerebral artery occlusion with cerebral infarction (H) 2016    TIA -  Endarterectomy...No residual     Cholesterol serum elevated      Colon polyps      Color blind      Coronary artery disease     2/2012 - MERARI to mid LAD     Decreased hearing       Depression      Gout      Hypertension      Hypertrophy of prostate without urinary obstruction and other lower urinary tract symptoms (LUTS)      Impotence      Left bundle branch block      Onychomycosis of toenail      Other and unspecified hyperlipidemia      Paroxysmal ventricular tachycardia (H)     with stress test 2012       PAST SURGICAL HISTORY:  Past Surgical History:   Procedure Laterality Date     ANGIOPLASTY  1991     ARTHROPLASTY KNEE Left 11/12/2018    Procedure: Left total knee arthroplasty;  Surgeon: Matt Schaefer MD;  Location:  OR     COLONOSCOPY  12/13/2011    Procedure:COLONOSCOPY; COLONOSCOPY; Surgeon:EMMANUELLE MOSER; Location: GI     CORONARY ANGIOGRAPHY ADULT ORDER  1991,2012 1991 - stent to proximal LAD, 2012 - Stent to mid LAD     CV HEART CATHETERIZATION WITH POSSIBLE INTERVENTION Left 4/23/2019    Procedure: Coronary Angiogram;  Surgeon: Percy Armas MD;  Location:  HEART CARDIAC CATH LAB     CV HEART CATHETERIZATION WITH POSSIBLE INTERVENTION N/A 12/10/2019    Procedure: Heart Catheterization with Possible Intervention;  Surgeon: Dajuan Duvall MD;  Location:  HEART CARDIAC CATH LAB     CV PCI ANGIOPLASTY N/A 4/23/2019    Procedure: PCI Angioplasty;  Surgeon: Percy Armas MD;  Location:  HEART CARDIAC CATH LAB     ENDARTERECTOMY CAROTID Right 11/10/2016    Procedure: ENDARTERECTOMY CAROTID;  Surgeon: Paco Suresh MD;  Location:  OR     HEART CATH, ANGIOPLASTY       ORTHOPEDIC SURGERY       PHACOEMULSIFICATION CLEAR CORNEA WITH STANDARD INTRAOCULAR LENS IMPLANT  12/19/2013    Procedure: PHACOEMULSIFICATION CLEAR CORNEA WITH STANDARD INTRAOCULAR LENS IMPLANT;  RIGHT PHACOEMULSIFICATION CLEAR CORNEA WITH STANDARD INTRAOCULAR LENS IMPLANT ;  Surgeon: Luisito Juan MD;  Location: [facility]       SOCIAL HISTORY:  Social History     Socioeconomic History     Marital status:      Spouse name: Not on file     Number of children:  Not on file     Years of education: Not on file     Highest education level: Not on file   Occupational History     Not on file   Social Needs     Financial resource strain: Not on file     Food insecurity:     Worry: Not on file     Inability: Not on file     Transportation needs:     Medical: Not on file     Non-medical: Not on file   Tobacco Use     Smoking status: Former Smoker     Packs/day: 0.50     Years: 20.00     Pack years: 10.00     Types: Cigarettes     Last attempt to quit: 1990     Years since quittin.3     Smokeless tobacco: Never Used   Substance and Sexual Activity     Alcohol use: Yes     Alcohol/week: 0.0 oz     Comment: daily - drinks x2     Drug use: No     Sexual activity: Never   Lifestyle     Physical activity:     Days per week: Not on file     Minutes per session: Not on file     Stress: Not on file   Relationships     Social connections:     Talks on phone: Not on file     Gets together: Not on file     Attends Baptism service: Not on file     Active member of club or organization: Not on file     Attends meetings of clubs or organizations: Not on file     Relationship status: Not on file     Intimate partner violence:     Fear of current or ex partner: Not on file     Emotionally abused: Not on file     Physically abused: Not on file     Forced sexual activity: Not on file   Other Topics Concern     Parent/sibling w/ CABG, MI or angioplasty before 65F 55M? Not Asked      Service Not Asked     Blood Transfusions Not Asked     Caffeine Concern No     Comment: 1-2 cups daily     Occupational Exposure Not Asked     Hobby Hazards Not Asked     Sleep Concern No     Stress Concern No     Weight Concern Not Asked     Special Diet No     Comment: trying to watch sodium intake     Back Care Not Asked     Exercise No     Comment: some walking     Bike Helmet Not Asked     Seat Belt Not Asked     Self-Exams Not Asked   Social History Narrative     Not on file       FAMILY  HISTORY:  Family History   Problem Relation Age of Onset     Heart Disease Mother 83     Heart Disease Father 69        emphysema     Coronary Artery Disease Brother      Coronary Artery Disease Sister        MEDS: acetaminophen (TYLENOL) 325 MG tablet, Take 2 tablets (650 mg) by mouth every 4 hours as needed for mild pain or headaches  ALLOPURINOL PO, Take 100 mg by mouth daily. To prevent gout attacks, recently started.   aspirin (ASA) 81 MG tablet, Take 81 mg by mouth daily  clopidogrel (PLAVIX) 75 MG tablet, Take 1 tablet (75 mg) by mouth daily  ezetimibe (ZETIA) 10 MG tablet, Take 1 tablet (10 mg) by mouth daily (Patient taking differently: Take 10 mg by mouth At Bedtime )  hypromellose-dextran (ARTIFICAL TEARS) 0.1-0.3 % ophthalmic solution, Place 1 drop into both eyes 2 times daily as needed for dry eyes  isosorbide mononitrate (IMDUR) 60 MG 24 hr tablet, Take 1 tablet (60 mg) by mouth daily  losartan (COZAAR) 25 MG tablet, Take 1 tablet (25 mg) by mouth daily  metoprolol succinate ER (TOPROL-XL) 50 MG 24 hr tablet, Take 1 tablet (50 mg) by mouth daily  Multiple Vitamins-Minerals (CENTRUM SILVER) per tablet, Take 1 tablet by mouth daily.  Multiple Vitamins-Minerals (PRESERVISION AREDS 2) CAPS, Take by mouth 2 times daily  nitroGLYcerin (NITROSTAT) 0.4 MG sublingual tablet, Place 1 tablet (0.4 mg) under the tongue every 5 minutes as needed for chest pain  oxymetazoline (AFRIN) 0.05 % nasal spray, Spray 2 sprays into both nostrils 2 times daily as needed for other (nose bleed)  pantoprazole (PROTONIX) 40 MG EC tablet, Take 40 mg by mouth daily  primidone (MYSOLINE) 250 MG tablet, Take 1 tablet (250 mg) by mouth 2 times daily  rosuvastatin (CRESTOR) 40 MG tablet, Take 1 tablet by mouth daily.    No current facility-administered medications on file prior to visit.       ALLERGIES:   Allergies   Allergen Reactions     Hmg-Coa-R Inhibitors Muscle Pain (Myalgia)     lipitor     Lisinopril Cough       PHYSICAL  "EXAM:  Vitals: /70 (BP Location: Right arm, Patient Position: Sitting, Cuff Size: Adult Large)   Pulse 64   Ht 1.778 m (5' 10\")   Wt 103.6 kg (228 lb 8 oz)   BMI 32.79 kg/m     Constitutional:  cooperative, alert and oriented, well developed, well nourished, in no acute distress appears younger than stated age      Skin:  warm and dry to the touch, no apparent skin lesions or masses noted   right CEA scar    Head:  normocephalic, no masses or lesions        Eyes:  pupils equal and round;conjunctivae and lids unremarkable;sclera white;no xanthalasma        ENT:  no pallor or cyanosis, dentition good        Neck:  JVP normal   prominent carotid pulsations at base of neck bilaterally. Well-healed right carotid endarterectomy scar    Respiratory:  normal breath sounds, clear to auscultation, normal A-P diameter, normal symmetry, normal respiratory excursion, no use of accessory muscles        Cardiac: regular rhythm;normal S1 and S2       systolic murmur;grade 1;RUSB          GI:  abdomen soft;BS normoactive        Vascular: pulses full and equal                                      Extremities and Musculoskeletal:  no spinal abnormalities noted;normal muscle strength and tone        Neurological:  no gross motor deficits            LABS/DATA:  I reviewed the following:  Echo 12/10/19:  Interpretation Summary  Left ventricular systolic function is moderate to severely reduced. LVEF 36%  based on biplane 2D tracing.  Severe hypokinesis of the apical wall segments with relatively preserved wall  motion in the basal wall segments.  This pattern can be seen in stress cardiomyopathy or multivessel coronary  artery disease.  Right ventricle is normal in structure, function and size.  No significant valvular abnormalities.     This study was compared to a prior TTE from 11/9/2016, left ventricular  systolic function has decreased.     Inpatient cardiology team notified.      Limited echo 1/20/20:  Interpretation " Summary  The visual ejection fraction is estimated at 55-60%.  There is mild to moderate concentric left ventricular hypertrophy.  The left atrium is mild to moderately dilated.  Right ventricular systolic pressure is elevated, consistent with moderate  pulmonary hypertension.  The ascending aorta is Mildly dilated.  LVEF is significantly improved compared with most recent study.      Component      Latest Ref Rng & Units 1/20/2020   Sodium      133 - 144 mmol/L 138   Potassium      3.4 - 5.3 mmol/L 4.4   Chloride      94 - 109 mmol/L 105   Carbon Dioxide      20 - 32 mmol/L 29   Anion Gap      3 - 14 mmol/L 4   Glucose      70 - 99 mg/dL 95   Urea Nitrogen      7 - 30 mg/dL 26   Creatinine      0.66 - 1.25 mg/dL 1.06   GFR Estimate      >60 mL/min/1.73:m2 66   GFR Estimate If Black      >60 mL/min/1.73:m2 77   Calcium      8.5 - 10.1 mg/dL 8.8         ASSESSMENT/PLAN:  CAD  - Angioplasty of LAD back in 1991, LAD stenting in early 2012 with a jailed diagonal vessel, later that year repeat coronary angiogram was performed which showed stable diagonal vessel with FFR of 0.8 and therefore medical therapy was recommended; nuclear stress study in 11/2018 demonstrating no evidence of ischemia or infarct  - Cardiac cath 4/23/19: 95% distal RCA s/p MERARI  - Recent admission with chest pain and NSTEMI.  Cath without new lesions.  Echo showed new decline in LVEF to 35%  - Currently on ASA 81 mg lifelong, Plavix x 1 year minimum from stenting, Toprol XL 50 mg, losartan 25 mg, Imdur 60 mg, Crestor 40 mg, Zetia 10 mg.   - Mediterranean style diet      New cardiomyopathy, consistent with stress cardiomyopathy   - Echo 12/10/19 LVEF 35% with MWA suggestive of stress cardiomyopathy  - Cath 12/10/19 without new lesions  - Echo 1/20/20: improvement in LVEF to 55-60%  - Continue Toprol XL 50 mg, losartan 25 mg      HTN  - Currently on Imdur 60 mg, Toprol XL 50 mg, losartan 25 mg  - BP controlled  - Follow      Hyperlipidemia  - FLP  11/13/19 shows LDL 69 on a combination of Zetia and rosuvastatin 40 mg      Carotid artery disease  - History of right carotid endarterectomy in 2016  - Last imaging 8/2018  - Continue ASA, statin            Follow up:  See cardiology FLORENCIA in May 2020 to discuss coming off Plavix   Dr. Mendoza in 11/2020       Thank you for allowing me to participate in the care of your patient.    Sincerely,     Jenaro Shook PA-C     [facility]

## 2020-01-22 NOTE — PROGRESS NOTES
CARDIOLOGY CLINIC PROGRESS NOTE    DOS: 2020      Iván Nicholas  : 1940, 79 year old  MRN: [id number]      History:  I had the pleasure of following up with Iván Nicholas today in the cardiology clinic.   He is a patient of Dr. Mendoza.     Iván is a pleasant 79-year-old gentleman with past medical history notable for coronary artery disease.  He had angioplasty of his LAD back in , LAD stenting in early  with a jailed diagonal vessel, later that year repeat coronary angiogram was performed which showed stable diagonal vessel with FFR of 0.8 and therefore medical therapy was recommended.  A nuclear stress study in 2018 demonstrated no evidence of ischemia or infarct.  Also hyperlipidemia, hypertension, carotid artery disease (history of right carotid endarterectomy in ), left bundle branch block, history of tobacco use, and chronic angina since his last angiogram that was treated with moderate dose of Imdur.  Newly diagnosed nonischemic cardiomyopathy.     He was seen by Gina Vivas PA-C on 19 due to increased episodes of chest tightness at night over the last 2 months.  This was not always exertional.  His sxs would go away with nitroglycerin.  Sxs were somewhat reminiscent of his previous episodes prior to his stents.  Amlodipine was added for elevated BP.  He underwent cardiac catheterization 19.  95% distal RCA s/p MERARI.  Started on Plavix.      19 was seen by Dr. Mendoza.  He had no chest pain at that time.  Weight was up and he had 2+ RLE edema, 1+ LLE edema.  Dr. Mendoza stopped amlodipine and started lisinopril 20 mg and chlorthalidone 25 mg daily.     - 19 Hosp Admission at [facility] due to chest pain/tightness radiating to back and arms.   Dr. Morales consulted for NSTEMI.   12/10/19 cardiac cath:  no new lesions found.   12/10/19 Echo:  LVEF 35% with WMA suggestive of stress cardiomyopathy.     19 had epistaxis,  resolved with Afrin.  Continued DAPT due to stent in 4/2019.  MEDS:  SWITCHED propranolol to Toprol XL 50 mg daily.   STOPPED Chlorthalidone.   DECREASED lisinopril from 20 to 5 mg.   Continued PTA Imdur 60 mg.     I saw him 12/19/19.  He had a cough since starting lisinopril, so we switched to losartan.     Interval History:   1/20/20 Echo: showed significant improvement in the LVEF from 36% to 55-60%.  There is mild to moderate concentric left ventricular hypertrophy.  BMP was WNL.      He presents today for follow up.   No chest pain.   No SOB, no orthopnea, no PND.  He has some mild edema in the right ankle that has been chronic and not changed or increased.   He is off the lisinopril, and the cough is gone.   He was started on primidone for the tremors since we switched propranolol to Toprol XL. This seems to be helping the tremors.   BP is ok after med changes.  He is sleeping well.       ROS:  Skin:  Negative     Eyes:  Positive for glasses;color blindness  ENT:  Positive for hearing loss  Respiratory:  Negative    Cardiovascular:    fatigue;Positive for  Gastroenterology: Negative    Genitourinary:  Positive for nocturia  Musculoskeletal:  Negative    Neurologic:  Negative    Psychiatric:  Negative    Heme/Lymph/Imm:  Negative    Endocrine:  Negative      PAST MEDICAL HISTORY:  Past Medical History:   Diagnosis Date     Arthritis      Carotid artery stenosis     Right, s/p right CEA 11/9/2016     Cerebral artery occlusion with cerebral infarction (H) 2016    TIA -  Endarterectomy...No residual     Cholesterol serum elevated      Colon polyps      Color blind      Coronary artery disease     2/2012 - MERARI to mid LAD     Decreased hearing      Depression      Gout      Hypertension      Hypertrophy of prostate without urinary obstruction and other lower urinary tract symptoms (LUTS)      Impotence      Left bundle branch block      Onychomycosis of toenail      Other and unspecified hyperlipidemia       Paroxysmal ventricular tachycardia (H)     with stress test 2012       PAST SURGICAL HISTORY:  Past Surgical History:   Procedure Laterality Date     ANGIOPLASTY  1991     ARTHROPLASTY KNEE Left 11/12/2018    Procedure: Left total knee arthroplasty;  Surgeon: Matt Schaefer MD;  Location:  OR     COLONOSCOPY  12/13/2011    Procedure:COLONOSCOPY; COLONOSCOPY; Surgeon:EMMANUELLE MOSER; Location: GI     CORONARY ANGIOGRAPHY ADULT ORDER  1991,2012 1991 - stent to proximal LAD, 2012 - Stent to mid LAD     CV HEART CATHETERIZATION WITH POSSIBLE INTERVENTION Left 4/23/2019    Procedure: Coronary Angiogram;  Surgeon: Percy Armas MD;  Location:  HEART CARDIAC CATH LAB     CV HEART CATHETERIZATION WITH POSSIBLE INTERVENTION N/A 12/10/2019    Procedure: Heart Catheterization with Possible Intervention;  Surgeon: Dajuan Duvall MD;  Location:  HEART CARDIAC CATH LAB     CV PCI ANGIOPLASTY N/A 4/23/2019    Procedure: PCI Angioplasty;  Surgeon: Percy Armas MD;  Location:  HEART CARDIAC CATH LAB     ENDARTERECTOMY CAROTID Right 11/10/2016    Procedure: ENDARTERECTOMY CAROTID;  Surgeon: Paco Suresh MD;  Location:  OR     HEART CATH, ANGIOPLASTY       ORTHOPEDIC SURGERY       PHACOEMULSIFICATION CLEAR CORNEA WITH STANDARD INTRAOCULAR LENS IMPLANT  12/19/2013    Procedure: PHACOEMULSIFICATION CLEAR CORNEA WITH STANDARD INTRAOCULAR LENS IMPLANT;  RIGHT PHACOEMULSIFICATION CLEAR CORNEA WITH STANDARD INTRAOCULAR LENS IMPLANT ;  Surgeon: Luisito Juan MD;  Location: [facility]       SOCIAL HISTORY:  Social History     Socioeconomic History     Marital status:      Spouse name: Not on file     Number of children: Not on file     Years of education: Not on file     Highest education level: Not on file   Occupational History     Not on file   Social Needs     Financial resource strain: Not on file     Food insecurity:     Worry: Not on file     Inability: Not on file      Transportation needs:     Medical: Not on file     Non-medical: Not on file   Tobacco Use     Smoking status: Former Smoker     Packs/day: 0.50     Years: 20.00     Pack years: 10.00     Types: Cigarettes     Last attempt to quit: 1990     Years since quittin.3     Smokeless tobacco: Never Used   Substance and Sexual Activity     Alcohol use: Yes     Alcohol/week: 0.0 oz     Comment: daily - drinks x2     Drug use: No     Sexual activity: Never   Lifestyle     Physical activity:     Days per week: Not on file     Minutes per session: Not on file     Stress: Not on file   Relationships     Social connections:     Talks on phone: Not on file     Gets together: Not on file     Attends Hinduism service: Not on file     Active member of club or organization: Not on file     Attends meetings of clubs or organizations: Not on file     Relationship status: Not on file     Intimate partner violence:     Fear of current or ex partner: Not on file     Emotionally abused: Not on file     Physically abused: Not on file     Forced sexual activity: Not on file   Other Topics Concern     Parent/sibling w/ CABG, MI or angioplasty before 65F 55M? Not Asked      Service Not Asked     Blood Transfusions Not Asked     Caffeine Concern No     Comment: 1-2 cups daily     Occupational Exposure Not Asked     Hobby Hazards Not Asked     Sleep Concern No     Stress Concern No     Weight Concern Not Asked     Special Diet No     Comment: trying to watch sodium intake     Back Care Not Asked     Exercise No     Comment: some walking     Bike Helmet Not Asked     Seat Belt Not Asked     Self-Exams Not Asked   Social History Narrative     Not on file       FAMILY HISTORY:  Family History   Problem Relation Age of Onset     Heart Disease Mother 83     Heart Disease Father 69        emphysema     Coronary Artery Disease Brother      Coronary Artery Disease Sister        MEDS: acetaminophen (TYLENOL) 325 MG tablet, Take 2 tablets  "(650 mg) by mouth every 4 hours as needed for mild pain or headaches  ALLOPURINOL PO, Take 100 mg by mouth daily. To prevent gout attacks, recently started.   aspirin (ASA) 81 MG tablet, Take 81 mg by mouth daily  clopidogrel (PLAVIX) 75 MG tablet, Take 1 tablet (75 mg) by mouth daily  ezetimibe (ZETIA) 10 MG tablet, Take 1 tablet (10 mg) by mouth daily (Patient taking differently: Take 10 mg by mouth At Bedtime )  hypromellose-dextran (ARTIFICAL TEARS) 0.1-0.3 % ophthalmic solution, Place 1 drop into both eyes 2 times daily as needed for dry eyes  isosorbide mononitrate (IMDUR) 60 MG 24 hr tablet, Take 1 tablet (60 mg) by mouth daily  losartan (COZAAR) 25 MG tablet, Take 1 tablet (25 mg) by mouth daily  metoprolol succinate ER (TOPROL-XL) 50 MG 24 hr tablet, Take 1 tablet (50 mg) by mouth daily  Multiple Vitamins-Minerals (CENTRUM SILVER) per tablet, Take 1 tablet by mouth daily.  Multiple Vitamins-Minerals (PRESERVISION AREDS 2) CAPS, Take by mouth 2 times daily  nitroGLYcerin (NITROSTAT) 0.4 MG sublingual tablet, Place 1 tablet (0.4 mg) under the tongue every 5 minutes as needed for chest pain  oxymetazoline (AFRIN) 0.05 % nasal spray, Spray 2 sprays into both nostrils 2 times daily as needed for other (nose bleed)  pantoprazole (PROTONIX) 40 MG EC tablet, Take 40 mg by mouth daily  primidone (MYSOLINE) 250 MG tablet, Take 1 tablet (250 mg) by mouth 2 times daily  rosuvastatin (CRESTOR) 40 MG tablet, Take 1 tablet by mouth daily.    No current facility-administered medications on file prior to visit.       ALLERGIES:   Allergies   Allergen Reactions     Hmg-Coa-R Inhibitors Muscle Pain (Myalgia)     lipitor     Lisinopril Cough       PHYSICAL EXAM:  Vitals: /70 (BP Location: Right arm, Patient Position: Sitting, Cuff Size: Adult Large)   Pulse 64   Ht 1.778 m (5' 10\")   Wt 103.6 kg (228 lb 8 oz)   BMI 32.79 kg/m    Constitutional:  cooperative, alert and oriented, well developed, well nourished, in no " acute distress appears younger than stated age      Skin:  warm and dry to the touch, no apparent skin lesions or masses noted   right CEA scar    Head:  normocephalic, no masses or lesions        Eyes:  pupils equal and round;conjunctivae and lids unremarkable;sclera white;no xanthalasma        ENT:  no pallor or cyanosis, dentition good        Neck:  JVP normal   prominent carotid pulsations at base of neck bilaterally. Well-healed right carotid endarterectomy scar    Respiratory:  normal breath sounds, clear to auscultation, normal A-P diameter, normal symmetry, normal respiratory excursion, no use of accessory muscles        Cardiac: regular rhythm;normal S1 and S2       systolic murmur;grade 1;RUSB          GI:  abdomen soft;BS normoactive        Vascular: pulses full and equal                                      Extremities and Musculoskeletal:  no spinal abnormalities noted;normal muscle strength and tone        Neurological:  no gross motor deficits            LABS/DATA:  I reviewed the following:  Echo 12/10/19:  Interpretation Summary  Left ventricular systolic function is moderate to severely reduced. LVEF 36%  based on biplane 2D tracing.  Severe hypokinesis of the apical wall segments with relatively preserved wall  motion in the basal wall segments.  This pattern can be seen in stress cardiomyopathy or multivessel coronary  artery disease.  Right ventricle is normal in structure, function and size.  No significant valvular abnormalities.     This study was compared to a prior TTE from 11/9/2016, left ventricular  systolic function has decreased.     Inpatient cardiology team notified.      Limited echo 1/20/20:  Interpretation Summary  The visual ejection fraction is estimated at 55-60%.  There is mild to moderate concentric left ventricular hypertrophy.  The left atrium is mild to moderately dilated.  Right ventricular systolic pressure is elevated, consistent with moderate  pulmonary  hypertension.  The ascending aorta is Mildly dilated.  LVEF is significantly improved compared with most recent study.      Component      Latest Ref Rng & Units 1/20/2020   Sodium      133 - 144 mmol/L 138   Potassium      3.4 - 5.3 mmol/L 4.4   Chloride      94 - 109 mmol/L 105   Carbon Dioxide      20 - 32 mmol/L 29   Anion Gap      3 - 14 mmol/L 4   Glucose      70 - 99 mg/dL 95   Urea Nitrogen      7 - 30 mg/dL 26   Creatinine      0.66 - 1.25 mg/dL 1.06   GFR Estimate      >60 mL/min/1.73:m2 66   GFR Estimate If Black      >60 mL/min/1.73:m2 77   Calcium      8.5 - 10.1 mg/dL 8.8         ASSESSMENT/PLAN:  CAD  - Angioplasty of LAD back in 1991, LAD stenting in early 2012 with a jailed diagonal vessel, later that year repeat coronary angiogram was performed which showed stable diagonal vessel with FFR of 0.8 and therefore medical therapy was recommended; nuclear stress study in 11/2018 demonstrating no evidence of ischemia or infarct  - Cardiac cath 4/23/19: 95% distal RCA s/p MERARI  - Recent admission with chest pain and NSTEMI.  Cath without new lesions.  Echo showed new decline in LVEF to 35%  - Currently on ASA 81 mg lifelong, Plavix x 1 year minimum from stenting, Toprol XL 50 mg, losartan 25 mg, Imdur 60 mg, Crestor 40 mg, Zetia 10 mg.   - Mediterranean style diet      New cardiomyopathy, consistent with stress cardiomyopathy   - Echo 12/10/19 LVEF 35% with MWA suggestive of stress cardiomyopathy  - Cath 12/10/19 without new lesions  - Echo 1/20/20: improvement in LVEF to 55-60%  - Continue Toprol XL 50 mg, losartan 25 mg      HTN  - Currently on Imdur 60 mg, Toprol XL 50 mg, losartan 25 mg  - BP controlled  - Follow      Hyperlipidemia  - FLP 11/13/19 shows LDL 69 on a combination of Zetia and rosuvastatin 40 mg      Carotid artery disease  - History of right carotid endarterectomy in 2016  - Last imaging 8/2018  - Continue ASA, statin            Follow up:  See cardiology [facility] in May 2020 to discuss coming  off Plavix   Dr. Mendoza in 11/2020     Jenaro Shook PA-C

## 2020-01-22 NOTE — LETTER
2020    Stephan Nice MD  [facility] [address]  [place] [address]    RE: Iván Reyeskathy       Dear Colleague,    I had the pleasure of seeing Iván Nicholas in the [facility].    CARDIOLOGY CLINIC PROGRESS NOTE    DOS: 2020      Iván Nicholas  : 1940, 79 year old  MRN: [id number]      History:  I had the pleasure of following up with Iván Nicholas today in the cardiology clinic.   He is a patient of Dr. Mendoza.     Iván is a pleasant 79-year-old gentleman with past medical history notable for coronary artery disease.  He had angioplasty of his LAD back in , LAD stenting in early  with a jailed diagonal vessel, later that year repeat coronary angiogram was performed which showed stable diagonal vessel with FFR of 0.8 and therefore medical therapy was recommended.  A nuclear stress study in 2018 demonstrated no evidence of ischemia or infarct.  Also hyperlipidemia, hypertension, carotid artery disease (history of right carotid endarterectomy in ), left bundle branch block, history of tobacco use, and chronic angina since his last angiogram that was treated with moderate dose of Imdur.  Newly diagnosed nonischemic cardiomyopathy.     He was seen by Gina Vivas PA-C on 19 due to increased episodes of chest tightness at night over the last 2 months.  This was not always exertional.  His sxs would go away with nitroglycerin.  Sxs were somewhat reminiscent of his previous episodes prior to his stents.  Amlodipine was added for elevated BP.  He underwent cardiac catheterization 19.  95% distal RCA s/p MERARI.  Started on Plavix.      19 was seen by Dr. Mendoza.  He had no chest pain at that time.  Weight was up and he had 2+ RLE edema, 1+ LLE edema.  Dr. Mendoza stopped amlodipine and started lisinopril 20 mg and chlorthalidone 25 mg daily.     -  12/12/19 Hosp Admission at [facility] due to chest pain/tightness radiating to back and arms.   Dr. Morales consulted for NSTEMI.   12/10/19 cardiac cath:  no new lesions found.   12/10/19 Echo:  LVEF 35% with WMA suggestive of stress cardiomyopathy.     12/11/19 had epistaxis, resolved with Afrin.  Continued DAPT due to stent in 4/2019.  MEDS:  SWITCHED propranolol to Toprol XL 50 mg daily.   STOPPED Chlorthalidone.   DECREASED lisinopril from 20 to 5 mg.   Continued PTA Imdur 60 mg.     I saw him 12/19/19.  He had a cough since starting lisinopril, so we switched to losartan.     Interval History:   1/20/20 Echo: showed significant improvement in the LVEF from 36% to 55-60%.  There is mild to moderate concentric left ventricular hypertrophy.  BMP was WNL.      He presents today for follow up.   No chest pain.   No SOB, no orthopnea, no PND.  He has some mild edema in the right ankle that has been chronic and not changed or increased.   He is off the lisinopril, and the cough is gone.   He was started on primidone for the tremors since we switched propranolol to Toprol XL. This seems to be helping the tremors.   BP is ok after med changes.  He is sleeping well.       ROS:  Skin:  Negative     Eyes:  Positive for glasses;color blindness  ENT:  Positive for hearing loss  Respiratory:  Negative    Cardiovascular:    fatigue;Positive for  Gastroenterology: Negative    Genitourinary:  Positive for nocturia  Musculoskeletal:  Negative    Neurologic:  Negative    Psychiatric:  Negative    Heme/Lymph/Imm:  Negative    Endocrine:  Negative      PAST MEDICAL HISTORY:  Past Medical History:   Diagnosis Date     Arthritis      Carotid artery stenosis     Right, s/p right CEA 11/9/2016     Cerebral artery occlusion with cerebral infarction (H) 2016    TIA -  Endarterectomy...No residual     Cholesterol serum elevated      Colon polyps      Color blind      Coronary artery disease     2/2012 - MERARI to mid LAD     Decreased hearing       Depression      Gout      Hypertension      Hypertrophy of prostate without urinary obstruction and other lower urinary tract symptoms (LUTS)      Impotence      Left bundle branch block      Onychomycosis of toenail      Other and unspecified hyperlipidemia      Paroxysmal ventricular tachycardia (H)     with stress test 2012       PAST SURGICAL HISTORY:  Past Surgical History:   Procedure Laterality Date     ANGIOPLASTY  1991     ARTHROPLASTY KNEE Left 11/12/2018    Procedure: Left total knee arthroplasty;  Surgeon: Matt Schaefer MD;  Location:  OR     COLONOSCOPY  12/13/2011    Procedure:COLONOSCOPY; COLONOSCOPY; Surgeon:EMMANUELLE MOSER; Location: GI     CORONARY ANGIOGRAPHY ADULT ORDER  1991,2012 1991 - stent to proximal LAD, 2012 - Stent to mid LAD     CV HEART CATHETERIZATION WITH POSSIBLE INTERVENTION Left 4/23/2019    Procedure: Coronary Angiogram;  Surgeon: Percy Armas MD;  Location:  HEART CARDIAC CATH LAB     CV HEART CATHETERIZATION WITH POSSIBLE INTERVENTION N/A 12/10/2019    Procedure: Heart Catheterization with Possible Intervention;  Surgeon: Dajuan Duvall MD;  Location:  HEART CARDIAC CATH LAB     CV PCI ANGIOPLASTY N/A 4/23/2019    Procedure: PCI Angioplasty;  Surgeon: Percy Armas MD;  Location:  HEART CARDIAC CATH LAB     ENDARTERECTOMY CAROTID Right 11/10/2016    Procedure: ENDARTERECTOMY CAROTID;  Surgeon: Paco Suresh MD;  Location:  OR     HEART CATH, ANGIOPLASTY       ORTHOPEDIC SURGERY       PHACOEMULSIFICATION CLEAR CORNEA WITH STANDARD INTRAOCULAR LENS IMPLANT  12/19/2013    Procedure: PHACOEMULSIFICATION CLEAR CORNEA WITH STANDARD INTRAOCULAR LENS IMPLANT;  RIGHT PHACOEMULSIFICATION CLEAR CORNEA WITH STANDARD INTRAOCULAR LENS IMPLANT ;  Surgeon: Luisito Juan MD;  Location: [facility]       SOCIAL HISTORY:  Social History     Socioeconomic History     Marital status:      Spouse name: Not on file     Number of children:  Not on file     Years of education: Not on file     Highest education level: Not on file   Occupational History     Not on file   Social Needs     Financial resource strain: Not on file     Food insecurity:     Worry: Not on file     Inability: Not on file     Transportation needs:     Medical: Not on file     Non-medical: Not on file   Tobacco Use     Smoking status: Former Smoker     Packs/day: 0.50     Years: 20.00     Pack years: 10.00     Types: Cigarettes     Last attempt to quit: 1990     Years since quittin.3     Smokeless tobacco: Never Used   Substance and Sexual Activity     Alcohol use: Yes     Alcohol/week: 0.0 oz     Comment: daily - drinks x2     Drug use: No     Sexual activity: Never   Lifestyle     Physical activity:     Days per week: Not on file     Minutes per session: Not on file     Stress: Not on file   Relationships     Social connections:     Talks on phone: Not on file     Gets together: Not on file     Attends [employer] service: Not on file     Active member of club or organization: Not on file     Attends meetings of clubs or organizations: Not on file     Relationship status: Not on file     Intimate partner violence:     Fear of current or ex partner: Not on file     Emotionally abused: Not on file     Physically abused: Not on file     Forced sexual activity: Not on file   Other Topics Concern     Parent/sibling w/ CABG, MI or angioplasty before 65F 55M? Not Asked      Service Not Asked     Blood Transfusions Not Asked     Caffeine Concern No     Comment: 1-2 cups daily     Occupational Exposure Not Asked     Hobby Hazards Not Asked     Sleep Concern No     Stress Concern No     Weight Concern Not Asked     Special Diet No     Comment: trying to watch sodium intake     Back Care Not Asked     Exercise No     Comment: some walking     Bike Helmet Not Asked     Seat Belt Not Asked     Self-Exams Not Asked   Social History Narrative     Not on file       FAMILY  HISTORY:  Family History   Problem Relation Age of Onset     Heart Disease Mother 83     Heart Disease Father 69        emphysema     Coronary Artery Disease Brother      Coronary Artery Disease Sister        MEDS: acetaminophen (TYLENOL) 325 MG tablet, Take 2 tablets (650 mg) by mouth every 4 hours as needed for mild pain or headaches  ALLOPURINOL PO, Take 100 mg by mouth daily. To prevent gout attacks, recently started.   aspirin (ASA) 81 MG tablet, Take 81 mg by mouth daily  clopidogrel (PLAVIX) 75 MG tablet, Take 1 tablet (75 mg) by mouth daily  ezetimibe (ZETIA) 10 MG tablet, Take 1 tablet (10 mg) by mouth daily (Patient taking differently: Take 10 mg by mouth At Bedtime )  hypromellose-dextran (ARTIFICAL TEARS) 0.1-0.3 % ophthalmic solution, Place 1 drop into both eyes 2 times daily as needed for dry eyes  isosorbide mononitrate (IMDUR) 60 MG 24 hr tablet, Take 1 tablet (60 mg) by mouth daily  losartan (COZAAR) 25 MG tablet, Take 1 tablet (25 mg) by mouth daily  metoprolol succinate ER (TOPROL-XL) 50 MG 24 hr tablet, Take 1 tablet (50 mg) by mouth daily  Multiple Vitamins-Minerals (CENTRUM SILVER) per tablet, Take 1 tablet by mouth daily.  Multiple Vitamins-Minerals (PRESERVISION AREDS 2) CAPS, Take by mouth 2 times daily  nitroGLYcerin (NITROSTAT) 0.4 MG sublingual tablet, Place 1 tablet (0.4 mg) under the tongue every 5 minutes as needed for chest pain  oxymetazoline (AFRIN) 0.05 % nasal spray, Spray 2 sprays into both nostrils 2 times daily as needed for other (nose bleed)  pantoprazole (PROTONIX) 40 MG EC tablet, Take 40 mg by mouth daily  primidone (MYSOLINE) 250 MG tablet, Take 1 tablet (250 mg) by mouth 2 times daily  rosuvastatin (CRESTOR) 40 MG tablet, Take 1 tablet by mouth daily.    No current facility-administered medications on file prior to visit.       ALLERGIES:   Allergies   Allergen Reactions     Hmg-Coa-R Inhibitors Muscle Pain (Myalgia)     lipitor     Lisinopril Cough       PHYSICAL  "EXAM:  Vitals: /70 (BP Location: Right arm, Patient Position: Sitting, Cuff Size: Adult Large)   Pulse 64   Ht 1.778 m (5' 10\")   Wt 103.6 kg (228 lb 8 oz)   BMI 32.79 kg/m     Constitutional:  cooperative, alert and oriented, well developed, well nourished, in no acute distress appears younger than stated age      Skin:  warm and dry to the touch, no apparent skin lesions or masses noted   right CEA scar    Head:  normocephalic, no masses or lesions        Eyes:  pupils equal and round;conjunctivae and lids unremarkable;sclera white;no xanthalasma        ENT:  no pallor or cyanosis, dentition good        Neck:  JVP normal   prominent carotid pulsations at base of neck bilaterally. Well-healed right carotid endarterectomy scar    Respiratory:  normal breath sounds, clear to auscultation, normal A-P diameter, normal symmetry, normal respiratory excursion, no use of accessory muscles        Cardiac: regular rhythm;normal S1 and S2       systolic murmur;grade 1;RUSB          GI:  abdomen soft;BS normoactive        Vascular: pulses full and equal                                      Extremities and Musculoskeletal:  no spinal abnormalities noted;normal muscle strength and tone        Neurological:  no gross motor deficits            LABS/DATA:  I reviewed the following:  Echo 12/10/19:  Interpretation Summary  Left ventricular systolic function is moderate to severely reduced. LVEF 36%  based on biplane 2D tracing.  Severe hypokinesis of the apical wall segments with relatively preserved wall  motion in the basal wall segments.  This pattern can be seen in stress cardiomyopathy or multivessel coronary  artery disease.  Right ventricle is normal in structure, function and size.  No significant valvular abnormalities.     This study was compared to a prior TTE from 11/9/2016, left ventricular  systolic function has decreased.     Inpatient cardiology team notified.      Limited echo 1/20/20:  Interpretation " Summary  The visual ejection fraction is estimated at 55-60%.  There is mild to moderate concentric left ventricular hypertrophy.  The left atrium is mild to moderately dilated.  Right ventricular systolic pressure is elevated, consistent with moderate  pulmonary hypertension.  The ascending aorta is Mildly dilated.  LVEF is significantly improved compared with most recent study.      Component      Latest Ref Rng & Units 1/20/2020   Sodium      133 - 144 mmol/L 138   Potassium      3.4 - 5.3 mmol/L 4.4   Chloride      94 - 109 mmol/L 105   Carbon Dioxide      20 - 32 mmol/L 29   Anion Gap      3 - 14 mmol/L 4   Glucose      70 - 99 mg/dL 95   Urea Nitrogen      7 - 30 mg/dL 26   Creatinine      0.66 - 1.25 mg/dL 1.06   GFR Estimate      >60 mL/min/1.73:m2 66   GFR Estimate If Black      >60 mL/min/1.73:m2 77   Calcium      8.5 - 10.1 mg/dL 8.8         ASSESSMENT/PLAN:  CAD  - Angioplasty of LAD back in 1991, LAD stenting in early 2012 with a jailed diagonal vessel, later that year repeat coronary angiogram was performed which showed stable diagonal vessel with FFR of 0.8 and therefore medical therapy was recommended; nuclear stress study in 11/2018 demonstrating no evidence of ischemia or infarct  - Cardiac cath 4/23/19: 95% distal RCA s/p MERARI  - Recent admission with chest pain and NSTEMI.  Cath without new lesions.  Echo showed new decline in LVEF to 35%  - Currently on ASA 81 mg lifelong, Plavix x 1 year minimum from stenting, Toprol XL 50 mg, losartan 25 mg, Imdur 60 mg, Crestor 40 mg, Zetia 10 mg.   - Mediterranean style diet      New cardiomyopathy, consistent with stress cardiomyopathy   - Echo 12/10/19 LVEF 35% with MWA suggestive of stress cardiomyopathy  - Cath 12/10/19 without new lesions  - Echo 1/20/20: improvement in LVEF to 55-60%  - Continue Toprol XL 50 mg, losartan 25 mg      HTN  - Currently on Imdur 60 mg, Toprol XL 50 mg, losartan 25 mg  - BP controlled  - Follow      Hyperlipidemia  - FLP  11/13/19 shows LDL 69 on a combination of Zetia and rosuvastatin 40 mg      Carotid artery disease  - History of right carotid endarterectomy in 2016  - Last imaging 8/2018  - Continue ASA, statin            Follow up:  See cardiology FLORENCIA in May 2020 to discuss coming off Plavix   Dr. Mendoza in 11/2020     Jenaro Shook PA-C    Thank you for allowing me to participate in the care of your patient.      Sincerely,     Jenaro Shook PA-C     [facility]    cc:   Jenaro Shook PA-C  [address] [place] [address]

## 2020-04-09 DIAGNOSIS — I25.10 CORONARY ARTERY DISEASE INVOLVING NATIVE CORONARY ARTERY, ANGINA PRESENCE UNSPECIFIED, UNSPECIFIED WHETHER NATIVE OR TRANSPLANTED HEART: ICD-10-CM

## 2020-04-09 RX ORDER — CLOPIDOGREL BISULFATE 75 MG/1
75 TABLET ORAL DAILY
Qty: 90 TABLET | Refills: 1 | Status: SHIPPED | OUTPATIENT
Start: 2020-04-09 | End: 2021-08-12

## 2020-04-27 ENCOUNTER — TELEPHONE (OUTPATIENT)
Dept: CARDIOLOGY | Facility: CLINIC | Age: 80
End: 2020-04-27

## 2020-04-27 DIAGNOSIS — I25.118 CORONARY ARTERY DISEASE OF NATIVE ARTERY OF NATIVE HEART WITH STABLE ANGINA PECTORIS (H): Primary | Chronic | ICD-10-CM

## 2020-04-27 DIAGNOSIS — I10 ESSENTIAL HYPERTENSION, BENIGN: ICD-10-CM

## 2020-05-12 ENCOUNTER — VIRTUAL VISIT (OUTPATIENT)
Dept: CARDIOLOGY | Facility: CLINIC | Age: 80
End: 2020-05-12
Attending: INTERNAL MEDICINE
Payer: COMMERCIAL

## 2020-05-12 VITALS — BODY MASS INDEX: 31.5 KG/M2 | HEIGHT: 70 IN | WEIGHT: 220 LBS

## 2020-05-12 DIAGNOSIS — I25.10 CORONARY ARTERY DISEASE INVOLVING NATIVE CORONARY ARTERY, ANGINA PRESENCE UNSPECIFIED, UNSPECIFIED WHETHER NATIVE OR TRANSPLANTED HEART: ICD-10-CM

## 2020-05-12 DIAGNOSIS — I10 ESSENTIAL HYPERTENSION, BENIGN: ICD-10-CM

## 2020-05-12 DIAGNOSIS — I25.10 CORONARY ARTERY DISEASE INVOLVING NATIVE CORONARY ARTERY OF NATIVE HEART WITHOUT ANGINA PECTORIS: Primary | ICD-10-CM

## 2020-05-12 DIAGNOSIS — I10 BENIGN ESSENTIAL HYPERTENSION: ICD-10-CM

## 2020-05-12 PROCEDURE — 99213 OFFICE O/P EST LOW 20 MIN: CPT | Mod: 95 | Performed by: PHYSICIAN ASSISTANT

## 2020-05-12 ASSESSMENT — MIFFLIN-ST. JEOR: SCORE: 1714.16

## 2020-05-12 NOTE — LETTER
5/12/2020      RE: Iván Nicholas  [address]   [place] [address]       Dear Colleague,    Thank you for the opportunity to participate in the care of your patient, Iván Nicholas, at the [facility]. Please see a copy of my visit note below.    Reason for Video Visit: to discuss discontinuing Plavix     HPI:  This is a pleasant 80-year-old gentleman with past medical history notable for coronary artery disease (angioplasty of his LAD back in 1991, LAD stenting in early 2012 with a jailed diagonal vessel, later that year repeat coronary angiogram was performed which showed stable diagonal vessel with FFR of 0.8 and therefore medical therapy was recommended; nuclear stress study in 11/2018 demonstrating no evidence of ischemia or infarct; repeat cath in 4/2019 received MERARI to RCA), hx of stress cardiomyopathy (resolved; on ARB and BB), hyperlipidemia, hypertension, carotid artery disease (history of right carotid endarterectomy in 2016, left bundle branch block, and history of tobacco use.    He reports feeling well. He denies any symptoms to CP, SOB, palpitations, lightheadedness, peripheral edema, PND, peripheral edema, orthopnea, PND, or bleeding issues.     Physical Exam:  A limited exam was conducted via video.  Constitutional:  Patient is pleasant, alert, cooperative, and in NAD.  HEENT:  NCAT. EOM's intact.   Neck:  CVP appears normal.   Pulmonary: Normal respiratory effort.   Extremities: No edema, erythema, cyanosis appreciated.  Skin:  No rashes or lesions appreciated.   Neurological:  No gross motor or sensory deficits.   Psych: Appropriate affect.       A/P:   This is a pleasant 80-year-old gentleman with past medical history notable for coronary artery disease (angioplasty of his LAD back in 1991, LAD stenting in early 2012 with a jailed diagonal vessel, later that year repeat  coronary angiogram was performed which showed stable diagonal vessel with FFR of 0.8 and therefore medical therapy was recommended; nuclear stress study in 11/2018 demonstrating no evidence of ischemia or infarct; repeat cath in 4/2019 received MERARI to RCA), hx of stress cardiomyopathy (resolved; on ARB and BB), hyperlipidemia, hypertension, carotid artery disease (history of right carotid endarterectomy in 2016, left bundle branch block, and history of tobacco use.    I asked Iván to finish his current Plavix prescription. After that he will continue with aspirin alone. He has no symptoms to suggest arrhythmia, heart failure, or ischemia. We will make no other changes at this time.     Video start time: 4:08 PM  Video end time: 4: 19 PM  Originating Location (pt. Location): home  Distant Location (provider location):  [facility]   Mode of Communication:  Video Conference via [employer] phone application       This visit is being conducted as a virtual visit due to the emphasis on mitigation of the COVID-19 virus pandemic. The clinician has decided that the risk of an in-office visit outweighs the benefit for this patient. The rest of the comprehensive physical examination is deferred due to public health emergency video visit restrictions.       Gina Vivas PA-C   5/12/2020  Pager: [phone]    Please do not hesitate to contact me if you have any questions/concerns.     Sincerely,     Gina Vivas PA-C

## 2020-05-12 NOTE — PROGRESS NOTES
"Iván Nicholas is a 80 year old male who is being evaluated via a billable video visit.      The patient has been notified of following:     \"This video visit will be conducted via a call between you and your physician/provider. We have found that certain health care needs can be provided without the need for an in-person physical exam.  This service lets us provide the care you need with a video conversation.  If a prescription is necessary we can send it directly to your pharmacy.  If lab work is needed we can place an order for that and you can then stop by our lab to have the test done at a later time.    Video visits are billed at different rates depending on your insurance coverage.  Please reach out to your insurance provider with any questions.    If during the course of the call the physician/provider feels a video visit is not appropriate, you will not be charged for this service.\"    Patient has given verbal consent for Video visit? Yes    How would you like to obtain your AVS? [facility]    Patient would like the video invitation sent by: Text to cell phone: [phone]    Will anyone else be joining your video visit? No       Review Of Systems  Skin: NEGATIVE  Eyes:Ears/Nose/Throat: wears glasses, hearing loss but no hearing aides  Respiratory: NEGATIVE  Cardiovascular:slight fatigue  Gastrointestinal: NEGATIVE  Genitourinary:NEGATIVE  Musculoskeletal: arthritis  Neurologic: numbness in hands and feet-sensitive to cold  Psychiatric: NEGATIVE  Hematologic/Lymphatic/Immunologic: NEGATIVE  Endocrine:  NEGATIVE  Vitals today are:  Weight 220.0 lb  DOX VIDEO VISIT-TEXT [phone]  Liliana Lomeli [facility]        -----------------------------------------------------------------------------------------------------------------------------    Primary Cardiologist: Dr. Mendoza    Reason for Video Visit: to discuss discontinuing Plavix     HPI:  This is a pleasant 80-year-old gentleman with past medical " history notable for coronary artery disease (angioplasty of his LAD back in 1991, LAD stenting in early 2012 with a jailed diagonal vessel, later that year repeat coronary angiogram was performed which showed stable diagonal vessel with FFR of 0.8 and therefore medical therapy was recommended; nuclear stress study in 11/2018 demonstrating no evidence of ischemia or infarct; repeat cath in 4/2019 received MERARI to RCA), hx of stress cardiomyopathy (resolved; on ARB and BB), hyperlipidemia, hypertension, carotid artery disease (history of right carotid endarterectomy in 2016, left bundle branch block, and history of tobacco use.    He reports feeling well. He denies any symptoms to CP, SOB, palpitations, lightheadedness, peripheral edema, PND, peripheral edema, orthopnea, PND, or bleeding issues.     Physical Exam:  A limited exam was conducted via video.  Constitutional:  Patient is pleasant, alert, cooperative, and in NAD.  HEENT:  NCAT. EOM's intact.   Neck:  CVP appears normal.   Pulmonary: Normal respiratory effort.   Extremities: No edema, erythema, cyanosis appreciated.  Skin:  No rashes or lesions appreciated.   Neurological:  No gross motor or sensory deficits.   Psych: Appropriate affect.       A/P:   This is a pleasant 80-year-old gentleman with past medical history notable for coronary artery disease (angioplasty of his LAD back in 1991, LAD stenting in early 2012 with a jailed diagonal vessel, later that year repeat coronary angiogram was performed which showed stable diagonal vessel with FFR of 0.8 and therefore medical therapy was recommended; nuclear stress study in 11/2018 demonstrating no evidence of ischemia or infarct; repeat cath in 4/2019 received MERARI to RCA), hx of stress cardiomyopathy (resolved; on ARB and BB), hyperlipidemia, hypertension, carotid artery disease (history of right carotid endarterectomy in 2016, left bundle branch block, and history of tobacco use.    I asked Iván to finish  his current Plavix prescription. After that he will continue with aspirin alone. He has no symptoms to suggest arrhythmia, heart failure, or ischemia. We will make no other changes at this time.     Video start time: 4:08 PM  Video end time: 4: 19 PM  Originating Location (pt. Location): home  Distant Location (provider location):  [facility]   Mode of Communication:  Video Conference via [employer] phone application       This visit is being conducted as a virtual visit due to the emphasis on mitigation of the COVID-19 virus pandemic. The clinician has decided that the risk of an in-office visit outweighs the benefit for this patient. The rest of the comprehensive physical examination is deferred due to public health emergency video visit restrictions.         Gina Vivas PA-C   5/12/2020  Pager: [phone]

## 2020-05-12 NOTE — PATIENT INSTRUCTIONS
Today's Plan:   Finish Plavix.  Continue with aspirin (baby dose 81 mg) once a day.  Follow up with Dr. Mendoza in fall.    If you have questions or concerns please call my nurse team at [phone].     Scheduling phone number: [phone]  Reminder: Please bring in all current medications, over the counter supplements and vitamin bottles to your next appointment.    It was a pleasure seeing you today!     Gina Vivas PA-C  5/12/2020

## 2020-10-16 DIAGNOSIS — I25.118 CORONARY ARTERY DISEASE OF NATIVE ARTERY OF NATIVE HEART WITH STABLE ANGINA PECTORIS (H): ICD-10-CM

## 2020-10-16 RX ORDER — ISOSORBIDE MONONITRATE 60 MG/1
60 TABLET, EXTENDED RELEASE ORAL DAILY
Qty: 90 TABLET | Refills: 1 | Status: SHIPPED | OUTPATIENT
Start: 2020-10-16 | End: 2021-02-10

## 2021-01-15 ENCOUNTER — HEALTH MAINTENANCE LETTER (OUTPATIENT)
Age: 81
End: 2021-01-15

## 2021-01-15 DIAGNOSIS — I10 ESSENTIAL HYPERTENSION, BENIGN: ICD-10-CM

## 2021-01-15 DIAGNOSIS — I25.118 CORONARY ARTERY DISEASE OF NATIVE ARTERY OF NATIVE HEART WITH STABLE ANGINA PECTORIS (H): Primary | Chronic | ICD-10-CM

## 2021-01-15 DIAGNOSIS — E78.00 PURE HYPERCHOLESTEROLEMIA: ICD-10-CM

## 2021-02-10 DIAGNOSIS — I25.118 CORONARY ARTERY DISEASE OF NATIVE ARTERY OF NATIVE HEART WITH STABLE ANGINA PECTORIS (H): ICD-10-CM

## 2021-02-10 RX ORDER — ISOSORBIDE MONONITRATE 60 MG/1
60 TABLET, EXTENDED RELEASE ORAL DAILY
Qty: 90 TABLET | Refills: 0 | Status: SHIPPED | OUTPATIENT
Start: 2021-02-10 | End: 2021-06-08

## 2021-02-17 ENCOUNTER — IMMUNIZATION (OUTPATIENT)
Dept: NURSING | Facility: CLINIC | Age: 81
End: 2021-02-17
Payer: COMMERCIAL

## 2021-02-17 PROCEDURE — 0001A PR COVID VAC PFIZER DIL RECON 30 MCG/0.3 ML IM: CPT

## 2021-02-17 PROCEDURE — 91300 PR COVID VAC PFIZER DIL RECON 30 MCG/0.3 ML IM: CPT

## 2021-03-10 ENCOUNTER — IMMUNIZATION (OUTPATIENT)
Dept: NURSING | Facility: CLINIC | Age: 81
End: 2021-03-10
Attending: INTERNAL MEDICINE
Payer: COMMERCIAL

## 2021-03-10 PROCEDURE — 91300 PR COVID VAC PFIZER DIL RECON 30 MCG/0.3 ML IM: CPT

## 2021-03-10 PROCEDURE — 0002A PR COVID VAC PFIZER DIL RECON 30 MCG/0.3 ML IM: CPT

## 2021-04-15 ENCOUNTER — TELEPHONE (OUTPATIENT)
Dept: CARDIOLOGY | Facility: CLINIC | Age: 81
End: 2021-04-15

## 2021-04-15 DIAGNOSIS — E78.00 PURE HYPERCHOLESTEROLEMIA: ICD-10-CM

## 2021-04-15 DIAGNOSIS — I10 ESSENTIAL HYPERTENSION, BENIGN: ICD-10-CM

## 2021-04-15 DIAGNOSIS — I25.118 CORONARY ARTERY DISEASE OF NATIVE ARTERY OF NATIVE HEART WITH STABLE ANGINA PECTORIS (H): Chronic | ICD-10-CM

## 2021-04-15 DIAGNOSIS — I25.118 CORONARY ARTERY DISEASE OF NATIVE ARTERY OF NATIVE HEART WITH STABLE ANGINA PECTORIS (H): Primary | Chronic | ICD-10-CM

## 2021-04-15 LAB
ANION GAP SERPL CALCULATED.3IONS-SCNC: 5 MMOL/L (ref 3–14)
BUN SERPL-MCNC: 30 MG/DL (ref 7–30)
CALCIUM SERPL-MCNC: 8.1 MG/DL (ref 8.5–10.1)
CHLORIDE SERPL-SCNC: 106 MMOL/L (ref 94–109)
CHOLEST SERPL-MCNC: 161 MG/DL
CO2 SERPL-SCNC: 28 MMOL/L (ref 20–32)
CREAT SERPL-MCNC: 1.23 MG/DL (ref 0.66–1.25)
GFR SERPL CREATININE-BSD FRML MDRD: 55 ML/MIN/{1.73_M2}
GLUCOSE SERPL-MCNC: 89 MG/DL (ref 70–99)
HDLC SERPL-MCNC: 79 MG/DL
LDLC SERPL CALC-MCNC: 47 MG/DL
NONHDLC SERPL-MCNC: 82 MG/DL
POTASSIUM SERPL-SCNC: 4.2 MMOL/L (ref 3.4–5.3)
SODIUM SERPL-SCNC: 139 MMOL/L (ref 133–144)
TRIGL SERPL-MCNC: 176 MG/DL

## 2021-04-15 PROCEDURE — 80061 LIPID PANEL: CPT | Performed by: INTERNAL MEDICINE

## 2021-04-15 PROCEDURE — 80048 BASIC METABOLIC PNL TOTAL CA: CPT | Performed by: INTERNAL MEDICINE

## 2021-04-15 PROCEDURE — 36415 COLL VENOUS BLD VENIPUNCTURE: CPT | Performed by: INTERNAL MEDICINE

## 2021-04-15 NOTE — TELEPHONE ENCOUNTER
FLP's and BMP done 4-15-21.     Last [employer] visit 5-  Recommendations were - Finish Plavix.  Continue with aspirin (baby dose 81 mg) once a day.  Follow up with Dr. Mendoza in fall.    F/Up visit not scheduled yet.

## 2021-04-15 NOTE — TELEPHONE ENCOUNTER
Labs look okay.  Trigs are up.   may be too many carbohydrates in the diet, not enough exercise or weight gain.  We can talk about it at OV whenever he gets scheduled

## 2021-04-16 NOTE — TELEPHONE ENCOUNTER
Spoke with Patient and FLP's reviewed.   Dr. Mendoza's message reviewed with Patient. Patient will try to decrease carbohydrate, exercise more and try to lose weight.    Patient confirms he is taking his medications as prescribed.    Patient agrees to call scheduling for next available visit with Dr. Mendoza.

## 2021-06-08 ENCOUNTER — PRE VISIT (OUTPATIENT)
Dept: CARDIOLOGY | Facility: CLINIC | Age: 81
End: 2021-06-08

## 2021-06-08 DIAGNOSIS — I25.118 CORONARY ARTERY DISEASE OF NATIVE ARTERY OF NATIVE HEART WITH STABLE ANGINA PECTORIS (H): ICD-10-CM

## 2021-06-08 RX ORDER — ISOSORBIDE MONONITRATE 60 MG/1
60 TABLET, EXTENDED RELEASE ORAL DAILY
Qty: 90 TABLET | Refills: 1 | Status: SHIPPED | OUTPATIENT
Start: 2021-06-08 | End: 2021-12-20

## 2021-06-16 ENCOUNTER — OFFICE VISIT (OUTPATIENT)
Dept: CARDIOLOGY | Facility: CLINIC | Age: 81
End: 2021-06-16
Payer: COMMERCIAL

## 2021-06-16 VITALS
DIASTOLIC BLOOD PRESSURE: 80 MMHG | HEIGHT: 70 IN | SYSTOLIC BLOOD PRESSURE: 181 MMHG | HEART RATE: 88 BPM | BODY MASS INDEX: 31.92 KG/M2 | WEIGHT: 223 LBS

## 2021-06-16 DIAGNOSIS — R55 NEAR SYNCOPE: ICD-10-CM

## 2021-06-16 DIAGNOSIS — E78.00 PURE HYPERCHOLESTEROLEMIA: ICD-10-CM

## 2021-06-16 DIAGNOSIS — E66.09 CLASS 1 OBESITY DUE TO EXCESS CALORIES WITH SERIOUS COMORBIDITY AND BODY MASS INDEX (BMI) OF 31.0 TO 31.9 IN ADULT: Chronic | ICD-10-CM

## 2021-06-16 DIAGNOSIS — I25.118 CORONARY ARTERY DISEASE OF NATIVE ARTERY OF NATIVE HEART WITH STABLE ANGINA PECTORIS (H): Primary | Chronic | ICD-10-CM

## 2021-06-16 DIAGNOSIS — R60.0 PERIPHERAL EDEMA: ICD-10-CM

## 2021-06-16 DIAGNOSIS — E66.811 CLASS 1 OBESITY DUE TO EXCESS CALORIES WITH SERIOUS COMORBIDITY AND BODY MASS INDEX (BMI) OF 31.0 TO 31.9 IN ADULT: Chronic | ICD-10-CM

## 2021-06-16 DIAGNOSIS — I10 ESSENTIAL HYPERTENSION, BENIGN: ICD-10-CM

## 2021-06-16 PROCEDURE — 99215 OFFICE O/P EST HI 40 MIN: CPT | Performed by: INTERNAL MEDICINE

## 2021-06-16 ASSESSMENT — MIFFLIN-ST. JEOR: SCORE: 1722.77

## 2021-06-16 NOTE — PROGRESS NOTES
HPI and Plan:   See dictation    Orders Placed This Encounter   Procedures     Follow-Up with Cardiac Advanced Practice Provider     Follow-Up with Cardiologist     Leadless EKG Monitor 8 to 14 Days       No orders of the defined types were placed in this encounter.      Medications Discontinued During This Encounter   Medication Reason     aspirin (ASA) 81 MG tablet          Encounter Diagnoses   Name Primary?     Coronary artery disease of native artery of native heart with stable angina pectoris (H) Yes     Pure hypercholesterolemia      Essential hypertension, benign      Class 1 obesity due to excess calories with serious comorbidity and body mass index (BMI) of 31.0 to 31.9 in adult      Peripheral edema      Near syncope        CURRENT MEDICATIONS:  Current Outpatient Medications   Medication Sig Dispense Refill     acetaminophen (TYLENOL) 325 MG tablet Take 2 tablets (650 mg) by mouth every 4 hours as needed for mild pain or headaches       ALLOPURINOL PO Take 100 mg by mouth daily. To prevent gout attacks, recently started.        clopidogrel (PLAVIX) 75 MG tablet Take 1 tablet (75 mg) by mouth daily 90 tablet 1     ezetimibe (ZETIA) 10 MG tablet Take 1 tablet (10 mg) by mouth daily (Patient taking differently: Take 10 mg by mouth At Bedtime ) 90 tablet 3     hypromellose-dextran (ARTIFICAL TEARS) 0.1-0.3 % ophthalmic solution Place 1 drop into both eyes 2 times daily as needed for dry eyes       isosorbide mononitrate (IMDUR) 60 MG 24 hr tablet Take 1 tablet (60 mg) by mouth daily Appointment due In April 90 tablet 1     losartan (COZAAR) 25 MG tablet Take 1 tablet (25 mg) by mouth daily 90 tablet 3     metoprolol succinate ER (TOPROL-XL) 50 MG 24 hr tablet Take 1 tablet (50 mg) by mouth daily 90 tablet 3     Multiple Vitamins-Minerals (CENTRUM SILVER) per tablet Take 1 tablet by mouth daily.       Multiple Vitamins-Minerals (PRESERVISION AREDS 2) CAPS Take by mouth 2 times daily       nitroGLYcerin  (NITROSTAT) 0.4 MG sublingual tablet Place 1 tablet (0.4 mg) under the tongue every 5 minutes as needed for chest pain 25 tablet 11     oxymetazoline (AFRIN) 0.05 % nasal spray Spray 2 sprays into both nostrils 2 times daily as needed for other (nose bleed)       pantoprazole (PROTONIX) 40 MG EC tablet Take 40 mg by mouth daily       primidone (MYSOLINE) 250 MG tablet Take 250 mg by mouth 3 times daily        rosuvastatin (CRESTOR) 40 MG tablet Take 1 tablet by mouth daily. 30 tablet 1       ALLERGIES     Allergies   Allergen Reactions     Lisinopril Cough     Statins [Hmg-Coa-R Inhibitors] Muscle Pain (Myalgia)     lipitor       PAST MEDICAL HISTORY:  Past Medical History:   Diagnosis Date     Arthritis      Carotid artery stenosis     Right, s/p right CEA 11/9/2016     Cerebral artery occlusion with cerebral infarction (H) 2016    TIA -  Endarterectomy...No residual     Cholesterol serum elevated      Colon polyps      Color blind      Coronary artery disease     2/2012 - MERARI to mid LAD     Decreased hearing      Depression      Gout      Hypertension      Hypertrophy of prostate without urinary obstruction and other lower urinary tract symptoms (LUTS)      Impotence      Left bundle branch block      Onychomycosis of toenail      Other and unspecified hyperlipidemia      Paroxysmal ventricular tachycardia (H)     with stress test 2012       PAST SURGICAL HISTORY:  Past Surgical History:   Procedure Laterality Date     ANGIOPLASTY  1991     ARTHROPLASTY KNEE Left 11/12/2018    Procedure: Left total knee arthroplasty;  Surgeon: Matt Schaefer MD;  Location:  OR     COLONOSCOPY  12/13/2011    Procedure:COLONOSCOPY; COLONOSCOPY; Surgeon:EMMANUELLE MOSER; Location: GI     CORONARY ANGIOGRAPHY ADULT ORDER  1991,2012 1991 - stent to proximal LAD, 2012 - Stent to mid LAD     CV HEART CATHETERIZATION WITH POSSIBLE INTERVENTION Left 4/23/2019    Procedure: Coronary Angiogram;  Surgeon: Percy Armas,  MD;  Location:  HEART CARDIAC CATH LAB     CV HEART CATHETERIZATION WITH POSSIBLE INTERVENTION N/A 12/10/2019    Procedure: Heart Catheterization with Possible Intervention;  Surgeon: Dajuan Duvall MD;  Location:  HEART CARDIAC CATH LAB     CV PCI ANGIOPLASTY N/A 2019    Procedure: PCI Angioplasty;  Surgeon: Percy Armas MD;  Location: [facility] CARDIAC CATH LAB     ENDARTERECTOMY CAROTID Right 11/10/2016    Procedure: ENDARTERECTOMY CAROTID;  Surgeon: Paco Suresh MD;  Location:  OR     HEART CATH, ANGIOPLASTY       ORTHOPEDIC SURGERY       PHACOEMULSIFICATION CLEAR CORNEA WITH STANDARD INTRAOCULAR LENS IMPLANT  2013    Procedure: PHACOEMULSIFICATION CLEAR CORNEA WITH STANDARD INTRAOCULAR LENS IMPLANT;  RIGHT PHACOEMULSIFICATION CLEAR CORNEA WITH STANDARD INTRAOCULAR LENS IMPLANT ;  Surgeon: Luistio Juan MD;  Location: [facility]       FAMILY HISTORY:  Family History   Problem Relation Age of Onset     Heart Disease Mother 83     Heart Disease Father 69        emphysema     Coronary Artery Disease Brother      Coronary Artery Disease Sister        SOCIAL HISTORY:  Social History     Socioeconomic History     Marital status:      Spouse name: Not on file     Number of children: Not on file     Years of education: Not on file     Highest education level: Not on file   Occupational History     Not on file   Social Needs     Financial resource strain: Not on file     Food insecurity     Worry: Not on file     Inability: Not on file     Transportation needs     Medical: Not on file     Non-medical: Not on file   Tobacco Use     Smoking status: Former Smoker     Packs/day: 0.50     Years: 20.00     Pack years: 10.00     Types: Cigarettes     Quit date: 1990     Years since quittin.5     Smokeless tobacco: Never Used   Substance and Sexual Activity     Alcohol use: Yes     Alcohol/week: 0.0 standard drinks     Comment: daily - drinks x2     Drug use: No      Sexual activity: Never   Lifestyle     Physical activity     Days per week: Not on file     Minutes per session: Not on file     Stress: Not on file   Relationships     Social connections     Talks on phone: Not on file     Gets together: Not on file     Attends Mu-ism service: Not on file     Active member of club or organization: Not on file     Attends meetings of clubs or organizations: Not on file     Relationship status: Not on file     Intimate partner violence     Fear of current or ex partner: Not on file     Emotionally abused: Not on file     Physically abused: Not on file     Forced sexual activity: Not on file   Other Topics Concern     Parent/sibling w/ CABG, MI or angioplasty before 65F 55M? Not Asked      Service Not Asked     Blood Transfusions Not Asked     Caffeine Concern No     Comment: 1-2 cups daily     Occupational Exposure Not Asked     Hobby Hazards Not Asked     Sleep Concern No     Stress Concern No     Weight Concern Not Asked     Special Diet No     Comment: trying to watch sodium intake     Back Care Not Asked     Exercise No     Comment: some walking     Bike Helmet Not Asked     Seat Belt Not Asked     Self-Exams Not Asked   Social History Narrative     Not on file       Review of Systems:  Skin:  Negative lumps or bumps on arms   Eyes:  Positive for glasses;color blindness;cataracts cataract extraction of both eyes  ENT:  Positive for hearing loss  Pt  reports while sleeping; Pt reports hearing loss bilaterally.  Respiratory:  Negative shortness of breath;dyspnea on exertion     Cardiovascular:  Negative Positive for;edema;lightheadedness;dizziness right ankle swelling, occas episodes of dizziness  Gastroenterology: Negative heartburn treated  Genitourinary:  Positive for nocturia hx of prostate surgery  Musculoskeletal:  Negative arthritis;joint pain of the knees, hips, ankles  Neurologic:  Negative numbness or tingling of feet cold at night needs to wear socks now while  "sleeping  Psychiatric:  not assessed   Pt reports having marital issues.  Heme/Lymph/Imm:  Positive for allergies cold feet at night  Endocrine:  Negative        Physical Exam:  Vitals: BP (!) 181/80   Pulse 88   Ht 1.778 m (5' 10\")   Wt 101.2 kg (223 lb)   BMI 32.00 kg/m      Constitutional:  cooperative, alert and oriented, well developed, well nourished, in no acute distress        Skin:  warm and dry to the touch, no apparent skin lesions or masses noted     right CEA scar    Head:  normocephalic, no masses or lesions        Eyes:  pupils equal and round;conjunctivae and lids unremarkable;sclera white;no xanthalasma        Lymph:      ENT:  no pallor or cyanosis, dentition good        Neck:  carotid pulses are full and equal bilaterally;no carotid bruit   prominent carotid pulsations at base of neck bilaterally. Well-healed right carotid endarterectomy scar    Respiratory:  normal breath sounds, clear to auscultation, normal A-P diameter, normal symmetry, normal respiratory excursion, no use of accessory muscles         Cardiac: regular rhythm;normal S1 and S2       systolic murmur;grade 1;RUSB        pulses full and equal                                        GI:    obese      Extremities and Muscular Skeletal:  no spinal abnormalities noted;normal muscle strength and tone   bilateral LE edema;R greater than L;trace;1+          Neurological:  no gross motor deficits        Psych:  affect appropriate, oriented to time, person and place        CC  No referring provider defined for this encounter.              "

## 2021-06-16 NOTE — PROGRESS NOTES
Service Date: 06/16/2021    HISTORY OF PRESENT ILLNESS:  Grace is a very nice 81-year-old gentleman, who appears 10 years younger than his stated age.  Coronary history dates back to angioplasty of his left anterior descending artery in 1991, stenting of his left anterior descending artery in 2012 due to crescendo angina and an abnormal stress test.  We jailed the diagonal.  It did not appear to be significantly compromised.  Stress test has not show any ischemia.  He was left with chronic stable exertional angina at a high workload ever since.  We took him back to the Cath Lab later in 2012.  FFR of the jailed diagonal was 0.8, and we decided to treat him medically.  In 04/2019 due to crescendo angina, he underwent stenting of his distal right coronary artery.  In 12/2019, due to chest pain syndrome and a non-ST segment elevation myocardial infarction, he was brought back to the Cath Lab, where he is found to have no flow-limiting stenoses.  First diagonal with 70% stenosis with an FFR of 0.87.  Ramus intermedius had a 50% stenosis, also with a negative FFR.  I did review his angiogram and agree there does not appear to be any flow-limiting blockages.    Grace also has obesity, hyperlipidemia, hypertension, peripheral edema, right leg worse than left.    Grace returns to clinic and review of the chart shows that he has had several falls.  Review of the chart shows it is thought that most of these falls were mechanical just due to stumbling, one of which there is a potential of near syncope.  When I reviewed the stories with Grace now, he tells a very different story, one of which appears that it may have been near syncope.  Another which he cannot remember and thinks he might have fainted.  He states he was told by doctors to make sure he drinks plenty of fluids because they think he may have been dehydrated.  He states he has been consciously drinking a lot of fluid and has not had any further episodes.    In  talking to Grace, he also describes in the last 6 months 2 episodes of chest discomfort, not necessarily associated with physical activity for which he took nitroglycerin with rapid resolution of his symptoms.  He has not had any of these symptoms in quite some time either.    He also talks about balancing problems.  He states if he goes for a walk, he has problems with his equilibrium and it has been recommended that he follow up with [facility] in [place], but it is also recommend that he follow up with me.    He also describes weak legs.  He occasionally has orthostasis.  He has no problems with dizziness when rolling in bed or lying down in bed.    ASSESSMENT AND PLAN:  1.  Grace may be having symptoms of chest discomfort, although does not have any clear association with physical activity.  Before the pandemic, he states he was exercising 2-3 times a week, doing a combination of rowing machine and bike, and was having no symptoms.  Unfortunately, with closing of the club, he states he has not been exercising at all.  Given the fact that he has these episodes of chest pain, I will set up a Lexiscan, although they sound more atypical.    He has no symptoms at this time to suggest heart failure or significant arrhythmia.    As stated, his story appears to be changing and somewhat confusing on his falling spells.  Review of the chart appears to indicate that most of them are thought to be mechanical in nature; however, I will set up a 14-day Zio Patch just to make sure we are not dealing with any significant pauses or arrhythmias.    Blood pressure is quite high today at 180/80.  On my retake, it remains high.  Looking through the chart, most blood pressures are well-controlled.  I am not going to change his antihypertensive regimen, but I will have him follow up after his Lexiscan and his monitor, and we will see what his blood pressure is doing.  We will respond as appropriate for his testing and  treat his blood pressure as necessary.    Fasting lipid profile is excellent, except for triglycerides.  Total cholesterol is 161, HDL is 79, LDL is 47, triglycerides are quite high at 176.  We talked about the importance of a low-carbohydrate diet, trying to get back to his regular exercise regimen and to lose weight.    Weight is 223 pounds.  This has not significantly changed over the last couple of years.  Body mass index is 32.    Basic metabolic profile shows some mild renal insufficiency with a creatinine of 1.23, giving him a GFR of 55.  Electrolytes are normal.    We talked about his peripheral edema and the importance of low-salt diet, elevating his legs, exercising regularly.    Over 45 minutes was spent with Grace, reviewing his various studies, reviewing the chart and talking to him about his issues.    Akhil Mendoza MD, [facility]        D: 2021   T: 2021   MT: al    Name:     GREG VALDERRAMA  MRN:      [id number]        Account:      [id number]   :      1940           Service Date: 2021       Document: [id number]

## 2021-06-16 NOTE — LETTER
6/16/2021    Stephan Nice MD  [employer] [address]  [facility] [address]    RE: Iván Nicholas       Dear Colleague,    I had the pleasure of seeing Iván Nicholas in the [facility].    HPI and Plan:   See dictation    Orders Placed This Encounter   Procedures     Follow-Up with Cardiac Advanced Practice Provider     Follow-Up with Cardiologist     Leadless EKG Monitor 8 to 14 Days       No orders of the defined types were placed in this encounter.      Medications Discontinued During This Encounter   Medication Reason     aspirin (ASA) 81 MG tablet          Encounter Diagnoses   Name Primary?     Coronary artery disease of native artery of native heart with stable angina pectoris (H) Yes     Pure hypercholesterolemia      Essential hypertension, benign      Class 1 obesity due to excess calories with serious comorbidity and body mass index (BMI) of 31.0 to 31.9 in adult      Peripheral edema      Near syncope        CURRENT MEDICATIONS:  Current Outpatient Medications   Medication Sig Dispense Refill     acetaminophen (TYLENOL) 325 MG tablet Take 2 tablets (650 mg) by mouth every 4 hours as needed for mild pain or headaches       ALLOPURINOL PO Take 100 mg by mouth daily. To prevent gout attacks, recently started.        clopidogrel (PLAVIX) 75 MG tablet Take 1 tablet (75 mg) by mouth daily 90 tablet 1     ezetimibe (ZETIA) 10 MG tablet Take 1 tablet (10 mg) by mouth daily (Patient taking differently: Take 10 mg by mouth At Bedtime ) 90 tablet 3     hypromellose-dextran (ARTIFICAL TEARS) 0.1-0.3 % ophthalmic solution Place 1 drop into both eyes 2 times daily as needed for dry eyes       isosorbide mononitrate (IMDUR) 60 MG 24 hr tablet Take 1 tablet (60 mg) by mouth daily Appointment due In April 90 tablet 1     losartan (COZAAR) 25 MG tablet Take 1 tablet (25 mg) by mouth daily 90 tablet 3     metoprolol  succinate ER (TOPROL-XL) 50 MG 24 hr tablet Take 1 tablet (50 mg) by mouth daily 90 tablet 3     Multiple Vitamins-Minerals (CENTRUM SILVER) per tablet Take 1 tablet by mouth daily.       Multiple Vitamins-Minerals (PRESERVISION AREDS 2) CAPS Take by mouth 2 times daily       nitroGLYcerin (NITROSTAT) 0.4 MG sublingual tablet Place 1 tablet (0.4 mg) under the tongue every 5 minutes as needed for chest pain 25 tablet 11     oxymetazoline (AFRIN) 0.05 % nasal spray Spray 2 sprays into both nostrils 2 times daily as needed for other (nose bleed)       pantoprazole (PROTONIX) 40 MG EC tablet Take 40 mg by mouth daily       primidone (MYSOLINE) 250 MG tablet Take 250 mg by mouth 3 times daily        rosuvastatin (CRESTOR) 40 MG tablet Take 1 tablet by mouth daily. 30 tablet 1       ALLERGIES     Allergies   Allergen Reactions     Lisinopril Cough     Statins [Hmg-Coa-R Inhibitors] Muscle Pain (Myalgia)     lipitor       PAST MEDICAL HISTORY:  Past Medical History:   Diagnosis Date     Arthritis      Carotid artery stenosis     Right, s/p right CEA 11/9/2016     Cerebral artery occlusion with cerebral infarction (H) 2016    TIA -  Endarterectomy...No residual     Cholesterol serum elevated      Colon polyps      Color blind      Coronary artery disease     2/2012 - MERARI to mid LAD     Decreased hearing      Depression      Gout      Hypertension      Hypertrophy of prostate without urinary obstruction and other lower urinary tract symptoms (LUTS)      Impotence      Left bundle branch block      Onychomycosis of toenail      Other and unspecified hyperlipidemia      Paroxysmal ventricular tachycardia (H)     with stress test 2012       PAST SURGICAL HISTORY:  Past Surgical History:   Procedure Laterality Date     ANGIOPLASTY  1991     ARTHROPLASTY KNEE Left 11/12/2018    Procedure: Left total knee arthroplasty;  Surgeon: Matt Schaefer MD;  Location: [facility] OR     COLONOSCOPY  12/13/2011    Procedure:COLONOSCOPY;  COLONOSCOPY; Surgeon:EMMANUELLE MOSER; Location: GI     CORONARY ANGIOGRAPHY ADULT ORDER  1991,2012 1991 - stent to proximal LAD, 2012 - Stent to mid LAD     CV HEART CATHETERIZATION WITH POSSIBLE INTERVENTION Left 4/23/2019    Procedure: Coronary Angiogram;  Surgeon: Percy Armas MD;  Location:  HEART CARDIAC CATH LAB     CV HEART CATHETERIZATION WITH POSSIBLE INTERVENTION N/A 12/10/2019    Procedure: Heart Catheterization with Possible Intervention;  Surgeon: Dajuan Duvall MD;  Location:  HEART CARDIAC CATH LAB     CV PCI ANGIOPLASTY N/A 4/23/2019    Procedure: PCI Angioplasty;  Surgeon: Percy Armas MD;  Location:  HEART CARDIAC CATH LAB     ENDARTERECTOMY CAROTID Right 11/10/2016    Procedure: ENDARTERECTOMY CAROTID;  Surgeon: Paco Suresh MD;  Location:  OR     HEART CATH, ANGIOPLASTY       ORTHOPEDIC SURGERY       PHACOEMULSIFICATION CLEAR CORNEA WITH STANDARD INTRAOCULAR LENS IMPLANT  12/19/2013    Procedure: PHACOEMULSIFICATION CLEAR CORNEA WITH STANDARD INTRAOCULAR LENS IMPLANT;  RIGHT PHACOEMULSIFICATION CLEAR CORNEA WITH STANDARD INTRAOCULAR LENS IMPLANT ;  Surgeon: Luisito Juan MD;  [facility]       FAMILY HISTORY:  Family History   Problem Relation Age of Onset     Heart Disease Mother 83     Heart Disease Father 69        emphysema     Coronary Artery Disease Brother      Coronary Artery Disease Sister        SOCIAL HISTORY:  Social History     Socioeconomic History     Marital status:      Spouse name: Not on file     Number of children: Not on file     Years of education: Not on file     Highest education level: Not on file   Occupational History     Not on file   Social Needs     Financial resource strain: Not on file     Food insecurity     Worry: Not on file     Inability: Not on file     Transportation needs     Medical: Not on file     Non-medical: Not on file   Tobacco Use     Smoking status: Former Smoker     Packs/day: 0.50      Years: 20.00     Pack years: 10.00     Types: Cigarettes     Quit date: 1990     Years since quittin.5     Smokeless tobacco: Never Used   Substance and Sexual Activity     Alcohol use: Yes     Alcohol/week: 0.0 standard drinks     Comment: daily - drinks x2     Drug use: No     Sexual activity: Never   Lifestyle     Physical activity     Days per week: Not on file     Minutes per session: Not on file     Stress: Not on file   Relationships     Social connections     Talks on phone: Not on file     Gets together: Not on file     Attends [employer] service: Not on file     Active member of club or organization: Not on file     Attends meetings of clubs or organizations: Not on file     Relationship status: Not on file     Intimate partner violence     Fear of current or ex partner: Not on file     Emotionally abused: Not on file     Physically abused: Not on file     Forced sexual activity: Not on file   Other Topics Concern     Parent/sibling w/ CABG, MI or angioplasty before 65F 55M? Not Asked      Service Not Asked     Blood Transfusions Not Asked     Caffeine Concern No     Comment: 1-2 cups daily     Occupational Exposure Not Asked     Hobby Hazards Not Asked     Sleep Concern No     Stress Concern No     Weight Concern Not Asked     Special Diet No     Comment: trying to watch sodium intake     Back Care Not Asked     Exercise No     Comment: some walking     Bike Helmet Not Asked     Seat Belt Not Asked     Self-Exams Not Asked   Social History Narrative     Not on file       Review of Systems:  Skin:  Negative lumps or bumps on arms   Eyes:  Positive for glasses;color blindness;cataracts cataract extraction of both eyes  ENT:  Positive for hearing loss  Pt  reports while sleeping; Pt reports hearing loss bilaterally.  Respiratory:  Negative shortness of breath;dyspnea on exertion     Cardiovascular:  Negative Positive for;edema;lightheadedness;dizziness right ankle swelling, occas episodes  "of dizziness  Gastroenterology: Negative heartburn treated  Genitourinary:  Positive for nocturia hx of prostate surgery  Musculoskeletal:  Negative arthritis;joint pain of the knees, hips, ankles  Neurologic:  Negative numbness or tingling of feet cold at night needs to wear socks now while sleeping  Psychiatric:  not assessed   Pt reports having marital issues.  Heme/Lymph/Imm:  Positive for allergies cold feet at night  Endocrine:  Negative        Physical Exam:  Vitals: BP (!) 181/80   Pulse 88   Ht 1.778 m (5' 10\")   Wt 101.2 kg (223 lb)   BMI 32.00 kg/m      Constitutional:  cooperative, alert and oriented, well developed, well nourished, in no acute distress        Skin:  warm and dry to the touch, no apparent skin lesions or masses noted     right CEA scar    Head:  normocephalic, no masses or lesions        Eyes:  pupils equal and round;conjunctivae and lids unremarkable;sclera white;no xanthalasma        Lymph:      ENT:  no pallor or cyanosis, dentition good        Neck:  carotid pulses are full and equal bilaterally;no carotid bruit   prominent carotid pulsations at base of neck bilaterally. Well-healed right carotid endarterectomy scar    Respiratory:  normal breath sounds, clear to auscultation, normal A-P diameter, normal symmetry, normal respiratory excursion, no use of accessory muscles         Cardiac: regular rhythm;normal S1 and S2       systolic murmur;grade 1;RUSB        pulses full and equal                                        GI:    obese      Extremities and Muscular Skeletal:  no spinal abnormalities noted;normal muscle strength and tone   bilateral LE edema;R greater than L;trace;1+          Neurological:  no gross motor deficits        Psych:  affect appropriate, oriented to time, person and place    Thank you for allowing me to participate in the care of your patient.      Sincerely,     Akhil Mendoza MD     [facility] " Care    cc:   No referring provider defined for this encounter.

## 2021-06-21 ENCOUNTER — HOSPITAL ENCOUNTER (OUTPATIENT)
Dept: NUCLEAR MEDICINE | Facility: CLINIC | Age: 81
Setting detail: NUCLEAR MEDICINE
End: 2021-06-21
Attending: INTERNAL MEDICINE
Payer: COMMERCIAL

## 2021-06-21 ENCOUNTER — TELEPHONE (OUTPATIENT)
Dept: CARDIOLOGY | Facility: CLINIC | Age: 81
End: 2021-06-21

## 2021-06-21 ENCOUNTER — HOSPITAL ENCOUNTER (OUTPATIENT)
Dept: CARDIOLOGY | Facility: CLINIC | Age: 81
End: 2021-06-21
Attending: INTERNAL MEDICINE
Payer: COMMERCIAL

## 2021-06-21 DIAGNOSIS — I25.118 CORONARY ARTERY DISEASE OF NATIVE ARTERY OF NATIVE HEART WITH STABLE ANGINA PECTORIS (H): Chronic | ICD-10-CM

## 2021-06-21 LAB
CV STRESS MAX HR HE: 62
RATE PRESSURE PRODUCT: NORMAL
STRESS ECHO BASELINE DIASTOLIC HE: 90
STRESS ECHO BASELINE HR: 53
STRESS ECHO BASELINE SYSTOLIC BP: 186
STRESS ECHO CALCULATED PERCENT HR: 45 %
STRESS ECHO LAST STRESS DIASTOLIC BP: 80
STRESS ECHO LAST STRESS SYSTOLIC BP: 168
STRESS ECHO TARGET HR: 139

## 2021-06-21 PROCEDURE — 78452 HT MUSCLE IMAGE SPECT MULT: CPT | Mod: 26 | Performed by: INTERNAL MEDICINE

## 2021-06-21 PROCEDURE — 93018 CV STRESS TEST I&R ONLY: CPT | Performed by: INTERNAL MEDICINE

## 2021-06-21 PROCEDURE — 93017 CV STRESS TEST TRACING ONLY: CPT

## 2021-06-21 PROCEDURE — 250N000011 HC RX IP 250 OP 636

## 2021-06-21 PROCEDURE — 93016 CV STRESS TEST SUPVJ ONLY: CPT | Performed by: INTERNAL MEDICINE

## 2021-06-21 PROCEDURE — 343N000001 HC RX 343: Performed by: INTERNAL MEDICINE

## 2021-06-21 PROCEDURE — A9502 TC99M TETROFOSMIN: HCPCS | Performed by: INTERNAL MEDICINE

## 2021-06-21 PROCEDURE — 78452 HT MUSCLE IMAGE SPECT MULT: CPT

## 2021-06-21 RX ORDER — AMINOPHYLLINE 25 MG/ML
INJECTION, SOLUTION INTRAVENOUS
Status: DISCONTINUED
Start: 2021-06-21 | End: 2021-06-21 | Stop reason: WASHOUT

## 2021-06-21 RX ORDER — REGADENOSON 0.08 MG/ML
INJECTION, SOLUTION INTRAVENOUS
Status: COMPLETED
Start: 2021-06-21 | End: 2021-06-21

## 2021-06-21 RX ADMIN — REGADENOSON 0.4 MG: 0.08 INJECTION, SOLUTION INTRAVENOUS at 09:28

## 2021-06-21 RX ADMIN — TETROFOSMIN 10.5 MCI.: 1.38 INJECTION, POWDER, LYOPHILIZED, FOR SOLUTION INTRAVENOUS at 08:00

## 2021-06-21 RX ADMIN — TETROFOSMIN 31.5 MCI.: 1.38 INJECTION, POWDER, LYOPHILIZED, FOR SOLUTION INTRAVENOUS at 09:30

## 2021-06-21 NOTE — TELEPHONE ENCOUNTER
Lexiscan results below. Ordered by Dr Mendoza for evaluation of chest pain. Ziopatch is pending. Routed to provider for review.        The nuclear stress test is negative for inducible myocardial ischemia or infarction.     Left ventricular function is normal, EF 71%.     A prior study was conducted on 10/4/2018.  This study has no change when compared with the prior study.   [place]: Dr Son

## 2021-06-22 NOTE — TELEPHONE ENCOUNTER
Called patient to review negative stress test and message from Dr Mendoza. No further questions at this time. Will await results of ziopatch.     Pt states he is having trouble logging into his [employer] account, customer support phone number provided.

## 2021-06-28 ENCOUNTER — HOSPITAL ENCOUNTER (OUTPATIENT)
Dept: CARDIOLOGY | Facility: CLINIC | Age: 81
Discharge: HOME | End: 2021-06-28
Attending: INTERNAL MEDICINE | Admitting: INTERNAL MEDICINE
Payer: COMMERCIAL

## 2021-06-28 DIAGNOSIS — I25.118 CORONARY ARTERY DISEASE OF NATIVE ARTERY OF NATIVE HEART WITH STABLE ANGINA PECTORIS (H): Chronic | ICD-10-CM

## 2021-06-28 PROCEDURE — 93246 EXT ECG>7D<15D RECORDING: CPT

## 2021-06-28 PROCEDURE — 93248 EXT ECG>7D<15D REV&INTERPJ: CPT | Performed by: INTERNAL MEDICINE

## 2021-07-21 NOTE — PATIENT INSTRUCTIONS
Go home and check to see if you are taking clopidogrel (Plavix).  Please call my nurse Fernanda back to let me know.  [phone].      Blood pressure is better controlled, but not ideal.  Continue the same medications.  Come back in 2 weeks to check a blood pressure.     The blood test today has one findings (BUN) that is just a little elevated.  You can try drinking a little more fluids today and tomorrow.  We will recheck this test when you come back in for the blood pressure check.    Pt tolerated EGD with banding of esophageal Varix, very well. Total of 2 bands were placed. Report was given to the Pts floor nurse. Return pt to PCU

## 2021-07-29 ENCOUNTER — TELEPHONE (OUTPATIENT)
Dept: CARDIOLOGY | Facility: CLINIC | Age: 81
End: 2021-07-29

## 2021-07-29 DIAGNOSIS — I44.7 LEFT BUNDLE BRANCH BLOCK: ICD-10-CM

## 2021-07-29 DIAGNOSIS — I47.29 PAROXYSMAL VENTRICULAR TACHYCARDIA (H): ICD-10-CM

## 2021-07-29 DIAGNOSIS — I25.118 CORONARY ARTERY DISEASE OF NATIVE ARTERY OF NATIVE HEART WITH STABLE ANGINA PECTORIS (H): Primary | Chronic | ICD-10-CM

## 2021-07-29 NOTE — TELEPHONE ENCOUNTER
14 day Leadless monitor worn 6-28-21 to 7-12-21 - see report.   Principle rhythm Sinus, Average HR 61 bpm, range  bpm  1 run VT 14.6 sec  242 runs SVT up to 20 bpm.     Nuclear done 6-21-21 -   nuclear stress test is negative for inducible myocardial ischemia or infarction.     LVEF  71%.     A prior study was conducted on 10/4/2018.  This study has no change when compared with the prior study.  Reviewed by Dr. Mendoza - reply 7-22-21  - Nuclear scan is reassuring that his atypical chest pain is probably not cardiac in origin    Next FLORENCIA visit BV 8-12 -21.   Last Dr. Mendoza visit 6-16-21  -  As stated, his story appears to be changing and somewhat confusing on his falling spells.  Review of the chart appears to indicate that most of them are thought to be mechanical in nature; however, I will set up a 14-day Zio Patch just to make sure we are not dealing with any significant pauses or arrhythmias.

## 2021-07-30 NOTE — TELEPHONE ENCOUNTER
"How is he doing regarding lightheadedness etc? Can he do a modified Layo protocol GXT. He is 81 with \"weak legs\"  "

## 2021-08-02 NOTE — TELEPHONE ENCOUNTER
Spoke with Patient who states he does walk daily without difficulty.   Denies symptoms of chest discomfort, shortness of breath, light headedness or dizziness.    Patient agrees with having Layo modified treadmill test.

## 2021-08-05 ENCOUNTER — HOSPITAL ENCOUNTER (OUTPATIENT)
Dept: CARDIOLOGY | Facility: CLINIC | Age: 81
Discharge: HOME | End: 2021-08-05
Attending: INTERNAL MEDICINE | Admitting: INTERNAL MEDICINE
Payer: COMMERCIAL

## 2021-08-05 DIAGNOSIS — I47.29 PAROXYSMAL VENTRICULAR TACHYCARDIA (H): ICD-10-CM

## 2021-08-05 DIAGNOSIS — I44.7 LEFT BUNDLE BRANCH BLOCK: ICD-10-CM

## 2021-08-05 DIAGNOSIS — I25.118 CORONARY ARTERY DISEASE OF NATIVE ARTERY OF NATIVE HEART WITH STABLE ANGINA PECTORIS (H): Chronic | ICD-10-CM

## 2021-08-05 PROCEDURE — 93017 CV STRESS TEST TRACING ONLY: CPT

## 2021-08-05 PROCEDURE — 93018 CV STRESS TEST I&R ONLY: CPT | Performed by: INTERNAL MEDICINE

## 2021-08-05 PROCEDURE — 93016 CV STRESS TEST SUPVJ ONLY: CPT | Performed by: INTERNAL MEDICINE

## 2021-08-12 ENCOUNTER — OFFICE VISIT (OUTPATIENT)
Dept: CARDIOLOGY | Facility: CLINIC | Age: 81
End: 2021-08-12
Attending: INTERNAL MEDICINE
Payer: COMMERCIAL

## 2021-08-12 VITALS
BODY MASS INDEX: 31.78 KG/M2 | WEIGHT: 222 LBS | HEART RATE: 54 BPM | SYSTOLIC BLOOD PRESSURE: 120 MMHG | DIASTOLIC BLOOD PRESSURE: 74 MMHG | HEIGHT: 70 IN | OXYGEN SATURATION: 97 %

## 2021-08-12 DIAGNOSIS — I25.118 CORONARY ARTERY DISEASE OF NATIVE ARTERY OF NATIVE HEART WITH STABLE ANGINA PECTORIS (H): Chronic | ICD-10-CM

## 2021-08-12 PROCEDURE — 99214 OFFICE O/P EST MOD 30 MIN: CPT | Performed by: PHYSICIAN ASSISTANT

## 2021-08-12 RX ORDER — PROPRANOLOL HYDROCHLORIDE 80 MG/1
80 TABLET ORAL DAILY
COMMUNITY
End: 2021-08-12

## 2021-08-12 ASSESSMENT — MIFFLIN-ST. JEOR: SCORE: 1718.24

## 2021-08-12 NOTE — PROGRESS NOTES
CARDIOLOGY CLINIC PROGRESS NOTE    DOS: 2021      Iván Nicholas  : 1940, 81 year old  MRN: [id number]      History:  I had the pleasure of following up with Iván Nicholas today in the cardiology clinic.   He is a patient of Dr. Mendoza.     Grace is a pleasant 81-year-old gentleman with past medical history notable for coronary artery disease.  He had angioplasty of his LAD back in , LAD stenting in early  with a jailed diagonal vessel, later that year repeat coronary angiogram was performed which showed stable diagonal vessel with FFR of 0.8 and therefore medical therapy was recommended.  A nuclear stress study in 2018 demonstrated no evidence of ischemia or infarct.  Also hyperlipidemia, hypertension, carotid artery disease (history of right carotid endarterectomy in ), left bundle branch block, history of tobacco use, and chronic angina since his last angiogram that was treated with moderate dose of Imdur.  Newly diagnosed nonischemic cardiomyopathy.     He was seen by Gina Vivas PA-C on 19 due to increased episodes of chest tightness at night over the last 2 months.  This was not always exertional.  His sxs would go away with nitroglycerin.  Sxs were somewhat reminiscent of his previous episodes prior to his stents.  Amlodipine was added for elevated BP.  He underwent cardiac catheterization 19.  95% distal RCA s/p MERARI.  Started on Plavix.      19 was seen by Dr. Mendoza.  He had no chest pain at that time.  Weight was up and he had 2+ RLE edema, 1+ LLE edema.  Dr. Mendoza stopped amlodipine and started lisinopril 20 mg and chlorthalidone 25 mg daily.     - 19 Hosp Admission at [facility] due to chest pain/tightness radiating to back and arms.   Dr. Morales consulted for NSTEMI.   12/10/19 cardiac cath:  no new lesions found.   12/10/19 Echo:  LVEF 35% with WMA suggestive of stress cardiomyopathy.     19 had epistaxis,  resolved with Afrin.  Continued DAPT due to stent in 4/2019.  MEDS:  SWITCHED propranolol to Toprol XL 50 mg daily.   STOPPED Chlorthalidone.   DECREASED lisinopril from 20 to 5 mg.   Continued PTA Imdur 60 mg.     I saw him 12/19/19.  He had a cough since starting lisinopril, so we switched to losartan.     1/20/20 Echo: showed significant improvement in the LVEF from 36% to 55-60%.  There is mild to moderate concentric left ventricular hypertrophy.      5/2020 came off Plavix.     Interval History:  Last visit 6/16/21 with Dr. Mendoza.  He noted atypical chest pain, recurrent falls vs syncope.  BP was elevated, which was atypical for him.      6/21/21 Lexsican: negative for ischemia.      6/28/21 14 day Ziopatch: 1 run VT 14.6 sec, 242 runs SVT up to 20 beats.  Average sinus rate is 61 bpm.       8/5/21 Exercise stress test: negative for ischemia         He presents today for follow up.   BP is back to his typical controlled - today 120/74.   He walks a few days a week, about 1 mile. Occasional arm pain going up hills.  None with flat or downhill.  No chest pain.  No sxs with stairs.    No more falls. He thinks he may have passed out.   No palpitations.    No SOB, no orthopnea, no PND.    He has some mild edema in the right ankle that has been chronic and not changed or increased.   He was started on primidone for the tremors since we switched propranolol to Toprol XL. This seems to be helping the tremors.         ROS:  Skin:  Positive for bruising   Eyes:  Positive for glasses  ENT:  Positive for hearing loss  Respiratory:  Positive for dyspnea on exertion  Cardiovascular:    edema;Positive for;syncope or near-syncope no recurrence since last seen  Gastroenterology: Positive for heartburn  Genitourinary:  not assessed    Musculoskeletal:  Positive for joint pain;back pain  Neurologic:  Negative    Psychiatric:  Negative    Heme/Lymph/Imm:  Positive for allergies  Endocrine:  Negative      PAST MEDICAL  HISTORY:  Past Medical History:   Diagnosis Date     Arthritis      Carotid artery stenosis     Right, s/p right CEA 11/9/2016     Cerebral artery occlusion with cerebral infarction (H) 2016    TIA -  Endarterectomy...No residual     Cholesterol serum elevated      Colon polyps      Color blind      Coronary artery disease     2/2012 - MERARI to mid LAD     Decreased hearing      Depression      Gout      Hypertension      Hypertrophy of prostate without urinary obstruction and other lower urinary tract symptoms (LUTS)      Impotence      Left bundle branch block      Onychomycosis of toenail      Other and unspecified hyperlipidemia      Paroxysmal ventricular tachycardia (H)     with stress test 2012       PAST SURGICAL HISTORY:  Past Surgical History:   Procedure Laterality Date     ANGIOPLASTY  1991     ARTHROPLASTY KNEE Left 11/12/2018    Procedure: Left total knee arthroplasty;  Surgeon: Matt Schaefer MD;  Location:  OR     COLONOSCOPY  12/13/2011    Procedure:COLONOSCOPY; COLONOSCOPY; Surgeon:EMMANUELLE MOSER; Location: GI     CORONARY ANGIOGRAPHY ADULT ORDER  1991,2012 1991 - stent to proximal LAD, 2012 - Stent to mid LAD     CV HEART CATHETERIZATION WITH POSSIBLE INTERVENTION Left 4/23/2019    Procedure: Coronary Angiogram;  Surgeon: Percy Armas MD;  Location:  HEART CARDIAC CATH LAB     CV HEART CATHETERIZATION WITH POSSIBLE INTERVENTION N/A 12/10/2019    Procedure: Heart Catheterization with Possible Intervention;  Surgeon: Dajuan Duvall MD;  Location:  HEART CARDIAC CATH LAB     CV PCI ANGIOPLASTY N/A 4/23/2019    Procedure: PCI Angioplasty;  Surgeon: Percy Armas MD;  Location:  HEART CARDIAC CATH LAB     ENDARTERECTOMY CAROTID Right 11/10/2016    Procedure: ENDARTERECTOMY CAROTID;  Surgeon: Paco Suresh MD;  Location:  OR     HEART CATH, ANGIOPLASTY       ORTHOPEDIC SURGERY       PHACOEMULSIFICATION CLEAR CORNEA WITH STANDARD INTRAOCULAR LENS IMPLANT   2013    Procedure: PHACOEMULSIFICATION CLEAR CORNEA WITH STANDARD INTRAOCULAR LENS IMPLANT;  RIGHT PHACOEMULSIFICATION CLEAR CORNEA WITH STANDARD INTRAOCULAR LENS IMPLANT ;  Surgeon: Luisito Juan MD;  Location: [facility]       SOCIAL HISTORY:  Social History     Socioeconomic History     Marital status:      Spouse name: Not on file     Number of children: Not on file     Years of education: Not on file     Highest education level: Not on file   Occupational History     Not on file   Social Needs     Financial resource strain: Not on file     Food insecurity:     Worry: Not on file     Inability: Not on file     Transportation needs:     Medical: Not on file     Non-medical: Not on file   Tobacco Use     Smoking status: Former Smoker     Packs/day: 0.50     Years: 20.00     Pack years: 10.00     Types: Cigarettes     Last attempt to quit: 1990     Years since quittin.3     Smokeless tobacco: Never Used   Substance and Sexual Activity     Alcohol use: Yes     Alcohol/week: 0.0 oz     Comment: daily - drinks x2     Drug use: No     Sexual activity: Never   Lifestyle     Physical activity:     Days per week: Not on file     Minutes per session: Not on file     Stress: Not on file   Relationships     Social connections:     Talks on phone: Not on file     Gets together: Not on file     Attends Voodoo service: Not on file     Active member of club or organization: Not on file     Attends meetings of clubs or organizations: Not on file     Relationship status: Not on file     Intimate partner violence:     Fear of current or ex partner: Not on file     Emotionally abused: Not on file     Physically abused: Not on file     Forced sexual activity: Not on file   Other Topics Concern     Parent/sibling w/ CABG, MI or angioplasty before 65F 55M? Not Asked      Service Not Asked     Blood Transfusions Not Asked     Caffeine Concern No     Comment: 1-2 cups daily     Occupational Exposure  Not Asked     Hobby Hazards Not Asked     Sleep Concern No     Stress Concern No     Weight Concern Not Asked     Special Diet No     Comment: trying to watch sodium intake     Back Care Not Asked     Exercise No     Comment: some walking     Bike Helmet Not Asked     Seat Belt Not Asked     Self-Exams Not Asked   Social History Narrative     Not on file       FAMILY HISTORY:  Family History   Problem Relation Age of Onset     Heart Disease Mother 83     Heart Disease Father 69        emphysema     Coronary Artery Disease Brother      Coronary Artery Disease Sister        MEDS: acetaminophen (TYLENOL) 325 MG tablet, Take 2 tablets (650 mg) by mouth every 4 hours as needed for mild pain or headaches  ALLOPURINOL PO, Take 100 mg by mouth daily. To prevent gout attacks, recently started.   ezetimibe (ZETIA) 10 MG tablet, Take 1 tablet (10 mg) by mouth daily (Patient taking differently: Take 10 mg by mouth At Bedtime )  hypromellose-dextran (ARTIFICAL TEARS) 0.1-0.3 % ophthalmic solution, Place 1 drop into both eyes 2 times daily as needed for dry eyes  isosorbide mononitrate (IMDUR) 60 MG 24 hr tablet, Take 1 tablet (60 mg) by mouth daily Appointment due In April  losartan (COZAAR) 25 MG tablet, Take 1 tablet (25 mg) by mouth daily  metoprolol succinate ER (TOPROL-XL) 50 MG 24 hr tablet, Take 1 tablet (50 mg) by mouth daily  Multiple Vitamins-Minerals (CENTRUM SILVER) per tablet, Take 1 tablet by mouth daily.  Multiple Vitamins-Minerals (PRESERVISION AREDS 2) CAPS, Take by mouth 2 times daily  nitroGLYcerin (NITROSTAT) 0.4 MG sublingual tablet, Place 1 tablet (0.4 mg) under the tongue every 5 minutes as needed for chest pain  oxymetazoline (AFRIN) 0.05 % nasal spray, Spray 2 sprays into both nostrils 2 times daily as needed for other (nose bleed)  pantoprazole (PROTONIX) 40 MG EC tablet, Take 40 mg by mouth daily  primidone (MYSOLINE) 250 MG tablet, Take 250 mg by mouth 3 times daily   rosuvastatin (CRESTOR) 40 MG  "tablet, Take 1 tablet by mouth daily.    No current facility-administered medications on file prior to visit.      ALLERGIES:   Allergies   Allergen Reactions     Lisinopril Cough     Statins [Hmg-Coa-R Inhibitors] Muscle Pain (Myalgia)     lipitor       PHYSICAL EXAM:  Vitals: /74 (BP Location: Right arm)   Pulse 54   Ht 1.778 m (5' 10\")   Wt 100.7 kg (222 lb)   SpO2 97%   BMI 31.85 kg/m    Constitutional:  cooperative, alert and oriented, well developed, well nourished, in no acute distress        Skin:  warm and dry to the touch, no apparent skin lesions or masses noted   right CEA scar    Head:  normocephalic, no masses or lesions        Eyes:  pupils equal and round;conjunctivae and lids unremarkable;sclera white;no xanthalasma        ENT:  no pallor or cyanosis        Neck:      prominent carotid pulsations at base of neck bilaterally. Well-healed right carotid endarterectomy scar    Respiratory:  normal breath sounds, clear to auscultation, normal A-P diameter, normal symmetry, normal respiratory excursion, no use of accessory muscles        Cardiac: regular rhythm;normal S1 and S2       systolic murmur;grade 1;RUSB          GI:  abdomen soft;BS normoactive obese      Vascular: pulses full and equal                                      Extremities and Musculoskeletal:  no spinal abnormalities noted;normal muscle strength and tone        Neurological:  no gross motor deficits            LABS/DATA:  I reviewed the following:  Echo 12/10/19:  Interpretation Summary  Left ventricular systolic function is moderate to severely reduced. LVEF 36%  based on biplane 2D tracing.  Severe hypokinesis of the apical wall segments with relatively preserved wall  motion in the basal wall segments.  This pattern can be seen in stress cardiomyopathy or multivessel coronary  artery disease.  Right ventricle is normal in structure, function and size.  No significant valvular abnormalities.     This study was compared " to a prior TTE from 11/9/2016, left ventricular  systolic function has decreased.     Inpatient cardiology team notified.      Limited echo 1/20/20:  Interpretation Summary  The visual ejection fraction is estimated at 55-60%.  There is mild to moderate concentric left ventricular hypertrophy.  The left atrium is mild to moderately dilated.  Right ventricular systolic pressure is elevated, consistent with moderate  pulmonary hypertension.  The ascending aorta is Mildly dilated.  LVEF is significantly improved compared with most recent study.      Mildred 6/21/21:     The nuclear stress test is negative for inducible myocardial ischemia or infarction.     Left ventricular function is normal, EF 71%.     A prior study was conducted on 10/4/2018.  This study has no change when compared with the prior study.      Zio 14 day 6/28/21:  SR  1 VT, 14.6 seconds  242 SVT, up to 20 beats      Treadmill ECG 8/5/21:  Interpretation Summary   This was a normal stress EKG with no evidence of stress-induced ischemia.          ASSESSMENT/PLAN:  CAD  - Angioplasty of LAD back in 1991, LAD stenting in early 2012 with a jailed diagonal vessel, later that year repeat coronary angiogram was performed which showed stable diagonal vessel with FFR of 0.8 and therefore medical therapy was recommended; nuclear stress study in 11/2018 demonstrating no evidence of ischemia or infarct  - Cardiac cath 4/23/19: 95% distal RCA s/p MERARI  - 12/2019 admission with chest pain and NSTEMI.  Cath 12/10/19 without new lesions.  Echo 12/10/19 showed new decline in LVEF to 35%  - Repeat echo 1/2020 showed EF improved to 55-60%  - Due to some atypical chest discomfort, underwent Mildred 6/21/21: negative for ischemia  - Due to some VT (14 seconds on Zio), underwent treadmill GXT 8/5/21: negative for ischemia   - Currently on ASA 81 mg lifelong, Toprol XL 50 mg, losartan 25 mg, Imdur 60 mg, Crestor 40 mg, Zetia 10 mg.   - Mediterranean style diet      H/o  cardiomyopathy, consistent with stress cardiomyopathy, resolved  - Echo 12/10/19 LVEF 35% with MWA suggestive of stress cardiomyopathy  - Cath 12/10/19 without new lesions  - Echo 1/20/20: improvement in LVEF to 55-60%  - Continue Toprol XL 50 mg, losartan 25 mg      HTN  - Currently on Imdur 60 mg, Toprol XL 50 mg, losartan 25 mg  - BP controlled  - Follow      Hyperlipidemia  - FLP 4/15/21 shows LDL 47 on a combination of Zetia and rosuvastatin 40 mg      Carotid artery disease  - History of right carotid endarterectomy in 2016  - Last imaging 8/2018  - Continue ASA, statin      VT  - Some question of prior falls vs syncope  - 14 seconds of VT on 14 day Zio 6/28/2021  - Treadmill Mildred 8/5/21: negative for ischemia  - No recurrent atypical sxs, nor falls/syncope   - He is already on Toprol XL.  HR today 54.  Average sinus rate on Zio was 61.  Will discuss dose with Dr. Mendoza      SVT  - 242 runs, up tp 20 beats on 14 day Zio 6/28/21  - Currently on BB, see above              Follow up:  Dr. Mendoza in Dec 2021    Jenaro Shook PA-C

## 2021-08-12 NOTE — LETTER
2021    Stephan Nice MD  [facility] [address]  [facility] [address]    RE: Iván T Cristopher       Dear Colleague,    I had the pleasure of seeing Iván Nicholas in the [facility].    CARDIOLOGY CLINIC PROGRESS NOTE    DOS: 2021      Iván Nicholas  : 1940, 81 year old  MRN: [id number]      History:  I had the pleasure of following up with Iván Nicholas today in the cardiology clinic.   He is a patient of Dr. Mendoza.     Grace is a pleasant 81-year-old gentleman with past medical history notable for coronary artery disease.  He had angioplasty of his LAD back in , LAD stenting in early  with a jailed diagonal vessel, later that year repeat coronary angiogram was performed which showed stable diagonal vessel with FFR of 0.8 and therefore medical therapy was recommended.  A nuclear stress study in 2018 demonstrated no evidence of ischemia or infarct.  Also hyperlipidemia, hypertension, carotid artery disease (history of right carotid endarterectomy in ), left bundle branch block, history of tobacco use, and chronic angina since his last angiogram that was treated with moderate dose of Imdur.  Newly diagnosed nonischemic cardiomyopathy.     He was seen by Gina Vivas PA-C on 19 due to increased episodes of chest tightness at night over the last 2 months.  This was not always exertional.  His sxs would go away with nitroglycerin.  Sxs were somewhat reminiscent of his previous episodes prior to his stents.  Amlodipine was added for elevated BP.  He underwent cardiac catheterization 19.  95% distal RCA s/p MERARI.  Started on Plavix.      19 was seen by Dr. Mendoza.  He had no chest pain at that time.  Weight was up and he had 2+ RLE edema, 1+ LLE edema.  Dr. Mendoza stopped amlodipine and started lisinopril 20 mg and chlorthalidone 25 mg daily.      12/9- 12/12/19 Hosp Admission at [facility] due to chest pain/tightness radiating to back and arms.   Dr. Morales consulted for NSTEMI.   12/10/19 cardiac cath:  no new lesions found.   12/10/19 Echo:  LVEF 35% with WMA suggestive of stress cardiomyopathy.     12/11/19 had epistaxis, resolved with Afrin.  Continued DAPT due to stent in 4/2019.  MEDS:  SWITCHED propranolol to Toprol XL 50 mg daily.   STOPPED Chlorthalidone.   DECREASED lisinopril from 20 to 5 mg.   Continued PTA Imdur 60 mg.     I saw him 12/19/19.  He had a cough since starting lisinopril, so we switched to losartan.     1/20/20 Echo: showed significant improvement in the LVEF from 36% to 55-60%.  There is mild to moderate concentric left ventricular hypertrophy.      5/2020 came off Plavix.     Interval History:  Last visit 6/16/21 with Dr. Mendoza.  He noted atypical chest pain, recurrent falls vs syncope.  BP was elevated, which was atypical for him.      6/21/21 Lexsican: negative for ischemia.      6/28/21 14 day Ziopatch: 1 run VT 14.6 sec, 242 runs SVT up to 20 beats.  Average sinus rate is 61 bpm.       8/5/21 Exercise stress test: negative for ischemia         He presents today for follow up.   BP is back to his typical controlled - today 120/74.   He walks a few days a week, about 1 mile. Occasional arm pain going up hills.  None with flat or downhill.  No chest pain.  No sxs with stairs.    No more falls. He thinks he may have passed out.   No palpitations.    No SOB, no orthopnea, no PND.    He has some mild edema in the right ankle that has been chronic and not changed or increased.   He was started on primidone for the tremors since we switched propranolol to Toprol XL. This seems to be helping the tremors.         ROS:  Skin:  Positive for bruising   Eyes:  Positive for glasses  ENT:  Positive for hearing loss  Respiratory:  Positive for dyspnea on exertion  Cardiovascular:    edema;Positive for;syncope or near-syncope no recurrence  since last seen  Gastroenterology: Positive for heartburn  Genitourinary:  not assessed    Musculoskeletal:  Positive for joint pain;back pain  Neurologic:  Negative    Psychiatric:  Negative    Heme/Lymph/Imm:  Positive for allergies  Endocrine:  Negative      PAST MEDICAL HISTORY:  Past Medical History:   Diagnosis Date     Arthritis      Carotid artery stenosis     Right, s/p right CEA 11/9/2016     Cerebral artery occlusion with cerebral infarction (H) 2016    TIA -  Endarterectomy...No residual     Cholesterol serum elevated      Colon polyps      Color blind      Coronary artery disease     2/2012 - MERARI to mid LAD     Decreased hearing      Depression      Gout      Hypertension      Hypertrophy of prostate without urinary obstruction and other lower urinary tract symptoms (LUTS)      Impotence      Left bundle branch block      Onychomycosis of toenail      Other and unspecified hyperlipidemia      Paroxysmal ventricular tachycardia (H)     with stress test 2012       PAST SURGICAL HISTORY:  Past Surgical History:   Procedure Laterality Date     ANGIOPLASTY  1991     ARTHROPLASTY KNEE Left 11/12/2018    Procedure: Left total knee arthroplasty;  Surgeon: Matt Schaefer MD;  Location:  OR     COLONOSCOPY  12/13/2011    Procedure:COLONOSCOPY; COLONOSCOPY; Surgeon:EMMANUELLE MOSER; Location: GI     CORONARY ANGIOGRAPHY ADULT ORDER  1991,2012 1991 - stent to proximal LAD, 2012 - Stent to mid LAD     CV HEART CATHETERIZATION WITH POSSIBLE INTERVENTION Left 4/23/2019    Procedure: Coronary Angiogram;  Surgeon: Percy Armas MD;  Location:  HEART CARDIAC CATH LAB     CV HEART CATHETERIZATION WITH POSSIBLE INTERVENTION N/A 12/10/2019    Procedure: Heart Catheterization with Possible Intervention;  Surgeon: Dajuan Duvall MD;  Location: [facility] CARDIAC CATH LAB     CV PCI ANGIOPLASTY N/A 4/23/2019    Procedure: PCI Angioplasty;  Surgeon: Precy Armas MD;  Location: [facility] CARDIAC  CATH LAB     ENDARTERECTOMY CAROTID Right 11/10/2016    Procedure: ENDARTERECTOMY CAROTID;  Surgeon: Paco Suresh MD;  Location:  OR     HEART CATH, ANGIOPLASTY       ORTHOPEDIC SURGERY       PHACOEMULSIFICATION CLEAR CORNEA WITH STANDARD INTRAOCULAR LENS IMPLANT  2013    Procedure: PHACOEMULSIFICATION CLEAR CORNEA WITH STANDARD INTRAOCULAR LENS IMPLANT;  RIGHT PHACOEMULSIFICATION CLEAR CORNEA WITH STANDARD INTRAOCULAR LENS IMPLANT ;  Surgeon: Luisito Juan MD;  Location: [facility]       SOCIAL HISTORY:  Social History     Socioeconomic History     Marital status:      Spouse name: Not on file     Number of children: Not on file     Years of education: Not on file     Highest education level: Not on file   Occupational History     Not on file   Social Needs     Financial resource strain: Not on file     Food insecurity:     Worry: Not on file     Inability: Not on file     Transportation needs:     Medical: Not on file     Non-medical: Not on file   Tobacco Use     Smoking status: Former Smoker     Packs/day: 0.50     Years: 20.00     Pack years: 10.00     Types: Cigarettes     Last attempt to quit: 1990     Years since quittin.3     Smokeless tobacco: Never Used   Substance and Sexual Activity     Alcohol use: Yes     Alcohol/week: 0.0 oz     Comment: daily - drinks x2     Drug use: No     Sexual activity: Never   Lifestyle     Physical activity:     Days per week: Not on file     Minutes per session: Not on file     Stress: Not on file   Relationships     Social connections:     Talks on phone: Not on file     Gets together: Not on file     Attends Yazidi service: Not on file     Active member of club or organization: Not on file     Attends meetings of clubs or organizations: Not on file     Relationship status: Not on file     Intimate partner violence:     Fear of current or ex partner: Not on file     Emotionally abused: Not on file     Physically abused: Not on file      Forced sexual activity: Not on file   Other Topics Concern     Parent/sibling w/ CABG, MI or angioplasty before 65F 55M? Not Asked      Service Not Asked     Blood Transfusions Not Asked     Caffeine Concern No     Comment: 1-2 cups daily     Occupational Exposure Not Asked     Hobby Hazards Not Asked     Sleep Concern No     Stress Concern No     Weight Concern Not Asked     Special Diet No     Comment: trying to watch sodium intake     Back Care Not Asked     Exercise No     Comment: some walking     Bike Helmet Not Asked     Seat Belt Not Asked     Self-Exams Not Asked   Social History Narrative     Not on file       FAMILY HISTORY:  Family History   Problem Relation Age of Onset     Heart Disease Mother 83     Heart Disease Father 69        emphysema     Coronary Artery Disease Brother      Coronary Artery Disease Sister        MEDS: acetaminophen (TYLENOL) 325 MG tablet, Take 2 tablets (650 mg) by mouth every 4 hours as needed for mild pain or headaches  ALLOPURINOL PO, Take 100 mg by mouth daily. To prevent gout attacks, recently started.   ezetimibe (ZETIA) 10 MG tablet, Take 1 tablet (10 mg) by mouth daily (Patient taking differently: Take 10 mg by mouth At Bedtime )  hypromellose-dextran (ARTIFICAL TEARS) 0.1-0.3 % ophthalmic solution, Place 1 drop into both eyes 2 times daily as needed for dry eyes  isosorbide mononitrate (IMDUR) 60 MG 24 hr tablet, Take 1 tablet (60 mg) by mouth daily Appointment due In April  losartan (COZAAR) 25 MG tablet, Take 1 tablet (25 mg) by mouth daily  metoprolol succinate ER (TOPROL-XL) 50 MG 24 hr tablet, Take 1 tablet (50 mg) by mouth daily  Multiple Vitamins-Minerals (CENTRUM SILVER) per tablet, Take 1 tablet by mouth daily.  Multiple Vitamins-Minerals (PRESERVISION AREDS 2) CAPS, Take by mouth 2 times daily  nitroGLYcerin (NITROSTAT) 0.4 MG sublingual tablet, Place 1 tablet (0.4 mg) under the tongue every 5 minutes as needed for chest pain  oxymetazoline (AFRIN)  "0.05 % nasal spray, Spray 2 sprays into both nostrils 2 times daily as needed for other (nose bleed)  pantoprazole (PROTONIX) 40 MG EC tablet, Take 40 mg by mouth daily  primidone (MYSOLINE) 250 MG tablet, Take 250 mg by mouth 3 times daily   rosuvastatin (CRESTOR) 40 MG tablet, Take 1 tablet by mouth daily.    No current facility-administered medications on file prior to visit.      ALLERGIES:   Allergies   Allergen Reactions     Lisinopril Cough     Statins [Hmg-Coa-R Inhibitors] Muscle Pain (Myalgia)     lipitor       PHYSICAL EXAM:  Vitals: /74 (BP Location: Right arm)   Pulse 54   Ht 1.778 m (5' 10\")   Wt 100.7 kg (222 lb)   SpO2 97%   BMI 31.85 kg/m    Constitutional:  cooperative, alert and oriented, well developed, well nourished, in no acute distress        Skin:  warm and dry to the touch, no apparent skin lesions or masses noted   right CEA scar    Head:  normocephalic, no masses or lesions        Eyes:  pupils equal and round;conjunctivae and lids unremarkable;sclera white;no xanthalasma        ENT:  no pallor or cyanosis        Neck:      prominent carotid pulsations at base of neck bilaterally. Well-healed right carotid endarterectomy scar    Respiratory:  normal breath sounds, clear to auscultation, normal A-P diameter, normal symmetry, normal respiratory excursion, no use of accessory muscles        Cardiac: regular rhythm;normal S1 and S2       systolic murmur;grade 1;RUSB          GI:  abdomen soft;BS normoactive obese      Vascular: pulses full and equal                                      Extremities and Musculoskeletal:  no spinal abnormalities noted;normal muscle strength and tone        Neurological:  no gross motor deficits            LABS/DATA:  I reviewed the following:  Echo 12/10/19:  Interpretation Summary  Left ventricular systolic function is moderate to severely reduced. LVEF 36%  based on biplane 2D tracing.  Severe hypokinesis of the apical wall segments with relatively " preserved wall  motion in the basal wall segments.  This pattern can be seen in stress cardiomyopathy or multivessel coronary  artery disease.  Right ventricle is normal in structure, function and size.  No significant valvular abnormalities.     This study was compared to a prior TTE from 11/9/2016, left ventricular  systolic function has decreased.     Inpatient cardiology team notified.      Limited echo 1/20/20:  Interpretation Summary  The visual ejection fraction is estimated at 55-60%.  There is mild to moderate concentric left ventricular hypertrophy.  The left atrium is mild to moderately dilated.  Right ventricular systolic pressure is elevated, consistent with moderate  pulmonary hypertension.  The ascending aorta is Mildly dilated.  LVEF is significantly improved compared with most recent study.      Mildred 6/21/21:     The nuclear stress test is negative for inducible myocardial ischemia or infarction.     Left ventricular function is normal, EF 71%.     A prior study was conducted on 10/4/2018.  This study has no change when compared with the prior study.      Zio 14 day 6/28/21:  SR  1 VT, 14.6 seconds  242 SVT, up to 20 beats      Treadmill ECG 8/5/21:  Interpretation Summary   This was a normal stress EKG with no evidence of stress-induced ischemia.          ASSESSMENT/PLAN:  CAD  - Angioplasty of LAD back in 1991, LAD stenting in early 2012 with a jailed diagonal vessel, later that year repeat coronary angiogram was performed which showed stable diagonal vessel with FFR of 0.8 and therefore medical therapy was recommended; nuclear stress study in 11/2018 demonstrating no evidence of ischemia or infarct  - Cardiac cath 4/23/19: 95% distal RCA s/p MERARI  - 12/2019 admission with chest pain and NSTEMI.  Cath 12/10/19 without new lesions.  Echo 12/10/19 showed new decline in LVEF to 35%  - Repeat echo 1/2020 showed EF improved to 55-60%  - Due to some atypical chest discomfort, underwent Mildred 6/21/21:  negative for ischemia  - Due to some VT (14 seconds on Zio), underwent treadmill GXT 8/5/21: negative for ischemia   - Currently on ASA 81 mg lifelong, Toprol XL 50 mg, losartan 25 mg, Imdur 60 mg, Crestor 40 mg, Zetia 10 mg.   - Mediterranean style diet      H/o cardiomyopathy, consistent with stress cardiomyopathy, resolved  - Echo 12/10/19 LVEF 35% with MWA suggestive of stress cardiomyopathy  - Cath 12/10/19 without new lesions  - Echo 1/20/20: improvement in LVEF to 55-60%  - Continue Toprol XL 50 mg, losartan 25 mg      HTN  - Currently on Imdur 60 mg, Toprol XL 50 mg, losartan 25 mg  - BP controlled  - Follow      Hyperlipidemia  - FLP 4/15/21 shows LDL 47 on a combination of Zetia and rosuvastatin 40 mg      Carotid artery disease  - History of right carotid endarterectomy in 2016  - Last imaging 8/2018  - Continue ASA, statin      VT  - Some question of prior falls vs syncope  - 14 seconds of VT on 14 day Zio 6/28/2021  - Treadmill Mildred 8/5/21: negative for ischemia  - No recurrent atypical sxs, nor falls/syncope   - He is already on Toprol XL.  HR today 54.  Average sinus rate on Zio was 61.  Will discuss dose with Dr. Mendoza      SVT  - 242 runs, up tp 20 beats on 14 day Zio 6/28/21  - Currently on BB, see above              Follow up:  Dr. Mendoza in Dec 2021    Jenaro Shook PA-C      Thank you for allowing me to participate in the care of your patient.      Sincerely,     Jenaro Shook PA-C     [facility]  cc:   Akhil Mendoza MD  [address]  [place] [address]

## 2021-08-13 ENCOUNTER — TELEPHONE (OUTPATIENT)
Dept: CARDIOLOGY | Facility: CLINIC | Age: 81
End: 2021-08-13

## 2021-08-13 DIAGNOSIS — I47.29 PAROXYSMAL VENTRICULAR TACHYCARDIA (H): Primary | ICD-10-CM

## 2021-08-13 NOTE — TELEPHONE ENCOUNTER
Message from Dr. Mendoza:  Akhil Mendoza MD Sellers, Anah E, PA-C  Cc: Mone Duran, RN  Let s have him see EP           Previous Messages       ----- Message -----   From: Jenaro Shook PA-C   Sent: 8/12/2021   2:42 PM CDT   To: Akhil Mendoza MD, Sapphire Berkowitz RN   Subject: VT and meds                                       Hi Dr. Mendoza.  Also CCing Sapphire as I am out of office tomorrow.   You saw Grace in June. He had some falls - ?mechanical vs syncope and some chest discomfort.   Mildred was done and negative.   Zio showed 14 seconds of VT. Also 242 SVT runs.   Treadmill GXT was done and negative.   He has had no further chest pain.   He has had no further falls, near falls, or near syncope.   He is on Toprol XL 50 mg.  BP is normal.  Average sinus rate on Zio was 61.     Do you want to leave Toprol XL at 50 mg or try to increase?   You see him back in December.     Thanks,   Jenaro Shook PA-C 8/12/2021 2:45 PM        8/13/2021 1433 order placed for EP consult.  Called patient to review Dr. Mendoza's recommendation for an EP consult and he is willing to set that up.  Message to scheduling to contact patient.

## 2021-08-27 ENCOUNTER — OFFICE VISIT (OUTPATIENT)
Dept: CARDIOLOGY | Facility: CLINIC | Age: 81
End: 2021-08-27
Payer: COMMERCIAL

## 2021-08-27 VITALS
WEIGHT: 222 LBS | HEART RATE: 60 BPM | HEIGHT: 70 IN | DIASTOLIC BLOOD PRESSURE: 83 MMHG | BODY MASS INDEX: 31.78 KG/M2 | SYSTOLIC BLOOD PRESSURE: 163 MMHG

## 2021-08-27 DIAGNOSIS — I25.118 CORONARY ARTERY DISEASE OF NATIVE ARTERY OF NATIVE HEART WITH STABLE ANGINA PECTORIS (H): ICD-10-CM

## 2021-08-27 DIAGNOSIS — I47.29 PAROXYSMAL VENTRICULAR TACHYCARDIA (H): Primary | ICD-10-CM

## 2021-08-27 DIAGNOSIS — Z11.59 ENCOUNTER FOR SCREENING FOR OTHER VIRAL DISEASES: ICD-10-CM

## 2021-08-27 DIAGNOSIS — I42.8 OTHER CARDIOMYOPATHY (H): ICD-10-CM

## 2021-08-27 PROCEDURE — 93000 ELECTROCARDIOGRAM COMPLETE: CPT | Performed by: INTERNAL MEDICINE

## 2021-08-27 PROCEDURE — 99204 OFFICE O/P NEW MOD 45 MIN: CPT | Performed by: INTERNAL MEDICINE

## 2021-08-27 RX ORDER — LOSARTAN POTASSIUM 25 MG/1
100 TABLET ORAL DAILY
Qty: 90 TABLET | Refills: 3 | Status: SHIPPED | OUTPATIENT
Start: 2021-08-27 | End: 2021-12-07

## 2021-08-27 ASSESSMENT — MIFFLIN-ST. JEOR: SCORE: 1718.24

## 2021-08-27 NOTE — PROGRESS NOTES
Service Date: 08/27/2021    HISTORY OF PRESENT ILLNESS:  I saw Mr. Nicholas for evaluation of recurrent syncope.  He is an 81-year-old white male with a history of coronary artery disease, previous coronary stenting.  He has normal LV ejection fraction.      The patient had 2 episodes of syncope in November and December of 2020.  Both episodes occurred while he was standing.  There were no warning symptoms.  The second episode was complicated with a minor head injury.      Since then, he has been having some occasional dizziness spells.  He was sent to a dizziness clinic for evaluation.  The patient was referred back to Cardiology for further evaluation.      He had a cardiac nuclear scan that showed no apparent evidence of major myocardial ischemia.  He was put on a Zio patch monitor that detected an episode of VT at 14.6 seconds.  He did not have apparent symptoms during the ventricular tachycardia.    PAST MEDICAL HISTORY:  Remarkable for TIA, carotid artery stenosis, colon polyps, depression, gout, hypertension.    PHYSICAL EXAMINATION:    VITAL SIGNS:  Blood pressure was 163/83, heart rate 60 beats per minute, body weight 222 pounds.  HEENT:  The eyes and ENT were unremarkable.  LUNGS:  Clear.  CARDIAC:  Rhythm was regular and heart sounds were normal with no murmur.  ABDOMEN:  Moderate obesity.  EXTREMITIES:  There was no pedal edema.    ASSESSMENT AND RECOMMENDATIONS:  Mr. Nicholas is an 81-year-old white male with recurrent syncope.  He has a history of coronary artery disease and preserved ejection fraction with one relatively long episode of nonsustained VT.  For further evaluation of the syncopal episode, I have recommended a comprehensive EP study.  If he is found to have inducible sustained ventricular tachycardia, I would recommend ICD implantation.  If there is any apparent sinus node dysfunction or AV conduction abnormalities, a pacemaker implantation would be recommended.      Otherwise, I would  recommend a loop recorder implantation.      He can continue the current medications with no changes for the time being.  The risks and benefits of EP study and possible device implantation were explained to the patient who expressed understanding and consented for the procedure.    cc:   Stephan Nice MD, PhD   [address]     Jessica Molina MD        D: 2021   T: 2021   MT: CARA    Name:     GREG VALDERRAMA  MRN:      [id number]        Account:      [id number]   :      1940           Service Date: 2021       Document: [id number]

## 2021-08-27 NOTE — PROGRESS NOTES
HPI and Plan:   See dictation    Orders Placed This Encounter   Procedures     EKG 12-lead complete w/read - Clinics (future- to be scheduled)       Orders Placed This Encounter   Medications     losartan (COZAAR) 25 MG tablet     Sig: Take 4 tablets (100 mg) by mouth daily     Dispense:  90 tablet     Refill:  3       Medications Discontinued During This Encounter   Medication Reason     aspirin (ASA) 81 MG EC tablet Stopped by Patient     losartan (COZAAR) 25 MG tablet          Encounter Diagnoses   Name Primary?     Paroxysmal ventricular tachycardia (H) Yes     Coronary artery disease of native artery of native heart with stable angina pectoris (H)      Other cardiomyopathy (H)        CURRENT MEDICATIONS:  Current Outpatient Medications   Medication Sig Dispense Refill     acetaminophen (TYLENOL) 325 MG tablet Take 2 tablets (650 mg) by mouth every 4 hours as needed for mild pain or headaches       ALLOPURINOL PO Take 100 mg by mouth daily. To prevent gout attacks, recently started.        ezetimibe (ZETIA) 10 MG tablet Take 1 tablet (10 mg) by mouth daily (Patient taking differently: Take 10 mg by mouth At Bedtime ) 90 tablet 3     hypromellose-dextran (ARTIFICAL TEARS) 0.1-0.3 % ophthalmic solution Place 1 drop into both eyes 2 times daily as needed for dry eyes       isosorbide mononitrate (IMDUR) 60 MG 24 hr tablet Take 1 tablet (60 mg) by mouth daily Appointment due In April 90 tablet 1     losartan (COZAAR) 25 MG tablet Take 4 tablets (100 mg) by mouth daily 90 tablet 3     metoprolol succinate ER (TOPROL-XL) 50 MG 24 hr tablet Take 1 tablet (50 mg) by mouth daily 90 tablet 3     Multiple Vitamins-Minerals (CENTRUM SILVER) per tablet Take 1 tablet by mouth daily.       Multiple Vitamins-Minerals (PRESERVISION AREDS 2) CAPS Take by mouth 2 times daily       nitroGLYcerin (NITROSTAT) 0.4 MG sublingual tablet Place 1 tablet (0.4 mg) under the tongue every 5 minutes as needed for chest pain 25 tablet 11      oxymetazoline (AFRIN) 0.05 % nasal spray Spray 2 sprays into both nostrils 2 times daily as needed for other (nose bleed)       pantoprazole (PROTONIX) 40 MG EC tablet Take 40 mg by mouth daily       primidone (MYSOLINE) 250 MG tablet Take 250 mg by mouth 3 times daily        rosuvastatin (CRESTOR) 40 MG tablet Take 1 tablet by mouth daily. 30 tablet 1       ALLERGIES     Allergies   Allergen Reactions     Lisinopril Cough     Statins [Hmg-Coa-R Inhibitors] Muscle Pain (Myalgia)     lipitor       PAST MEDICAL HISTORY:  Past Medical History:   Diagnosis Date     Carotid artery stenosis     Right, s/p right CEA 11/9/2016     Cerebral artery occlusion with cerebral infarction (H) 2016    TIA -  Endarterectomy...No residual     Colon polyps      Color blind      Coronary artery disease     2/2012 - MERARI to mid LAD     Decreased hearing      Depression      Gout      Hypertension      Hypertrophy of prostate without urinary obstruction and other lower urinary tract symptoms (LUTS)      Left bundle branch block      Onychomycosis of toenail      Paroxysmal ventricular tachycardia (H)     with stress test 2012       PAST SURGICAL HISTORY:  Past Surgical History:   Procedure Laterality Date     ANGIOPLASTY  1991     ARTHROPLASTY KNEE Left 11/12/2018    Procedure: Left total knee arthroplasty;  Surgeon: Matt Schaefer MD;  Location:  OR     COLONOSCOPY  12/13/2011    Procedure:COLONOSCOPY; COLONOSCOPY; Surgeon:EMMANUELLE MOSER; Location: GI     CORONARY ANGIOGRAPHY ADULT ORDER  1991,2012 1991 - stent to proximal LAD, 2012 - Stent to mid LAD     CV HEART CATHETERIZATION WITH POSSIBLE INTERVENTION Left 4/23/2019    Procedure: Coronary Angiogram;  Surgeon: Percy Armas MD;  Location:  HEART CARDIAC CATH LAB     CV HEART CATHETERIZATION WITH POSSIBLE INTERVENTION N/A 12/10/2019    Procedure: Heart Catheterization with Possible Intervention;  Surgeon: Dajuan Duvall MD;  Location:  HEART CARDIAC CATH  LAB     CV PCI ANGIOPLASTY N/A 2019    Procedure: PCI Angioplasty;  Surgeon: Percy Armas MD;  Location:  HEART CARDIAC CATH LAB     ENDARTERECTOMY CAROTID Right 11/10/2016    Procedure: ENDARTERECTOMY CAROTID;  Surgeon: Paco Suresh MD;  Location:  OR     HEART CATH, ANGIOPLASTY       ORTHOPEDIC SURGERY       PHACOEMULSIFICATION CLEAR CORNEA WITH STANDARD INTRAOCULAR LENS IMPLANT  2013    Procedure: PHACOEMULSIFICATION CLEAR CORNEA WITH STANDARD INTRAOCULAR LENS IMPLANT;  RIGHT PHACOEMULSIFICATION CLEAR CORNEA WITH STANDARD INTRAOCULAR LENS IMPLANT ;  Surgeon: Luisito Juan MD;  Location: [facility]       FAMILY HISTORY:  Family History   Problem Relation Age of Onset     Heart Disease Mother 83     Heart Disease Father 69        emphysema     Coronary Artery Disease Brother      Coronary Artery Disease Sister        SOCIAL HISTORY:  Social History     Socioeconomic History     Marital status:      Spouse name: None     Number of children: None     Years of education: None     Highest education level: None   Occupational History     None   Tobacco Use     Smoking status: Former Smoker     Packs/day: 0.50     Years: 20.00     Pack years: 10.00     Types: Cigarettes     Quit date: 1990     Years since quittin.7     Smokeless tobacco: Never Used   Substance and Sexual Activity     Alcohol use: Yes     Alcohol/week: 0.0 standard drinks     Comment: daily - drinks x2     Drug use: No     Sexual activity: Never   Other Topics Concern     Parent/sibling w/ CABG, MI or angioplasty before 65F 55M? Not Asked      Service Not Asked     Blood Transfusions Not Asked     Caffeine Concern No     Comment: 1-2 cups daily     Occupational Exposure Not Asked     Hobby Hazards Not Asked     Sleep Concern No     Stress Concern No     Weight Concern Not Asked     Special Diet No     Comment: trying to watch sodium intake     Back Care Not Asked     Exercise No     Comment:  "some walking     Bike Helmet Not Asked     Seat Belt Not Asked     Self-Exams Not Asked   Social History Narrative     None     Social Determinants of Health     Financial Resource Strain:      Difficulty of Paying Living Expenses:    Food Insecurity:      Worried About Running Out of Food in the Last Year:      Ran Out of Food in the Last Year:    Transportation Needs:      Lack of Transportation (Medical):      Lack of Transportation (Non-Medical):    Physical Activity:      Days of Exercise per Week:      Minutes of Exercise per Session:    Stress:      Feeling of Stress :    Social Connections:      Frequency of Communication with Friends and Family:      Frequency of Social Gatherings with Friends and Family:      Attends Yarsani Services:      Active Member of Clubs or Organizations:      Attends Club or Organization Meetings:      Marital Status:    Intimate Partner Violence:      Fear of Current or Ex-Partner:      Emotionally Abused:      Physically Abused:      Sexually Abused:        Review of Systems:  Skin:  Positive for bruising     Eyes:  Positive for glasses    ENT:  Positive for hearing loss    Respiratory:  Positive for dyspnea on exertion     Cardiovascular:    Positive for;chest pain;edema;syncope or near-syncope fell and thought he fainted  Gastroenterology: Positive for heartburn    Genitourinary:  Negative      Musculoskeletal:  Positive for joint pain;back pain    Neurologic:  Negative      Psychiatric:  Negative      Heme/Lymph/Imm:  Positive for allergies    Endocrine:  Negative        Physical Exam:  Vitals: BP (!) 163/83   Pulse 60   Ht 1.778 m (5' 10\")   Wt 100.7 kg (222 lb)   BMI 31.85 kg/m      Constitutional:  cooperative, alert and oriented, well developed, well nourished, in no acute distress appears younger than stated age      Skin:  warm and dry to the touch, no apparent skin lesions or masses noted     right CEA scar    Head:  normocephalic, no masses or lesions    "     Eyes:  pupils equal and round;conjunctivae and lids unremarkable;sclera white;no xanthalasma        Lymph:      ENT:  no pallor or cyanosis        Neck:  carotid pulses are full and equal bilaterally;no carotid bruit   prominent carotid pulsations at base of neck bilaterally. Well-healed right carotid endarterectomy scar    Respiratory:  normal breath sounds, clear to auscultation, normal A-P diameter, normal symmetry, normal respiratory excursion, no use of accessory muscles         Cardiac: regular rhythm;normal S1 and S2       systolic murmur;grade 1;RUSB        pulses full and equal                                        GI:  abdomen soft;BS normoactive obese      Extremities and Muscular Skeletal:  no spinal abnormalities noted;normal muscle strength and tone   bilateral LE edema;R greater than L;trace 1+;RLE edema;pitting        Neurological:  no gross motor deficits        Psych:  affect appropriate, oriented to time, person and place        CC  No referring provider defined for this encounter.

## 2021-08-27 NOTE — LETTER
8/27/2021    Stephan Nice MD  [facility] [address]  [facility] [address]    RE: Iván Nicholas       Dear Colleague,    I had the pleasure of seeing Iván Nicholas in the [facility].    HPI and Plan:   See dictation    Orders Placed This Encounter   Procedures     EKG 12-lead complete w/read - Clinics (future- to be scheduled)       Orders Placed This Encounter   Medications     losartan (COZAAR) 25 MG tablet     Sig: Take 4 tablets (100 mg) by mouth daily     Dispense:  90 tablet     Refill:  3       Medications Discontinued During This Encounter   Medication Reason     aspirin (ASA) 81 MG EC tablet Stopped by Patient     losartan (COZAAR) 25 MG tablet          Encounter Diagnoses   Name Primary?     Paroxysmal ventricular tachycardia (H) Yes     Coronary artery disease of native artery of native heart with stable angina pectoris (H)      Other cardiomyopathy (H)        CURRENT MEDICATIONS:  Current Outpatient Medications   Medication Sig Dispense Refill     acetaminophen (TYLENOL) 325 MG tablet Take 2 tablets (650 mg) by mouth every 4 hours as needed for mild pain or headaches       ALLOPURINOL PO Take 100 mg by mouth daily. To prevent gout attacks, recently started.        ezetimibe (ZETIA) 10 MG tablet Take 1 tablet (10 mg) by mouth daily (Patient taking differently: Take 10 mg by mouth At Bedtime ) 90 tablet 3     hypromellose-dextran (ARTIFICAL TEARS) 0.1-0.3 % ophthalmic solution Place 1 drop into both eyes 2 times daily as needed for dry eyes       isosorbide mononitrate (IMDUR) 60 MG 24 hr tablet Take 1 tablet (60 mg) by mouth daily Appointment due In April 90 tablet 1     losartan (COZAAR) 25 MG tablet Take 4 tablets (100 mg) by mouth daily 90 tablet 3     metoprolol succinate ER (TOPROL-XL) 50 MG 24 hr tablet Take 1 tablet (50 mg) by mouth daily 90 tablet 3     Multiple Vitamins-Minerals (CENTRUM SILVER) per  tablet Take 1 tablet by mouth daily.       Multiple Vitamins-Minerals (PRESERVISION AREDS 2) CAPS Take by mouth 2 times daily       nitroGLYcerin (NITROSTAT) 0.4 MG sublingual tablet Place 1 tablet (0.4 mg) under the tongue every 5 minutes as needed for chest pain 25 tablet 11     oxymetazoline (AFRIN) 0.05 % nasal spray Spray 2 sprays into both nostrils 2 times daily as needed for other (nose bleed)       pantoprazole (PROTONIX) 40 MG EC tablet Take 40 mg by mouth daily       primidone (MYSOLINE) 250 MG tablet Take 250 mg by mouth 3 times daily        rosuvastatin (CRESTOR) 40 MG tablet Take 1 tablet by mouth daily. 30 tablet 1       ALLERGIES     Allergies   Allergen Reactions     Lisinopril Cough     Statins [Hmg-Coa-R Inhibitors] Muscle Pain (Myalgia)     lipitor       PAST MEDICAL HISTORY:  Past Medical History:   Diagnosis Date     Carotid artery stenosis     Right, s/p right CEA 11/9/2016     Cerebral artery occlusion with cerebral infarction (H) 2016    TIA -  Endarterectomy...No residual     Colon polyps      Color blind      Coronary artery disease     2/2012 - MERARI to mid LAD     Decreased hearing      Depression      Gout      Hypertension      Hypertrophy of prostate without urinary obstruction and other lower urinary tract symptoms (LUTS)      Left bundle branch block      Onychomycosis of toenail      Paroxysmal ventricular tachycardia (H)     with stress test 2012       PAST SURGICAL HISTORY:  Past Surgical History:   Procedure Laterality Date     ANGIOPLASTY  1991     ARTHROPLASTY KNEE Left 11/12/2018    Procedure: Left total knee arthroplasty;  Surgeon: Matt Schaefer MD;  Location:  OR     COLONOSCOPY  12/13/2011    Procedure:COLONOSCOPY; COLONOSCOPY; Surgeon:EMMANUELLE MOSER; Location: GI     CORONARY ANGIOGRAPHY ADULT ORDER  1991,2012 1991 - stent to proximal LAD, 2012 - Stent to mid LAD     CV HEART CATHETERIZATION WITH POSSIBLE INTERVENTION Left 4/23/2019    Procedure: Coronary  Angiogram;  Surgeon: Percy Armas MD;  Location:  HEART CARDIAC CATH LAB     CV HEART CATHETERIZATION WITH POSSIBLE INTERVENTION N/A 12/10/2019    Procedure: Heart Catheterization with Possible Intervention;  Surgeon: Dajuan Duvall MD;  Location:  HEART CARDIAC CATH LAB     CV PCI ANGIOPLASTY N/A 2019    Procedure: PCI Angioplasty;  Surgeon: Percy Armas MD;  Location:  HEART CARDIAC CATH LAB     ENDARTERECTOMY CAROTID Right 11/10/2016    Procedure: ENDARTERECTOMY CAROTID;  Surgeon: Paco Suresh MD;  Location:  OR     HEART CATH, ANGIOPLASTY       ORTHOPEDIC SURGERY       PHACOEMULSIFICATION CLEAR CORNEA WITH STANDARD INTRAOCULAR LENS IMPLANT  2013    Procedure: PHACOEMULSIFICATION CLEAR CORNEA WITH STANDARD INTRAOCULAR LENS IMPLANT;  RIGHT PHACOEMULSIFICATION CLEAR CORNEA WITH STANDARD INTRAOCULAR LENS IMPLANT ;  Surgeon: Luisito Juan MD;  Location: [facility]       FAMILY HISTORY:  Family History   Problem Relation Age of Onset     Heart Disease Mother 83     Heart Disease Father 69        emphysema     Coronary Artery Disease Brother      Coronary Artery Disease Sister        SOCIAL HISTORY:  Social History     Socioeconomic History     Marital status:      Spouse name: None     Number of children: None     Years of education: None     Highest education level: None   Occupational History     None   Tobacco Use     Smoking status: Former Smoker     Packs/day: 0.50     Years: 20.00     Pack years: 10.00     Types: Cigarettes     Quit date: 1990     Years since quittin.7     Smokeless tobacco: Never Used   Substance and Sexual Activity     Alcohol use: Yes     Alcohol/week: 0.0 standard drinks     Comment: daily - drinks x2     Drug use: No     Sexual activity: Never   Other Topics Concern     Parent/sibling w/ CABG, MI or angioplasty before 65F 55M? Not Asked      Service Not Asked     Blood Transfusions Not Asked     Caffeine Concern  "No     Comment: 1-2 cups daily     Occupational Exposure Not Asked     Hobby Hazards Not Asked     Sleep Concern No     Stress Concern No     Weight Concern Not Asked     Special Diet No     Comment: trying to watch sodium intake     Back Care Not Asked     Exercise No     Comment: some walking     Bike Helmet Not Asked     Seat Belt Not Asked     Self-Exams Not Asked   Social History Narrative     None     Social Determinants of Health     Financial Resource Strain:      Difficulty of Paying Living Expenses:    Food Insecurity:      Worried About Running Out of Food in the Last Year:      Ran Out of Food in the Last Year:    Transportation Needs:      Lack of Transportation (Medical):      Lack of Transportation (Non-Medical):    Physical Activity:      Days of Exercise per Week:      Minutes of Exercise per Session:    Stress:      Feeling of Stress :    Social Connections:      Frequency of Communication with Friends and Family:      Frequency of Social Gatherings with Friends and Family:      Attends Jain Services:      Active Member of Clubs or Organizations:      Attends Club or Organization Meetings:      Marital Status:    Intimate Partner Violence:      Fear of Current or Ex-Partner:      Emotionally Abused:      Physically Abused:      Sexually Abused:        Review of Systems:  Skin:  Positive for bruising     Eyes:  Positive for glasses    ENT:  Positive for hearing loss    Respiratory:  Positive for dyspnea on exertion     Cardiovascular:    Positive for;chest pain;edema;syncope or near-syncope fell and thought he fainted  Gastroenterology: Positive for heartburn    Genitourinary:  Negative      Musculoskeletal:  Positive for joint pain;back pain    Neurologic:  Negative      Psychiatric:  Negative      Heme/Lymph/Imm:  Positive for allergies    Endocrine:  Negative        Physical Exam:  Vitals: BP (!) 163/83   Pulse 60   Ht 1.778 m (5' 10\")   Wt 100.7 kg (222 lb)   BMI 31.85 kg/m  "     Constitutional:  cooperative, alert and oriented, well developed, well nourished, in no acute distress appears younger than stated age      Skin:  warm and dry to the touch, no apparent skin lesions or masses noted     right CEA scar    Head:  normocephalic, no masses or lesions        Eyes:  pupils equal and round;conjunctivae and lids unremarkable;sclera white;no xanthalasma        Lymph:      ENT:  no pallor or cyanosis        Neck:  carotid pulses are full and equal bilaterally;no carotid bruit   prominent carotid pulsations at base of neck bilaterally. Well-healed right carotid endarterectomy scar    Respiratory:  normal breath sounds, clear to auscultation, normal A-P diameter, normal symmetry, normal respiratory excursion, no use of accessory muscles         Cardiac: regular rhythm;normal S1 and S2       systolic murmur;grade 1;RUSB        pulses full and equal                                        GI:  abdomen soft;BS normoactive obese      Extremities and Muscular Skeletal:  no spinal abnormalities noted;normal muscle strength and tone   bilateral LE edema;R greater than L;trace 1+;RLE edema;pitting        Neurological:  no gross motor deficits        Psych:  affect appropriate, oriented to time, person and place        CC  No referring provider defined for this encounter.                  Thank you for allowing me to participate in the care of your patient.      Sincerely,     Jessica Molina MD     [facility]  cc:   No referring provider defined for this encounter.

## 2021-08-31 ENCOUNTER — TELEPHONE (OUTPATIENT)
Dept: CARDIOLOGY | Facility: CLINIC | Age: 81
End: 2021-08-31

## 2021-08-31 NOTE — TELEPHONE ENCOUNTER
Call from patient's spouse, she was not at the visit with Dr. Molina and wanted to review the plans for EP study and possible device choice. Read through Dr. Molina's dictation with the spouse. She expressed a better understanding of the plan. She will discuss again with the patient but she thinks he is ready to have the study next week.

## 2021-09-01 DIAGNOSIS — I47.29 PAROXYSMAL VENTRICULAR TACHYCARDIA (H): Primary | ICD-10-CM

## 2021-09-01 RX ORDER — LIDOCAINE 40 MG/G
CREAM TOPICAL
Status: CANCELLED | OUTPATIENT
Start: 2021-09-01

## 2021-09-01 RX ORDER — SODIUM CHLORIDE 450 MG/100ML
INJECTION, SOLUTION INTRAVENOUS CONTINUOUS
Status: CANCELLED | OUTPATIENT
Start: 2021-09-01

## 2021-09-01 RX ORDER — CEFAZOLIN SODIUM 2 G/100ML
2 INJECTION, SOLUTION INTRAVENOUS
Status: CANCELLED | OUTPATIENT
Start: 2021-09-01

## 2021-09-01 NOTE — PROGRESS NOTES
Called patient with pre-procedure instructions for EP studies with possible ICD vs. Possible PPM vs. Possible loop recorder placement on 9/7/21 at 1300.  Instructions as follows:      Anticoagulation: N/A   Oral diabetes meds: N/A  Insulin: N/A  Diuretic: N/A  Contrast allergy: None    Pt informed to be NPO at midnight.  Pt may have clear liquid breakfast before 8am.    Instructed pt to shower the morning of the procedure, and then put on a clean shirt in order to help prevent infection.     Pt has post-procedure transportation and 24 hours monitoring set up.   Pt aware of no driving for 24 hours post procedure due to sedation.     COVID test scheduled: 9/4/21    Pt reminded to self-quarantine from the time of the COVID test to the procedure.    Pt aware of arrival time of 1100 and location. Pt verbalized understanding of instructions. He will call the clinic with any questions, device clinic phone number provided.      KAMALA Guan     9/2/21: Received call from patient clarifying if he had to self-quarantine away from his wife after his COVID test.  Explained that patient can self-quarantine with anyone that he is normally around.  Pt verbalized understanding and agreement with plan.  KAMALA Guan

## 2021-09-04 ENCOUNTER — LAB (OUTPATIENT)
Dept: URGENT CARE | Facility: URGENT CARE | Age: 81
End: 2021-09-04
Payer: COMMERCIAL

## 2021-09-04 ENCOUNTER — HEALTH MAINTENANCE LETTER (OUTPATIENT)
Age: 81
End: 2021-09-04

## 2021-09-04 DIAGNOSIS — Z11.59 ENCOUNTER FOR SCREENING FOR OTHER VIRAL DISEASES: ICD-10-CM

## 2021-09-04 PROCEDURE — U0003 INFECTIOUS AGENT DETECTION BY NUCLEIC ACID (DNA OR RNA); SEVERE ACUTE RESPIRATORY SYNDROME CORONAVIRUS 2 (SARS-COV-2) (CORONAVIRUS DISEASE [COVID-19]), AMPLIFIED PROBE TECHNIQUE, MAKING USE OF HIGH THROUGHPUT TECHNOLOGIES AS DESCRIBED BY CMS-2020-01-R: HCPCS

## 2021-09-04 PROCEDURE — U0005 INFEC AGEN DETEC AMPLI PROBE: HCPCS

## 2021-09-05 LAB — SARS-COV-2 RNA RESP QL NAA+PROBE: NEGATIVE

## 2021-09-07 ENCOUNTER — HOSPITAL ENCOUNTER (OUTPATIENT)
Facility: CLINIC | Age: 81
Discharge: HOME | End: 2021-09-07
Admitting: INTERNAL MEDICINE
Payer: COMMERCIAL

## 2021-09-07 VITALS
DIASTOLIC BLOOD PRESSURE: 90 MMHG | RESPIRATION RATE: 18 BRPM | BODY MASS INDEX: 31.22 KG/M2 | TEMPERATURE: 97.9 F | HEIGHT: 70 IN | HEART RATE: 55 BPM | SYSTOLIC BLOOD PRESSURE: 179 MMHG | WEIGHT: 218.1 LBS | OXYGEN SATURATION: 95 %

## 2021-09-07 DIAGNOSIS — I47.29 PAROXYSMAL VENTRICULAR TACHYCARDIA (H): ICD-10-CM

## 2021-09-07 LAB
ANION GAP SERPL CALCULATED.3IONS-SCNC: 4 MMOL/L (ref 3–14)
BUN SERPL-MCNC: 23 MG/DL (ref 7–30)
CALCIUM SERPL-MCNC: 8.3 MG/DL (ref 8.5–10.1)
CHLORIDE BLD-SCNC: 108 MMOL/L (ref 94–109)
CO2 SERPL-SCNC: 27 MMOL/L (ref 20–32)
CREAT SERPL-MCNC: 1.27 MG/DL (ref 0.66–1.25)
ERYTHROCYTE [DISTWIDTH] IN BLOOD BY AUTOMATED COUNT: 13.7 % (ref 10–15)
GFR SERPL CREATININE-BSD FRML MDRD: 53 ML/MIN/1.73M2
GLUCOSE BLD-MCNC: 98 MG/DL (ref 70–99)
HCT VFR BLD AUTO: 41.9 % (ref 40–53)
HGB BLD-MCNC: 14 G/DL (ref 13.3–17.7)
MCH RBC QN AUTO: 34.8 PG (ref 26.5–33)
MCHC RBC AUTO-ENTMCNC: 33.4 G/DL (ref 31.5–36.5)
MCV RBC AUTO: 104 FL (ref 78–100)
PLATELET # BLD AUTO: 148 10E3/UL (ref 150–450)
POTASSIUM BLD-SCNC: 3.9 MMOL/L (ref 3.4–5.3)
RBC # BLD AUTO: 4.02 10E6/UL (ref 4.4–5.9)
SODIUM SERPL-SCNC: 139 MMOL/L (ref 133–144)
WBC # BLD AUTO: 6 10E3/UL (ref 4–11)

## 2021-09-07 PROCEDURE — 93620 COMP EP EVL R AT VEN PAC&REC: CPT | Performed by: INTERNAL MEDICINE

## 2021-09-07 PROCEDURE — 85027 COMPLETE CBC AUTOMATED: CPT | Performed by: INTERNAL MEDICINE

## 2021-09-07 PROCEDURE — 36415 COLL VENOUS BLD VENIPUNCTURE: CPT | Performed by: INTERNAL MEDICINE

## 2021-09-07 PROCEDURE — 250N000011 HC RX IP 250 OP 636: Performed by: INTERNAL MEDICINE

## 2021-09-07 PROCEDURE — 93620 COMP EP EVL R AT VEN PAC&REC: CPT | Mod: 26 | Performed by: INTERNAL MEDICINE

## 2021-09-07 PROCEDURE — 99152 MOD SED SAME PHYS/QHP 5/>YRS: CPT | Performed by: INTERNAL MEDICINE

## 2021-09-07 PROCEDURE — 33285 INSJ SUBQ CAR RHYTHM MNTR: CPT | Performed by: INTERNAL MEDICINE

## 2021-09-07 PROCEDURE — 258N000002 HC RX IP 258 OP 250: Performed by: INTERNAL MEDICINE

## 2021-09-07 PROCEDURE — 999N000054 HC STATISTIC EKG NON-CHARGEABLE

## 2021-09-07 PROCEDURE — C1730 CATH, EP, 19 OR FEW ELECT: HCPCS | Performed by: INTERNAL MEDICINE

## 2021-09-07 PROCEDURE — 250N000009 HC RX 250: Performed by: INTERNAL MEDICINE

## 2021-09-07 PROCEDURE — 93005 ELECTROCARDIOGRAM TRACING: CPT

## 2021-09-07 PROCEDURE — C1764 EVENT RECORDER, CARDIAC: HCPCS | Performed by: INTERNAL MEDICINE

## 2021-09-07 PROCEDURE — 99153 MOD SED SAME PHYS/QHP EA: CPT | Performed by: INTERNAL MEDICINE

## 2021-09-07 PROCEDURE — 272N000001 HC OR GENERAL SUPPLY STERILE: Performed by: INTERNAL MEDICINE

## 2021-09-07 PROCEDURE — 80048 BASIC METABOLIC PNL TOTAL CA: CPT | Performed by: INTERNAL MEDICINE

## 2021-09-07 PROCEDURE — C1760 CLOSURE DEV, VASC: HCPCS | Performed by: INTERNAL MEDICINE

## 2021-09-07 PROCEDURE — 250N000013 HC RX MED GY IP 250 OP 250 PS 637: Performed by: INTERNAL MEDICINE

## 2021-09-07 DEVICE — MONITOR CARDIAC LUX DX INSERTABLE M301: Type: IMPLANTABLE DEVICE | Status: FUNCTIONAL

## 2021-09-07 RX ORDER — SODIUM CHLORIDE 450 MG/100ML
INJECTION, SOLUTION INTRAVENOUS CONTINUOUS
Status: DISCONTINUED | OUTPATIENT
Start: 2021-09-07 | End: 2021-09-07 | Stop reason: HOSPADM

## 2021-09-07 RX ORDER — NALOXONE HYDROCHLORIDE 0.4 MG/ML
0.4 INJECTION, SOLUTION INTRAMUSCULAR; INTRAVENOUS; SUBCUTANEOUS
Status: DISCONTINUED | OUTPATIENT
Start: 2021-09-07 | End: 2021-09-07 | Stop reason: HOSPADM

## 2021-09-07 RX ORDER — NALOXONE HYDROCHLORIDE 0.4 MG/ML
0.2 INJECTION, SOLUTION INTRAMUSCULAR; INTRAVENOUS; SUBCUTANEOUS
Status: DISCONTINUED | OUTPATIENT
Start: 2021-09-07 | End: 2021-09-07 | Stop reason: HOSPADM

## 2021-09-07 RX ORDER — LIDOCAINE 40 MG/G
CREAM TOPICAL
Status: DISCONTINUED | OUTPATIENT
Start: 2021-09-07 | End: 2021-09-07 | Stop reason: HOSPADM

## 2021-09-07 RX ORDER — CEFAZOLIN SODIUM 2 G/100ML
2 INJECTION, SOLUTION INTRAVENOUS
Status: DISCONTINUED | OUTPATIENT
Start: 2021-09-07 | End: 2021-09-07 | Stop reason: HOSPADM

## 2021-09-07 RX ORDER — FENTANYL CITRATE 50 UG/ML
INJECTION, SOLUTION INTRAMUSCULAR; INTRAVENOUS
Status: DISCONTINUED | OUTPATIENT
Start: 2021-09-07 | End: 2021-09-07 | Stop reason: HOSPADM

## 2021-09-07 RX ORDER — EZETIMIBE 10 MG/1
10 TABLET ORAL EVERY MORNING
COMMUNITY

## 2021-09-07 RX ADMIN — SODIUM CHLORIDE: 4.5 INJECTION, SOLUTION INTRAVENOUS at 11:30

## 2021-09-07 RX ADMIN — LOSARTAN POTASSIUM 75 MG: 50 TABLET, FILM COATED ORAL at 17:19

## 2021-09-07 ASSESSMENT — MIFFLIN-ST. JEOR: SCORE: 1700.55

## 2021-09-07 NOTE — DISCHARGE INSTRUCTIONS
EP Study & Loop Recorder Implant Discharge Instructions    After you go home:      Have an adult stay with you until tomorrow.    You may resume your normal diet.       For 24 hours - due to the sedation you received:    Relax and take it easy.    Do NOT make any important or legal decisions.    Do NOT drive or operate machines at home or at work.    Do NOT drink alcohol.    Care of Chest Incision:      Keep the bandage on for 3 days. You may remove the dressing on Friday. Change it only if it gets loose or soaked. If you need to change it, use 4x4-inch gauze and a large clear bandage.     Leave the strips of tape on. They will fall off on their own, or we will remove them at your first check-up.    Check your wound daily for signs of infection, such as increased redness, severe swelling or draining. Fever may also be a sign of infection. Call us if you see any of these signs.    If there are no signs of infection, you may shower after the bandage comes off in 3 days. If you take a tub bath, keep the wound dry.    No soaking the incision (swimming pool, bathtub, hot tub) for 2 weeks.    You may have mild to medium pain for 3 to 5 days. Take Acetaminophen (Tylenol) or Ibuprofen (Advil) for the pain. If the pain persists or is severe, call us.    Care of Groin Puncture Site:      For the first 24 hrs - check the puncture site every 1-2 hours while awake.    For the first 2 days, when you cough, sneeze, laugh or move your bowels, hold your hand over the puncture site and press firmly.    Remove the bandaid after 24 hours. If there is minor oozing, apply another bandaid and remove it after 12 hours.    It is normal to have a small bruise or pea size lump at the site.    Do NOT take a bath, or use a hot tub or pool for at least 3 days. Do NOT scrub the site. Do not use lotion or powder near the puncture site.    Activity    Chest:    Avoid strenuous activity until incision well healed.    Groin:    For 2 days:    No  stooping or squatting    Do NOT do any heavy activity such as exercise, lifting, or straining.     No housework, yard work or any activity that make you sweat    Do NOT lift more than 10 pounds    Bleeding:    Chest:    If you start bleeding from the incision site, sit down and press firmly on the site for 10 minutes.     Once bleeding stops, call [facility] as soon as you can.    Groin:     If you start bleeding from the site in your groin, lie down flat and press firmly on the site for 10 minutes.     Once bleeding stops, lay flat for 2 hours.     Call [facility] as soon as you can.       Call 911 right away if you have heavy bleeding or bleeding that does not stop.      Medicines:      Take your medications, including blood thinners, unless your provider tells you not to.    If you have stopped any medicines, check with your provider about when to restart them.    If you have pain or shortness of breath, you may take Advil (ibuprofen) or Tylenol (acetaminophen).    Follow Up Appointments:      Follow up with Device Clinic at [facility] of patient preference in 7-10 days.    Call the clinic if:      You have increased pain or a large or growing hard lump around the site.    The site is red, swollen, hot or tender.    Blood or fluid is draining from the site.    You have chills or a fever greater than 101 F (38 C).    Your leg feels numb, cool or changes color.    Increased pain in the chest and/or groin.    New pain in the back or belly that you cannot control with Tylenol.    Shortness of breath or chest pain that is not relieved by Advil or Tylenol.    Recurrent irregular or fast heart rate lasting over 2 hours.    Any questions or concerns.    Heart rhythms:    You may have some premature heartbeats. These feel very strong. They may make you feel that the fast heart rhythm (tachycardia) is going to start again.  Give it time. The premature beats should occur less often.    Telling others about  your device:      Before you leave the hospital, you will receive a temporary ID card. A permanent card will be mailed to you about 6 to 8 weeks later. Always carry the ID card with you. It has important details about your device.    You may also get a Medical Alert bracelet or tag that says you have a pacemaker or a defibrillator (ICD). Go to [email or web address].     Always tell doctors, dentists and other care providers that you have a device implanted in you.    Let us know before you plan any surgeries. Your care team must take special steps to keep you safe during certain procedures. These steps will depend on the type of device you have. Your provider will need to see your ID card. They may need to call us for instructions.    Device Safety:      Please refer to device  s booklet for further information.      [facility] [place]:    [phone] [facility] (7 days a week)

## 2021-09-07 NOTE — PROGRESS NOTES
Care Suites Admission Nursing Note    Reason for admission: EP Study with Possible ICD vs PPM vs Loop  CS arrival time: 1105    Accompanied by: wife - Linda  Name/phone of DC : Linda ... [phone]    Medications held: N/A  Education/questions answered: Procedure explained. All questions & concerns addressed.    Plan: Home with wife post procedure    A/O. IV flds infusing. Procedure explained. All questions & concerns addressed.   Pt resting in bed, denies additional needs at this time, call light within reach. Wife at bedside.     1155 Dr Peralta at bedside to speak with pt & wife. Consent signed at this time.  1203 Report given to Janie Celeste RN.

## 2021-09-07 NOTE — PROGRESS NOTES
No inducible sustained arrhythmias. ILR placed. Accessed perclosed. Bedrest for 1 hour then home around 7 pm.

## 2021-09-07 NOTE — PROGRESS NOTES
1450 Report received from Janie Celeste RN. Pt waiting to go to procedure.  1615 Pt returned from EP Lab. A/O. Gauze drsg CDI to right groin puncture sites. No oozing or hematoma noted. Area soft & flat. Gauze drsg CDI to mid chest. Area soft & flat. Pt denies pain. Pt instructed on activity restrictions while on bedrest. Verbal understanding received from pt. Pt's wife at bedside. Detailed update given.   1645 Dr Peralta called regarding elevated BP. Detailed update given. Per pt/wife - Dr Molina had increased pt's Cozaar from 25mg to 100mg daily at the end of August. Pt has only been taking 25 mg because he didn't think that he needed the increased dose. Order obtained to give pt 75 mg Cozaar asap to total 100mg for today.  1700 Device rep at bedside to speak with pt & wife regarding Loop Recorder.  1730 Pt taking diet & flds well. No complaints.  1815 Discharge teaching & instructions given to both pt & wife w/ verbal understanding received. All questions & concerns addressed. Discussed with pt & wife the importance of obtaining a BP monitor and taking pt's BP twice a day & keeping a log of the results.  Also pt/wife instructed that pt should start taking the 100mg of Cozaar tomorrow and to take the BP log to his return appt with the device clinic. Verbal understanding received from both.  1830 OOB - steady on feet. Ambulated in halls to bathroom with good nimco. No change in puncture site assessment with activity.  1905 Pt discharged per w/c to private vehicle. All personal belongings taken w/ pt.

## 2021-09-07 NOTE — PROGRESS NOTES
PATIENT/VISITOR WELLNESS SCREENING    Step 1 Patient Screening    1. In the last month, have you been in contact with someone who was confirmed or suspected to have Coronavirus/COVID-19? Yes: friend tested positive 2 weeks ago - pt tested negative @ that time & also negative pre procedure    2. Do you have the following symptoms?  Fever/Chills? No   Cough? No   Shortness of breath? No   New loss of taste or smell? No  Sore throat? No  Muscle or body aches? No  Headaches? No  Fatigue? No  Vomiting or diarrhea? No    Step 2 Visitor Screening    1. Name of Visitor (1 visitor per patient): Linda - wife    2. In the last month, have you been in contact with someone who was confirmed or suspected to have Coronavirus/COVID-19? No    3. Do you have the following symptoms?  Fever/Chills? No   Cough? No   Shortness of breath? No   Skin rash? No   Loss of taste or smell? No  Sore throat? No  Runny or stuffy nose? No  Muscle or body aches? No  Headaches? No  Fatigue? No  Vomiting or diarrhea? No

## 2021-09-07 NOTE — Clinical Note
Potential access sites were evaluated for patency using ultrasound.   The right femoral vein was selected x2. Access was obtained under with Sonosite guidance using a standard 18 guage needle with direct visualization of needle entry.

## 2021-09-07 NOTE — PROGRESS NOTES
Pt updated of 2 hr delay in EP lab. Procedure will be at least 1500. Pt up to the bathroom.Denies c/o

## 2021-09-08 ENCOUNTER — TELEPHONE (OUTPATIENT)
Dept: CARDIOLOGY | Facility: CLINIC | Age: 81
End: 2021-09-08

## 2021-09-08 DIAGNOSIS — R55 SYNCOPE: Primary | ICD-10-CM

## 2021-09-08 NOTE — TELEPHONE ENCOUNTER
Post [place] [employer] Loop recorder implant discharge phone call.    Reviewed the following:  - Remove outer dressing 3 days after implant. May shower after outer dressing removed.    - Remove bandage over groin site today and place fresh band-aide as needed for next 3 days.    - Leave steri-strips in place over loop recorder, will be removed at 1 week device check  - Hold pressure over groin site for next 2-3 days with coughing/laughing/sneezing/bowel movements  - If groin site begins bleeding, lay down and apply pressure for 15 minutes.  If bleeding will not stop present to ER.  - Limit driving for: 1 day  - Watch for redness, drainage, warmth, or fever. Call device clinic if any signs of infection.     1 week device check scheduled: 9/16/21 at 1000 in [place]    Pt states understanding of all instructions and will call with any questions.      KAMALA Guan

## 2021-09-13 LAB
ATRIAL RATE - MUSE: 55 BPM
DIASTOLIC BLOOD PRESSURE - MUSE: NORMAL MMHG
INTERPRETATION ECG - MUSE: NORMAL
P AXIS - MUSE: 2 DEGREES
PR INTERVAL - MUSE: 184 MS
QRS DURATION - MUSE: 96 MS
QT - MUSE: 472 MS
QTC - MUSE: 451 MS
R AXIS - MUSE: 16 DEGREES
SYSTOLIC BLOOD PRESSURE - MUSE: NORMAL MMHG
T AXIS - MUSE: 41 DEGREES
VENTRICULAR RATE- MUSE: 55 BPM

## 2021-09-16 ENCOUNTER — ANCILLARY PROCEDURE (OUTPATIENT)
Dept: CARDIOLOGY | Facility: CLINIC | Age: 81
End: 2021-09-16
Attending: INTERNAL MEDICINE
Payer: COMMERCIAL

## 2021-09-16 ENCOUNTER — TELEPHONE (OUTPATIENT)
Dept: CARDIOLOGY | Facility: CLINIC | Age: 81
End: 2021-09-16

## 2021-09-16 DIAGNOSIS — R55 SYNCOPE: ICD-10-CM

## 2021-09-16 PROCEDURE — 93291 INTERROG DEV EVAL SCRMS IP: CPT | Performed by: INTERNAL MEDICINE

## 2021-09-16 NOTE — TELEPHONE ENCOUNTER
Patient was seen in clinic today 1 week post loop recorder implant. Patient had questions about his blood pressure.   He states that he has had episodes of high BP prior to taking his medications (219 systolic) and times when his BP is as low as 108 systolic. He does not have symptoms with high/low BPs.     Patient brought his home cuff in which we compared with the clinic cuff today and appeared to be accurate. We reviewed how to take his BP medications and how to keep a log of his blood pressures at home. We also reviewed how to check his BP at home to get the most accurate reading.     Patient will keep a log of his BPs and let us know in a couple weeks how his BP has been trending. Asked that he call sooner with any questions or concerns.

## 2021-10-07 LAB
MDC_IDC_EPISODE_DTM: NORMAL
MDC_IDC_EPISODE_DTM: NORMAL
MDC_IDC_EPISODE_ID: NORMAL
MDC_IDC_EPISODE_ID: NORMAL
MDC_IDC_EPISODE_TYPE: NORMAL
MDC_IDC_EPISODE_TYPE: NORMAL
MDC_IDC_MSMT_BATTERY_DTM: NORMAL
MDC_IDC_MSMT_BATTERY_STATUS: NORMAL
MDC_IDC_PG_IMPLANT_DTM: NORMAL
MDC_IDC_PG_MFG: NORMAL
MDC_IDC_PG_MODEL: NORMAL
MDC_IDC_PG_SERIAL: NORMAL
MDC_IDC_PG_TYPE: NORMAL
MDC_IDC_SESS_CLINIC_NAME: NORMAL
MDC_IDC_SESS_DTM: NORMAL
MDC_IDC_SESS_TYPE: NORMAL
MDC_IDC_STAT_AT_BURDEN_PERCENT: 0 %
MDC_IDC_STAT_AT_DTM_END: NORMAL
MDC_IDC_STAT_AT_DTM_START: NORMAL

## 2021-12-02 ENCOUNTER — PRE VISIT (OUTPATIENT)
Dept: CARDIOLOGY | Facility: CLINIC | Age: 81
End: 2021-12-02
Payer: COMMERCIAL

## 2021-12-07 ENCOUNTER — APPOINTMENT (OUTPATIENT)
Dept: GENERAL RADIOLOGY | Facility: CLINIC | Age: 81
End: 2021-12-07
Attending: EMERGENCY MEDICINE
Payer: COMMERCIAL

## 2021-12-07 ENCOUNTER — APPOINTMENT (OUTPATIENT)
Dept: OCCUPATIONAL THERAPY | Facility: CLINIC | Age: 81
End: 2021-12-07
Attending: STUDENT IN AN ORGANIZED HEALTH CARE EDUCATION/TRAINING PROGRAM
Payer: COMMERCIAL

## 2021-12-07 ENCOUNTER — APPOINTMENT (OUTPATIENT)
Dept: CT IMAGING | Facility: CLINIC | Age: 81
End: 2021-12-07
Attending: EMERGENCY MEDICINE
Payer: COMMERCIAL

## 2021-12-07 ENCOUNTER — APPOINTMENT (OUTPATIENT)
Dept: PHYSICAL THERAPY | Facility: CLINIC | Age: 81
End: 2021-12-07
Attending: STUDENT IN AN ORGANIZED HEALTH CARE EDUCATION/TRAINING PROGRAM
Payer: COMMERCIAL

## 2021-12-07 ENCOUNTER — APPOINTMENT (OUTPATIENT)
Dept: CARDIOLOGY | Facility: CLINIC | Age: 81
End: 2021-12-07
Attending: INTERNAL MEDICINE
Payer: COMMERCIAL

## 2021-12-07 ENCOUNTER — APPOINTMENT (OUTPATIENT)
Dept: MRI IMAGING | Facility: CLINIC | Age: 81
End: 2021-12-07
Attending: EMERGENCY MEDICINE
Payer: COMMERCIAL

## 2021-12-07 ENCOUNTER — HOSPITAL ENCOUNTER (OUTPATIENT)
Facility: CLINIC | Age: 81
Setting detail: OBSERVATION
Discharge: HOME | End: 2021-12-08
Attending: EMERGENCY MEDICINE | Admitting: EMERGENCY MEDICINE
Payer: COMMERCIAL

## 2021-12-07 ENCOUNTER — DOCUMENTATION ONLY (OUTPATIENT)
Dept: CARDIOLOGY | Facility: CLINIC | Age: 81
End: 2021-12-07

## 2021-12-07 DIAGNOSIS — R55 SYNCOPE AND COLLAPSE: Primary | ICD-10-CM

## 2021-12-07 DIAGNOSIS — S61.214A LACERATION OF RIGHT RING FINGER WITHOUT FOREIGN BODY WITHOUT DAMAGE TO NAIL, INITIAL ENCOUNTER: ICD-10-CM

## 2021-12-07 DIAGNOSIS — R55 SYNCOPE, UNSPECIFIED SYNCOPE TYPE: ICD-10-CM

## 2021-12-07 DIAGNOSIS — S61.212A LACERATION OF RIGHT MIDDLE FINGER WITHOUT FOREIGN BODY WITHOUT DAMAGE TO NAIL, INITIAL ENCOUNTER: ICD-10-CM

## 2021-12-07 DIAGNOSIS — G45.9 TIA (TRANSIENT ISCHEMIC ATTACK): ICD-10-CM

## 2021-12-07 PROBLEM — G25.0 ESSENTIAL TREMOR: Status: ACTIVE | Noted: 2017-01-24

## 2021-12-07 PROBLEM — M1A.9XX0 CHRONIC GOUTY ARTHRITIS: Status: ACTIVE | Noted: 2017-01-24

## 2021-12-07 PROBLEM — Z91.81 HISTORY OF FALLING: Status: ACTIVE | Noted: 2021-09-22

## 2021-12-07 PROBLEM — I25.110 CORONARY ARTERY DISEASE INVOLVING NATIVE HEART WITH UNSTABLE ANGINA PECTORIS, UNSPECIFIED VESSEL OR LESION TYPE (H): Status: ACTIVE | Noted: 2019-12-09

## 2021-12-07 PROBLEM — K21.9 GASTROESOPHAGEAL REFLUX DISEASE WITHOUT ESOPHAGITIS: Status: ACTIVE | Noted: 2019-04-17

## 2021-12-07 PROBLEM — M1A.00X0 CHRONIC GOUTY ARTHRITIS: Status: ACTIVE | Noted: 2017-01-24

## 2021-12-07 PROBLEM — I25.9 CHRONIC ISCHEMIC HEART DISEASE: Status: ACTIVE | Noted: 2017-01-24

## 2021-12-07 PROBLEM — I11.9 HYPERTENSIVE HEART DISEASE WITHOUT HEART FAILURE: Status: ACTIVE | Noted: 2017-01-24

## 2021-12-07 LAB
ANION GAP SERPL CALCULATED.3IONS-SCNC: 9 MMOL/L (ref 3–14)
ATRIAL RATE - MUSE: 66 BPM
BASOPHILS # BLD AUTO: 0 10E3/UL (ref 0–0.2)
BASOPHILS NFR BLD AUTO: 0 %
BUN SERPL-MCNC: 18 MG/DL (ref 7–30)
CALCIUM SERPL-MCNC: 8.3 MG/DL (ref 8.5–10.1)
CHLORIDE BLD-SCNC: 108 MMOL/L (ref 94–109)
CHOLEST SERPL-MCNC: 167 MG/DL
CO2 SERPL-SCNC: 25 MMOL/L (ref 20–32)
CREAT SERPL-MCNC: 0.83 MG/DL (ref 0.66–1.25)
DIASTOLIC BLOOD PRESSURE - MUSE: NORMAL MMHG
EOSINOPHIL # BLD AUTO: 0 10E3/UL (ref 0–0.7)
EOSINOPHIL NFR BLD AUTO: 0 %
ERYTHROCYTE [DISTWIDTH] IN BLOOD BY AUTOMATED COUNT: 13.2 % (ref 10–15)
GFR SERPL CREATININE-BSD FRML MDRD: 83 ML/MIN/1.73M2
GLUCOSE BLD-MCNC: 126 MG/DL (ref 70–99)
HCT VFR BLD AUTO: 43.9 % (ref 40–53)
HDLC SERPL-MCNC: 54 MG/DL
HGB BLD-MCNC: 14.3 G/DL (ref 13.3–17.7)
IMM GRANULOCYTES # BLD: 0.1 10E3/UL
IMM GRANULOCYTES NFR BLD: 1 %
INR PPP: 1.04 (ref 0.85–1.15)
INTERPRETATION ECG - MUSE: NORMAL
LDLC SERPL CALC-MCNC: 49 MG/DL
LVEF ECHO: NORMAL
LYMPHOCYTES # BLD AUTO: 0.7 10E3/UL (ref 0.8–5.3)
LYMPHOCYTES NFR BLD AUTO: 6 %
MAGNESIUM SERPL-MCNC: 2.2 MG/DL (ref 1.6–2.3)
MCH RBC QN AUTO: 33.3 PG (ref 26.5–33)
MCHC RBC AUTO-ENTMCNC: 32.6 G/DL (ref 31.5–36.5)
MCV RBC AUTO: 102 FL (ref 78–100)
MONOCYTES # BLD AUTO: 0.6 10E3/UL (ref 0–1.3)
MONOCYTES NFR BLD AUTO: 5 %
NEUTROPHILS # BLD AUTO: 9.9 10E3/UL (ref 1.6–8.3)
NEUTROPHILS NFR BLD AUTO: 88 %
NONHDLC SERPL-MCNC: 113 MG/DL
NRBC # BLD AUTO: 0 10E3/UL
NRBC BLD AUTO-RTO: 0 /100
P AXIS - MUSE: 70 DEGREES
PLATELET # BLD AUTO: 225 10E3/UL (ref 150–450)
POTASSIUM BLD-SCNC: 3.6 MMOL/L (ref 3.4–5.3)
PR INTERVAL - MUSE: 202 MS
QRS DURATION - MUSE: 110 MS
QT - MUSE: 464 MS
QTC - MUSE: 486 MS
R AXIS - MUSE: 25 DEGREES
RBC # BLD AUTO: 4.3 10E6/UL (ref 4.4–5.9)
SARS-COV-2 RNA RESP QL NAA+PROBE: NEGATIVE
SODIUM SERPL-SCNC: 142 MMOL/L (ref 133–144)
SYSTOLIC BLOOD PRESSURE - MUSE: NORMAL MMHG
T AXIS - MUSE: 43 DEGREES
TRIGL SERPL-MCNC: 322 MG/DL
TROPONIN I SERPL HS-MCNC: 25 NG/L
VENTRICULAR RATE- MUSE: 66 BPM
WBC # BLD AUTO: 11.3 10E3/UL (ref 4–11)

## 2021-12-07 PROCEDURE — 96375 TX/PRO/DX INJ NEW DRUG ADDON: CPT | Mod: 59

## 2021-12-07 PROCEDURE — 71046 X-RAY EXAM CHEST 2 VIEWS: CPT

## 2021-12-07 PROCEDURE — 70553 MRI BRAIN STEM W/O & W/DYE: CPT

## 2021-12-07 PROCEDURE — 0042T CT HEAD PERFUSION WITH CONTRAST: CPT | Mod: GZ

## 2021-12-07 PROCEDURE — 97165 OT EVAL LOW COMPLEX 30 MIN: CPT | Mod: GO

## 2021-12-07 PROCEDURE — 255N000002 HC RX 255 OP 636: Performed by: STUDENT IN AN ORGANIZED HEALTH CARE EDUCATION/TRAINING PROGRAM

## 2021-12-07 PROCEDURE — A9585 GADOBUTROL INJECTION: HCPCS | Performed by: STUDENT IN AN ORGANIZED HEALTH CARE EDUCATION/TRAINING PROGRAM

## 2021-12-07 PROCEDURE — 999N000226 HC STATISTIC SLP IP EVAL DEFER: Performed by: SPEECH-LANGUAGE PATHOLOGIST

## 2021-12-07 PROCEDURE — 87635 SARS-COV-2 COVID-19 AMP PRB: CPT | Performed by: EMERGENCY MEDICINE

## 2021-12-07 PROCEDURE — 250N000011 HC RX IP 250 OP 636: Performed by: EMERGENCY MEDICINE

## 2021-12-07 PROCEDURE — 99205 OFFICE O/P NEW HI 60 MIN: CPT | Mod: GC | Performed by: STUDENT IN AN ORGANIZED HEALTH CARE EDUCATION/TRAINING PROGRAM

## 2021-12-07 PROCEDURE — 97161 PT EVAL LOW COMPLEX 20 MIN: CPT | Mod: GP | Performed by: PHYSICAL THERAPIST

## 2021-12-07 PROCEDURE — 250N000013 HC RX MED GY IP 250 OP 250 PS 637: Performed by: EMERGENCY MEDICINE

## 2021-12-07 PROCEDURE — 70498 CT ANGIOGRAPHY NECK: CPT

## 2021-12-07 PROCEDURE — C9803 HOPD COVID-19 SPEC COLLECT: HCPCS

## 2021-12-07 PROCEDURE — 999N000208 ECHOCARDIOGRAM COMPLETE

## 2021-12-07 PROCEDURE — 93005 ELECTROCARDIOGRAM TRACING: CPT | Mod: XU

## 2021-12-07 PROCEDURE — G0378 HOSPITAL OBSERVATION PER HR: HCPCS

## 2021-12-07 PROCEDURE — 80061 LIPID PANEL: CPT | Performed by: STUDENT IN AN ORGANIZED HEALTH CARE EDUCATION/TRAINING PROGRAM

## 2021-12-07 PROCEDURE — 99214 OFFICE O/P EST MOD 30 MIN: CPT | Performed by: INTERNAL MEDICINE

## 2021-12-07 PROCEDURE — 12001 RPR S/N/AX/GEN/TRNK 2.5CM/<: CPT

## 2021-12-07 PROCEDURE — 80048 BASIC METABOLIC PNL TOTAL CA: CPT | Performed by: EMERGENCY MEDICINE

## 2021-12-07 PROCEDURE — 84484 ASSAY OF TROPONIN QUANT: CPT | Performed by: EMERGENCY MEDICINE

## 2021-12-07 PROCEDURE — 97530 THERAPEUTIC ACTIVITIES: CPT | Mod: GP | Performed by: PHYSICAL THERAPIST

## 2021-12-07 PROCEDURE — 99291 CRITICAL CARE FIRST HOUR: CPT | Mod: 25

## 2021-12-07 PROCEDURE — 72125 CT NECK SPINE W/O DYE: CPT

## 2021-12-07 PROCEDURE — 83735 ASSAY OF MAGNESIUM: CPT | Performed by: EMERGENCY MEDICINE

## 2021-12-07 PROCEDURE — 70450 CT HEAD/BRAIN W/O DYE: CPT | Mod: XS

## 2021-12-07 PROCEDURE — 250N000013 HC RX MED GY IP 250 OP 250 PS 637: Performed by: STUDENT IN AN ORGANIZED HEALTH CARE EDUCATION/TRAINING PROGRAM

## 2021-12-07 PROCEDURE — 96374 THER/PROPH/DIAG INJ IV PUSH: CPT | Mod: 59

## 2021-12-07 PROCEDURE — 99285 EMERGENCY DEPT VISIT HI MDM: CPT | Mod: 25

## 2021-12-07 PROCEDURE — 93306 TTE W/DOPPLER COMPLETE: CPT | Mod: 26 | Performed by: INTERNAL MEDICINE

## 2021-12-07 PROCEDURE — 85610 PROTHROMBIN TIME: CPT | Performed by: EMERGENCY MEDICINE

## 2021-12-07 PROCEDURE — 36415 COLL VENOUS BLD VENIPUNCTURE: CPT | Performed by: EMERGENCY MEDICINE

## 2021-12-07 PROCEDURE — 97530 THERAPEUTIC ACTIVITIES: CPT | Mod: GO

## 2021-12-07 PROCEDURE — 85025 COMPLETE CBC W/AUTO DIFF WBC: CPT | Performed by: EMERGENCY MEDICINE

## 2021-12-07 PROCEDURE — 99220 PR INITIAL OBSERVATION CARE,LEVEL III: CPT | Performed by: STUDENT IN AN ORGANIZED HEALTH CARE EDUCATION/TRAINING PROGRAM

## 2021-12-07 RX ORDER — LOSARTAN POTASSIUM 100 MG/1
100 TABLET ORAL DAILY
COMMUNITY
End: 2022-01-18

## 2021-12-07 RX ORDER — LIDOCAINE 40 MG/G
CREAM TOPICAL
Status: DISCONTINUED | OUTPATIENT
Start: 2021-12-07 | End: 2021-12-08 | Stop reason: HOSPADM

## 2021-12-07 RX ORDER — ACETAMINOPHEN 325 MG/1
650 TABLET ORAL EVERY 4 HOURS PRN
Status: DISCONTINUED | OUTPATIENT
Start: 2021-12-07 | End: 2021-12-08 | Stop reason: HOSPADM

## 2021-12-07 RX ORDER — ASPIRIN 81 MG/1
324 TABLET, CHEWABLE ORAL ONCE
Status: DISCONTINUED | OUTPATIENT
Start: 2021-12-07 | End: 2021-12-07

## 2021-12-07 RX ORDER — GADOBUTROL 604.72 MG/ML
10 INJECTION INTRAVENOUS ONCE
Status: COMPLETED | OUTPATIENT
Start: 2021-12-07 | End: 2021-12-07

## 2021-12-07 RX ORDER — LIDOCAINE 40 MG/G
CREAM TOPICAL
Status: DISCONTINUED | OUTPATIENT
Start: 2021-12-07 | End: 2021-12-08

## 2021-12-07 RX ORDER — PRIMIDONE 250 MG/1
250 TABLET ORAL AT BEDTIME
Status: DISCONTINUED | OUTPATIENT
Start: 2021-12-07 | End: 2021-12-08 | Stop reason: HOSPADM

## 2021-12-07 RX ORDER — IOPAMIDOL 755 MG/ML
120 INJECTION, SOLUTION INTRAVASCULAR ONCE
Status: COMPLETED | OUTPATIENT
Start: 2021-12-07 | End: 2021-12-07

## 2021-12-07 RX ORDER — HYDRALAZINE HYDROCHLORIDE 20 MG/ML
10 INJECTION INTRAMUSCULAR; INTRAVENOUS EVERY 10 MIN PRN
Status: DISCONTINUED | OUTPATIENT
Start: 2021-12-07 | End: 2021-12-08 | Stop reason: HOSPADM

## 2021-12-07 RX ORDER — LOSARTAN POTASSIUM 100 MG/1
100 TABLET ORAL DAILY
Status: DISCONTINUED | OUTPATIENT
Start: 2021-12-07 | End: 2021-12-08 | Stop reason: HOSPADM

## 2021-12-07 RX ORDER — PRIMIDONE 250 MG/1
500 TABLET ORAL EVERY MORNING
Status: DISCONTINUED | OUTPATIENT
Start: 2021-12-07 | End: 2021-12-08 | Stop reason: HOSPADM

## 2021-12-07 RX ORDER — ROSUVASTATIN CALCIUM 20 MG/1
40 TABLET, COATED ORAL DAILY
Status: DISCONTINUED | OUTPATIENT
Start: 2021-12-07 | End: 2021-12-08 | Stop reason: HOSPADM

## 2021-12-07 RX ORDER — CLOPIDOGREL BISULFATE 75 MG/1
75 TABLET ORAL DAILY
Status: DISCONTINUED | OUTPATIENT
Start: 2021-12-08 | End: 2021-12-07 | Stop reason: CLARIF

## 2021-12-07 RX ORDER — MULTIPLE VITAMINS W/ MINERALS TAB 9MG-400MCG
1 TAB ORAL DAILY
Status: DISCONTINUED | OUTPATIENT
Start: 2021-12-07 | End: 2021-12-08 | Stop reason: HOSPADM

## 2021-12-07 RX ORDER — PROCHLORPERAZINE MALEATE 5 MG
5 TABLET ORAL EVERY 6 HOURS PRN
Status: DISCONTINUED | OUTPATIENT
Start: 2021-12-07 | End: 2021-12-08 | Stop reason: HOSPADM

## 2021-12-07 RX ORDER — PROCHLORPERAZINE 25 MG
12.5 SUPPOSITORY, RECTAL RECTAL EVERY 12 HOURS PRN
Status: DISCONTINUED | OUTPATIENT
Start: 2021-12-07 | End: 2021-12-08 | Stop reason: HOSPADM

## 2021-12-07 RX ORDER — ONDANSETRON 2 MG/ML
4 INJECTION INTRAMUSCULAR; INTRAVENOUS EVERY 6 HOURS PRN
Status: DISCONTINUED | OUTPATIENT
Start: 2021-12-07 | End: 2021-12-08 | Stop reason: HOSPADM

## 2021-12-07 RX ORDER — PRIMIDONE 250 MG/1
250 TABLET ORAL AT BEDTIME
COMMUNITY

## 2021-12-07 RX ORDER — CLOPIDOGREL 300 MG/1
300 TABLET, FILM COATED ORAL ONCE
Status: COMPLETED | OUTPATIENT
Start: 2021-12-07 | End: 2021-12-07

## 2021-12-07 RX ORDER — PANTOPRAZOLE SODIUM 40 MG/1
40 TABLET, DELAYED RELEASE ORAL DAILY
Status: DISCONTINUED | OUTPATIENT
Start: 2021-12-07 | End: 2021-12-08 | Stop reason: HOSPADM

## 2021-12-07 RX ORDER — ASPIRIN 81 MG/1
81 TABLET, CHEWABLE ORAL ONCE
Status: COMPLETED | OUTPATIENT
Start: 2021-12-07 | End: 2021-12-07

## 2021-12-07 RX ORDER — LABETALOL HYDROCHLORIDE 5 MG/ML
10 INJECTION, SOLUTION INTRAVENOUS ONCE
Status: COMPLETED | OUTPATIENT
Start: 2021-12-07 | End: 2021-12-07

## 2021-12-07 RX ORDER — LABETALOL HYDROCHLORIDE 5 MG/ML
10 INJECTION, SOLUTION INTRAVENOUS EVERY 10 MIN PRN
Status: DISCONTINUED | OUTPATIENT
Start: 2021-12-07 | End: 2021-12-08 | Stop reason: HOSPADM

## 2021-12-07 RX ORDER — ONDANSETRON 4 MG/1
4 TABLET, ORALLY DISINTEGRATING ORAL EVERY 6 HOURS PRN
Status: DISCONTINUED | OUTPATIENT
Start: 2021-12-07 | End: 2021-12-08 | Stop reason: HOSPADM

## 2021-12-07 RX ORDER — ASPIRIN 81 MG/1
81 TABLET, CHEWABLE ORAL DAILY
Status: DISCONTINUED | OUTPATIENT
Start: 2021-12-08 | End: 2021-12-08 | Stop reason: HOSPADM

## 2021-12-07 RX ORDER — ISOSORBIDE MONONITRATE 30 MG/1
60 TABLET, EXTENDED RELEASE ORAL DAILY
Status: DISCONTINUED | OUTPATIENT
Start: 2021-12-07 | End: 2021-12-08 | Stop reason: HOSPADM

## 2021-12-07 RX ADMIN — PRIMIDONE 250 MG: 250 TABLET ORAL at 22:07

## 2021-12-07 RX ADMIN — ASPIRIN 81 MG CHEWABLE TABLET 81 MG: 81 TABLET CHEWABLE at 03:10

## 2021-12-07 RX ADMIN — MULTIPLE VITAMINS W/ MINERALS TAB 1 TABLET: TAB at 10:35

## 2021-12-07 RX ADMIN — LOSARTAN POTASSIUM 100 MG: 100 TABLET, FILM COATED ORAL at 10:32

## 2021-12-07 RX ADMIN — GADOBUTROL 10 ML: 604.72 INJECTION INTRAVENOUS at 11:47

## 2021-12-07 RX ADMIN — PANTOPRAZOLE SODIUM 40 MG: 40 TABLET, DELAYED RELEASE ORAL at 10:35

## 2021-12-07 RX ADMIN — ISOSORBIDE MONONITRATE 60 MG: 60 TABLET, EXTENDED RELEASE ORAL at 10:32

## 2021-12-07 RX ADMIN — PRIMIDONE 500 MG: 250 TABLET ORAL at 10:32

## 2021-12-07 RX ADMIN — LABETALOL HYDROCHLORIDE 10 MG: 5 INJECTION, SOLUTION INTRAVENOUS at 03:10

## 2021-12-07 RX ADMIN — CLOPIDOGREL BISULFATE 300 MG: 300 TABLET, FILM COATED ORAL at 03:10

## 2021-12-07 RX ADMIN — HUMAN ALBUMIN MICROSPHERES AND PERFLUTREN 9 ML: 10; .22 INJECTION, SOLUTION INTRAVENOUS at 13:32

## 2021-12-07 RX ADMIN — ACETAMINOPHEN 650 MG: 325 TABLET, FILM COATED ORAL at 09:44

## 2021-12-07 RX ADMIN — IOPAMIDOL 120 ML: 755 INJECTION, SOLUTION INTRAVENOUS at 01:52

## 2021-12-07 RX ADMIN — ROSUVASTATIN CALCIUM 40 MG: 20 TABLET, FILM COATED ORAL at 10:33

## 2021-12-07 ASSESSMENT — ENCOUNTER SYMPTOMS
FEVER: 0
CHILLS: 0
ABDOMINAL PAIN: 0
BACK PAIN: 0
HEADACHES: 0
SHORTNESS OF BREATH: 0
NECK PAIN: 0
WOUND: 1

## 2021-12-07 ASSESSMENT — MIFFLIN-ST. JEOR: SCORE: 1725.04

## 2021-12-07 NOTE — CONSULTS
"Cardiology Progress Note          Assessment and Plan:     TIA (transient ischemic attack)- with bilateral LE weakness    Paroxysmal ventricular tachycardia (H)    Pure hypercholesterolemia    Essential hypertension, benign    Coronary artery disease of native artery of native heart with stable angina pectoris (H)    Coronary artery disease involving native heart with unstable angina pectoris, unspecified vessel or lesion type (H)    Near syncope- doubt true syncope.  No evidence of trauma  EKG stable  Will interrogate Loop recorder- Interrogation of ILR negative.  No arrythmia at all including Ventricular or supraventricular  Check echocardiogram  If echocardiogram unremarkable will sign off.                  Interval History:   Grace Stockton is a pleasant 81-year-old patient followed by Dr. Will for general cardiology and Dr. Aroldo Small for electrophysiology.  He has a history of coronary disease having angioplasty LAD in 1991 and stenting in 2012.  Diagonal branch was jailed.  New recent nuclear stress test which is negative.  He has a history of peripheral arterial disease including a right carotid endarterectomy in 2016 and prior history of left bundle branch block subsequently resolved.  He has over the past few years had these recurrent episodes of falls or syncope is not always clear what has been going on.  He was referred to electrophysiology Dr. Aroldo Small.  He did a complete EP study on 9 7 which was unremarkable and did not internal implantable loop recorder at that time.  The evaluation 1 week following that was negative.  He now comes in having taken out recycling walking in the garage and then he says he passed out.  However there is no evidence of trauma anywhere on his body.  He did wake up with weakness and really on ability inability to move his lower extremities which lasted for some period of time.  Cardiology was consulted regarding \"\" syncope.  His EKG reviewed and showed some prolongation " "of QTC but otherwise normal.    While I was with the patient in the emergency room we interrogated the loop recorder which was completely negative without any supraventricular or ventricular arrhythmias.  I personally reviewed his EKG chest x-ray negative.              Review of Systems:   As per subjective, otherwise 5 systems reviewed and negative.           Physical Exam:   Blood pressure (!) 157/77, pulse 61, temperature 97.3  F (36.3  C), temperature source Oral, resp. rate 18, height 1.803 m (5' 11\"), weight 99.8 kg (220 lb), SpO2 96 %.      Vital Sign Ranges  Temperature Temp  Av.3  F (36.3  C)  Min: 97.3  F (36.3  C)  Max: 97.3  F (36.3  C)   Blood pressure Systolic (24hrs), Av , Min:144 , Max:224        Diastolic (24hrs), Av, Min:62, Max:119      Pulse Pulse  Av.4  Min: 61  Max: 77   Respirations Resp  Av.1  Min: 14  Max: 23   Pulse oximetry SpO2  Av %  Min: 90 %  Max: 97 %       No intake or output data in the 24 hours ending 21 0950    Constitutional:   NAD   Skin:   Warm and dry   Head:   Nontraumatic   Neck:   Supple, symmetrical, trachea midline, no adenopathy, thyroid symmetric, not enlarged and no tenderness, skin normal   Lungs:   normal   Cardiovascular:   Normal apical impulse, regular rate and rhythm, normal S1 and S2, no S3 or S4, and no murmur noted    Abdomen:   Benign   Extremities and Back:   Symmetric, no curvature, spinous processes are non-tender on palpation, paraspinous muscles are non-tender on palpation, no costal vertebral tenderness   Neurological:   Grossly nonfocal            Medications:     Current Outpatient Medications   Medication Sig Dispense Refill     acetaminophen (TYLENOL) 325 MG tablet Take 2 tablets (650 mg) by mouth every 4 hours as needed for mild pain or headaches       ALLOPURINOL PO Take 100 mg by mouth daily. To prevent gout attacks, recently started.        ezetimibe (ZETIA) 10 MG tablet Take 10 mg by mouth At Bedtime        " hypromellose-dextran (ARTIFICAL TEARS) 0.1-0.3 % ophthalmic solution Place 1 drop into both eyes 2 times daily as needed for dry eyes       isosorbide mononitrate (IMDUR) 60 MG 24 hr tablet Take 1 tablet (60 mg) by mouth daily Appointment due In April 90 tablet 1     losartan (COZAAR) 100 MG tablet Take 100 mg by mouth daily       metoprolol succinate ER (TOPROL-XL) 50 MG 24 hr tablet Take 1 tablet (50 mg) by mouth daily (Patient taking differently: Take 50 mg by mouth every evening ) 90 tablet 3     Multiple Vitamins-Minerals (CENTRUM SILVER) per tablet Take 1 tablet by mouth daily.       Multiple Vitamins-Minerals (PRESERVISION AREDS 2) CAPS Take 1 capsule by mouth 2 times daily        nitroGLYcerin (NITROSTAT) 0.4 MG sublingual tablet Place 1 tablet (0.4 mg) under the tongue every 5 minutes as needed for chest pain 25 tablet 11     pantoprazole (PROTONIX) 40 MG EC tablet Take 40 mg by mouth daily       primidone (MYSOLINE) 250 MG tablet Take 250 mg by mouth At Bedtime       primidone (MYSOLINE) 250 MG tablet Take 500 mg by mouth every morning        rosuvastatin (CRESTOR) 40 MG tablet Take 1 tablet by mouth daily. 30 tablet 1                Data:     Results for orders placed or performed during the hospital encounter of 12/07/21 (from the past 24 hour(s))   EKG 12 lead   Result Value Ref Range    Systolic Blood Pressure  mmHg    Diastolic Blood Pressure  mmHg    Ventricular Rate 66 BPM    Atrial Rate 66 BPM    AZ Interval 202 ms    QRS Duration 110 ms     ms    QTc 486 ms    P Axis 70 degrees    R AXIS 25 degrees    T Axis 43 degrees    Interpretation ECG       Sinus rhythm  Prolonged QT  Abnormal ECG  When compared with ECG of 07-SEP-2021 11:27,  No significant change was found  Confirmed by GENERATED REPORT, COMPUTER ([id number]),  Duglas Jeter ([id number]) on 12/7/2021 1:47:03 AM     CBC with platelets differential    Narrative    The following orders were created for panel order CBC with platelets  differential.  Procedure                               Abnormality         Status                     ---------                               -----------         ------                     CBC with platelets and d...[[id number]]  Abnormal            Final result                 Please view results for these tests on the individual orders.   Basic metabolic panel   Result Value Ref Range    Sodium 142 133 - 144 mmol/L    Potassium 3.6 3.4 - 5.3 mmol/L    Chloride 108 94 - 109 mmol/L    Carbon Dioxide (CO2) 25 20 - 32 mmol/L    Anion Gap 9 3 - 14 mmol/L    Urea Nitrogen 18 7 - 30 mg/dL    Creatinine 0.83 0.66 - 1.25 mg/dL    Calcium 8.3 (L) 8.5 - 10.1 mg/dL    Glucose 126 (H) 70 - 99 mg/dL    GFR Estimate 83 >60 mL/min/1.73m2   Troponin I   Result Value Ref Range    Troponin I High Sensitivity 25 <79 ng/L   INR   Result Value Ref Range    INR 1.04 0.85 - 1.15   CBC with platelets and differential   Result Value Ref Range    WBC Count 11.3 (H) 4.0 - 11.0 10e3/uL    RBC Count 4.30 (L) 4.40 - 5.90 10e6/uL    Hemoglobin 14.3 13.3 - 17.7 g/dL    Hematocrit 43.9 40.0 - 53.0 %     (H) 78 - 100 fL    MCH 33.3 (H) 26.5 - 33.0 pg    MCHC 32.6 31.5 - 36.5 g/dL    RDW 13.2 10.0 - 15.0 %    Platelet Count 225 150 - 450 10e3/uL    % Neutrophils 88 %    % Lymphocytes 6 %    % Monocytes 5 %    % Eosinophils 0 %    % Basophils 0 %    % Immature Granulocytes 1 %    NRBCs per 100 WBC 0 <1 /100    Absolute Neutrophils 9.9 (H) 1.6 - 8.3 10e3/uL    Absolute Lymphocytes 0.7 (L) 0.8 - 5.3 10e3/uL    Absolute Monocytes 0.6 0.0 - 1.3 10e3/uL    Absolute Eosinophils 0.0 0.0 - 0.7 10e3/uL    Absolute Basophils 0.0 0.0 - 0.2 10e3/uL    Absolute Immature Granulocytes 0.1 <=0.4 10e3/uL    Absolute NRBCs 0.0 10e3/uL   Magnesium   Result Value Ref Range    Magnesium 2.2 1.6 - 2.3 mg/dL   Lipid panel reflex to direct LDL   Result Value Ref Range    Cholesterol 167 <200 mg/dL    Triglycerides 322 (H) <150 mg/dL    Direct Measure HDL 54 >=40 mg/dL     LDL Cholesterol Calculated 49 <=100 mg/dL    Non HDL Cholesterol 113 <130 mg/dL    Narrative    Cholesterol  Desirable:  <200 mg/dL    Triglycerides  Normal:  Less than 150 mg/dL  Borderline High:  150-199 mg/dL  High:  200-499 mg/dL  Very High:  Greater than or equal to 500 mg/dL    Direct Measure HDL  Female:  Greater than or equal to 50 mg/dL   Male:  Greater than or equal to 40 mg/dL    LDL Cholesterol  Desirable:  <100mg/dL  Above Desirable:  100-129 mg/dL   Borderline High:  130-159 mg/dL   High:  160-189 mg/dL   Very High:  >= 190 mg/dL    Non HDL Cholesterol  Desirable:  130 mg/dL  Above Desirable:  130-159 mg/dL  Borderline High:  160-189 mg/dL  High:  190-219 mg/dL  Very High:  Greater than or equal to 220 mg/dL   Cervical spine CT w/o contrast    Narrative    EXAM: CT CERVICAL SPINE W/O CONTRAST  LOCATION: [facility]  DATE/TIME: 12/7/2021 1:50 AM    INDICATION: Neck trauma (Age >= 65y)  COMPARISON: None.  TECHNIQUE: Routine CT Cervical Spine without IV contrast. Multiplanar reformats. Dose reduction techniques were used.    FINDINGS:  VERTEBRA: Normal vertebral body heights and alignment. No fracture or posttraumatic subluxation.     CANAL/FORAMINA: Multilevel degenerative changes. Mild central canal stenosis at C5-C6. Moderate right C6-C7 foraminal stenosis.    PARASPINAL: No extraspinal abnormality.      Impression    IMPRESSION:  1.  No fracture or posttraumatic subluxation.  2.  Degenerative changes as above.   CT Head w/o Contrast    Narrative    EXAM: CTA  HEAD NECK WITH CONTRAST, CT HEAD W/O CONTRAST, CT HEAD PERFUSION WITH CONTRAST  LOCATION: [facility]  DATE/TIME: 12/7/2021 1:52 AM    INDICATION: Code Stroke; syncopal episode followed by bilateral, left greater than right lower extremity weakness.  COMPARISON: 11/08/2016  TECHNIQUE: Head and neck CT angiogram with IV contrast. Noncontrast head CT followed by axial helical CT images of the  head and neck vessels obtained during the arterial phase of intravenous contrast administration. Axial 2D reconstructed images and   multiplanar 3D MIP reconstructed images of the head and neck vessels were performed by the technologist. Additional CT cerebral perfusion was performed utilizing a second contrast bolus. Perfusion data were post processed with generation of standard   perfusion maps and estimation of ischemic/infarcted volumes utilizing standard threshold values. Dose reduction techniques were used. All stenosis measurements made according to NASCET criteria unless otherwise specified.  CONTRAST: 70mL Isovue-370 (accession [id number]), 70mL Isovue-370 (accession [id number]), 50mL Isovue-370 (accession [id number])      FINDINGS:   NONCONTRAST HEAD CT:   INTRACRANIAL CONTENTS: No intracranial hemorrhage, extraaxial collection, or mass effect.  No CT evidence of acute infarct. Mild presumed chronic small vessel ischemic changes. Mild to moderate generalized volume loss. No hydrocephalus.     VISUALIZED ORBITS/SINUSES/MASTOIDS: No intraorbital abnormality. No paranasal sinus mucosal disease. No middle ear or mastoid effusion.    BONES/SOFT TISSUES: No acute abnormality.    HEAD CTA:  ANTERIOR CIRCULATION: No stenosis/occlusion, aneurysm, or high flow vascular malformation. 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    POSTERIOR CIRCULATION: As above, there is some diminished contrast enhanced flow within the hypoplastic intracranial right vertebral artery resulting from a stenosis in the V3 segment. Otherwise, no major vessel flow limiting stenosis or occlusion.   Dominant left and smaller right vertebral artery contribute to a normal basilar artery.     DURAL VENOUS SINUSES: Expected enhancement of the major dural venous sinuses.    NECK CTA:  RIGHT CAROTID: Status post carotid endarterectomy. No measurable stenosis. No dissection.    LEFT CAROTID: Atherosclerotic plaque results in  less than 50% stenosis in the left ICA. No dissection.    VERTEBRAL ARTERIES: Dominant caliber left vertebral artery is uniformly patent through the neck and into the head with mild atheromatous changes at the V3 segment. Nondominant somewhat hypoplastic right vertebral artery demonstrates a focal calcified   atheromatous plaque in the V3 segment beyond which the vessel appears to diminish and contrast enhanced flow but remains patent.     AORTIC ARCH: Classic aortic arch anatomy with no significant stenosis at the origin of the great vessels.    NONVASCULAR STRUCTURES: Unremarkable.    CT PERFUSION:  PERFUSION MAPS: Perfusion maps suggest possible small zones of oligemia medial right temporal lobe and the left occipital pole, potentially artifactual.    RAPID ANALYSIS:  CBF<30%: 0 mL  Tmax>6sec: 8 mL  Mismatch volume: 8 mL  Mismatch ratio: Infinite      Impression    IMPRESSION:   HEAD CT:  1.  No CT evidence of acute intracranial hemorrhage, mass or recent infarct.  2.  Mild to moderate volume loss.  3.  Mild chronic small vessel ischemic changes.    HEAD CTA:   1.  No major vessel acute thromboembolism, flow-limiting stenosis or occlusion.    NECK CTA:  1.  Status post right carotid endarterectomy. Vessel is widely patent without measurable stenosis.  2.  No hemodynamically significant stenosis in the proximal left internal carotid artery.  3.  Nondominant right vertebral artery demonstrates evidence of a flow-limiting stenosis in the V3 segment but the vessel remains patent and extends intracranially. The dominant caliber left vertebral artery is widely patent throughout its course.    CT PERFUSION:  1.  Perfusion maps suggest possible small zones of oligemia medial right temporal lobe and the left occipital pole, potentially artifactual.    Results of the noncontrast head CT called to Dr. Dawn at 1:39 AM and results of the CTA called to Dr. Dawn at 1:57 AM on 12/07/2021.   CTA Head Neck with Contrast     Narrative    EXAM: CTA  HEAD NECK WITH CONTRAST, CT HEAD W/O CONTRAST, CT HEAD PERFUSION WITH CONTRAST  LOCATION: [facility]  DATE/TIME: 12/7/2021 1:52 AM    INDICATION: Code Stroke; syncopal episode followed by bilateral, left greater than right lower extremity weakness.  COMPARISON: 11/08/2016  TECHNIQUE: Head and neck CT angiogram with IV contrast. Noncontrast head CT followed by axial helical CT images of the head and neck vessels obtained during the arterial phase of intravenous contrast administration. Axial 2D reconstructed images and   multiplanar 3D MIP reconstructed images of the head and neck vessels were performed by the technologist. Additional CT cerebral perfusion was performed utilizing a second contrast bolus. Perfusion data were post processed with generation of standard   perfusion maps and estimation of ischemic/infarcted volumes utilizing standard threshold values. Dose reduction techniques were used. All stenosis measurements made according to NASCET criteria unless otherwise specified.  CONTRAST: 70mL Isovue-370 (accession [id number]), 70mL Isovue-370 (accession [id number]), 50mL Isovue-370 (accession [id number])      FINDINGS:   NONCONTRAST HEAD CT:   INTRACRANIAL CONTENTS: No intracranial hemorrhage, extraaxial collection, or mass effect.  No CT evidence of acute infarct. Mild presumed chronic small vessel ischemic changes. Mild to moderate generalized volume loss. No hydrocephalus.     VISUALIZED ORBITS/SINUSES/MASTOIDS: No intraorbital abnormality. No paranasal sinus mucosal disease. No middle ear or mastoid effusion.    BONES/SOFT TISSUES: No acute abnormality.    HEAD CTA:  ANTERIOR CIRCULATION: No stenosis/occlusion, aneurysm, or high flow vascular malformation. 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    POSTERIOR CIRCULATION: As above, there is some diminished contrast enhanced flow within the hypoplastic intracranial right vertebral  artery resulting from a stenosis in the V3 segment. Otherwise, no major vessel flow limiting stenosis or occlusion.   Dominant left and smaller right vertebral artery contribute to a normal basilar artery.     DURAL VENOUS SINUSES: Expected enhancement of the major dural venous sinuses.    NECK CTA:  RIGHT CAROTID: Status post carotid endarterectomy. No measurable stenosis. No dissection.    LEFT CAROTID: Atherosclerotic plaque results in less than 50% stenosis in the left ICA. No dissection.    VERTEBRAL ARTERIES: Dominant caliber left vertebral artery is uniformly patent through the neck and into the head with mild atheromatous changes at the V3 segment. Nondominant somewhat hypoplastic right vertebral artery demonstrates a focal calcified   atheromatous plaque in the V3 segment beyond which the vessel appears to diminish and contrast enhanced flow but remains patent.     AORTIC ARCH: Classic aortic arch anatomy with no significant stenosis at the origin of the great vessels.    NONVASCULAR STRUCTURES: Unremarkable.    CT PERFUSION:  PERFUSION MAPS: Perfusion maps suggest possible small zones of oligemia medial right temporal lobe and the left occipital pole, potentially artifactual.    RAPID ANALYSIS:  CBF<30%: 0 mL  Tmax>6sec: 8 mL  Mismatch volume: 8 mL  Mismatch ratio: Infinite      Impression    IMPRESSION:   HEAD CT:  1.  No CT evidence of acute intracranial hemorrhage, mass or recent infarct.  2.  Mild to moderate volume loss.  3.  Mild chronic small vessel ischemic changes.    HEAD CTA:   1.  No major vessel acute thromboembolism, flow-limiting stenosis or occlusion.    NECK CTA:  1.  Status post right carotid endarterectomy. Vessel is widely patent without measurable stenosis.  2.  No hemodynamically significant stenosis in the proximal left internal carotid artery.  3.  Nondominant right vertebral artery demonstrates evidence of a flow-limiting stenosis in the V3 segment but the vessel remains patent and  extends intracranially. The dominant caliber left vertebral artery is widely patent throughout its course.    CT PERFUSION:  1.  Perfusion maps suggest possible small zones of oligemia medial right temporal lobe and the left occipital pole, potentially artifactual.    Results of the noncontrast head CT called to Dr. Dawn at 1:39 AM and results of the CTA called to Dr. Dawn at 1:57 AM on 12/07/2021.   CT Head Perfusion w Contrast    Narrative    EXAM: CTA  HEAD NECK WITH CONTRAST, CT HEAD W/O CONTRAST, CT HEAD PERFUSION WITH CONTRAST  LOCATION: [facility]  DATE/TIME: 12/7/2021 1:52 AM    INDICATION: Code Stroke; syncopal episode followed by bilateral, left greater than right lower extremity weakness.  COMPARISON: 11/08/2016  TECHNIQUE: Head and neck CT angiogram with IV contrast. Noncontrast head CT followed by axial helical CT images of the head and neck vessels obtained during the arterial phase of intravenous contrast administration. Axial 2D reconstructed images and   multiplanar 3D MIP reconstructed images of the head and neck vessels were performed by the technologist. Additional CT cerebral perfusion was performed utilizing a second contrast bolus. Perfusion data were post processed with generation of standard   perfusion maps and estimation of ischemic/infarcted volumes utilizing standard threshold values. Dose reduction techniques were used. All stenosis measurements made according to NASCET criteria unless otherwise specified.  CONTRAST: 70mL Isovue-370 (accession [id number]), 70mL Isovue-370 (accession [id number]), 50mL Isovue-370 (accession [id number])      FINDINGS:   NONCONTRAST HEAD CT:   INTRACRANIAL CONTENTS: No intracranial hemorrhage, extraaxial collection, or mass effect.  No CT evidence of acute infarct. Mild presumed chronic small vessel ischemic changes. Mild to moderate generalized volume loss. No hydrocephalus.     VISUALIZED ORBITS/SINUSES/MASTOIDS: No intraorbital  abnormality. No paranasal sinus mucosal disease. No middle ear or mastoid effusion.    BONES/SOFT TISSUES: No acute abnormality.    HEAD CTA:  ANTERIOR CIRCULATION: No stenosis/occlusion, aneurysm, or high flow vascular malformation. 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    POSTERIOR CIRCULATION: As above, there is some diminished contrast enhanced flow within the hypoplastic intracranial right vertebral artery resulting from a stenosis in the V3 segment. Otherwise, no major vessel flow limiting stenosis or occlusion.   Dominant left and smaller right vertebral artery contribute to a normal basilar artery.     DURAL VENOUS SINUSES: Expected enhancement of the major dural venous sinuses.    NECK CTA:  RIGHT CAROTID: Status post carotid endarterectomy. No measurable stenosis. No dissection.    LEFT CAROTID: Atherosclerotic plaque results in less than 50% stenosis in the left ICA. No dissection.    VERTEBRAL ARTERIES: Dominant caliber left vertebral artery is uniformly patent through the neck and into the head with mild atheromatous changes at the V3 segment. Nondominant somewhat hypoplastic right vertebral artery demonstrates a focal calcified   atheromatous plaque in the V3 segment beyond which the vessel appears to diminish and contrast enhanced flow but remains patent.     AORTIC ARCH: Classic aortic arch anatomy with no significant stenosis at the origin of the great vessels.    NONVASCULAR STRUCTURES: Unremarkable.    CT PERFUSION:  PERFUSION MAPS: Perfusion maps suggest possible small zones of oligemia medial right temporal lobe and the left occipital pole, potentially artifactual.    RAPID ANALYSIS:  CBF<30%: 0 mL  Tmax>6sec: 8 mL  Mismatch volume: 8 mL  Mismatch ratio: Infinite      Impression    IMPRESSION:   HEAD CT:  1.  No CT evidence of acute intracranial hemorrhage, mass or recent infarct.  2.  Mild to moderate volume loss.  3.  Mild chronic small vessel ischemic  changes.    HEAD CTA:   1.  No major vessel acute thromboembolism, flow-limiting stenosis or occlusion.    NECK CTA:  1.  Status post right carotid endarterectomy. Vessel is widely patent without measurable stenosis.  2.  No hemodynamically significant stenosis in the proximal left internal carotid artery.  3.  Nondominant right vertebral artery demonstrates evidence of a flow-limiting stenosis in the V3 segment but the vessel remains patent and extends intracranially. The dominant caliber left vertebral artery is widely patent throughout its course.    CT PERFUSION:  1.  Perfusion maps suggest possible small zones of oligemia medial right temporal lobe and the left occipital pole, potentially artifactual.    Results of the noncontrast head CT called to Dr. Dawn at 1:39 AM and results of the CTA called to Dr. Dawn at 1:57 AM on 12/07/2021.   Asymptomatic COVID-19 Virus (Coronavirus) by PCR Nasopharyngeal    Specimen: Nasopharyngeal; Swab   Result Value Ref Range    SARS CoV2 PCR Negative Negative    Narrative    Testing was performed using the [employer]  SARS-CoV-2 & Influenza A/B Assay on the [employer]  [employer]  System.  This test should be ordered for the detection of SARS-COV-2 in individuals who meet SARS-CoV-2 clinical and/or epidemiological criteria. Test performance is unknown in asymptomatic patients.  This test is for in vitro diagnostic use under the FDA EUA for laboratories certified under CLIA to perform moderate and/or high complexity testing. This test has not been FDA cleared or approved.  A negative test does not rule out the presence of PCR inhibitors in the specimen or target RNA in concentration below the limit of detection for the assay. The possibility of a false negative should be considered if the patient's recent exposure or clinical presentation suggests COVID-19.  [facility] [employer] are certified under the Clinical Laboratory Improvement Amendments of 1988 (CLIA-88) as qualified to  perform moderate and/or high complexity laboratory testing.   XR Chest 2 Views    Narrative    EXAM: XR CHEST 2 VW  LOCATION: [facility]  DATE/TIME: 12/7/2021 2:45 AM    INDICATION: Syncope  COMPARISON: 11/15/2021      Impression    IMPRESSION: Interval decrease in the amount of opacity involving the left and right lung base. There remains a very minimal residual atelectasis. No new pulmonary alveolar infiltrates. Mild cardiac enlargement. Normal pulmonary vascularity. Tortuous and   calcified thoracic aorta. Degenerative changes in the spine and shoulders.

## 2021-12-07 NOTE — CONSULTS
"      [facility]    Stroke Consult Note    Reason for Consult: \"TIA vs CVA\"    Chief Complaint: Syncope     HPI  Iván Nicholas is a left hand dominant 81 year old male with history of CAD, HTN, HLD, hx of TIA, known carotid artery stenosis s/p R CEA, who presents to the emergency department for evaluation after a syncopal like episode.   Patient reports suddenly collapsing while he was in his garage. He denies any symptoms leading up to the event. Patient awoke on the floor and noted BL lower extremity weakness, stating he felt his left leg felt weaker than his right. Patient reports a left knee replacement which may have been played a role in the increased weakness. Patient has history of similar episodes in the past and has been following with Cardiology for further evaluation including placement of ILR.   BP in ED was 195/95, patient reports he has not taken his home blood pressure medication prior to arrival. Today patient reports 3/10 headache but does not endorse any visual disturbances, language difficulties, focal weakness, or sensory deficits.      TIA Evaluation Summarized    MRI and/or Head CT MRI brain negative for acute ischemic stroke    Intracranial Vasculature CTA head reveals hypoplastic right vert, focal stenosis In R P1,   Cervical Vasculature CTA neck with small atheroma at level of arch, mild to mod stenosis in L ICA      Echocardiogram Cardiology ordered, no indication from Stroke Neurology perspective at this time,    EKG/Telemetry SR with prolonged QT   Other Testing Not Applicable     LDL 12/7/2021: 49 mg/dL   A1C Not drawn on this admission      Impression  81 year old male who presented to ED for evaluation after a syncopal event. Patient has history of similar events in the past. MRI brain negative. Clinical presentation unlikely to be secondary to TIA as patient reports the initially reported LLE weakness likely secondary to hx of L knee replacement rather " "than new focal weakness time of fall.     Recommendations  -Discontinue Plavix, however given history of TIA and atherosclerotic disease patient would benefit from monotherapy. Consider continuing ASA 81 mg daily. Follow up with Neurology as outpatient PRN   -Cardiology consulted, follow up as indicated per their recommendations after workup completed     Patient Follow-up    - in the next 1-2 week(s) with PCP    Thank you for this consult. No further stroke evaluation is recommended, so we will sign off. Please contact us with any additional questions.    Brenda Hughes PA-C   Neurology  To page me or covering stroke neurology team member, click here: AMCOM   Choose \"On Call\" tab at top, then search dropdown box for \"Neurology Adult\", select location, press Enter, then look for stroke/neuro ICU/telestroke.  _____________________________________________________    Clinically Significant Risk Factors Present on Admission                   Past Medical History   Past Medical History:   Diagnosis Date     Carotid artery stenosis     Right, s/p right CEA 11/9/2016     Cerebral artery occlusion with cerebral infarction (H) 2016    TIA -  Endarterectomy...No residual     Colon polyps      Color blind      Coronary artery disease     2/2012 - MERARI to mid LAD     Decreased hearing      Depression      Gout      Hypertension      Hypertrophy of prostate without urinary obstruction and other lower urinary tract symptoms (LUTS)      Onychomycosis of toenail      Paroxysmal ventricular tachycardia (H)     with stress test 2012     Syncope and collapse 6/16/2021     Past Surgical History   Past Surgical History:   Procedure Laterality Date     ANGIOPLASTY  1991     ARTHROPLASTY KNEE Left 11/12/2018    Procedure: Left total knee arthroplasty;  Surgeon: Matt Schaefer MD;  Location:  OR     COLONOSCOPY  12/13/2011    Procedure:COLONOSCOPY; COLONOSCOPY; Surgeon:EMMANUELLE MOSER; Location: GI     CORONARY ANGIOGRAPHY ADULT " ORDER  1991,2012 1991 - stent to proximal LAD, 2012 - Stent to mid LAD     CV HEART CATHETERIZATION WITH POSSIBLE INTERVENTION Left 4/23/2019    Procedure: Coronary Angiogram;  Surgeon: Percy Armas MD;  Location:  HEART CARDIAC CATH LAB     CV HEART CATHETERIZATION WITH POSSIBLE INTERVENTION N/A 12/10/2019    Procedure: Heart Catheterization with Possible Intervention;  Surgeon: Dajuan Duvall MD;  Location:  HEART CARDIAC CATH LAB     CV PCI ANGIOPLASTY N/A 4/23/2019    Procedure: PCI Angioplasty;  Surgeon: Percy Armas MD;  Location: [facility] CARDIAC CATH LAB     ENDARTERECTOMY CAROTID Right 11/10/2016    Procedure: ENDARTERECTOMY CAROTID;  Surgeon: Paco uSresh MD;  Location:  OR     EP COMPREHENSIVE EP STUDY N/A 9/7/2021    Procedure: EP Comprehensive EP Study;  Surgeon: Jas Peralta MD;  Location:  HEART CARDIAC CATH LAB     HEART CATH, ANGIOPLASTY       ORTHOPEDIC SURGERY       PHACOEMULSIFICATION CLEAR CORNEA WITH STANDARD INTRAOCULAR LENS IMPLANT  12/19/2013    Procedure: PHACOEMULSIFICATION CLEAR CORNEA WITH STANDARD INTRAOCULAR LENS IMPLANT;  RIGHT PHACOEMULSIFICATION CLEAR CORNEA WITH STANDARD INTRAOCULAR LENS IMPLANT ;  Surgeon: Luisito Juan MD;  Location:  EC     Medications   Home Meds  Prior to Admission medications    Medication Sig Start Date End Date Taking? Authorizing Provider   acetaminophen (TYLENOL) 325 MG tablet Take 2 tablets (650 mg) by mouth every 4 hours as needed for mild pain or headaches 12/12/19   EklofZi MD   ALLOPURINOL PO Take 100 mg by mouth daily. To prevent gout attacks, recently started.     Unknown, Entered By History   ezetimibe (ZETIA) 10 MG tablet Take 10 mg by mouth daily    Reported, Patient   hypromellose-dextran (ARTIFICAL TEARS) 0.1-0.3 % ophthalmic solution Place 1 drop into both eyes 2 times daily as needed for dry eyes    Unknown, Entered By History   isosorbide mononitrate (IMDUR) 60 MG 24  hr tablet Take 1 tablet (60 mg) by mouth daily Appointment due In April 6/8/21   Akhil Mendoza MD   losartan (COZAAR) 25 MG tablet Take 4 tablets (100 mg) by mouth daily 8/27/21   Jessica Molina MD   metoprolol succinate ER (TOPROL-XL) 50 MG 24 hr tablet Take 1 tablet (50 mg) by mouth daily 12/19/19   Jenaro Shook PA-C   Multiple Vitamins-Minerals (CENTRUM SILVER) per tablet Take 1 tablet by mouth daily.    Unknown, Entered By History   Multiple Vitamins-Minerals (PRESERVISION AREDS 2) CAPS Take by mouth 2 times daily    Reported, Patient   nitroGLYcerin (NITROSTAT) 0.4 MG sublingual tablet Place 1 tablet (0.4 mg) under the tongue every 5 minutes as needed for chest pain 12/12/19   Zi Foley MD   oxymetazoline (AFRIN) 0.05 % nasal spray Spray 2 sprays into both nostrils 2 times daily as needed for other (nose bleed) 12/12/19   Zi Foley MD   pantoprazole (PROTONIX) 40 MG EC tablet Take 40 mg by mouth daily    Reported, Patient   primidone (MYSOLINE) 250 MG tablet Take 250 mg by mouth 3 times daily  1/22/20   Jenaro Shook PA-C   rosuvastatin (CRESTOR) 40 MG tablet Take 1 tablet by mouth daily. 2/29/12   Akhil Mendoza MD       Scheduled Meds    [START ON 12/8/2021] aspirin  81 mg Oral Daily     [START ON 12/8/2021] clopidogrel  75 mg Oral Daily     isosorbide mononitrate  60 mg Oral Daily     losartan  100 mg Oral Daily     multivitamin  1 tablet Oral Daily     pantoprazole  40 mg Oral Daily     primidone  250 mg Oral TID     rosuvastatin  40 mg Oral Daily     sodium chloride (PF)  3 mL Intracatheter Q8H     sodium chloride (PF)  3 mL Intracatheter Q8H       Infusion Meds    - MEDICATION INSTRUCTIONS -       - MEDICATION INSTRUCTIONS -         PRN Meds  acetaminophen, labetalol **OR** hydrALAZINE, lidocaine 4%, lidocaine 4%, lidocaine (buffered or not buffered), lidocaine (buffered or not buffered), - MEDICATION INSTRUCTIONS -, - MEDICATION INSTRUCTIONS -, melatonin,  ondansetron **OR** ondansetron, prochlorperazine **OR** prochlorperazine **OR** prochlorperazine, sodium chloride (PF), sodium chloride (PF)    Allergies   Allergies   Allergen Reactions     Oxycodone Other (See Comments)     Confused & angry     Statins [Hmg-Coa-R Inhibitors] Muscle Pain (Myalgia)     lipitor     Lisinopril Cough     Family History   Family History   Problem Relation Age of Onset     Heart Disease Mother 83     Heart Disease Father 69        emphysema     Coronary Artery Disease Brother      Coronary Artery Disease Sister      Social History   Social History     Tobacco Use     Smoking status: Former Smoker     Packs/day: 0.50     Years: 20.00     Pack years: 10.00     Types: Cigarettes     Quit date: 1990     Years since quittin.0     Smokeless tobacco: Never Used   Substance Use Topics     Alcohol use: Yes     Alcohol/week: 0.0 standard drinks     Comment: daily - drinks x2     Drug use: No     Review of Systems   The 10 point Review of Systems is negative other than noted in the HPI or here.      PHYSICAL EXAMINATION   Temp:  [97.3  F (36.3  C)] 97.3  F (36.3  C)  Pulse:  [61-77] 61  Resp:  [14-23] 18  BP: (144-224)/() 157/77  SpO2:  [90 %-97 %] 96 %    Neurologic  Mental Status:  alert, oriented x 3, follows commands, speech clear and fluent, naming and repetition normal  Cranial Nerves:  visual fields intact, EOMI with normal smooth pursuit, facial sensation intact and symmetric, facial movements symmetric, hearing not formally tested but intact to conversation, no dysarthria  Motor:  normal muscle tone and bulk, no abnormal movements, able to move all limbs spontaneously, no pronator drift  Reflexes:  deferred  Sensory:  light touch sensation intact and symmetric throughout upper and lower extremities  Coordination:  baseline tremor   Station/Gait:  deferred    Dysphagia Screen  Per Nursing    Stroke Scale  NIHSS  Interval     Interval Comments     1a. Level of Consciousness  0-->Alert, keenly responsive   1b. LOC Questions 0-->Answers both questions correctly   1c. LOC Commands 0-->Performs both tasks correctly   2.   Best Gaze 0-->Normal   3.   Visual 0-->No visual loss   4.   Facial Palsy 0-->Normal symmetrical movements   5a. Motor Arm, Left 0-->No drift, limb holds 90 (or 45) degrees for full 10 secs   5b. Motor Arm, Right 0-->No drift, limb holds 90 (or 45) degrees for full 10 secs   6a. Motor Leg, Left 0-->No drift, leg holds 30 degree position for full 5 secs   6b. Motor Leg, right 0-->No drift, leg holds 30 degree position for full 5 secs   7.   Limb Ataxia 0-->Absent   8.   Sensory 0-->Normal, no sensory loss   9.   Best Language 0-->No aphasia, normal   10. Dysarthria 0-->Normal   11. Extinction and Inattention  0-->No abnormality   Total 0 (12/07/21 0920)     Imaging  I personally reviewed all imaging; relevant findings per HPI.    Labs Data   CBC  Recent Labs   Lab 12/07/21 0122   WBC 11.3*   RBC 4.30*   HGB 14.3   HCT 43.9        Basic Metabolic Panel   Recent Labs   Lab 12/07/21 0122      POTASSIUM 3.6   CHLORIDE 108   CO2 25   BUN 18   CR 0.83   *   DONELL 8.3*     Liver Panel  No results for input(s): PROTTOTAL, ALBUMIN, BILITOTAL, ALKPHOS, AST, ALT, BILIDIRECT in the last 168 hours.  INR    Recent Labs   Lab Test 12/07/21  0122 04/23/19  1049 11/10/16  0010   INR 1.04 1.05 0.99      Lipid Profile    Recent Labs   Lab Test 12/07/21  0122 04/15/21  0825 11/13/19  1007 08/19/16  0833 09/14/15  0815 08/17/15  0818 09/11/14  0722   CHOL 167 161 156   < > 163 177 192   HDL 54 79 76   < > 60 57 66   LDL 49 47 69   < > 81 98 113   TRIG 322* 176* 55   < > 109 109 66   CHOLHDLRATIO  --   --   --   --  2.7 3.1 2.9    < > = values in this interval not displayed.     A1C    Recent Labs   Lab Test 11/10/16  0010   A1C 5.2     Troponin I  No results for input(s): TROPONIN, GHTROP in the last 168 hours.       Stroke Consult Data Data This was a non-emergent,  non-telestroke consult.

## 2021-12-07 NOTE — PLAN OF CARE
RECEIVING UNIT ED HANDOFF REVIEW    ED Nurse Handoff Report was reviewed by: Mone Maurice RN on December 7, 2021 at 11:55 AM

## 2021-12-07 NOTE — PHARMACY-ADMISSION MEDICATION HISTORY
Pharmacy Medication History  Admission medication history interview status for the 12/7/2021  admission is complete. See EPIC admission navigator for prior to admission medications     Location of Interview: Patient room  Medication history sources: Patient and Surescripts    Significant changes made to the medication list:  Primidone updated to 500 mg in AM, 250 mg in PM.    In the past week, patient estimated taking medication this percent of the time: greater than 90%    Additional medication history information:   Recent Cefuroxime course in November    Medication reconciliation completed by provider prior to medication history? Yes    Time spent in this activity: 20 minutes    Prior to Admission medications    Medication Sig Last Dose Taking? Auth Provider   acetaminophen (TYLENOL) 325 MG tablet Take 2 tablets (650 mg) by mouth every 4 hours as needed for mild pain or headaches prn Yes Zi Foley MD   ALLOPURINOL PO Take 100 mg by mouth daily. To prevent gout attacks, recently started.  12/6/2021 at Unknown time Yes Unknown, Entered By History   ezetimibe (ZETIA) 10 MG tablet Take 10 mg by mouth At Bedtime  12/5/2021 Yes Reported, Patient   hypromellose-dextran (ARTIFICAL TEARS) 0.1-0.3 % ophthalmic solution Place 1 drop into both eyes 2 times daily as needed for dry eyes prn Yes Unknown, Entered By History   isosorbide mononitrate (IMDUR) 60 MG 24 hr tablet Take 1 tablet (60 mg) by mouth daily Appointment due In April 12/6/2021 at Unknown time Yes Akhil Mendoza MD   losartan (COZAAR) 100 MG tablet Take 100 mg by mouth daily 12/6/2021 at Unknown time Yes Unknown, Entered By History   metoprolol succinate ER (TOPROL-XL) 50 MG 24 hr tablet Take 1 tablet (50 mg) by mouth daily  Patient taking differently: Take 50 mg by mouth every evening  12/5/2021 Yes Jenaro Shook PA-C   Multiple Vitamins-Minerals (CENTRUM SILVER) per tablet Take 1 tablet by mouth daily. 12/6/2021 at Unknown time Yes  Unknown, Entered By History   Multiple Vitamins-Minerals (PRESERVISION AREDS 2) CAPS Take 1 capsule by mouth 2 times daily  12/6/2021 at am Yes Reported, Patient   nitroGLYcerin (NITROSTAT) 0.4 MG sublingual tablet Place 1 tablet (0.4 mg) under the tongue every 5 minutes as needed for chest pain prn Yes Zi Foley MD   pantoprazole (PROTONIX) 40 MG EC tablet Take 40 mg by mouth daily 12/6/2021 at Unknown time Yes Reported, Patient   primidone (MYSOLINE) 250 MG tablet Take 250 mg by mouth At Bedtime 12/5/2021 Yes Unknown, Entered By History   primidone (MYSOLINE) 250 MG tablet Take 500 mg by mouth every morning  12/6/2021 at Unknown time Yes Jenaro Shook PA-C   rosuvastatin (CRESTOR) 40 MG tablet Take 1 tablet by mouth daily. 12/6/2021 at Unknown time Yes Akhil Mendoza MD       The information provided in this note is only as accurate as the sources available at the time of update(s)

## 2021-12-07 NOTE — PLAN OF CARE
Pt here with syncope event, and BLE weakness, history of TIA and atherosclerotic disease. MRI neg for stroke. Pt A&O x4, VSS, on RA. LS clear. Tele NSR. Has internal implanted loop recorder, monitor at patient bedside. CMS intact BLE jocelyne, with baseline edema, denies numbness/tingling. Neuro's intact. Up SBA w/ GB d/t syncope episode. Denies pain. Cardiac diet. Voiding adequately. +BS, BM yesterday. CT/CTA no acute findings. ECHO unchanged. Plans to discharge tomorrow. Pt scoring green on Aggression Stop Light Tool.

## 2021-12-07 NOTE — CONSULTS
"    [facility]    Stroke Telephone Note    I was called by Deneen Dawn Md on 12/07/21 regarding patient Iván Nicholas. 81M HTN, HLD, CAD s/p stent, Right CEA (2016), tobacco use presenting after a syncopal event. LSN 8:30PM. Left-leg weakness (drift only per ED exam NIHSS 2).   Implanted loop recorder for recurrent syncopal events in Sept. One sustained Vtach noted (>14 sec)  BP: 195/95.     Stroke Code Data (for stroke code without tele)  Stroke code activated 12/07/21   0116   Stroke provider first response  12/07/21   0117            Last known normal 12/07/21 2030        Time of discovery   (or onset of symptoms) 12/07/21       Head CT read by Stroke Neuro Dr/Provider 12/07/21   0139   Was stroke code de-escalated? Yes 12/07/21 0217  patient is outside emergent treatment time parameters       Imaging Findings   CTH: No Bleed. Calcified emboli noted on the distal LMCA territory.   CTA; No LVO, Left LICA <50% stenosis. Dominant Left Vert with V4 athero, Right V2-V4 - mod athero.   CTP: no mismatch >11cc noted.     Thrombolytic Treatment   Not given due to OOW.    Endovascular Treatment  Not initiated due to absence of proximal vessel occlusion    Impression  Acute ischemic stroke of Posterior circulation due to undetermined etiology     Recommendations   - Use orderset: \"Ischemic Stroke Routine Admission\" or \"Ischemic Stroke No Thrombolytics/No Thrombectomy ICU Admission\"  - Neurochecks and Vital Signs every Q4H   - Permissive HTN; goal SBP < 220 mmHg  - Daily aspirin 81 mg for secondary stroke prevention  - Plavix (clopidogrel) 300 mg PO loading dose x 1  - Plavix (clopidogrel) 75 mg PO Daily  - Statin: Home dose, recheck LDL. Adjust dose as needed in AM  - MRI Brain with and without contrast  - TTE (with Bubble Study if age 60 yrs or less)  - Telemetry, EKG  - Bedside Glucose Monitoring  - A1c, Lipid Panel, Troponin x 3  - PT/OT/SLP  - Stroke Education  - Euthermia, " "Euglycemia    My recommendations are based on the information provided over the phone by Iván Nicholas's in-person providers. They are not intended to replace the clinical judgment of his in-person providers. I was not requested to personally see or examine the patient at this time.    The Stroke Staff is Dr. Workman.    Abe Bailey MD  Vascular Neurology Fellow  To page me or covering stroke neurology team member, click here: AMCOM   Choose \"On Call\" tab at top, then search dropdown box for \"Neurology Adult\", select location, press Enter, then look for stroke/neuro ICU/telestroke.           "

## 2021-12-07 NOTE — PLAN OF CARE
Physical Therapy Discharge Summary    Reason for therapy discharge:    All goals and outcomes met, no further needs identified.    Progress towards therapy goal(s). See goals on Care Plan in [facility] electronic health record for goal details.  Goals met    Therapy recommendation(s):    No further therapy is recommended.

## 2021-12-07 NOTE — ED NOTES
[facility]  ED Nurse Handoff Report    ED Chief complaint: Syncope      ED Diagnosis:   Final diagnoses:   None       Code Status: Full Code    Allergies:   Allergies   Allergen Reactions     Oxycodone Other (See Comments)     Confused & angry     Statins [Hmg-Coa-R Inhibitors] Muscle Pain (Myalgia)     lipitor     Lisinopril Cough       Patient Story: Iván Nicholas is a 81 year old male who presents with syncope. Per report, the patient was working in his garage tonight when he experienced a syncopal episode. He states he did not feel any sensations prior to this episode. He woke up on the floor and was unable to ambulate due to weakness in both of his legs. His left leg feels weaker than his right. He sustained a laceration to his right 4th finger. He was able to crawl to the door, and yell for his wife, who called EMS. Here, he states he has had syncopal episodes similar to this, and is currently working with his cardiologist to find the cause of these episodes. He denies any headache, neck pain, back pain, hip pain, chest pain, abdominal pain, shortness of breath, fever of chills. Of note, he is not on any blood thinners. He has had multiple stents placed.   Focused Assessment:  See flowsheets    Treatments and/or interventions provided: See   Medications   100mL Saline flush (has no administration in time range)   iopamidol (ISOVUE-370) solution 120 mL (120 mLs Intravenous Given 12/7/21 0152)     Patient's response to treatments and/or interventions: see orders    To be done/followed up on inpatient unit:      Does this patient have any cognitive concerns?: none    Activity level - Baseline/Home:  Independent  Activity Level - Current:   Stand with Assist    Patient's Preferred language: English   Needed?: No    Isolation: None  Infection: Not Applicable  Patient tested for COVID 19 prior to admission: YES  Bariatric?: No    Vital Signs:   Vitals:    12/07/21 0105   BP: (!)  195/95   Pulse: 63   Resp: 14   Temp: 97.3  F (36.3  C)   TempSrc: Oral   SpO2: 97%       Cardiac Rhythm:     Was the PSS-3 completed:   Yes  What interventions are required if any?               Family Comments:   OBS brochure/video discussed/provided to patient/family: Yes              Name of person given brochure if not patient:               Relationship to patient:     For the majority of the shift this patient's behavior was Green.   Behavioral interventions performed were none.    ED NURSE PHONE NUMBER: [phone]

## 2021-12-07 NOTE — PROGRESS NOTES
"   12/07/21 1200   Quick Adds   Type of Visit Initial Occupational Therapy Evaluation   Living Environment   People in home spouse   Current Living Arrangements house   Transportation Anticipated car, drives self;family or friend will provide   Living Environment Comments pt reports walk in shower, stairs in home and to enter, standard height toilets    Self-Care   Usual Activity Tolerance good   Current Activity Tolerance moderate   Equipment Currently Used at Home none   Activity/Exercise/Self-Care Comment pt reports that he tries to walk a few times per week when the weather is nice outside. reports indp at baseline with all ADL and self care, mobility with no use of AD or DME   Instrumental Activities of Daily Living (IADL)   IADL Comments reports indp at baseline. he and wife complete together in the home . drives and is retired    Disability/Function   Hearing Difficulty or Deaf no   Wear Glasses or Blind yes   Vision Management   ( wears glasses; he reports he left them at home )   Fall history within last six months yes   Number of times patient has fallen within last six months 1  (admission fall & few in the past (primary/cardio MD aware))   General Information   Onset of Illness/Injury or Date of Surgery 12/07/21   Referring Physician Vadim Chen MD   Patient/Family Therapy Goal Statement (OT) \"to go home soon\"   Additional Occupational Profile Info/Pertinent History of Current Problem Iván Nicholas is a 81 year old male admitted on 12/7/2021. He presents with syncope and weakness.    Existing Precautions/Restrictions fall   General Observations and Info see VS flowsheet. RN ok for in room activity   Cognitive Status Examination   Orientation Status orientation to person, place and time   Cognitive Status Comments able to recall situation for admission. follows commands appropiately and no currently concerns for functional cog   Visual Perception   Impact of Vision Impairment on Function (Vision) " wears glasses at baseline   Pain Assessment   Patient Currently in Pain No   Posture   Posture not impaired   Range of Motion Comprehensive   General Range of Motion bilateral upper extremity ROM WFL   Strength Comprehensive (MMT)   General Manual Muscle Testing (MMT) Assessment no strength deficits identified   Comment, General Manual Muscle Testing (MMT) Assessment bilaterally equal and strong    Coordination   Upper Extremity Coordination No deficits were identified   Transfers   Transfers sit-stand transfer   Sit-Stand Transfer   Sit-Stand [place] (Transfers) contact guard   Sit/Stand Transfer Comments no device    Activities of Daily Living   BADL Assessment grooming   Grooming Assessment   [place] Level (Grooming) supervision   Comment (Grooming) sitting g/h    Clinical Impression   Criteria for Skilled Therapeutic Interventions Met (OT) yes;meets criteria;skilled treatment is necessary   OT Diagnosis decreased safety in ADL   OT Problem List-Impairments impacting ADL problems related to;activity tolerance impaired;balance;mobility   Assessment of Occupational Performance 1-3 Performance Deficits   Identified Performance Deficits bathing, home mgmt, mobility    Planned Therapy Interventions (OT) ADL retraining;bed mobility training;progressive activity/exercise;home program guidelines;IADL retraining   Clinical Decision Making Complexity (OT) low complexity   Therapy Frequency (OT) Daily   Predicted Duration of Therapy 4 days    Anticipated Equipment Needs Upon Discharge (OT) shower chair   Risk & Benefits of therapy have been explained evaluation/treatment results reviewed;care plan/treatment goals reviewed;risks/benefits reviewed;current/potential barriers reviewed;participants voiced agreement with care plan;participants included;patient   OT Discharge Planning    OT Discharge Recommendation (DC Rec) Home with assist   OT Rationale for DC Rec eval limited by ED environment and MRI apt. requires  continued skilled IP OT tx to increase safety and function for ADL and self care. will continue to monitor for deficits or changes from diagnosis. anticipate that he will be able to return to home with assist from spouse for ADL and IADL as needed . would benefit from alert button and shower chair for home use   OT Brief overview of current status  CGA mobility and ADL   Therapy Certification   Start of Care Date 12/07/21   Certification date from 12/07/21   Certification date to 12/07/21   Medical Diagnosis TIA   Total Evaluation Time (Minutes)   Total Evaluation Time (Minutes) 15

## 2021-12-07 NOTE — PLAN OF CARE
Speech Language Pathology: Orders received. Chart reviewed and discussed pt status with RN.? Speech Language Pathology services are not indicated due to no speech-language or swallow needs.? Nursing reports pt passed the nursing swallow screen.  Neuros are reported to be intact and MRI is negative for new CVI.  Defer discharge recommendations to medical care team.? Will complete orders.

## 2021-12-07 NOTE — PROVIDER NOTIFICATION
Transferred from ICU with clothing, shoes, blanket, bilat hearing aids, implanted loop record/monitor device, looks like cell phone and wallet. Patient declined to check wallet into security. All with patient in room.   
152.4

## 2021-12-07 NOTE — PROGRESS NOTES
Hospitalist update note    See  H and P from earlier today for full details.     Patient seen and examined on 7th floor in the late afternoon.  Patient is up in chair and with no new complaints.  Reports some prior lower extremity weakness more so on the left but also notes history of knee replacement.  He denies any vision changes or headache.  No new tingling or numbness.  Cardiology and neurology following and their plans are awaited.  Patient states that neurology informed him he has not had a stroke and he is happy about that.    Continue with planned observation overnight and await further specialist recommendations with possible discharge home tomorrow 12/8.

## 2021-12-07 NOTE — PROGRESS NOTES
A+O x 4. Boarded in ED. Neuros are intact. Urinating in urinal. Standby assist. Cardiac diet/no caffeine. Full code. VSS on RA. MRI complete. Has implanted cardiac monitor. Sending to station 73. 2 PIVs in left arm.

## 2021-12-07 NOTE — PROGRESS NOTES
Received a call from Dr. 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     I spoke with the bedside nurse, pt brought his [employer] remote monitor with him, RN will have pt send a remote transmission.     Transmission came in, no arrhythmias, nothing abnormal. Called Dr. Morales with the update.

## 2021-12-07 NOTE — PROGRESS NOTES
"   12/07/21 1200   Quick Adds   Type of Visit Initial Occupational Therapy Evaluation   Living Environment   People in home spouse   Current Living Arrangements house   Transportation Anticipated car, drives self;family or friend will provide   Living Environment Comments pt reports walk in shower, stairs in home and to enter, standard height toilets    Self-Care   Usual Activity Tolerance good   Current Activity Tolerance moderate   Equipment Currently Used at Home none   Activity/Exercise/Self-Care Comment pt reports that he tries to walk a few times per week when the weather is nice outside. reports indp at baseline with all ADL and self care, mobility with no use of AD or DME   Instrumental Activities of Daily Living (IADL)   IADL Comments reports indp at baseline. he and wife complete together in the home . drives and is retired    Disability/Function   Hearing Difficulty or Deaf no   Wear Glasses or Blind yes   Vision Management   ( wears glasses; he reports he left them at home )   Fall history within last six months yes   Number of times patient has fallen within last six months 1  (admission fall & few in the past (primary/cardio MD aware))   General Information   Onset of Illness/Injury or Date of Surgery 12/07/21   Referring Physician Vadim Chen MD   Patient/Family Therapy Goal Statement (OT) \"to go home soon\"   Additional Occupational Profile Info/Pertinent History of Current Problem Iván Nicholas is a 81 year old male admitted on 12/7/2021. He presents with syncope and weakness.    Existing Precautions/Restrictions fall   General Observations and Info see VS flowsheet. RN ok for in room activity   Cognitive Status Examination   Orientation Status orientation to person, place and time   Cognitive Status Comments able to recall situation for admission. follows commands appropiately and no currently concerns for functional cog   Visual Perception   Impact of Vision Impairment on Function (Vision) " wears glasses at baseline   Pain Assessment   Patient Currently in Pain No   Posture   Posture not impaired   Range of Motion Comprehensive   General Range of Motion bilateral upper extremity ROM WFL   Strength Comprehensive (MMT)   General Manual Muscle Testing (MMT) Assessment no strength deficits identified   Comment, General Manual Muscle Testing (MMT) Assessment bilaterally equal and strong    Coordination   Upper Extremity Coordination No deficits were identified   Transfers   Transfers sit-stand transfer   Sit-Stand Transfer   Sit-Stand [place] (Transfers) contact guard   Sit/Stand Transfer Comments no device    Activities of Daily Living   BADL Assessment grooming   Grooming Assessment   [place] Level (Grooming) supervision   Comment (Grooming) sitting g/h    Clinical Impression   Criteria for Skilled Therapeutic Interventions Met (OT) yes;meets criteria;skilled treatment is necessary   OT Diagnosis decreased safety in ADL   OT Problem List-Impairments impacting ADL problems related to;activity tolerance impaired;balance;mobility   Assessment of Occupational Performance 1-3 Performance Deficits   Identified Performance Deficits bathing, home mgmt, mobility    Planned Therapy Interventions (OT) ADL retraining;bed mobility training;progressive activity/exercise;home program guidelines;IADL retraining   Clinical Decision Making Complexity (OT) low complexity   Therapy Frequency (OT) Daily   Predicted Duration of Therapy 4 days    Anticipated Equipment Needs Upon Discharge (OT) shower chair   Risk & Benefits of therapy have been explained evaluation/treatment results reviewed;care plan/treatment goals reviewed;risks/benefits reviewed;current/potential barriers reviewed;participants voiced agreement with care plan;participants included;patient   OT Discharge Planning    OT Discharge Recommendation (DC Rec) Home with assist   OT Rationale for DC Rec eval limited by ED environment and MRI apt. requires  continued skilled IP OT tx to increase safety and function for ADL and self care. will continue to monitor for deficits or changes from diagnosis. anticipate that he will be able to return to home with assist from spouse for ADL and IADL as needed . would benefit from alert button and shower chair for home use   OT Brief overview of current status  CGA mobility and ADL   Total Evaluation Time (Minutes)   Total Evaluation Time (Minutes) 15

## 2021-12-07 NOTE — ED PROVIDER NOTES
History   Chief Complaint:  Syncope     HPI   Iván Nicholas is a 81 year old male who presents with syncope. Per report, the patient was working in his garage tonight when he experienced a syncopal episode. He states he did not feel any sensations prior to this episode. He woke up on the floor and was unable to ambulate due to weakness in both of his legs. His left leg feels weaker than his right. He sustained a laceration to his right 4th finger. He was able to crawl to the door, and yell for his wife, who called EMS. Here, he states he has had syncopal episodes similar to this, and is currently working with his cardiologist to find the cause of these episodes. He denies any headache, neck pain, back pain, hip pain, chest pain, abdominal pain, shortness of breath, fever of chills. Of note, he is not on any blood thinners. He has had multiple stents placed.     Rhythm recorder: [employer] LUX-Dx Insertable Cardiac Monitor, model M301    ROS:  Review of Systems   Constitutional: Negative for chills and fever.   Respiratory: Negative for shortness of breath.    Cardiovascular: Negative for chest pain.   Gastrointestinal: Negative for abdominal pain.   Musculoskeletal: Negative for back pain and neck pain.        No hip pain   Skin: Positive for wound.   Neurological: Positive for syncope. Negative for headaches.   All other systems reviewed and are negative.     Allergies:  Oxycodone  Statins  Lisinopril     Medications:    Allopurinol  Zetia  Imdur  Cozaar  Toprol  Nitrostat  Afrin  Protonix  Mysoline  Crestor    Past Medical History:    Carotid artery stenosis  Cerebral artery occlusion with cerebral infarction  Colon polyps  Color blind  CAD  Decreased hearing  Depression  Gout  Hypertension  Hypertrophy of prostate  Onychomycosis of toenail  Paroxysmal ventricular tachycardia  Syncope  Obesity  TIA  Left bundle branch block  Hypercholesterolemia  Paroxysmal ventricular tachycardia    Past Surgical  History:    Phacoemulsification clear cornea with standard intraocular lens implant  Orthopedic surgery  Heart cath x2  Endarterectomy carotid  PCI angioplasty  Colonoscopy  Arthroplasty knee  Angioplasty     Family History:    Brother: CAD  Sister: CAD  Father: Heart disease, COPD  Mother: Heart disease, Stroke    Social History:   reports that he quit smoking about 31 years ago. His smoking use included cigarettes. He has a 10.00 pack-year smoking history. He has never used smokeless tobacco. He reports current alcohol use. He reports that he does not use drugs.  PCP: Stephan Nice     Physical Exam     Patient Vitals for the past 24 hrs:   BP Temp Temp src Pulse Resp SpO2   12/07/21 0105 (!) 195/95 97.3  F (36.3  C) Oral 63 14 97 %        Physical Exam  Eyes:               Sclera white; Pupils are equal and round  ENT:                External ears and nares normal  CV:                  Rate as above with regular rhythm   Resp:               Breath sounds clear and equal bilaterally                          Non-labored, no retractions or accessory muscle use  GI:                   Abdomen is soft, non-tender, non-distended                          No rebound tenderness or peritoneal features  MS:                  Moves all extremities, no midline tenderness in spine  Skin:                Warm and dry, laceration R hand 4th finger, distal phalanx; laceration R hand 3rd finger distal phalanx along distal nail but not affecting nail or nailbed  Neuro:             Speech is normal and fluent. No perianal numbness.  Able to lift R leg off of bed without drift.  L leg briefly off bed, has immediate drift.       [employer] Stroke Scale  Exam Interval: Baseline    Score    Level of consciousness: (0)   Alert, keenly responsive    LOC questions: (0)   Answers both questions correctly    LOC commands: (0)   Performs both tasks correctly    Best gaze: (0)   Normal    Visual: (0)   No visual loss     Facial palsy: (0)   Normal symmetrical movements    Motor arm (left): (0)   No drift    Motor arm (right): (0)   No drift    Motor leg (left): (2)   Some effort against gravity    Motor leg (right): (0)   No drift    Limb ataxia: (0)   Absent    Sensory: (0)   Normal- no sensory loss    Best language: (0)   Normal- no aphasia    Dysarthria: (0)   Normal    Extinction and inattention: (0)   No abnormality          Total Score:  2          Emergency Department Course   ECG:  ECG taken at 0116, ECG read at 0124  Normal sinus rhythm  Prolonged QT (Previously 451 on 09/07/2021)  Abnormal ECG  Rate 66 bpm. VT interval 202 ms. QRS duration 110 ms. QT/QTc 464/486 ms. P-R-T axes 70 25 43.    Imaging:  XR Chest 2 Views   Final Result   IMPRESSION: Interval decrease in the amount of opacity involving the left and right lung base. There remains a very minimal residual atelectasis. No new pulmonary alveolar infiltrates. Mild cardiac enlargement. Normal pulmonary vascularity. Tortuous and    calcified thoracic aorta. Degenerative changes in the spine and shoulders.      CT Head Perfusion w Contrast   Final Result   IMPRESSION:    HEAD CT:   1.  No CT evidence of acute intracranial hemorrhage, mass or recent infarct.   2.  Mild to moderate volume loss.   3.  Mild chronic small vessel ischemic changes.      HEAD CTA:    1.  No major vessel acute thromboembolism, flow-limiting stenosis or occlusion.      NECK CTA:   1.  Status post right carotid endarterectomy. Vessel is widely patent without measurable stenosis.   2.  No hemodynamically significant stenosis in the proximal left internal carotid artery.   3.  Nondominant right vertebral artery demonstrates evidence of a flow-limiting stenosis in the V3 segment but the vessel remains patent and extends intracranially. The dominant caliber left vertebral artery is widely patent throughout its course.      CT PERFUSION:   1.  Perfusion maps suggest possible small zones of oligemia  medial right temporal lobe and the left occipital pole, potentially artifactual.      Results of the noncontrast head CT called to Dr. Dawn at 1:39 AM and results of the CTA called to Dr. Dawn at 1:57 AM on 12/07/2021.      CTA Head Neck with Contrast   Final Result   IMPRESSION:    HEAD CT:   1.  No CT evidence of acute intracranial hemorrhage, mass or recent infarct.   2.  Mild to moderate volume loss.   3.  Mild chronic small vessel ischemic changes.      HEAD CTA:    1.  No major vessel acute thromboembolism, flow-limiting stenosis or occlusion.      NECK CTA:   1.  Status post right carotid endarterectomy. Vessel is widely patent without measurable stenosis.   2.  No hemodynamically significant stenosis in the proximal left internal carotid artery.   3.  Nondominant right vertebral artery demonstrates evidence of a flow-limiting stenosis in the V3 segment but the vessel remains patent and extends intracranially. The dominant caliber left vertebral artery is widely patent throughout its course.      CT PERFUSION:   1.  Perfusion maps suggest possible small zones of oligemia medial right temporal lobe and the left occipital pole, potentially artifactual.      Results of the noncontrast head CT called to Dr. Dawn at 1:39 AM and results of the CTA called to Dr. Dawn at 1:57 AM on 12/07/2021.      CT Head w/o Contrast   Final Result   IMPRESSION:    HEAD CT:   1.  No CT evidence of acute intracranial hemorrhage, mass or recent infarct.   2.  Mild to moderate volume loss.   3.  Mild chronic small vessel ischemic changes.      HEAD CTA:    1.  No major vessel acute thromboembolism, flow-limiting stenosis or occlusion.      NECK CTA:   1.  Status post right carotid endarterectomy. Vessel is widely patent without measurable stenosis.   2.  No hemodynamically significant stenosis in the proximal left internal carotid artery.   3.  Nondominant right vertebral artery demonstrates evidence of a flow-limiting stenosis in  the V3 segment but the vessel remains patent and extends intracranially. The dominant caliber left vertebral artery is widely patent throughout its course.      CT PERFUSION:   1.  Perfusion maps suggest possible small zones of oligemia medial right temporal lobe and the left occipital pole, potentially artifactual.      Results of the noncontrast head CT called to Dr. Dawn at 1:39 AM and results of the CTA called to Dr. Dawn at 1:57 AM on 12/07/2021.      Cervical spine CT w/o contrast   Final Result   IMPRESSION:   1.  No fracture or posttraumatic subluxation.   2.  Degenerative changes as above.      MR Brain w/o & w Contrast    (Results Pending)      Report per radiology    Laboratory:  Labs Ordered and Resulted from Time of ED Arrival to Time of ED Departure   BASIC METABOLIC PANEL - Abnormal       Result Value    Sodium 142      Potassium 3.6      Chloride 108      Carbon Dioxide (CO2) 25      Anion Gap 9      Urea Nitrogen 18      Creatinine 0.83      Calcium 8.3 (*)     Glucose 126 (*)     GFR Estimate 83     CBC WITH PLATELETS AND DIFFERENTIAL - Abnormal    WBC Count 11.3 (*)     RBC Count 4.30 (*)     Hemoglobin 14.3      Hematocrit 43.9       (*)     MCH 33.3 (*)     MCHC 32.6      RDW 13.2      Platelet Count 225      % Neutrophils 88      % Lymphocytes 6      % Monocytes 5      % Eosinophils 0      % Basophils 0      % Immature Granulocytes 1      NRBCs per 100 WBC 0      Absolute Neutrophils 9.9 (*)     Absolute Lymphocytes 0.7 (*)     Absolute Monocytes 0.6      Absolute Eosinophils 0.0      Absolute Basophils 0.0      Absolute Immature Granulocytes 0.1      Absolute NRBCs 0.0     LIPID REFLEX TO DIRECT LDL PANEL - Abnormal    Cholesterol 167      Triglycerides 322 (*)     Direct Measure HDL 54      LDL Cholesterol Calculated 49      Non HDL Cholesterol 113     TROPONIN I - Normal    Troponin I High Sensitivity 25     INR - Normal    INR 1.04     COVID-19 VIRUS (CORONAVIRUS) BY PCR - Normal     SARS CoV2 PCR Negative     MAGNESIUM - Normal    Magnesium 2.2          Procedures   Procedure: Laceration Repair      LACERATION:  A 1.3cm laceration.  LOCATION:  Distal tip right middle finger  FUNCTION:  Distally sensation, circulation and motor are intact.  ANESTHESIA:  NA  PREPARATION:  Cleaned with water  DEBRIDEMENT:  Wound explored, no foreign body found  CLOSURE:  Wound was closed with One Layer.  Skin closed with skin adhesive    Emergency Department Course:    Consults:   0119 - Dr Bailey, stroke neuro  0140 - Dr Harvey, radiology  0157 - Dr Harvey, radiology  0159 - Dr Bailey, stroke neuro.  On reassessment, patient's L leg movement has improved.  0203 - Dr Bailey, stroke neuro.  De-escalate.    Interventions:  Medications   clopidogrel (PLAVIX) tablet 75 mg (has no administration in time range)   aspirin (ASA) chewable tablet 81 mg (has no administration in time range)   labetalol (NORMODYNE/TRANDATE) injection 10 mg (has no administration in time range)     Or   hydrALAZINE (APRESOLINE) injection 10 mg (has no administration in time range)   iopamidol (ISOVUE-370) solution 120 mL (120 mLs Intravenous Given 12/7/21 0152)   clopidogrel (PLAVIX) tablet 300 mg (300 mg Oral Given 12/7/21 0310)   aspirin (ASA) chewable tablet 81 mg (81 mg Oral Given 12/7/21 0310)   labetalol (NORMODYNE/TRANDATE) injection 10 mg (10 mg Intravenous Given 12/7/21 0310)          Impression & Plan    CMS Diagnoses: The patient has stroke symptoms:         ED Stroke specific documentation           NIHSS PDF     Patient last known well time: 9:00pm  ED Provider first to bedside at: 1:10am  CT Results received at: 1:40pm    Thrombolytics:   Not given due to minor/isolated/quickly resolving symptoms and unclear or unfavorable risk-benefit profile for extended window thrombolysis beyond the conventional 4.5 hour time window.    If treating with thrombolytics: Ensure SBP<180 and DBP<105 prior to treatment with thrombolytics.   Administering thrombolytics after treatment with IV labetalol, hydralazine, or nicardipine is reasonable once BP control is established.    Endovascular Retrieval:  Not initiated due to absence of proximal vessel occlusion    Medical Decision Making:  Trauma assessment without evidence for thoracic, abdominal, pelvic, or extremity injury aside from finger lacerations as above.  Leg weakness concerning for intracranial or vertebral injury.  Remains in c-collar while awaiting imaging.  Leg weakness could also be secondary to stroke.  Stroke Mimics were considered (including migraine headache, seizure disorder, hypoglycemia (or hyperglycemia), head or spinal trauma, CNS infection, Toxin ingestion and shock state (e.g. sepsis).  Code stroke evaluation without contributing abnormalities on CT or CTA as discussed with both neuro and radiology.  Symptoms improving and not appropriate for TPA beyond 4.5 hour time window.  CT and CT c-spine negative for injuries.  Leg motion improved.  Spinal trauma no longer suspected.  C-collar removed.  One of two finger lacerations required repair as above.  Attempted to interrogate device for syncope evaluation but it was not successfully sending.     Received aspirin and plavix after imaging returned and per neuro recommendations.  Pending MRI if device is compatible.  Boarding in ED pending inpatient bed availability.      Diagnosis:    ICD-10-CM    1. Syncope, unspecified syncope type  R55    2. TIA (transient ischemic attack)  G45.9    3. Laceration of right ring finger without foreign body without damage to nail, initial encounter  S61.214A     No repair needed   4. Laceration of right middle finger without foreign body without damage to nail, initial encounter  S61.212A     Repaired with skin adhesive        12/7/2021   Deneen Dawn, *        Deneen Dawn MD  12/07/21 [id number]

## 2021-12-07 NOTE — H&P
[facility]    History and Physical - Hospitalist Service       Date of Admission:  12/7/2021    Assessment & Plan      Iván Nicholas is a 81 year old male admitted on 12/7/2021. He presents with syncope and weakness.       TIA (transient ischemic attack)    Assessment: presenting after a syncopal event with subsequent left-leg weakness.  Symptoms appear to be improving, stroke neurology was consulted in the emergency department, and felt no hyperacute MRI was needed but nonetheless should be obtained to rule out CVA.    Plan:   - Admit to neuro observation  - Neurochecks and Vital Signs every Q4H   - Permissive HTN; goal SBP < 220 mmHg  - Daily aspirin 81 mg for secondary stroke prevention  - Plavix (clopidogrel) 300 mg PO loading dose x 1 then 75 mg PO Daily  - Statin: check Lipid panel, resume PTA statin and titrate as needed  - MRI Brain with and without contrast  - Telemetry  - PT/OT/SLP  - Stroke Education  - Euthermia, Euglycemia      Paroxysmal ventricular tachycardia (H)    Syncope    Assessment: Implantation of an implantable loop recorder (ILR) completed on 08/2021. On admission -> EKG shows sinus rhythm with prolonged QT     Plan:   - Cardiology eval  - Monitor on telemetry      Pure hypercholesterolemia    Essential hypertension, benign    Assessmen/Plan: Continue prior to admission losartan/metoprolol/Crestor once verified      Coronary artery disease of native artery of native heart with stable angina pectoris (H)    Assessment: cardiac nuclear scan 06/2021  that showed no apparent evidence of major myocardial ischemia    Plan:   -No anginal symptoms on admission.  Continue prior to admission metoprolol and losartan and Imdur.       Diet:   Cardiac Diet  DVT Prophylaxis: Pneumatic Compression Devices  Siddiqui Catheter: Not present  Central Lines: None  Code Status:   FULL CODE    Clinically Significant Risk Factors Present on Admission                   Disposition Plan    Expected discharge:  tomorrow recommended to prior living arrangement once syncope and TIA work up completed.     The patient's care was discussed with the Patient and ED Provider.    Vadim Chen MD  [facility]  Securely message with the Vocera Web Console (learn more here)  Text page via [facility] Paging/Directory        ______________________________________________________________________    Chief Complaint     Syncope    History is obtained from the patient    History of Present Illness      Iván Nicholas is a 81 year old male with past medical history of TIA, hypertension, hyperlipidemia, paroxysmal ventricular tachycardia who presents for evaluation of syncope.    Patient ports that he was in his usual state of health until the evening of admission when he was working in his garage and experienced a syncopal episode.  He denied any chest pain or shortness of breath or lightheadedness prior to his syncopal episode.  Following his syncope, he woke up on the floor and noticed significant weakness of his lower extremities.  He was able to crawl to the door and called his wife for assistance, who ultimately activated EMS for further assistance.  He reports he has had history of syncope in the past and currently undergoing work-up with cardiology to determine the etiology.  He recently had a loop recorder implanted earlier this year.  He currently denies any headaches, slurred speech, neck pain or back pain.  He denies any current chest pain or shortness of breath, no recent fevers or chills.  He denies any head trauma that he is aware of.  He is not currently anticoagulated.  He denies any recent blood in his stool, no weight loss or night sweats.  He has no urinary complaints urgency or frequency, no hematuria.  He denies any nausea/vomiting or abdominal pain.  At this time he has no other complaints.    Review of Systems      The 10 point Review of Systems is negative other than noted  in the [facility] of [facility] in the [place] or here.     Past Medical History    I have reviewed this patient's medical history and updated it with pertinent information if needed.   Past Medical History:   Diagnosis Date     Carotid artery stenosis     Right, s/p right CEA 11/9/2016     Cerebral artery occlusion with cerebral infarction (H) 2016    TIA -  Endarterectomy...No residual     Colon polyps      Color blind      Coronary artery disease     2/2012 - MERARI to mid LAD     Decreased hearing      Depression      Gout      Hypertension      Hypertrophy of prostate without urinary obstruction and other lower urinary tract symptoms (LUTS)      Onychomycosis of toenail      Paroxysmal ventricular tachycardia (H)     with stress test 2012     Syncope and collapse 6/16/2021       Past Surgical History   I have reviewed this patient's surgical history and updated it with pertinent information if needed.  Past Surgical History:   Procedure Laterality Date     ANGIOPLASTY  1991     ARTHROPLASTY KNEE Left 11/12/2018    Procedure: Left total knee arthroplasty;  Surgeon: Matt Schaefer MD;  Location:  OR     COLONOSCOPY  12/13/2011    Procedure:COLONOSCOPY; COLONOSCOPY; Surgeon:EMMANUELLE MOSER; Location: GI     CORONARY ANGIOGRAPHY ADULT ORDER  1991,2012 1991 - stent to proximal LAD, 2012 - Stent to mid LAD     CV HEART CATHETERIZATION WITH POSSIBLE INTERVENTION Left 4/23/2019    Procedure: Coronary Angiogram;  Surgeon: Percy Armas MD;  Location:  HEART CARDIAC CATH LAB     CV HEART CATHETERIZATION WITH POSSIBLE INTERVENTION N/A 12/10/2019    Procedure: Heart Catheterization with Possible Intervention;  Surgeon: Dajuan Duvall MD;  Location: [facility] CARDIAC CATH LAB     CV PCI ANGIOPLASTY N/A 4/23/2019    Procedure: PCI Angioplasty;  Surgeon: Percy Armas MD;  Location: [facility] CARDIAC CATH LAB     ENDARTERECTOMY CAROTID Right 11/10/2016    Procedure: ENDARTERECTOMY CAROTID;  Surgeon: Paco Suresh MD;   Location:  OR     EP COMPREHENSIVE EP STUDY N/A 2021    Procedure: EP Comprehensive EP Study;  Surgeon: Jas Peralta MD;  Location:  HEART CARDIAC CATH LAB     HEART CATH, ANGIOPLASTY       ORTHOPEDIC SURGERY       PHACOEMULSIFICATION CLEAR CORNEA WITH STANDARD INTRAOCULAR LENS IMPLANT  2013    Procedure: PHACOEMULSIFICATION CLEAR CORNEA WITH STANDARD INTRAOCULAR LENS IMPLANT;  RIGHT PHACOEMULSIFICATION CLEAR CORNEA WITH STANDARD INTRAOCULAR LENS IMPLANT ;  Surgeon: Luisito Juan MD;  Location: [facility]       Social History   I have reviewed this patient's social history and updated it with pertinent information if needed.  Social History     Tobacco Use     Smoking status: Former Smoker     Packs/day: 0.50     Years: 20.00     Pack years: 10.00     Types: Cigarettes     Quit date: 1990     Years since quittin.0     Smokeless tobacco: Never Used   Substance Use Topics     Alcohol use: Yes     Alcohol/week: 0.0 standard drinks     Comment: daily - drinks x2     Drug use: No       Family History   I have reviewed this patient's family history and updated it with pertinent information if needed.  Family History   Problem Relation Age of Onset     Heart Disease Mother 83     Heart Disease Father 69        emphysema     Coronary Artery Disease Brother      Coronary Artery Disease Sister        Prior to Admission Medications   Prior to Admission Medications   Prescriptions Last Dose Informant Patient Reported? Taking?   ALLOPURINOL PO   Yes No   Sig: Take 100 mg by mouth daily. To prevent gout attacks, recently started.    Multiple Vitamins-Minerals (CENTRUM SILVER) per tablet   Yes No   Sig: Take 1 tablet by mouth daily.   Multiple Vitamins-Minerals (PRESERVISION AREDS 2) CAPS   Yes No   Sig: Take by mouth 2 times daily   acetaminophen (TYLENOL) 325 MG tablet   No No   Sig: Take 2 tablets (650 mg) by mouth every 4 hours as needed for mild pain or headaches   ezetimibe (ZETIA) 10 MG  tablet   Yes No   Sig: Take 10 mg by mouth daily   hypromellose-dextran (ARTIFICAL TEARS) 0.1-0.3 % ophthalmic solution   Yes No   Sig: Place 1 drop into both eyes 2 times daily as needed for dry eyes   isosorbide mononitrate (IMDUR) 60 MG 24 hr tablet   No No   Sig: Take 1 tablet (60 mg) by mouth daily Appointment due In April   losartan (COZAAR) 25 MG tablet   No No   Sig: Take 4 tablets (100 mg) by mouth daily   metoprolol succinate ER (TOPROL-XL) 50 MG 24 hr tablet   No No   Sig: Take 1 tablet (50 mg) by mouth daily   nitroGLYcerin (NITROSTAT) 0.4 MG sublingual tablet   No No   Sig: Place 1 tablet (0.4 mg) under the tongue every 5 minutes as needed for chest pain   oxymetazoline (AFRIN) 0.05 % nasal spray   No No   Sig: Spray 2 sprays into both nostrils 2 times daily as needed for other (nose bleed)   pantoprazole (PROTONIX) 40 MG EC tablet   Yes No   Sig: Take 40 mg by mouth daily   primidone (MYSOLINE) 250 MG tablet   Yes No   Sig: Take 250 mg by mouth 3 times daily    rosuvastatin (CRESTOR) 40 MG tablet   Yes No   Sig: Take 1 tablet by mouth daily.      Facility-Administered Medications: None     Allergies   Allergies   Allergen Reactions     Oxycodone Other (See Comments)     Confused & angry     Statins [Hmg-Coa-R Inhibitors] Muscle Pain (Myalgia)     lipitor     Lisinopril Cough       Physical Exam   Vital Signs: Temp: 97.3  F (36.3  C) Temp src: Oral BP: (!) 159/84 Pulse: 65   Resp: 23 SpO2: 95 % O2 Device: None (Room air)    Weight: 220 lbs 0 oz    Constitutional: awake, alert, cooperative, no apparent distress.   Eyes: Lids and lashes normal, pupils equal, round and reactive to light   ENT: Normocephalic, without obvious abnormality, atraumatic, sinuses nontender on palpation   Hematologic / Lymphatic: no cervical lymphadenopathy   Respiratory: CTABL   Cardiovascular: RRR with no m/r/g   GI: Normal bowel sounds, soft, non-distended, non-tender.   Skin: normal skin color, texture, turgor    Musculoskeletal: There is no redness, warmth, or swelling of the joints. Full range of motion noted.   Neurologic: Awake, alert, oriented to name, place and time. Cranial nerves II-XII are grossly intact. Motor is 5 out of 5 bilaterally. Sensory is intact.   Neuropsychiatric: normal mood and affect      Data   Data reviewed today: I reviewed all medications, new labs and imaging results over the last 24 hours. I personally reviewed the EKG tracing showing SR with prolonged QT, the chest x-ray image(s) showing see below, the head CT image(s) showing see below and the CT Cervical image(s) showing see below.    CXR  IMPRESSION: Interval decrease in the amount of opacity involving the left and right lung base. There remains a very minimal residual atelectasis. No new pulmonary alveolar infiltrates. Mild cardiac enlargement. Normal pulmonary vascularity. Tortuous and   calcified thoracic aorta. Degenerative changes in the spine and shoulders.    CT CERVICAL SPINE  IMPRESSION:  1.  No fracture or posttraumatic subluxation.  2.  Degenerative changes as above.    HEAD CT:  1.  No CT evidence of acute intracranial hemorrhage, mass or recent infarct.  2.  Mild to moderate volume loss.  3.  Mild chronic small vessel ischemic changes.     HEAD CTA:   1.  No major vessel acute thromboembolism, flow-limiting stenosis or occlusion.     NECK CTA:  1.  Status post right carotid endarterectomy. Vessel is widely patent without measurable stenosis.  2.  No hemodynamically significant stenosis in the proximal left internal carotid artery.  3.  Nondominant right vertebral artery demonstrates evidence of a flow-limiting stenosis in the V3 segment but the vessel remains patent and extends intracranially. The dominant caliber left vertebral artery is widely patent throughout its course.     CT PERFUSION:  1.  Perfusion maps suggest possible small zones of oligemia medial right temporal lobe and the left occipital pole, potentially  artifactual.    Most Recent 3 CBC's:Recent Labs   Lab Test 12/07/21  0122 09/07/21  1139 12/12/19  0528   WBC 11.3* 6.0 5.6   HGB 14.3 14.0 13.2*   * 104* 95    148* 135*     Most Recent 3 BMP's:Recent Labs   Lab Test 12/07/21  0122 09/07/21  1139 04/15/21  0825    139 139   POTASSIUM 3.6 3.9 4.2   CHLORIDE 108 108 106   CO2 25 27 28   BUN 18 23 30   CR 0.83 1.27* 1.23   ANIONGAP 9 4 5   DONELL 8.3* 8.3* 8.1*   * 98 89     Most Recent 2 LFT's:Recent Labs   Lab Test 01/24/17  0000 09/14/15  0815   AST 27  --    ALT 23 25   ALKPHOS 58  --    BILITOTAL 0.2  --      Most Recent 3 INR's:Recent Labs   Lab Test 12/07/21  0122 04/23/19  1049 11/10/16  0010   INR 1.04 1.05 0.99     Most Recent 3 Troponin's:Recent Labs   Lab Test 12/10/19  0237 12/09/19  2030 12/09/19  1652   TROPI 1.378* 2.223* 2.706*     Recent Results (from the past 24 hour(s))   Cervical spine CT w/o contrast    Narrative    EXAM: CT CERVICAL SPINE W/O CONTRAST  LOCATION: [facility]  DATE/TIME: 12/7/2021 1:50 AM    INDICATION: Neck trauma (Age >= 65y)  COMPARISON: None.  TECHNIQUE: Routine CT Cervical Spine without IV contrast. Multiplanar reformats. Dose reduction techniques were used.    FINDINGS:  VERTEBRA: Normal vertebral body heights and alignment. No fracture or posttraumatic subluxation.     CANAL/FORAMINA: Multilevel degenerative changes. Mild central canal stenosis at C5-C6. Moderate right C6-C7 foraminal stenosis.    PARASPINAL: No extraspinal abnormality.      Impression    IMPRESSION:  1.  No fracture or posttraumatic subluxation.  2.  Degenerative changes as above.   CT Head w/o Contrast    Narrative    EXAM: CTA  HEAD NECK WITH CONTRAST, CT HEAD W/O CONTRAST, CT HEAD PERFUSION WITH CONTRAST  LOCATION: [facility]  DATE/TIME: 12/7/2021 1:52 AM    INDICATION: Code Stroke; syncopal episode followed by bilateral, left greater than right lower extremity  weakness.  COMPARISON: 11/08/2016  TECHNIQUE: Head and neck CT angiogram with IV contrast. Noncontrast head CT followed by axial helical CT images of the head and neck vessels obtained during the arterial phase of intravenous contrast administration. Axial 2D reconstructed images and   multiplanar 3D MIP reconstructed images of the head and neck vessels were performed by the technologist. Additional CT cerebral perfusion was performed utilizing a second contrast bolus. Perfusion data were post processed with generation of standard   perfusion maps and estimation of ischemic/infarcted volumes utilizing standard threshold values. Dose reduction techniques were used. All stenosis measurements made according to NASCET criteria unless otherwise specified.  CONTRAST: 70mL Isovue-370 (accession [id number]), 70mL Isovue-370 (accession [id number]), 50mL Isovue-370 (accession [id number])      FINDINGS:   NONCONTRAST HEAD CT:   INTRACRANIAL CONTENTS: No intracranial hemorrhage, extraaxial collection, or mass effect.  No CT evidence of acute infarct. Mild presumed chronic small vessel ischemic changes. Mild to moderate generalized volume loss. No hydrocephalus.     VISUALIZED ORBITS/SINUSES/MASTOIDS: No intraorbital abnormality. No paranasal sinus mucosal disease. No middle ear or mastoid effusion.    BONES/SOFT TISSUES: No acute abnormality.    HEAD CTA:  ANTERIOR CIRCULATION: No stenosis/occlusion, aneurysm, or high flow vascular malformation. 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    POSTERIOR CIRCULATION: As above, there is some diminished contrast enhanced flow within the hypoplastic intracranial right vertebral artery resulting from a stenosis in the V3 segment. Otherwise, no major vessel flow limiting stenosis or occlusion.   Dominant left and smaller right vertebral artery contribute to a normal basilar artery.     DURAL VENOUS SINUSES: Expected enhancement of the major dural venous  sinuses.    NECK CTA:  RIGHT CAROTID: Status post carotid endarterectomy. No measurable stenosis. No dissection.    LEFT CAROTID: Atherosclerotic plaque results in less than 50% stenosis in the left ICA. No dissection.    VERTEBRAL ARTERIES: Dominant caliber left vertebral artery is uniformly patent through the neck and into the head with mild atheromatous changes at the V3 segment. Nondominant somewhat hypoplastic right vertebral artery demonstrates a focal calcified   atheromatous plaque in the V3 segment beyond which the vessel appears to diminish and contrast enhanced flow but remains patent.     AORTIC ARCH: Classic aortic arch anatomy with no significant stenosis at the origin of the great vessels.    NONVASCULAR STRUCTURES: Unremarkable.    CT PERFUSION:  PERFUSION MAPS: Perfusion maps suggest possible small zones of oligemia medial right temporal lobe and the left occipital pole, potentially artifactual.    RAPID ANALYSIS:  CBF<30%: 0 mL  Tmax>6sec: 8 mL  Mismatch volume: 8 mL  Mismatch ratio: Infinite      Impression    IMPRESSION:   HEAD CT:  1.  No CT evidence of acute intracranial hemorrhage, mass or recent infarct.  2.  Mild to moderate volume loss.  3.  Mild chronic small vessel ischemic changes.    HEAD CTA:   1.  No major vessel acute thromboembolism, flow-limiting stenosis or occlusion.    NECK CTA:  1.  Status post right carotid endarterectomy. Vessel is widely patent without measurable stenosis.  2.  No hemodynamically significant stenosis in the proximal left internal carotid artery.  3.  Nondominant right vertebral artery demonstrates evidence of a flow-limiting stenosis in the V3 segment but the vessel remains patent and extends intracranially. The dominant caliber left vertebral artery is widely patent throughout its course.    CT PERFUSION:  1.  Perfusion maps suggest possible small zones of oligemia medial right temporal lobe and the left occipital pole, potentially artifactual.    Results  of the noncontrast head CT called to Dr. Dawn at 1:39 AM and results of the CTA called to Dr. Dawn at 1:57 AM on 12/07/2021.   CTA Head Neck with Contrast    Narrative    EXAM: CTA  HEAD NECK WITH CONTRAST, CT HEAD W/O CONTRAST, CT HEAD PERFUSION WITH CONTRAST  LOCATION: [facility]  DATE/TIME: 12/7/2021 1:52 AM    INDICATION: Code Stroke; syncopal episode followed by bilateral, left greater than right lower extremity weakness.  COMPARISON: 11/08/2016  TECHNIQUE: Head and neck CT angiogram with IV contrast. Noncontrast head CT followed by axial helical CT images of the head and neck vessels obtained during the arterial phase of intravenous contrast administration. Axial 2D reconstructed images and   multiplanar 3D MIP reconstructed images of the head and neck vessels were performed by the technologist. Additional CT cerebral perfusion was performed utilizing a second contrast bolus. Perfusion data were post processed with generation of standard   perfusion maps and estimation of ischemic/infarcted volumes utilizing standard threshold values. Dose reduction techniques were used. All stenosis measurements made according to NASCET criteria unless otherwise specified.  CONTRAST: 70mL Isovue-370 (accession [id number]), 70mL Isovue-370 (accession [id number]), 50mL Isovue-370 (accession [id number])      FINDINGS:   NONCONTRAST HEAD CT:   INTRACRANIAL CONTENTS: No intracranial hemorrhage, extraaxial collection, or mass effect.  No CT evidence of acute infarct. Mild presumed chronic small vessel ischemic changes. Mild to moderate generalized volume loss. No hydrocephalus.     VISUALIZED ORBITS/SINUSES/MASTOIDS: No intraorbital abnormality. No paranasal sinus mucosal disease. No middle ear or mastoid effusion.    BONES/SOFT TISSUES: No acute abnormality.    HEAD CTA:  ANTERIOR CIRCULATION: No stenosis/occlusion, aneurysm, or high flow vascular malformation. Developmentally hypoplastic left A1 anterior  cerebral artery segment.    POSTERIOR CIRCULATION: As above, there is some diminished contrast enhanced flow within the hypoplastic intracranial right vertebral artery resulting from a stenosis in the V3 segment. Otherwise, no major vessel flow limiting stenosis or occlusion.   Dominant left and smaller right vertebral artery contribute to a normal basilar artery.     DURAL VENOUS SINUSES: Expected enhancement of the major dural venous sinuses.    NECK CTA:  RIGHT CAROTID: Status post carotid endarterectomy. No measurable stenosis. No dissection.    LEFT CAROTID: Atherosclerotic plaque results in less than 50% stenosis in the left ICA. No dissection.    VERTEBRAL ARTERIES: Dominant caliber left vertebral artery is uniformly patent through the neck and into the head with mild atheromatous changes at the V3 segment. Nondominant somewhat hypoplastic right vertebral artery demonstrates a focal calcified   atheromatous plaque in the V3 segment beyond which the vessel appears to diminish and contrast enhanced flow but remains patent.     AORTIC ARCH: Classic aortic arch anatomy with no significant stenosis at the origin of the great vessels.    NONVASCULAR STRUCTURES: Unremarkable.    CT PERFUSION:  PERFUSION MAPS: Perfusion maps suggest possible small zones of oligemia medial right temporal lobe and the left occipital pole, potentially artifactual.    RAPID ANALYSIS:  CBF<30%: 0 mL  Tmax>6sec: 8 mL  Mismatch volume: 8 mL  Mismatch ratio: Infinite      Impression    IMPRESSION:   HEAD CT:  1.  No CT evidence of acute intracranial hemorrhage, mass or recent infarct.  2.  Mild to moderate volume loss.  3.  Mild chronic small vessel ischemic changes.    HEAD CTA:   1.  No major vessel acute thromboembolism, flow-limiting stenosis or occlusion.    NECK CTA:  1.  Status post right carotid endarterectomy. Vessel is widely patent without measurable stenosis.  2.  No hemodynamically significant stenosis in the proximal left  internal carotid artery.  3.  Nondominant right vertebral artery demonstrates evidence of a flow-limiting stenosis in the V3 segment but the vessel remains patent and extends intracranially. The dominant caliber left vertebral artery is widely patent throughout its course.    CT PERFUSION:  1.  Perfusion maps suggest possible small zones of oligemia medial right temporal lobe and the left occipital pole, potentially artifactual.    Results of the noncontrast head CT called to Dr. Dawn at 1:39 AM and results of the CTA called to Dr. Dawn at 1:57 AM on 12/07/2021.   CT Head Perfusion w Contrast    Narrative    EXAM: CTA  HEAD NECK WITH CONTRAST, CT HEAD W/O CONTRAST, CT HEAD PERFUSION WITH CONTRAST  LOCATION: [facility]  DATE/TIME: 12/7/2021 1:52 AM    INDICATION: Code Stroke; syncopal episode followed by bilateral, left greater than right lower extremity weakness.  COMPARISON: 11/08/2016  TECHNIQUE: Head and neck CT angiogram with IV contrast. Noncontrast head CT followed by axial helical CT images of the head and neck vessels obtained during the arterial phase of intravenous contrast administration. Axial 2D reconstructed images and   multiplanar 3D MIP reconstructed images of the head and neck vessels were performed by the technologist. Additional CT cerebral perfusion was performed utilizing a second contrast bolus. Perfusion data were post processed with generation of standard   perfusion maps and estimation of ischemic/infarcted volumes utilizing standard threshold values. Dose reduction techniques were used. All stenosis measurements made according to NASCET criteria unless otherwise specified.  CONTRAST: 70mL Isovue-370 (accession [id number]), 70mL Isovue-370 (accession [id number]), 50mL Isovue-370 (accession [id number])      FINDINGS:   NONCONTRAST HEAD CT:   INTRACRANIAL CONTENTS: No intracranial hemorrhage, extraaxial collection, or mass effect.  No CT evidence of acute infarct.  Mild presumed chronic small vessel ischemic changes. Mild to moderate generalized volume loss. No hydrocephalus.     VISUALIZED ORBITS/SINUSES/MASTOIDS: No intraorbital abnormality. No paranasal sinus mucosal disease. No middle ear or mastoid effusion.    BONES/SOFT TISSUES: No acute abnormality.    HEAD CTA:  ANTERIOR CIRCULATION: No stenosis/occlusion, aneurysm, or high flow vascular malformation. 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    POSTERIOR CIRCULATION: As above, there is some diminished contrast enhanced flow within the hypoplastic intracranial right vertebral artery resulting from a stenosis in the V3 segment. Otherwise, no major vessel flow limiting stenosis or occlusion.   Dominant left and smaller right vertebral artery contribute to a normal basilar artery.     DURAL VENOUS SINUSES: Expected enhancement of the major dural venous sinuses.    NECK CTA:  RIGHT CAROTID: Status post carotid endarterectomy. No measurable stenosis. No dissection.    LEFT CAROTID: Atherosclerotic plaque results in less than 50% stenosis in the left ICA. No dissection.    VERTEBRAL ARTERIES: Dominant caliber left vertebral artery is uniformly patent through the neck and into the head with mild atheromatous changes at the V3 segment. Nondominant somewhat hypoplastic right vertebral artery demonstrates a focal calcified   atheromatous plaque in the V3 segment beyond which the vessel appears to diminish and contrast enhanced flow but remains patent.     AORTIC ARCH: Classic aortic arch anatomy with no significant stenosis at the origin of the great vessels.    NONVASCULAR STRUCTURES: Unremarkable.    CT PERFUSION:  PERFUSION MAPS: Perfusion maps suggest possible small zones of oligemia medial right temporal lobe and the left occipital pole, potentially artifactual.    RAPID ANALYSIS:  CBF<30%: 0 mL  Tmax>6sec: 8 mL  Mismatch volume: 8 mL  Mismatch ratio: Infinite      Impression    IMPRESSION:    HEAD CT:  1.  No CT evidence of acute intracranial hemorrhage, mass or recent infarct.  2.  Mild to moderate volume loss.  3.  Mild chronic small vessel ischemic changes.    HEAD CTA:   1.  No major vessel acute thromboembolism, flow-limiting stenosis or occlusion.    NECK CTA:  1.  Status post right carotid endarterectomy. Vessel is widely patent without measurable stenosis.  2.  No hemodynamically significant stenosis in the proximal left internal carotid artery.  3.  Nondominant right vertebral artery demonstrates evidence of a flow-limiting stenosis in the V3 segment but the vessel remains patent and extends intracranially. The dominant caliber left vertebral artery is widely patent throughout its course.    CT PERFUSION:  1.  Perfusion maps suggest possible small zones of oligemia medial right temporal lobe and the left occipital pole, potentially artifactual.    Results of the noncontrast head CT called to Dr. Dawn at 1:39 AM and results of the CTA called to Dr. Dawn at 1:57 AM on 12/07/2021.   XR Chest 2 Views    Narrative    EXAM: XR CHEST 2 VW  LOCATION: [facility]  DATE/TIME: 12/7/2021 2:45 AM    INDICATION: Syncope  COMPARISON: 11/15/2021      Impression    IMPRESSION: Interval decrease in the amount of opacity involving the left and right lung base. There remains a very minimal residual atelectasis. No new pulmonary alveolar infiltrates. Mild cardiac enlargement. Normal pulmonary vascularity. Tortuous and   calcified thoracic aorta. Degenerative changes in the spine and shoulders.

## 2021-12-07 NOTE — PROGRESS NOTES
[facility]      OUTPATIENT OCCUPATIONAL THERAPY  EVALUATION  PLAN OF TREATMENT FOR OUTPATIENT REHABILITATION  (COMPLETE FOR INITIAL CLAIMS ONLY)  Patient's Last Name, First Name, M.I.  YOB: 1940  Iván Nicholas                          Provider's Name  [facility] Medical Record No.  [id number]                               Onset Date:  12/07/21   Start of Care Date:  12/07/21     Type:     ___PT   _X_OT   ___SLP Medical Diagnosis:  TIA                        OT Diagnosis:  decreased safety in ADL   Visits from SOC:  1   _________________________________________________________________________________  Plan of Treatment/Functional Goals    Planned Interventions: ADL retraining,bed mobility training,progressive activity/exercise,home program guidelines,IADL retraining   Goals: See Occupational Therapy Goals on Care Plan in [facility] electronic health record.    Therapy Frequency: Daily  Predicted Duration of Therapy Intervention: 4 days   _________________________________________________________________________________    I CERTIFY THE NEED FOR THESE SERVICES FURNISHED UNDER        THIS PLAN OF TREATMENT AND WHILE UNDER MY CARE     (Physician co-signature of this document indicates review and certification of the therapy plan).                Certification date from: 12/07/21, Certification date to: 12/07/21    Referring Physician: Vadim Chen MD            Initial Assessment        See Occupational Therapy evaluation dated 12/07/21 in [facility] electronic health record.

## 2021-12-08 ENCOUNTER — APPOINTMENT (OUTPATIENT)
Dept: OCCUPATIONAL THERAPY | Facility: CLINIC | Age: 81
End: 2021-12-08
Payer: COMMERCIAL

## 2021-12-08 VITALS
OXYGEN SATURATION: 96 % | TEMPERATURE: 97.8 F | BODY MASS INDEX: 30.8 KG/M2 | WEIGHT: 220 LBS | HEIGHT: 71 IN | SYSTOLIC BLOOD PRESSURE: 158 MMHG | DIASTOLIC BLOOD PRESSURE: 85 MMHG | RESPIRATION RATE: 16 BRPM | HEART RATE: 70 BPM

## 2021-12-08 LAB
ANION GAP SERPL CALCULATED.3IONS-SCNC: 5 MMOL/L (ref 3–14)
BUN SERPL-MCNC: 15 MG/DL (ref 7–30)
CALCIUM SERPL-MCNC: 8.7 MG/DL (ref 8.5–10.1)
CHLORIDE BLD-SCNC: 109 MMOL/L (ref 94–109)
CO2 SERPL-SCNC: 27 MMOL/L (ref 20–32)
CREAT SERPL-MCNC: 0.93 MG/DL (ref 0.66–1.25)
ERYTHROCYTE [DISTWIDTH] IN BLOOD BY AUTOMATED COUNT: 13.6 % (ref 10–15)
GFR SERPL CREATININE-BSD FRML MDRD: 77 ML/MIN/1.73M2
GLUCOSE BLD-MCNC: 115 MG/DL (ref 70–99)
GLUCOSE BLDC GLUCOMTR-MCNC: 91 MG/DL (ref 70–99)
HCT VFR BLD AUTO: 41.7 % (ref 40–53)
HGB BLD-MCNC: 14 G/DL (ref 13.3–17.7)
MCH RBC QN AUTO: 34.1 PG (ref 26.5–33)
MCHC RBC AUTO-ENTMCNC: 33.6 G/DL (ref 31.5–36.5)
MCV RBC AUTO: 102 FL (ref 78–100)
PLATELET # BLD AUTO: 179 10E3/UL (ref 150–450)
POTASSIUM BLD-SCNC: 3.8 MMOL/L (ref 3.4–5.3)
RBC # BLD AUTO: 4.1 10E6/UL (ref 4.4–5.9)
SODIUM SERPL-SCNC: 141 MMOL/L (ref 133–144)
WBC # BLD AUTO: 5.5 10E3/UL (ref 4–11)

## 2021-12-08 PROCEDURE — 36415 COLL VENOUS BLD VENIPUNCTURE: CPT | Performed by: STUDENT IN AN ORGANIZED HEALTH CARE EDUCATION/TRAINING PROGRAM

## 2021-12-08 PROCEDURE — G0378 HOSPITAL OBSERVATION PER HR: HCPCS

## 2021-12-08 PROCEDURE — 80048 BASIC METABOLIC PNL TOTAL CA: CPT | Performed by: STUDENT IN AN ORGANIZED HEALTH CARE EDUCATION/TRAINING PROGRAM

## 2021-12-08 PROCEDURE — 97535 SELF CARE MNGMENT TRAINING: CPT | Mod: GO | Performed by: OCCUPATIONAL THERAPIST

## 2021-12-08 PROCEDURE — 82962 GLUCOSE BLOOD TEST: CPT

## 2021-12-08 PROCEDURE — 99217 PR OBSERVATION CARE DISCHARGE: CPT | Performed by: PHYSICIAN ASSISTANT

## 2021-12-08 PROCEDURE — 85027 COMPLETE CBC AUTOMATED: CPT | Performed by: STUDENT IN AN ORGANIZED HEALTH CARE EDUCATION/TRAINING PROGRAM

## 2021-12-08 PROCEDURE — 250N000013 HC RX MED GY IP 250 OP 250 PS 637: Performed by: STUDENT IN AN ORGANIZED HEALTH CARE EDUCATION/TRAINING PROGRAM

## 2021-12-08 RX ORDER — ASPIRIN 81 MG/1
81 TABLET, CHEWABLE ORAL DAILY
COMMUNITY
Start: 2021-12-09 | End: 2024-09-16

## 2021-12-08 RX ADMIN — PANTOPRAZOLE SODIUM 40 MG: 40 TABLET, DELAYED RELEASE ORAL at 07:48

## 2021-12-08 RX ADMIN — ASPIRIN 81 MG CHEWABLE TABLET 81 MG: 81 TABLET CHEWABLE at 07:47

## 2021-12-08 RX ADMIN — ISOSORBIDE MONONITRATE 60 MG: 60 TABLET, EXTENDED RELEASE ORAL at 07:47

## 2021-12-08 RX ADMIN — LOSARTAN POTASSIUM 100 MG: 100 TABLET, FILM COATED ORAL at 07:47

## 2021-12-08 RX ADMIN — ROSUVASTATIN CALCIUM 40 MG: 20 TABLET, FILM COATED ORAL at 07:48

## 2021-12-08 RX ADMIN — MULTIPLE VITAMINS W/ MINERALS TAB 1 TABLET: TAB at 07:48

## 2021-12-08 RX ADMIN — PRIMIDONE 500 MG: 250 TABLET ORAL at 07:48

## 2021-12-08 NOTE — DISCHARGE SUMMARY
[facility]    Discharge Summary  Hospitalist    Date of Admission:  12/7/2021  Date of Discharge:  12/8/2021  Discharging Provider: Pollo Stern PA-C    Discharge Diagnoses      Syncope, unspecified syncope type  TIA (transient ischemic attack)  Laceration of right ring finger without foreign body without damage to nail, initial encounter  Laceration of right middle finger without foreign body without damage to nail, initial encounter  Syncope and collapse    History of Present Illness   Iván Nicholas is an 81 year old male who presented with syncope.    HPI from admission H&P:  Iván Nicholas is a 81 year old male with past medical history of TIA, hypertension, hyperlipidemia, paroxysmal ventricular tachycardia who presents for evaluation of syncope.     Patient ports that he was in his usual state of health until the evening of admission when he was working in his garage and experienced a syncopal episode.  He denied any chest pain or shortness of breath or lightheadedness prior to his syncopal episode.  Following his syncope, he woke up on the floor and noticed significant weakness of his lower extremities.  He was able to crawl to the door and called his wife for assistance, who ultimately activated EMS for further assistance.  He reports he has had history of syncope in the past and currently undergoing work-up with cardiology to determine the etiology.  He recently had a loop recorder implanted earlier this year.  He currently denies any headaches, slurred speech, neck pain or back pain.  He denies any current chest pain or shortness of breath, no recent fevers or chills.  He denies any head trauma that he is aware of.  He is not currently anticoagulated.  He denies any recent blood in his stool, no weight loss or night sweats.  He has no urinary complaints urgency or frequency, no hematuria.  He denies any nausea/vomiting or abdominal pain.  At this time he has no other  complaints.       Hospital Course   Iván Nicholas was admitted on 12/7/2021.  The following problems were addressed during his hospitalization:    TIA (transient ischemic attack)  Presenting after a syncopal event with subsequent left-leg weakness. Symptoms improved on admission.  *MRI negative for acute infarct/hemorrhage. Cerebral atrophy with few scattered nonspecific white matter changes.  *TTE with LVEF 60-65%, mild LVH, normal RVSF, no pericardial effusion  - Neurology consulted, low suspicion for cerebral ischemia as etiology, DAPT not indicated, started on ASA 81mg daily for CVA prophylaxis     Hx paroxysmal ventricular tachycardia  Syncope  Implantation of an implantable loop recorder (ILR) completed on 08/2021. On admission -> EKG shows sinus rhythm with prolonged QT.   - Cardiology consulted, ILR interrogated without significant arrhythmia. TTE stable, signed-off. Follow up with Cardiology as scheduled.     Pure hypercholesterolemia  Essential hypertension, benign  - Continue prior to admission losartan/metoprolol/Crestor once verified     CAD  Cardiac nuclear scan 06/2021  that showed no apparent evidence of major myocardial ischemia  - Continue prior to admission metoprolol and losartan and Imdur.    Pollo Stern PA-C    Significant Results and Procedures   As noted    Pending Results   These results will be followed up by n/a  Unresulted Labs Ordered in the Past 30 Days of this Admission     No orders found for last 31 day(s).          Code Status   Full Code       Primary Care Physician   Stephan Nice    Physical Exam   Temp: 98.6  F (37  C) Temp src: Oral BP: (!) 162/88 Pulse: 63   Resp: 16 SpO2: 96 % O2 Device: None (Room air)    Vitals:    12/07/21 0310   Weight: 99.8 kg (220 lb)     Vital Signs with Ranges  Temp:  [97.5  F (36.4  C)-98.6  F (37  C)] 98.6  F (37  C)  Pulse:  [61-69] 63  Resp:  [16-18] 16  BP: (128-162)/(71-88) 162/88  SpO2:  [95 %-98 %] 96 %  I/O last 3 completed  shifts:  In: 720 [P.O.:720]  Out: -     GEN: well-developed, well-nourished, appears comfortable  PULM: lungs CTA bilaterally, no increased work of breathing, no wheeze, rales, rhonchi  CV: RRR, S1 & S2, no murmur  GI: soft, nontender, nondistended, no guarding or rigidity, +BS in all 4 quadrants  NEURO: awake, alert, oriented x3, follows commands, moving all extremities spontaneously and symmetrically,  strength 5/5 bilaterally, plantar flexion/dorsiflexion 5/5 bilaterally; finger to nose intact bilaterally  SKIN: warm & dry without rash, wound, or pedal edema    Discharge Disposition   Discharged to home  Condition at discharge: Stable    Consultations This Hospital Stay   CARDIOLOGY IP CONSULT  NEUROLOGY IP CONSULT  PATIENT LEARNING CENTER IP CONSULT  SPEECH LANGUAGE PATH ADULT IP CONSULT  PHARMACY IP CONSULT  PHARMACY IP CONSULT  PHARMACY IP CONSULT  PHYSICAL THERAPY ADULT IP CONSULT  OCCUPATIONAL THERAPY ADULT IP CONSULT  REHAB ADMISSIONS LIAISON IP CONSULT  CARE MANAGEMENT / SOCIAL WORK IP CONSULT  SMOKING CESSATION PROGRAM IP CONSULT    Time Spent on this Encounter   I, Pollo Stern PA-C, personally saw the patient today and spent greater than 30 minutes discharging this patient.    Discharge Orders      Reason for your hospital stay    TIA     Follow-up and recommended labs and tests     Follow up with primary care provider, Stephan Nice, within 7 days for hospital follow- up.     Activity    Your activity upon discharge: activity as tolerated     Diet    Follow this diet upon discharge: Orders Placed This Encounter      Combination Diet No Caffeine Diet     Discharge Medications   Current Discharge Medication List      START taking these medications    Details   aspirin (ASA) 81 MG chewable tablet Take 1 tablet (81 mg) by mouth daily    Associated Diagnoses: TIA (transient ischemic attack); Syncope and collapse         CONTINUE these medications which have NOT CHANGED    Details   acetaminophen  (TYLENOL) 325 MG tablet Take 2 tablets (650 mg) by mouth every 4 hours as needed for mild pain or headaches    Associated Diagnoses: Pain      ALLOPURINOL PO Take 100 mg by mouth daily. To prevent gout attacks, recently started.       ezetimibe (ZETIA) 10 MG tablet Take 10 mg by mouth At Bedtime       hypromellose-dextran (ARTIFICAL TEARS) 0.1-0.3 % ophthalmic solution Place 1 drop into both eyes 2 times daily as needed for dry eyes      isosorbide mononitrate (IMDUR) 60 MG 24 hr tablet Take 1 tablet (60 mg) by mouth daily Appointment due In April  Qty: 90 tablet, Refills: 1    Associated Diagnoses: Coronary artery disease of native artery of native heart with stable angina pectoris (H)      losartan (COZAAR) 100 MG tablet Take 100 mg by mouth daily      metoprolol succinate ER (TOPROL-XL) 50 MG 24 hr tablet Take 1 tablet (50 mg) by mouth daily  Qty: 90 tablet, Refills: 3    Comments: Do not fill at this time. Patient will call when ready to refill.  Associated Diagnoses: Coronary artery disease of native artery of native heart with stable angina pectoris (H)      Multiple Vitamins-Minerals (CENTRUM SILVER) per tablet Take 1 tablet by mouth daily.      Multiple Vitamins-Minerals (PRESERVISION AREDS 2) CAPS Take 1 capsule by mouth 2 times daily       nitroGLYcerin (NITROSTAT) 0.4 MG sublingual tablet Place 1 tablet (0.4 mg) under the tongue every 5 minutes as needed for chest pain  Qty: 25 tablet, Refills: 11    Associated Diagnoses: Coronary artery disease of native artery of native heart with stable angina pectoris (H)      pantoprazole (PROTONIX) 40 MG EC tablet Take 40 mg by mouth daily      !! primidone (MYSOLINE) 250 MG tablet Take 250 mg by mouth At Bedtime      !! primidone (MYSOLINE) 250 MG tablet Take 500 mg by mouth every morning       rosuvastatin (CRESTOR) 40 MG tablet Take 1 tablet by mouth daily.  Qty: 30 tablet, Refills: 1    Associated Diagnoses: CAD (coronary artery disease)       !! - Potential  duplicate medications found. Please discuss with provider.        Allergies   Allergies   Allergen Reactions     Oxycodone Other (See Comments)     Confused & angry     Statins [Hmg-Coa-R Inhibitors] Muscle Pain (Myalgia)     lipitor     Lisinopril Cough     Data   Most Recent 3 CBC's:  Recent Labs   Lab Test 12/08/21  0915 12/07/21  0122 09/07/21  1139   WBC 5.5 11.3* 6.0   HGB 14.0 14.3 14.0   * 102* 104*    225 148*      Most Recent 3 BMP's:  Recent Labs   Lab Test 12/08/21  0915 12/08/21  0008 12/07/21  0122 09/07/21  1139     --  142 139   POTASSIUM 3.8  --  3.6 3.9   CHLORIDE 109  --  108 108   CO2 27  --  25 27   BUN 15  --  18 23   CR 0.93  --  0.83 1.27*   ANIONGAP 5  --  9 4   DONELL 8.7  --  8.3* 8.3*   * 91 126* 98     Most Recent 2 LFT's:  Recent Labs   Lab Test 01/24/17  0000 09/14/15  0815   AST 27  --    ALT 23 25   ALKPHOS 58  --    BILITOTAL 0.2  --      Most Recent INR's and Anticoagulation Dosing History:  Anticoagulation Dose History     Recent Dosing and Labs Latest Ref Rng & Units 11/8/2016 11/10/2016 4/23/2019 12/7/2021    INR 0.85 - 1.15 1.01 0.99 1.05 1.04        Most Recent 3 Troponin's:  Recent Labs   Lab Test 12/10/19  0237 12/09/19  2030 12/09/19  1652   TROPI 1.378* 2.223* 2.706*     Most Recent Cholesterol Panel:  Recent Labs   Lab Test 12/07/21  0122   CHOL 167   LDL 49   HDL 54   TRIG 322*     Most Recent 6 Bacteria Isolates From Any Culture (See EPIC Reports for Culture Details):  Recent Labs   Lab Test 12/10/19  0700   CULT 10,000 to 50,000 colonies/mL  Escherichia coli  *     Most Recent TSH, T4 and A1c Labs:  Recent Labs   Lab Test 11/10/16  0010   A1C 5.2     Results for orders placed or performed during the hospital encounter of 12/07/21   Cervical spine CT w/o contrast    Narrative    EXAM: CT CERVICAL SPINE W/O CONTRAST  LOCATION: [facility]  DATE/TIME: 12/7/2021 1:50 AM    INDICATION: Neck trauma (Age >= 65y)  COMPARISON:  None.  TECHNIQUE: Routine CT Cervical Spine without IV contrast. Multiplanar reformats. Dose reduction techniques were used.    FINDINGS:  VERTEBRA: Normal vertebral body heights and alignment. No fracture or posttraumatic subluxation.     CANAL/FORAMINA: Multilevel degenerative changes. Mild central canal stenosis at C5-C6. Moderate right C6-C7 foraminal stenosis.    PARASPINAL: No extraspinal abnormality.      Impression    IMPRESSION:  1.  No fracture or posttraumatic subluxation.  2.  Degenerative changes as above.   XR Chest 2 Views    Narrative    EXAM: XR CHEST 2 VW  LOCATION: [facility]  DATE/TIME: 12/7/2021 2:45 AM    INDICATION: Syncope  COMPARISON: 11/15/2021      Impression    IMPRESSION: Interval decrease in the amount of opacity involving the left and right lung base. There remains a very minimal residual atelectasis. No new pulmonary alveolar infiltrates. Mild cardiac enlargement. Normal pulmonary vascularity. Tortuous and   calcified thoracic aorta. Degenerative changes in the spine and shoulders.   CT Head w/o Contrast    Narrative    EXAM: CTA  HEAD NECK WITH CONTRAST, CT HEAD W/O CONTRAST, CT HEAD PERFUSION WITH CONTRAST  LOCATION: [facility]  DATE/TIME: 12/7/2021 1:52 AM    INDICATION: Code Stroke; syncopal episode followed by bilateral, left greater than right lower extremity weakness.  COMPARISON: 11/08/2016  TECHNIQUE: Head and neck CT angiogram with IV contrast. Noncontrast head CT followed by axial helical CT images of the head and neck vessels obtained during the arterial phase of intravenous contrast administration. Axial 2D reconstructed images and   multiplanar 3D MIP reconstructed images of the head and neck vessels were performed by the technologist. Additional CT cerebral perfusion was performed utilizing a second contrast bolus. Perfusion data were post processed with generation of standard   perfusion maps and estimation of  ischemic/infarcted volumes utilizing standard threshold values. Dose reduction techniques were used. All stenosis measurements made according to NASCET criteria unless otherwise specified.  CONTRAST: 70mL Isovue-370 (accession [id number]), 70mL Isovue-370 (accession [id number]), 50mL Isovue-370 (accession [id number])      FINDINGS:   NONCONTRAST HEAD CT:   INTRACRANIAL CONTENTS: No intracranial hemorrhage, extraaxial collection, or mass effect.  No CT evidence of acute infarct. Mild presumed chronic small vessel ischemic changes. Mild to moderate generalized volume loss. No hydrocephalus.     VISUALIZED ORBITS/SINUSES/MASTOIDS: No intraorbital abnormality. No paranasal sinus mucosal disease. No middle ear or mastoid effusion.    BONES/SOFT TISSUES: No acute abnormality.    HEAD CTA:  ANTERIOR CIRCULATION: No stenosis/occlusion, aneurysm, or high flow vascular malformation. 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    POSTERIOR CIRCULATION: As above, there is some diminished contrast enhanced flow within the hypoplastic intracranial right vertebral artery resulting from a stenosis in the V3 segment. Otherwise, no major vessel flow limiting stenosis or occlusion.   Dominant left and smaller right vertebral artery contribute to a normal basilar artery.     DURAL VENOUS SINUSES: Expected enhancement of the major dural venous sinuses.    NECK CTA:  RIGHT CAROTID: Status post carotid endarterectomy. No measurable stenosis. No dissection.    LEFT CAROTID: Atherosclerotic plaque results in less than 50% stenosis in the left ICA. No dissection.    VERTEBRAL ARTERIES: Dominant caliber left vertebral artery is uniformly patent through the neck and into the head with mild atheromatous changes at the V3 segment. Nondominant somewhat hypoplastic right vertebral artery demonstrates a focal calcified   atheromatous plaque in the V3 segment beyond which the vessel appears to diminish and contrast enhanced  flow but remains patent.     AORTIC ARCH: Classic aortic arch anatomy with no significant stenosis at the origin of the great vessels.    NONVASCULAR STRUCTURES: Unremarkable.    CT PERFUSION:  PERFUSION MAPS: Perfusion maps suggest possible small zones of oligemia medial right temporal lobe and the left occipital pole, potentially artifactual.    RAPID ANALYSIS:  CBF<30%: 0 mL  Tmax>6sec: 8 mL  Mismatch volume: 8 mL  Mismatch ratio: Infinite      Impression    IMPRESSION:   HEAD CT:  1.  No CT evidence of acute intracranial hemorrhage, mass or recent infarct.  2.  Mild to moderate volume loss.  3.  Mild chronic small vessel ischemic changes.    HEAD CTA:   1.  No major vessel acute thromboembolism, flow-limiting stenosis or occlusion.    NECK CTA:  1.  Status post right carotid endarterectomy. Vessel is widely patent without measurable stenosis.  2.  No hemodynamically significant stenosis in the proximal left internal carotid artery.  3.  Nondominant right vertebral artery demonstrates evidence of a flow-limiting stenosis in the V3 segment but the vessel remains patent and extends intracranially. The dominant caliber left vertebral artery is widely patent throughout its course.    CT PERFUSION:  1.  Perfusion maps suggest possible small zones of oligemia medial right temporal lobe and the left occipital pole, potentially artifactual.    Results of the noncontrast head CT called to Dr. Dawn at 1:39 AM and results of the CTA called to Dr. Dawn at 1:57 AM on 12/07/2021.   CTA Head Neck with Contrast    Narrative    EXAM: CTA  HEAD NECK WITH CONTRAST, CT HEAD W/O CONTRAST, CT HEAD PERFUSION WITH CONTRAST  LOCATION: [facility]  DATE/TIME: 12/7/2021 1:52 AM    INDICATION: Code Stroke; syncopal episode followed by bilateral, left greater than right lower extremity weakness.  COMPARISON: 11/08/2016  TECHNIQUE: Head and neck CT angiogram with IV contrast. Noncontrast head CT followed by axial  helical CT images of the head and neck vessels obtained during the arterial phase of intravenous contrast administration. Axial 2D reconstructed images and   multiplanar 3D MIP reconstructed images of the head and neck vessels were performed by the technologist. Additional CT cerebral perfusion was performed utilizing a second contrast bolus. Perfusion data were post processed with generation of standard   perfusion maps and estimation of ischemic/infarcted volumes utilizing standard threshold values. Dose reduction techniques were used. All stenosis measurements made according to NASCET criteria unless otherwise specified.  CONTRAST: 70mL Isovue-370 (accession [id number]), 70mL Isovue-370 (accession [id number]), 50mL Isovue-370 (accession [id number])      FINDINGS:   NONCONTRAST HEAD CT:   INTRACRANIAL CONTENTS: No intracranial hemorrhage, extraaxial collection, or mass effect.  No CT evidence of acute infarct. Mild presumed chronic small vessel ischemic changes. Mild to moderate generalized volume loss. No hydrocephalus.     VISUALIZED ORBITS/SINUSES/MASTOIDS: No intraorbital abnormality. No paranasal sinus mucosal disease. No middle ear or mastoid effusion.    BONES/SOFT TISSUES: No acute abnormality.    HEAD CTA:  ANTERIOR CIRCULATION: No stenosis/occlusion, aneurysm, or high flow vascular malformation. 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    POSTERIOR CIRCULATION: As above, there is some diminished contrast enhanced flow within the hypoplastic intracranial right vertebral artery resulting from a stenosis in the V3 segment. Otherwise, no major vessel flow limiting stenosis or occlusion.   Dominant left and smaller right vertebral artery contribute to a normal basilar artery.     DURAL VENOUS SINUSES: Expected enhancement of the major dural venous sinuses.    NECK CTA:  RIGHT CAROTID: Status post carotid endarterectomy. No measurable stenosis. No dissection.    LEFT CAROTID:  Atherosclerotic plaque results in less than 50% stenosis in the left ICA. No dissection.    VERTEBRAL ARTERIES: Dominant caliber left vertebral artery is uniformly patent through the neck and into the head with mild atheromatous changes at the V3 segment. Nondominant somewhat hypoplastic right vertebral artery demonstrates a focal calcified   atheromatous plaque in the V3 segment beyond which the vessel appears to diminish and contrast enhanced flow but remains patent.     AORTIC ARCH: Classic aortic arch anatomy with no significant stenosis at the origin of the great vessels.    NONVASCULAR STRUCTURES: Unremarkable.    CT PERFUSION:  PERFUSION MAPS: Perfusion maps suggest possible small zones of oligemia medial right temporal lobe and the left occipital pole, potentially artifactual.    RAPID ANALYSIS:  CBF<30%: 0 mL  Tmax>6sec: 8 mL  Mismatch volume: 8 mL  Mismatch ratio: Infinite      Impression    IMPRESSION:   HEAD CT:  1.  No CT evidence of acute intracranial hemorrhage, mass or recent infarct.  2.  Mild to moderate volume loss.  3.  Mild chronic small vessel ischemic changes.    HEAD CTA:   1.  No major vessel acute thromboembolism, flow-limiting stenosis or occlusion.    NECK CTA:  1.  Status post right carotid endarterectomy. Vessel is widely patent without measurable stenosis.  2.  No hemodynamically significant stenosis in the proximal left internal carotid artery.  3.  Nondominant right vertebral artery demonstrates evidence of a flow-limiting stenosis in the V3 segment but the vessel remains patent and extends intracranially. The dominant caliber left vertebral artery is widely patent throughout its course.    CT PERFUSION:  1.  Perfusion maps suggest possible small zones of oligemia medial right temporal lobe and the left occipital pole, potentially artifactual.    Results of the noncontrast head CT called to Dr. Dawn at 1:39 AM and results of the CTA called to Dr. Dawn at 1:57 AM on 12/07/2021.   CT  Head Perfusion w Contrast    Narrative    EXAM: CTA  HEAD NECK WITH CONTRAST, CT HEAD W/O CONTRAST, CT HEAD PERFUSION WITH CONTRAST  LOCATION: [facility]  DATE/TIME: 12/7/2021 1:52 AM    INDICATION: Code Stroke; syncopal episode followed by bilateral, left greater than right lower extremity weakness.  COMPARISON: 11/08/2016  TECHNIQUE: Head and neck CT angiogram with IV contrast. Noncontrast head CT followed by axial helical CT images of the head and neck vessels obtained during the arterial phase of intravenous contrast administration. Axial 2D reconstructed images and   multiplanar 3D MIP reconstructed images of the head and neck vessels were performed by the technologist. Additional CT cerebral perfusion was performed utilizing a second contrast bolus. Perfusion data were post processed with generation of standard   perfusion maps and estimation of ischemic/infarcted volumes utilizing standard threshold values. Dose reduction techniques were used. All stenosis measurements made according to NASCET criteria unless otherwise specified.  CONTRAST: 70mL Isovue-370 (accession [id number]), 70mL Isovue-370 (accession [id number]), 50mL Isovue-370 (accession [id number])      FINDINGS:   NONCONTRAST HEAD CT:   INTRACRANIAL CONTENTS: No intracranial hemorrhage, extraaxial collection, or mass effect.  No CT evidence of acute infarct. Mild presumed chronic small vessel ischemic changes. Mild to moderate generalized volume loss. No hydrocephalus.     VISUALIZED ORBITS/SINUSES/MASTOIDS: No intraorbital abnormality. No paranasal sinus mucosal disease. No middle ear or mastoid effusion.    BONES/SOFT TISSUES: No acute abnormality.    HEAD CTA:  ANTERIOR CIRCULATION: No stenosis/occlusion, aneurysm, or high flow vascular malformation. 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    POSTERIOR CIRCULATION: As above, there is some diminished contrast enhanced flow within the hypoplastic  intracranial right vertebral artery resulting from a stenosis in the V3 segment. Otherwise, no major vessel flow limiting stenosis or occlusion.   Dominant left and smaller right vertebral artery contribute to a normal basilar artery.     DURAL VENOUS SINUSES: Expected enhancement of the major dural venous sinuses.    NECK CTA:  RIGHT CAROTID: Status post carotid endarterectomy. No measurable stenosis. No dissection.    LEFT CAROTID: Atherosclerotic plaque results in less than 50% stenosis in the left ICA. No dissection.    VERTEBRAL ARTERIES: Dominant caliber left vertebral artery is uniformly patent through the neck and into the head with mild atheromatous changes at the V3 segment. Nondominant somewhat hypoplastic right vertebral artery demonstrates a focal calcified   atheromatous plaque in the V3 segment beyond which the vessel appears to diminish and contrast enhanced flow but remains patent.     AORTIC ARCH: Classic aortic arch anatomy with no significant stenosis at the origin of the great vessels.    NONVASCULAR STRUCTURES: Unremarkable.    CT PERFUSION:  PERFUSION MAPS: Perfusion maps suggest possible small zones of oligemia medial right temporal lobe and the left occipital pole, potentially artifactual.    RAPID ANALYSIS:  CBF<30%: 0 mL  Tmax>6sec: 8 mL  Mismatch volume: 8 mL  Mismatch ratio: Infinite      Impression    IMPRESSION:   HEAD CT:  1.  No CT evidence of acute intracranial hemorrhage, mass or recent infarct.  2.  Mild to moderate volume loss.  3.  Mild chronic small vessel ischemic changes.    HEAD CTA:   1.  No major vessel acute thromboembolism, flow-limiting stenosis or occlusion.    NECK CTA:  1.  Status post right carotid endarterectomy. Vessel is widely patent without measurable stenosis.  2.  No hemodynamically significant stenosis in the proximal left internal carotid artery.  3.  Nondominant right vertebral artery demonstrates evidence of a flow-limiting stenosis in the V3 segment but  the vessel remains patent and extends intracranially. The dominant caliber left vertebral artery is widely patent throughout its course.    CT PERFUSION:  1.  Perfusion maps suggest possible small zones of oligemia medial right temporal lobe and the left occipital pole, potentially artifactual.    Results of the noncontrast head CT called to Dr. Dawn at 1:39 AM and results of the CTA called to Dr. Dwan at 1:57 AM on 2021.   MR Brain w/o & w Contrast    Narrative    MRI BRAIN WITHOUT AND WITH CONTRAST  2021 12:35 PM    HISTORY:  Transient ischemic attack (TIA). Right-sided weakness.    TECHNIQUE:  Multiplanar, multisequence MRI of the brain without and  with 10 mL Gadavist.     COMPARISON: 2016.    FINDINGS: Diffusion-weighted imaging is normal. There is no evidence  for intracranial hemorrhage or acute infarct. There is some mild  cerebral atrophy. There are some scattered signal hyperintensities in  the supratentorial white matter without enhancement or mass effect.  Incidental note is made of a cavum septum vergae. Vascular structures  are patent at the skull base. Postcontrast images do not show any  abnormal areas of enhancement or any focal mass lesions.      Impression    IMPRESSION:  1. No evidence for intracranial hemorrhage, acute infarct,  hydrocephalus, or any focal mass lesions.  2. Cerebral atrophy with a few scattered nonspecific white matter  changes. The latter are nonspecific but most likely due to some  chronic small vessel ischemic disease given the patient's age.    MOHINI LICEA MD         SYSTEM ID:  [id number]   Echocardiogram Complete     Value    LVEF  60-65%    Narrative    [id number]  [id number]  [id number]  [id number]^TRINH^ROSEMARIE^VICENTA     [facility]  Echocardiography Laboratory  [address] [place], [place] [address]     Name: GREG VALDERRAMA  MRN: [id number]  : 1940  Study Date: 2021 01:02 PM  Age: 81 yrs  Gender: Male  Patient Location:  [facility]  Reason For Study: Syncope  Ordering Physician: ROSEMARIE TSANG  Referring Physician: Stephan Nice  Performed By: Joseph Viramontes RDCS     BSA: 2.2 m2  Height: 71 in  Weight: 220 lb  HR: 59  BP: 157/77 mmHg  ______________________________________________________________________________  Procedure  Complete Portable Echo Adult. Optison (NDC #[id number]) given intravenously.  ______________________________________________________________________________  Interpretation Summary     Left ventricular systolic function is normal.  The visual ejection fraction is 60-65%.  Mild left ventricular hypertrophy.  The right ventricle is normal in structure, function and size.  Aortic sclerosis without stenosis.  Dilation of the inferior vena cava is present with abnormal respiratory  variation in diameter.  There is no pericardial effusion.     No major change from prior study.  ______________________________________________________________________________  Left Ventricle  The left ventricle is normal in structure, function and size. There is mild  concentric left ventricular hypertrophy. The visual ejection fraction is 60-  65%. Left ventricular systolic function is normal. Grade I or early diastolic  dysfunction.     Right Ventricle  The right ventricle is normal in structure, function and size.     Atria  The left atrium is mildly dilated. Right atrial size is normal.     Mitral Valve  The mitral valve is normal in structure and function. There is trace mitral  regurgitation.     Tricuspid Valve  The tricuspid valve is normal in structure and function. Right ventricular  systolic pressure could not be approximated due to inadequate tricuspid  regurgitation.     Aortic Valve  There is trivial trileaflet aortic sclerosis.     Pulmonic Valve  The pulmonic valve is normal in structure and function. There is mild (1+)  pulmonic valvular regurgitation.     Vessels  Mild aortic root dilatation. The ascending aorta is  Borderline dilated.  Dilation of the inferior vena cava is present with abnormal respiratory  variation in diameter.     Pericardium  There is no pericardial effusion.     ______________________________________________________________________________  MMode/2D Measurements & Calculations  IVSd: 1.1 cm  LVIDd: 5.0 cm  LVIDs: 3.1 cm  LVPWd: 0.88 cm  FS: 38.1 %  LV mass(C)d: 171.3 grams  LV mass(C)dI: 78.0 grams/m2     Ao root diam: 3.7 cm  LA dimension: 4.3 cm  asc Aorta Diam: 4.0 cm  LA/Ao: 1.2  RWT: 0.35     Doppler Measurements & Calculations  MV E max rickie: 46.5 cm/sec  MV A max rickie: 85.9 cm/sec  MV E/A: 0.54  MV dec time: 0.29 sec  Ao V2 max: 203.6 cm/sec  Ao max P.0 mmHg  Ao V2 mean: 134.2 cm/sec  Ao mean P.4 mmHg  Ao V2 VTI: 46.3 cm  LV V1 max P.9 mmHg  LV V1 max: 110.5 cm/sec  LV V1 VTI: 27.4 cm  PA acc time: 0.09 sec  AV Rickie Ratio (DI): 0.54  E/E' av.1  Lateral E/e': 5.8  Medial E/e': 10.4     ______________________________________________________________________________  Report approved by: Micheal Thompson 2021 04:02 PM

## 2021-12-08 NOTE — PLAN OF CARE
Pt here with syncope. A&Ox4. Neuros are intact. VSS. Tele NSR. Low fat and cardiac diet, thin liquids. Takes pills whole. Up with SBA. Skin is intact except baseline edema bi ankles an foot. Denies pain. Pt scoring green on the Aggression Stop Light Tool. Plan is to follow up with his cardiologist  and primary. Discharge pending today at home.

## 2021-12-08 NOTE — PLAN OF CARE
Occupational Therapy Discharge Summary    Reason for therapy discharge:    All goals and outcomes met, no further needs identified.    Progress towards therapy goal(s). See goals on Care Plan in [facility] electronic health record for goal details.  Goals met    Therapy recommendation(s):    No further therapy is recommended. Pt to have assistance from spouse for higher level IADLs.

## 2021-12-08 NOTE — PLAN OF CARE
"/78 (BP Location: Right arm)   Pulse 62   Temp 98.1  F (36.7  C) (Oral)   Resp 16   Ht 1.803 m (5' 11\")   Wt 99.8 kg (220 lb)   SpO2 95%   BMI 30.68 kg/m      Patient is here with syncope. Patient is completely alert and oriented, neurologically intact as well. VSS on RA. Tele NSR. Patient is currently on a low fat/cardiac diet with thin liquids. Takes pills whole. Up with stand-by assist. Skin is intact aside from baseline edema in the feet and ankles bilaterally. Denies pain. Patient is scoring green on the Aggression Stop Light Tool. Plan is to follow up with the patient's cardiologist and primary care provider. Patient will be transitioning back to his home following discharge, plans pending.  "

## 2021-12-08 NOTE — PROGRESS NOTES
No cardiac complaints. Echocardiogram showed normal EF 60-65%, normal RV size and function and mild LVH. No changes from prior study. Vitals and Tele stable. Loop interrogation negative for arrhythmia. Follow up with cardiology,  Dr. Mendoza, as scheduled.

## 2021-12-08 NOTE — CONSULTS
Care Management Initial Consult    General Information  Assessment completed with: Patient,    Type of CM/SW Visit: Offer D/C Planning    Primary Care Provider verified and updated as needed: Yes   Readmission within the last 30 days: no previous admission in last 30 days      Reason for Consult: discharge planning  Advance Care Planning: Advance Care Planning Reviewed: no concerns identified       Communication Assessment  Patient's communication style: spoken language (English or Bilingual)    Hearing Difficulty or Deaf: no   Wear Glasses or Blind: yes    Cognitive  Cognitive/Neuro/Behavioral: WDL  Level of Consciousness: alert  Arousal Level: opens eyes spontaneously  Orientation: oriented x 4  Mood/Behavior: calm,cooperative  Best Language: 0 - No aphasia  Speech: clear,spontaneous,logical    Living Environment:   People in home:       Current living Arrangements: house      Able to return to prior arrangements: yes       Family/Social Support:  Care provided by: self  Provides care for:    Marital Status:              Description of Support System:         Current Resources:   Patient receiving home care services: No     Community Resources: None  Equipment currently used at home: none  Supplies currently used at home:      Employment/Financial:  Employment Status:          Financial Concerns: No concerns identified      Lifestyle & Psychosocial Needs:  Social Determinants of Health     Tobacco Use: Medium Risk     Smoking Tobacco Use: Former Smoker     Smokeless Tobacco Use: Never Used   Alcohol Use: Not on file   Financial Resource Strain: Not on file   Food Insecurity: Not on file   Transportation Needs: Not on file   Physical Activity: Not on file   Stress: Not on file   Social Connections: Not on file   Intimate Partner Violence: Not on file   Depression: Not on file   Housing Stability: Not on file       Functional Status:  Prior to admission patient needed assistance:  no     Mental Health  Status:          Chemical Dependency Status:                Values/Beliefs:  Spiritual, Cultural Beliefs, Orthodox Practices, Values that affect care:                 Additional Information:  CM consulted for stroke-discharge planning.  Patient is observation status.  Stroke r/o by Neurology.  Met with patient.  Provided him with MOON.  He has no concerns about discharging to home.  He states he is at his baseline functioning.  He has been discharged by PT and OT who recommend discharge to home.    Care Management Discharge Note    Discharge Date: 12/08/2021     Discharge Disposition: Home    Discharge Services: None    Discharge DME: None    Discharge Transportation: family or friend will provide    Private pay costs discussed: Not applicable    PAS Confirmation Code:    Patient/family educated on Medicare website which has current facility and service quality ratings: no    Education Provided on the Discharge Plan:  yes  Persons Notified of Discharge Plans: n/a  Patient/Family in Agreement with the Plan: yes    Handoff Referral Completed: No    Additional Information:  No discharge needs identified.  Please re-consult CM if needs arise.    Skylar Butcher RN, BSN, PHN  Inpatient Care Coordination  [facility]  Phone: [phone]

## 2021-12-09 NOTE — PLAN OF CARE
Pt discharged to home, transported by family friend. Reviewed AVS, answered questions. Reviewed AVS with wife over the phone as well. PT discharged with ring, watch, clothes and wallet.

## 2021-12-12 NOTE — PROGRESS NOTES
[facility]      OUTPATIENT PHYSICAL THERAPY EVALUATION  PLAN OF TREATMENT FOR OUTPATIENT REHABILITATION  (COMPLETE FOR INITIAL CLAIMS ONLY)  Patient's Last Name, First Name, M.I.  YOB: 1940  Iván Nicholas                        Provider's Name  [facility] Medical Record No.  [id number]                               Onset Date:  12/06/21   Start of Care Date:  12/07/21      Type:     _X_PT   ___OT   ___SLP Medical Diagnosis:  syncope                        PT Diagnosis:  impaired functional mobility   Visits from SOC:  1   _________________________________________________________________________________  Plan of Treatment/Functional Goals    Planned Interventions:       Goals: See Physical Therapy Goals on Care Plan in [employer] electronic health record.    Therapy Frequency: One time eval and treatment only  Predicted Duration of Therapy Intervention: 1  _________________________________________________________________________________    I CERTIFY THE NEED FOR THESE SERVICES FURNISHED UNDER        THIS PLAN OF TREATMENT AND WHILE UNDER MY CARE     (Physician co-signature of this document indicates review and certification of the therapy plan).                Certification date from: 12/07/21, Certification date to: 12/07/21    Referring Physician: Vadim Chen MD            Initial Assessment        See Physical Therapy evaluation dated 12/07/21 in Epic electronic health record.

## 2021-12-12 NOTE — PROGRESS NOTES
12/07/21 1400   Quick Adds   Type of Visit Initial PT Evaluation   Living Environment   People in home spouse   Current Living Arrangements house   Home Accessibility stairs within home;stairs to enter home   Number of Stairs, Main Entrance 2   Stair Railings, Main Entrance none   Number of Stairs, Within Home, Primary greater than 10 stairs   Stair Railings, Within Home, Primary railing on right side (ascending)   Transportation Anticipated car, drives self;family or friend will provide   Living Environment Comments all needs met on first floor but goes to basement often to watch TV   Self-Care   Usual Activity Tolerance good   Current Activity Tolerance moderate   Regular Exercise Yes   Activity/Exercise Type walking   Equipment Currently Used at Home none   Activity/Exercise/Self-Care Comment IND for all mobility, ADLs/IADLs at baseline. Prior to covid, enjoyed going to the health club, has been doing walking more recently   Disability/Function   Fall history within last six months yes   Number of times patient has fallen within last six months 1   Change in Functional Status Since Onset of Current Illness/Injury no   General Information   Onset of Illness/Injury or Date of Surgery 12/06/21   Referring Physician Vadim Chen MD   Patient/Family Therapy Goals Statement (PT) not specified   Pertinent History of Current Problem (include personal factors and/or comorbidities that impact the POC) 81 year old male admitted on 12/7/2021. Presents after a syncopal event with subsequent left-leg weakness   Existing Precautions/Restrictions fall   Cognition   Orientation Status (Cognition) person;place;time   Affect/Mental Status (Cognition) WNL   Follows Commands (Cognition) WNL   Pain Assessment   Patient Currently in Pain No   Integumentary/Edema   Integumentary/Edema no deficits were identifed   Posture    Posture Not impaired   Range of Motion (ROM)   ROM Quick Adds ROM WFL   ROM Comment not formally tested but  evidenced by functional mobility   Strength Comprehensive (MMT)   General Manual Muscle Testing (MMT) Assessment no strength deficits identified   Comment, General Manual Muscle Testing (MMT) Assessment B LE equal and strong   Strength   Manual Muscle Testing Quick Adds Strength WFL   Bed Mobility   Bed Mobility No deficits identified   Transfers   Transfers sit-stand transfer   Sit-Stand Transfer   Sit-Stand [place] (Transfers) supervision   Gait/Stairs (Locomotion)   [place] Level (Gait) supervision   Distance in Feet (Required for LE Total Joints) 400 x2 (10 ft eval)   Pattern (Gait) step-through   Comment (Gait/Stairs) Pt amb with SBA and no AD. Performed DGI with pt scoring 21/24. Pt reports feeling slightly unsteady with first bout of gait. On second bout, pt donned slippers and pt reports increased stability.   Balance   Balance other (describe)   Balance Comments decreased stability noted with romberg stance, however able to maintain 20s   Sensory Examination   Sensory Perception WNL   Coordination   Coordination no deficits were identified   Muscle Tone   Muscle Tone no deficits were identified   Clinical Impression   Criteria for Skilled Therapeutic Intervention yes, treatment indicated   PT Diagnosis (PT) impaired functional mobility   Influenced by the following impairments impaired balance   Functional limitations due to impairments difficulty with dynamic mobility and balance tasks   Clinical Presentation Stable/Uncomplicated   Clinical Presentation Rationale based on current presentation, PMH, social support   Clinical Decision Making (Complexity) low complexity   Therapy Frequency (PT) One time eval and treatment only   Predicted Duration of Therapy Intervention (days/wks) 1   Risk & Benefits of therapy have been explained evaluation/treatment results reviewed;care plan/treatment goals reviewed;risks/benefits reviewed;current/potential barriers reviewed;participants voiced agreement with  care plan;participants included;patient   PT Discharge Planning    PT Discharge Recommendation (DC Rec) home   PT Rationale for DC Rec Patient is independent at baseline for mobility, ADLs, and IADLs. Pt currently able to demo functional mobility without use of AD and SBA. Testing 21/24 on DGI, not scoring as a falls risk. Anticipate that pt would be safe to discharge home when medically appropriate.   PT Brief overview of current status  SBA transfers and gait   Therapy Certification   Start of care date 12/07/21   Certification date from 12/07/21   Certification date to 12/07/21   Medical Diagnosis syncope   Total Evaluation Time   Total Evaluation Time (Minutes) 30

## 2021-12-20 ENCOUNTER — ANCILLARY PROCEDURE (OUTPATIENT)
Dept: CARDIOLOGY | Facility: CLINIC | Age: 81
End: 2021-12-20
Attending: INTERNAL MEDICINE
Payer: COMMERCIAL

## 2021-12-20 VITALS
DIASTOLIC BLOOD PRESSURE: 84 MMHG | WEIGHT: 227 LBS | HEART RATE: 51 BPM | SYSTOLIC BLOOD PRESSURE: 138 MMHG | BODY MASS INDEX: 32.5 KG/M2 | HEIGHT: 70 IN

## 2021-12-20 DIAGNOSIS — E66.811 CLASS 1 OBESITY DUE TO EXCESS CALORIES WITH SERIOUS COMORBIDITY AND BODY MASS INDEX (BMI) OF 32.0 TO 32.9 IN ADULT: ICD-10-CM

## 2021-12-20 DIAGNOSIS — R55 NEAR SYNCOPE: ICD-10-CM

## 2021-12-20 DIAGNOSIS — I47.29 PAROXYSMAL VENTRICULAR TACHYCARDIA (H): ICD-10-CM

## 2021-12-20 DIAGNOSIS — R55 SYNCOPE, UNSPECIFIED SYNCOPE TYPE: Primary | ICD-10-CM

## 2021-12-20 DIAGNOSIS — E78.00 PURE HYPERCHOLESTEROLEMIA: ICD-10-CM

## 2021-12-20 DIAGNOSIS — E66.09 CLASS 1 OBESITY DUE TO EXCESS CALORIES WITH SERIOUS COMORBIDITY AND BODY MASS INDEX (BMI) OF 32.0 TO 32.9 IN ADULT: ICD-10-CM

## 2021-12-20 DIAGNOSIS — I10 ESSENTIAL HYPERTENSION, BENIGN: ICD-10-CM

## 2021-12-20 DIAGNOSIS — I25.118 CORONARY ARTERY DISEASE OF NATIVE ARTERY OF NATIVE HEART WITH STABLE ANGINA PECTORIS (H): Chronic | ICD-10-CM

## 2021-12-20 DIAGNOSIS — R55 SYNCOPE: ICD-10-CM

## 2021-12-20 PROCEDURE — 93297 REM INTERROG DEV EVAL ICPMS: CPT | Performed by: INTERNAL MEDICINE

## 2021-12-20 PROCEDURE — G2066 INTER DEVC REMOTE 30D: HCPCS | Performed by: INTERNAL MEDICINE

## 2021-12-20 PROCEDURE — 99214 OFFICE O/P EST MOD 30 MIN: CPT | Mod: 25 | Performed by: INTERNAL MEDICINE

## 2021-12-20 RX ORDER — ISOSORBIDE MONONITRATE 30 MG/1
30 TABLET, EXTENDED RELEASE ORAL DAILY
Qty: 90 TABLET | Refills: 3
Start: 2021-12-20 | End: 2024-01-22

## 2021-12-20 ASSESSMENT — MIFFLIN-ST. JEOR: SCORE: 1740.92

## 2021-12-20 NOTE — PROGRESS NOTES
HPI and Plan:   See dictation    Today's clinic visit entailed:  Review of the result(s) of each unique test - Reveal interpretation, lab work, hospital records  Ordering of each unique test  Prescription drug management  32 minutes spent on the date of the encounter doing chart review, history and exam, documentation and further activities per the note  Provider  Link to [facility] Help Grid     The level of medical decision making during this visit was of moderate complexity.      Orders Placed This Encounter   Procedures     Lipid Profile     Basic metabolic panel     Lipid Profile     Follow-Up with Cardiac Advanced Practice Provider     Follow-Up with Cardiologist       Orders Placed This Encounter   Medications     isosorbide mononitrate (IMDUR) 30 MG 24 hr tablet     Sig: Take 1 tablet (30 mg) by mouth daily Appointment due In April     Dispense:  90 tablet     Refill:  3       Medications Discontinued During This Encounter   Medication Reason     isosorbide mononitrate (IMDUR) 60 MG 24 hr tablet          Encounter Diagnoses   Name Primary?     Coronary artery disease of native artery of native heart with stable angina pectoris (H)      Syncope, unspecified syncope type Yes     Near syncope      Paroxysmal ventricular tachycardia (H)      Pure hypercholesterolemia      Essential hypertension, benign      Class 1 obesity due to excess calories with serious comorbidity and body mass index (BMI) of 32.0 to 32.9 in adult        CURRENT MEDICATIONS:  Current Outpatient Medications   Medication Sig Dispense Refill     acetaminophen (TYLENOL) 325 MG tablet Take 2 tablets (650 mg) by mouth every 4 hours as needed for mild pain or headaches       ALLOPURINOL PO Take 100 mg by mouth daily. To prevent gout attacks, recently started.        aspirin (ASA) 81 MG chewable tablet Take 1 tablet (81 mg) by mouth daily       ezetimibe (ZETIA) 10 MG tablet Take 10 mg by mouth At Bedtime        hypromellose-dextran (ARTIFICAL TEARS)  0.1-0.3 % ophthalmic solution Place 1 drop into both eyes 2 times daily as needed for dry eyes       isosorbide mononitrate (IMDUR) 30 MG 24 hr tablet Take 1 tablet (30 mg) by mouth daily Appointment due In April 90 tablet 3     losartan (COZAAR) 100 MG tablet Take 100 mg by mouth daily       metoprolol succinate ER (TOPROL-XL) 50 MG 24 hr tablet Take 1 tablet (50 mg) by mouth daily (Patient taking differently: Take 50 mg by mouth every evening ) 90 tablet 3     Multiple Vitamins-Minerals (CENTRUM SILVER) per tablet Take 1 tablet by mouth daily.       Multiple Vitamins-Minerals (PRESERVISION AREDS 2) CAPS Take 1 capsule by mouth 2 times daily        nitroGLYcerin (NITROSTAT) 0.4 MG sublingual tablet Place 1 tablet (0.4 mg) under the tongue every 5 minutes as needed for chest pain 25 tablet 11     pantoprazole (PROTONIX) 40 MG EC tablet Take 40 mg by mouth daily       primidone (MYSOLINE) 250 MG tablet Take 250 mg by mouth At Bedtime       primidone (MYSOLINE) 250 MG tablet Take 500 mg by mouth every morning        rosuvastatin (CRESTOR) 40 MG tablet Take 1 tablet by mouth daily. 30 tablet 1       ALLERGIES     Allergies   Allergen Reactions     Oxycodone Other (See Comments)     Confused & angry     Statins [Hmg-Coa-R Inhibitors] Muscle Pain (Myalgia)     lipitor     Lisinopril Cough       PAST MEDICAL HISTORY:  Past Medical History:   Diagnosis Date     Carotid artery stenosis     Right, s/p right CEA 11/9/2016     Cerebral artery occlusion with cerebral infarction (H) 2016    TIA -  Endarterectomy...No residual     Colon polyps      Color blind      Coronary artery disease     2/2012 - MERARI to mid LAD     Decreased hearing      Depression      Gout      Hypertension      Hypertrophy of prostate without urinary obstruction and other lower urinary tract symptoms (LUTS)      Onychomycosis of toenail      Paroxysmal ventricular tachycardia (H)     with stress test 2012     Syncope and collapse 6/16/2021       PAST  SURGICAL HISTORY:  Past Surgical History:   Procedure Laterality Date     ANGIOPLASTY  1991     ARTHROPLASTY KNEE Left 11/12/2018    Procedure: Left total knee arthroplasty;  Surgeon: Matt Schaefer MD;  Location:  OR     COLONOSCOPY  12/13/2011    Procedure:COLONOSCOPY; COLONOSCOPY; Surgeon:EMMANUELLE MOSER; Location: GI     CORONARY ANGIOGRAPHY ADULT ORDER  1991,2012 1991 - stent to proximal LAD, 2012 - Stent to mid LAD     CV HEART CATHETERIZATION WITH POSSIBLE INTERVENTION Left 4/23/2019    Procedure: Coronary Angiogram;  Surgeon: Percy Armas MD;  Location:  HEART CARDIAC CATH LAB     CV HEART CATHETERIZATION WITH POSSIBLE INTERVENTION N/A 12/10/2019    Procedure: Heart Catheterization with Possible Intervention;  Surgeon: Dajuan Duvall MD;  Location:  HEART CARDIAC CATH LAB     CV PCI ANGIOPLASTY N/A 4/23/2019    Procedure: PCI Angioplasty;  Surgeon: Percy Armas MD;  Location:  HEART CARDIAC CATH LAB     ENDARTERECTOMY CAROTID Right 11/10/2016    Procedure: ENDARTERECTOMY CAROTID;  Surgeon: Paco Suresh MD;  Location:  OR     EP COMPREHENSIVE EP STUDY N/A 9/7/2021    Procedure: EP Comprehensive EP Study;  Surgeon: Jas Peralta MD;  Location:  HEART CARDIAC CATH LAB     HEART CATH, ANGIOPLASTY       ORTHOPEDIC SURGERY       PHACOEMULSIFICATION CLEAR CORNEA WITH STANDARD INTRAOCULAR LENS IMPLANT  12/19/2013    Procedure: PHACOEMULSIFICATION CLEAR CORNEA WITH STANDARD INTRAOCULAR LENS IMPLANT;  RIGHT PHACOEMULSIFICATION CLEAR CORNEA WITH STANDARD INTRAOCULAR LENS IMPLANT ;  Surgeon: Luisito Juan MD;  Location: [facility]       FAMILY HISTORY:  Family History   Problem Relation Age of Onset     Heart Disease Mother 83     Heart Disease Father 69        emphysema     Coronary Artery Disease Brother      Coronary Artery Disease Sister        SOCIAL HISTORY:  Social History     Socioeconomic History     Marital status:      Spouse name: None      "Number of children: None     Years of education: None     Highest education level: None   Occupational History     None   Tobacco Use     Smoking status: Former Smoker     Packs/day: 0.50     Years: 20.00     Pack years: 10.00     Types: Cigarettes     Quit date: 1990     Years since quittin.0     Smokeless tobacco: Never Used   Substance and Sexual Activity     Alcohol use: Yes     Alcohol/week: 0.0 standard drinks     Comment: daily - drinks x2     Drug use: No     Sexual activity: Never   Other Topics Concern     Parent/sibling w/ CABG, MI or angioplasty before 65F 55M? Not Asked      Service Not Asked     Blood Transfusions Not Asked     Caffeine Concern No     Comment: 1-2 cups daily     Occupational Exposure Not Asked     Hobby Hazards Not Asked     Sleep Concern No     Stress Concern No     Weight Concern Not Asked     Special Diet No     Comment: trying to watch sodium intake     Back Care Not Asked     Exercise No     Comment: some walking     Bike Helmet Not Asked     Seat Belt Not Asked     Self-Exams Not Asked   Social History Narrative     None     Social Determinants of Health     Financial Resource Strain: Not on file   Food Insecurity: Not on file   Transportation Needs: Not on file   Physical Activity: Not on file   Stress: Not on file   Social Connections: Not on file   Intimate Partner Violence: Not on file   Housing Stability: Not on file       Review of Systems:  Skin:  Positive for itching     Eyes:  Positive for glasses    ENT:  Positive for hearing loss    Respiratory:  Negative       Cardiovascular:    syncope or near-syncope;Positive for    Gastroenterology: Negative      Genitourinary:  not assessed      Musculoskeletal:  Negative      Neurologic:  Negative      Psychiatric:  Negative      Heme/Lymph/Imm:  Negative      Endocrine:  Negative        Physical Exam:  Vitals: /84   Pulse 51   Ht 1.778 m (5' 10\")   Wt 103 kg (227 lb)   BMI 32.57 kg/m  "     Constitutional:  cooperative, alert and oriented, well developed, well nourished, in no acute distress        Skin:  warm and dry to the touch, no apparent skin lesions or masses noted     right CEA scar    Head:  normocephalic, no masses or lesions        Eyes:  pupils equal and round;conjunctivae and lids unremarkable;sclera white;no xanthalasma        Lymph:      ENT:  no pallor or cyanosis, dentition good        Neck:  carotid pulses are full and equal bilaterally;no carotid bruit   prominent carotid pulsations at base of neck bilaterally. Well-healed right carotid endarterectomy scar    Respiratory:  no use of accessory muscles;normal symmetry basal rales       Cardiac: regular rhythm;normal S1 and S2       systolic murmur;grade 1;RUSB        pulses full and equal                                        GI:    obese      Extremities and Muscular Skeletal:  no spinal abnormalities noted;normal muscle strength and tone   bilateral LE edema;R greater than L;trace;1+          Neurological:  no gross motor deficits        Psych:  affect appropriate, oriented to time, person and place        CC  Akhil Mendoza MD  [address]  [place] [id number]

## 2021-12-20 NOTE — PROGRESS NOTES
Service Date: 12/20/2021    CLINIC VISIT    HISTORY OF PRESENT ILLNESS:  Grace is a very nice 81-year-old gentleman who appears 10 years younger than his stated age.  Coronary history dates back to angioplasty of his left anterior descending artery in 1991, stenting of his left anterior descending artery in 2012 due to crescendo angina and an abnormal stress test.  We jailed the diagonal.   It did not appear to be significantly compromised.  Subsequent stress test did not show any ischemia.  He was left with chronic stable exertional angina at a high workload ever since.  We took him back to the Cath Lab later in 2012.  FFR of the jailed diagonal was 0.8 and again, we decided to treat him medically.  In 2019, due to crescendo angina, he underwent stenting of his distal right coronary artery.  Later, in 12/2019, due to chest pain syndrome and a non-ST segment elevation myocardial infarction, we brought him back to the lab where he was found to have no flow-limiting stenoses.  First diagonal had a 70% stenosis with an FFR of 0.87.  Ramus intermedius branch had a 50% stenosis with a negative FFR.  I reviewed his angiogram and agree that there does not appear to be any flow-limiting blockages.  We have been treating him with Imdur.    Grace also has obesity, hyperlipidemia, hypertension and peripheral edema, right leg worse than left.    When I saw Grace last, he had had a series of falling spells.  Unclear whether these were syncopal spells versus tripping, although in talking to him, they did not sound like they were all mechanical.  He states he does get dehydrated at times and does not drink enough fluids.  We did embark on an evaluation that included a stress nuclear scan in June that appeared to be negative for ischemia with ejection fraction of 71%.  I referred him to EP and Dr. Aroldo Molina conducted an EP study and found to be noninducible and implanted a Reveal device.  Of note, he previously did have 1  monitoring that did appear to show a 17-beat run of ventricular tachycardia leading up to his EP study.    More recently, on 12/07, Grace had another falling spell in his garage.  He states he had put the trash out.  He was returning.  He states he is not sure whether he stumbled but clearly had a near-syncopal spell.  He does remember putting his hand out, breaking the fall.  He did not hurt himself.  He was hospitalized.  His device was interrogated and there were no significant arrhythmias.  He states he had not been eating well that day and it was thought that it was probably hypotension and intravascular volume depletion.    Grace returns to clinic today stating that he is feeling great.  He has no chest, arm, neck, jaw or shoulder discomfort.  No lightheadedness, dizziness, syncope or near syncope.  Unaware of any arrhythmias.  His ankle edema is at baseline.    ASSESSMENT AND PLAN:  Grace has no symptoms at this time to suggest ischemia and this is supported by his stress test of last June.  While he was in the hospital a week ago, he did have a followup echocardiogram and this demonstrated an ejection fraction of 60%-65%.  No significant valvular pathology.  There was mild dilatation of his aorta.  Inferior vena cava was dilated with abnormal respiratory variation.  Pulmonary pressures could not be estimated, as the tricuspid regurgitation was not significant enough.    Grace has no symptoms to suggest heart failure.    No significant arrhythmias detected on his Reveal.    I suspect his syncope probably was intravascular volume depletion.  I suspect his Imdur may be contributing to this and I will decrease his Imdur from 60 mg to 30 mg.  I will have him follow up with my FLORENCIA in 1 month at which time I may consider cutting his Imdur back further if he is not having any anginal symptoms.  Again, the Imdur would make him more prone to hypotension in the setting of intravascular volume depletion.    Fasting  lipid profile was checked in the ER on his recent admission.  Overall, it is good except the triglycerides are quite high.  Total cholesterol is 167, HDL is 54, LDL is 49, triglycerides are 322.  Obviously, this is not a fasting lipid profile and if he does have elevated glucose  contributing to triglycerides, this could also cause intravascular volume depletion.  I will recheck a fasting lipid profile when he comes back for his visit with my FLORENCIA in 1 month for a more accurate measurement.    Glucoses in the chart are only mildly elevated at 115 and 126.  Again, I think his triglycerides were probably high because it was nonfasting but we will follow up on this in 1 month.    I have encouraged him to continue with his regular exercise regimen.    Blood pressure is well controlled today at 138/84 with a pulse of 51.  If he continues to have spells, I may decrease his metoprolol to 25 mg daily, allowing his heart rate come up higher.    Thank you for allowing me to participate in his care.  Over 30 minutes was spent with Grace today, reviewing his various studies and discussing his issues.    Akhil Mendoza MD, [facility]        D: 2021   T: 2021   MT: angela    Name:     GREG VALDERRAMA  MRN:      [id number]        Account:      [id number]   :      1940           Service Date: 2021       Document: [id number]

## 2021-12-20 NOTE — LETTER
12/20/2021    Stephan Nice MD  [facility] [address]  [facility] [address]    RE: Iván Nicholas       Dear Colleague,     I had the pleasure of seeing Iván Nicholas in the [facility].  HPI and Plan:   See dictation    Today's clinic visit entailed:  Review of the result(s) of each unique test - Reveal interpretation, lab work, hospital records  Ordering of each unique test  Prescription drug management  32 minutes spent on the date of the encounter doing chart review, history and exam, documentation and further activities per the note  Provider  Link to [facility] Help Grid     The level of medical decision making during this visit was of moderate complexity.      Orders Placed This Encounter   Procedures     Lipid Profile     Basic metabolic panel     Lipid Profile     Follow-Up with Cardiac Advanced Practice Provider     Follow-Up with Cardiologist       Orders Placed This Encounter   Medications     isosorbide mononitrate (IMDUR) 30 MG 24 hr tablet     Sig: Take 1 tablet (30 mg) by mouth daily Appointment due In April     Dispense:  90 tablet     Refill:  3       Medications Discontinued During This Encounter   Medication Reason     isosorbide mononitrate (IMDUR) 60 MG 24 hr tablet          Encounter Diagnoses   Name Primary?     Coronary artery disease of native artery of native heart with stable angina pectoris (H)      Syncope, unspecified syncope type Yes     Near syncope      Paroxysmal ventricular tachycardia (H)      Pure hypercholesterolemia      Essential hypertension, benign      Class 1 obesity due to excess calories with serious comorbidity and body mass index (BMI) of 32.0 to 32.9 in adult        CURRENT MEDICATIONS:  Current Outpatient Medications   Medication Sig Dispense Refill     acetaminophen (TYLENOL) 325 MG tablet Take 2 tablets (650 mg) by mouth every 4 hours as needed for mild pain or headaches       ALLOPURINOL PO Take 100 mg by mouth  daily. To prevent gout attacks, recently started.        aspirin (ASA) 81 MG chewable tablet Take 1 tablet (81 mg) by mouth daily       ezetimibe (ZETIA) 10 MG tablet Take 10 mg by mouth At Bedtime        hypromellose-dextran (ARTIFICAL TEARS) 0.1-0.3 % ophthalmic solution Place 1 drop into both eyes 2 times daily as needed for dry eyes       isosorbide mononitrate (IMDUR) 30 MG 24 hr tablet Take 1 tablet (30 mg) by mouth daily Appointment due In April 90 tablet 3     losartan (COZAAR) 100 MG tablet Take 100 mg by mouth daily       metoprolol succinate ER (TOPROL-XL) 50 MG 24 hr tablet Take 1 tablet (50 mg) by mouth daily (Patient taking differently: Take 50 mg by mouth every evening ) 90 tablet 3     Multiple Vitamins-Minerals (CENTRUM SILVER) per tablet Take 1 tablet by mouth daily.       Multiple Vitamins-Minerals (PRESERVISION AREDS 2) CAPS Take 1 capsule by mouth 2 times daily        nitroGLYcerin (NITROSTAT) 0.4 MG sublingual tablet Place 1 tablet (0.4 mg) under the tongue every 5 minutes as needed for chest pain 25 tablet 11     pantoprazole (PROTONIX) 40 MG EC tablet Take 40 mg by mouth daily       primidone (MYSOLINE) 250 MG tablet Take 250 mg by mouth At Bedtime       primidone (MYSOLINE) 250 MG tablet Take 500 mg by mouth every morning        rosuvastatin (CRESTOR) 40 MG tablet Take 1 tablet by mouth daily. 30 tablet 1       ALLERGIES     Allergies   Allergen Reactions     Oxycodone Other (See Comments)     Confused & angry     Statins [Hmg-Coa-R Inhibitors] Muscle Pain (Myalgia)     lipitor     Lisinopril Cough       PAST MEDICAL HISTORY:  Past Medical History:   Diagnosis Date     Carotid artery stenosis     Right, s/p right CEA 11/9/2016     Cerebral artery occlusion with cerebral infarction (H) 2016    TIA -  Endarterectomy...No residual     Colon polyps      Color blind      Coronary artery disease     2/2012 - MERARI to mid LAD     Decreased hearing      Depression      Gout      Hypertension       Hypertrophy of prostate without urinary obstruction and other lower urinary tract symptoms (LUTS)      Onychomycosis of toenail      Paroxysmal ventricular tachycardia (H)     with stress test 2012     Syncope and collapse 6/16/2021       PAST SURGICAL HISTORY:  Past Surgical History:   Procedure Laterality Date     ANGIOPLASTY  1991     ARTHROPLASTY KNEE Left 11/12/2018    Procedure: Left total knee arthroplasty;  Surgeon: Matt Schaefer MD;  Location:  OR     COLONOSCOPY  12/13/2011    Procedure:COLONOSCOPY; COLONOSCOPY; Surgeon:EMMANUELLE MOSER; Location: GI     CORONARY ANGIOGRAPHY ADULT ORDER  1991,2012 1991 - stent to proximal LAD, 2012 - Stent to mid LAD     CV HEART CATHETERIZATION WITH POSSIBLE INTERVENTION Left 4/23/2019    Procedure: Coronary Angiogram;  Surgeon: Percy Armas MD;  Location:  HEART CARDIAC CATH LAB     CV HEART CATHETERIZATION WITH POSSIBLE INTERVENTION N/A 12/10/2019    Procedure: Heart Catheterization with Possible Intervention;  Surgeon: Dajuan Duvall MD;  Location:  HEART CARDIAC CATH LAB     CV PCI ANGIOPLASTY N/A 4/23/2019    Procedure: PCI Angioplasty;  Surgeon: Percy Armas MD;  Location:  HEART CARDIAC CATH LAB     ENDARTERECTOMY CAROTID Right 11/10/2016    Procedure: ENDARTERECTOMY CAROTID;  Surgeon: Paco Suresh MD;  Location:  OR     EP COMPREHENSIVE EP STUDY N/A 9/7/2021    Procedure: EP Comprehensive EP Study;  Surgeon: Jas Peralta MD;  Location:  HEART CARDIAC CATH LAB     HEART CATH, ANGIOPLASTY       ORTHOPEDIC SURGERY       PHACOEMULSIFICATION CLEAR CORNEA WITH STANDARD INTRAOCULAR LENS IMPLANT  12/19/2013    Procedure: PHACOEMULSIFICATION CLEAR CORNEA WITH STANDARD INTRAOCULAR LENS IMPLANT;  RIGHT PHACOEMULSIFICATION CLEAR CORNEA WITH STANDARD INTRAOCULAR LENS IMPLANT ;  Surgeon: Luisito Juan MD;  [facility]       FAMILY HISTORY:  Family History   Problem Relation Age of Onset     Heart Disease  Mother 83     Heart Disease Father 69        emphysema     Coronary Artery Disease Brother      Coronary Artery Disease Sister        SOCIAL HISTORY:  Social History     Socioeconomic History     Marital status:      Spouse name: None     Number of children: None     Years of education: None     Highest education level: None   Occupational History     None   Tobacco Use     Smoking status: Former Smoker     Packs/day: 0.50     Years: 20.00     Pack years: 10.00     Types: Cigarettes     Quit date: 1990     Years since quittin.0     Smokeless tobacco: Never Used   Substance and Sexual Activity     Alcohol use: Yes     Alcohol/week: 0.0 standard drinks     Comment: daily - drinks x2     Drug use: No     Sexual activity: Never   Other Topics Concern     Parent/sibling w/ CABG, MI or angioplasty before 65F 55M? Not Asked      Service Not Asked     Blood Transfusions Not Asked     Caffeine Concern No     Comment: 1-2 cups daily     Occupational Exposure Not Asked     Hobby Hazards Not Asked     Sleep Concern No     Stress Concern No     Weight Concern Not Asked     Special Diet No     Comment: trying to watch sodium intake     Back Care Not Asked     Exercise No     Comment: some walking     Bike Helmet Not Asked     Seat Belt Not Asked     Self-Exams Not Asked   Social History Narrative     None     Social Determinants of Health     Financial Resource Strain: Not on file   Food Insecurity: Not on file   Transportation Needs: Not on file   Physical Activity: Not on file   Stress: Not on file   Social Connections: Not on file   Intimate Partner Violence: Not on file   Housing Stability: Not on file       Review of Systems:  Skin:  Positive for itching     Eyes:  Positive for glasses    ENT:  Positive for hearing loss    Respiratory:  Negative       Cardiovascular:    syncope or near-syncope;Positive for    Gastroenterology: Negative      Genitourinary:  not assessed      Musculoskeletal:   "Negative      Neurologic:  Negative      Psychiatric:  Negative      Heme/Lymph/Imm:  Negative      Endocrine:  Negative        Physical Exam:  Vitals: /84   Pulse 51   Ht 1.778 m (5' 10\")   Wt 103 kg (227 lb)   BMI 32.57 kg/m      Constitutional:  cooperative, alert and oriented, well developed, well nourished, in no acute distress        Skin:  warm and dry to the touch, no apparent skin lesions or masses noted     right CEA scar    Head:  normocephalic, no masses or lesions        Eyes:  pupils equal and round;conjunctivae and lids unremarkable;sclera white;no xanthalasma        Lymph:      ENT:  no pallor or cyanosis, dentition good        Neck:  carotid pulses are full and equal bilaterally;no carotid bruit   prominent carotid pulsations at base of neck bilaterally. Well-healed right carotid endarterectomy scar    Respiratory:  no use of accessory muscles;normal symmetry basal rales       Cardiac: regular rhythm;normal S1 and S2       systolic murmur;grade 1;RUSB        pulses full and equal                                        GI:    obese      Extremities and Muscular Skeletal:  no spinal abnormalities noted;normal muscle strength and tone   bilateral LE edema;R greater than L;trace;1+          Neurological:  no gross motor deficits        Psych:  affect appropriate, oriented to time, person and place        CC  Akhil Mendoza MD  [address]  [place] [id number]      Thank you for allowing me to participate in the care of your patient.      Sincerely,     Akhil Mendoza MD     [facility]  "

## 2022-01-04 LAB
MDC_IDC_EPISODE_DTM: NORMAL
MDC_IDC_EPISODE_ID: NORMAL
MDC_IDC_EPISODE_TYPE: NORMAL
MDC_IDC_MSMT_BATTERY_DTM: NORMAL
MDC_IDC_MSMT_BATTERY_STATUS: NORMAL
MDC_IDC_PG_IMPLANT_DTM: NORMAL
MDC_IDC_PG_MFG: NORMAL
MDC_IDC_PG_MODEL: NORMAL
MDC_IDC_PG_SERIAL: NORMAL
MDC_IDC_PG_TYPE: NORMAL
MDC_IDC_SESS_CLINIC_NAME: NORMAL
MDC_IDC_SESS_DTM: NORMAL
MDC_IDC_SESS_TYPE: NORMAL
MDC_IDC_STAT_AT_BURDEN_PERCENT: 1 %
MDC_IDC_STAT_AT_DTM_END: NORMAL
MDC_IDC_STAT_AT_DTM_START: NORMAL

## 2022-01-18 ENCOUNTER — OFFICE VISIT (OUTPATIENT)
Dept: CARDIOLOGY | Facility: CLINIC | Age: 82
End: 2022-01-18
Attending: INTERNAL MEDICINE
Payer: COMMERCIAL

## 2022-01-18 ENCOUNTER — LAB (OUTPATIENT)
Dept: LAB | Facility: CLINIC | Age: 82
End: 2022-01-18
Payer: COMMERCIAL

## 2022-01-18 VITALS
BODY MASS INDEX: 31.92 KG/M2 | WEIGHT: 223 LBS | SYSTOLIC BLOOD PRESSURE: 118 MMHG | HEIGHT: 70 IN | OXYGEN SATURATION: 98 % | DIASTOLIC BLOOD PRESSURE: 65 MMHG | HEART RATE: 62 BPM

## 2022-01-18 DIAGNOSIS — I10 ESSENTIAL HYPERTENSION, BENIGN: Primary | ICD-10-CM

## 2022-01-18 DIAGNOSIS — R55 SYNCOPE, UNSPECIFIED SYNCOPE TYPE: ICD-10-CM

## 2022-01-18 DIAGNOSIS — I25.118 CORONARY ARTERY DISEASE OF NATIVE ARTERY OF NATIVE HEART WITH STABLE ANGINA PECTORIS (H): Chronic | ICD-10-CM

## 2022-01-18 LAB
CHOLEST SERPL-MCNC: 158 MG/DL
FASTING STATUS PATIENT QL REPORTED: YES
HDLC SERPL-MCNC: 67 MG/DL
LDLC SERPL CALC-MCNC: 75 MG/DL
NONHDLC SERPL-MCNC: 91 MG/DL
TRIGL SERPL-MCNC: 82 MG/DL

## 2022-01-18 PROCEDURE — 99214 OFFICE O/P EST MOD 30 MIN: CPT | Performed by: PHYSICIAN ASSISTANT

## 2022-01-18 PROCEDURE — 36415 COLL VENOUS BLD VENIPUNCTURE: CPT

## 2022-01-18 PROCEDURE — 80061 LIPID PANEL: CPT

## 2022-01-18 RX ORDER — LOSARTAN POTASSIUM 50 MG/1
50 TABLET ORAL DAILY
COMMUNITY
Start: 2022-01-18

## 2022-01-18 ASSESSMENT — MIFFLIN-ST. JEOR: SCORE: 1722.77

## 2022-01-18 NOTE — PATIENT INSTRUCTIONS
Due to your lightheadedness, decrease losartan from 100 mg to 50 mg.   You can use up your 100 mg tablets by breaking them in half.   See me back in 1 month to follow up on your blood pressure.

## 2022-01-18 NOTE — LETTER
2022    Stephan Nice MD  [facility] [address]  [place] [address]    RE: Iván Nicholas       Dear Colleague,     I had the pleasure of seeing Iván Nicholas in the [facility].      CARDIOLOGY CLINIC PROGRESS NOTE    DOS: 2022      Iván Nicholas  : 1940, 81 year old  MRN: [id number]      History:  I had the pleasure of following up with Iván Nicholas today in the cardiology clinic.   He is a patient of Dr. Mendoza.      Grace is a pleasant 81-year-old gentleman with past medical history notable for coronary artery disease.  He had angioplasty of his LAD back in , LAD stenting in early  with a jailed diagonal vessel, later that year repeat coronary angiogram was performed which showed stable diagonal vessel with FFR of 0.8 and therefore medical therapy was recommended.  Also hyperlipidemia, hypertension, carotid artery disease (history of right carotid endarterectomy in ), left bundle branch block, history of tobacco use, chronic angina since his last angiogram that was treated with Imdur, nonischemic cardiomyopathy, falls/syncope.      Cardiac catheterization 19.  95% distal RCA s/p MERARI.  Started on Plavix.       19 Dr. Mendoza stopped amlodipine and started lisinopril 20 mg and chlorthalidone 25 mg daily.     - 19 Hosp Admission at [facility] due to chest pain/tightness radiating to back and arms.   Dr. Morales consulted for NSTEMI.   12/10/19 cardiac cath:  no new lesions found.   12/10/19 Echo:  LVEF 35% with WMA suggestive of stress cardiomyopathy.     19 had epistaxis, resolved with Afrin.  Continued DAPT due to stent in 2019.  MEDS:  SWITCHED propranolol to Toprol XL 50 mg daily.   STOPPED Chlorthalidone.   DECREASED lisinopril from 20 to 5 mg.   Continued PTA Imdur 60 mg.      19.  He had a cough since starting lisinopril, so we switched to losartan.      20 Echo: showed  significant improvement in the LVEF from 36% to 55-60%.  There is mild to moderate concentric left ventricular hypertrophy.       5/2020 came off Plavix.      6/16/21 with Dr. Mendoza he noted atypical chest pain, recurrent falls vs syncope.     6/21/21 Lexsican: negative for ischemia.       6/28/21 14 day Ziopatch: 1 run VT 14.6 sec, 242 runs SVT up to 20 beats.  Average sinus rate is 61 bpm.       8/5/21 Exercise stress test: negative for ischemia      Seen by Dr. Aroldo Molina 163/83, but had forgotten to take his medications that day.  His losartan was increased from 25 mg to 100 mg.  Dr. Molina conducted an EP study and found to be noninducible VT and he implanted a Reveal device.       12/07/21, Grace had another falling spell in his garage.  He was hospitalized.  His device was interrogated and there were no significant arrhythmias.  It was thought that it was probably hypotension and intravascular volume depletion.     12/20/21 Dr. Mendoza decreased Imdur from 60 mg to 30 mg.     Grace returns to clinic today stating that he is feeling well.  On lower dose Imdur, no increase in chest pain.  No recurrence of syncope or near syncope.  Some LH with position changes.  BP today is 118.   Unaware of any arrhythmias.    He has some mild edema in the right ankle that has been chronic and not changed or increased.    He walks a few days a week, about 1 mile.       ROS:  Skin:  Negative     Eyes:  Negative    ENT:  Positive for hearing loss  Respiratory:  Negative    Cardiovascular:    lightheadedness;Positive for  Gastroenterology: Negative    Genitourinary:  not assessed    Musculoskeletal:  Positive for    Neurologic:  Negative    Psychiatric:  Negative    Heme/Lymph/Imm:  Negative    Endocrine:  Negative      PAST MEDICAL HISTORY:  Past Medical History:   Diagnosis Date     Carotid artery stenosis     Right, s/p right CEA 11/9/2016     Cerebral artery occlusion with cerebral infarction (H) 2016    TIA -   Endarterectomy...No residual     Colon polyps      Color blind      Coronary artery disease     2/2012 - MERARI to mid LAD     Decreased hearing      Depression      Gout      Hypertension      Hypertrophy of prostate without urinary obstruction and other lower urinary tract symptoms (LUTS)      Onychomycosis of toenail      Paroxysmal ventricular tachycardia (H)     with stress test 2012     Syncope and collapse 6/16/2021       PAST SURGICAL HISTORY:  Past Surgical History:   Procedure Laterality Date     ANGIOPLASTY  1991     ARTHROPLASTY KNEE Left 11/12/2018    Procedure: Left total knee arthroplasty;  Surgeon: Matt Schaefer MD;  Location:  OR     COLONOSCOPY  12/13/2011    Procedure:COLONOSCOPY; COLONOSCOPY; Surgeon:EMMANUELLE MOSER; Location: GI     CORONARY ANGIOGRAPHY ADULT ORDER  1991,2012 1991 - stent to proximal LAD, 2012 - Stent to mid LAD     CV HEART CATHETERIZATION WITH POSSIBLE INTERVENTION Left 4/23/2019    Procedure: Coronary Angiogram;  Surgeon: Percy Armas MD;  Location:  HEART CARDIAC CATH LAB     CV HEART CATHETERIZATION WITH POSSIBLE INTERVENTION N/A 12/10/2019    Procedure: Heart Catheterization with Possible Intervention;  Surgeon: Dajuan Duvall MD;  Location:  HEART CARDIAC CATH LAB     CV PCI ANGIOPLASTY N/A 4/23/2019    Procedure: PCI Angioplasty;  Surgeon: Percy Armas MD;  Location: [facility] CARDIAC CATH LAB     ENDARTERECTOMY CAROTID Right 11/10/2016    Procedure: ENDARTERECTOMY CAROTID;  Surgeon: Paco Suresh MD;  Location:  OR     EP COMPREHENSIVE EP STUDY N/A 9/7/2021    Procedure: EP Comprehensive EP Study;  Surgeon: Jas Peralta MD;  Location:  HEART CARDIAC CATH LAB     HEART CATH, ANGIOPLASTY       ORTHOPEDIC SURGERY       PHACOEMULSIFICATION CLEAR CORNEA WITH STANDARD INTRAOCULAR LENS IMPLANT  12/19/2013    Procedure: PHACOEMULSIFICATION CLEAR CORNEA WITH STANDARD INTRAOCULAR LENS IMPLANT;  RIGHT PHACOEMULSIFICATION CLEAR  CORNEA WITH STANDARD INTRAOCULAR LENS IMPLANT ;  Surgeon: Luisito Juan MD;  Location: [facility]       SOCIAL HISTORY:  Social History     Socioeconomic History     Marital status:      Spouse name: None     Number of children: None     Years of education: None     Highest education level: None   Occupational History     None   Tobacco Use     Smoking status: Former Smoker     Packs/day: 0.50     Years: 20.00     Pack years: 10.00     Types: Cigarettes     Quit date: 1990     Years since quittin.1     Smokeless tobacco: Never Used   Substance and Sexual Activity     Alcohol use: Yes     Alcohol/week: 0.0 standard drinks     Comment: daily - drinks x2     Drug use: No     Sexual activity: Never   Other Topics Concern     Parent/sibling w/ CABG, MI or angioplasty before 65F 55M? Not Asked      Service Not Asked     Blood Transfusions Not Asked     Caffeine Concern No     Comment: 1-2 cups daily     Occupational Exposure Not Asked     Hobby Hazards Not Asked     Sleep Concern No     Stress Concern No     Weight Concern Not Asked     Special Diet No     Comment: trying to watch sodium intake     Back Care Not Asked     Exercise No     Comment: some walking     Bike Helmet Not Asked     Seat Belt Not Asked     Self-Exams Not Asked   Social History Narrative     None     Social Determinants of Health     Financial Resource Strain: Not on file   Food Insecurity: Not on file   Transportation Needs: Not on file   Physical Activity: Not on file   Stress: Not on file   Social Connections: Not on file   Intimate Partner Violence: Not on file   Housing Stability: Not on file       FAMILY HISTORY:  Family History   Problem Relation Age of Onset     Heart Disease Mother 83     Heart Disease Father 69        emphysema     Coronary Artery Disease Brother      Coronary Artery Disease Sister        MEDS: acetaminophen (TYLENOL) 325 MG tablet, Take 2 tablets (650 mg) by mouth every 4 hours as needed for  "mild pain or headaches  ALLOPURINOL PO, Take 100 mg by mouth daily. To prevent gout attacks, recently started.   aspirin (ASA) 81 MG chewable tablet, Take 1 tablet (81 mg) by mouth daily  ezetimibe (ZETIA) 10 MG tablet, Take 10 mg by mouth At Bedtime   hypromellose-dextran (ARTIFICAL TEARS) 0.1-0.3 % ophthalmic solution, Place 1 drop into both eyes 2 times daily as needed for dry eyes  isosorbide mononitrate (IMDUR) 30 MG 24 hr tablet, Take 1 tablet (30 mg) by mouth daily Appointment due In April  losartan (COZAAR) 100 MG tablet, Take 0.5 tablets (50 mg) by mouth daily  metoprolol succinate ER (TOPROL-XL) 50 MG 24 hr tablet, Take 1 tablet (50 mg) by mouth daily (Patient taking differently: Take 50 mg by mouth every evening )  Multiple Vitamins-Minerals (CENTRUM SILVER) per tablet, Take 1 tablet by mouth daily.  Multiple Vitamins-Minerals (PRESERVISION AREDS 2) CAPS, Take 1 capsule by mouth 2 times daily   nitroGLYcerin (NITROSTAT) 0.4 MG sublingual tablet, Place 1 tablet (0.4 mg) under the tongue every 5 minutes as needed for chest pain  pantoprazole (PROTONIX) 40 MG EC tablet, Take 40 mg by mouth daily  primidone (MYSOLINE) 250 MG tablet, Take 250 mg by mouth At Bedtime  primidone (MYSOLINE) 250 MG tablet, Take 500 mg by mouth every morning   rosuvastatin (CRESTOR) 40 MG tablet, Take 1 tablet by mouth daily.    No current facility-administered medications on file prior to visit.      ALLERGIES:   Allergies   Allergen Reactions     Oxycodone Other (See Comments)     Confused & angry     Statins [Hmg-Coa-R Inhibitors] Muscle Pain (Myalgia)     lipitor     Lisinopril Cough       PHYSICAL EXAM:  Vitals: /65 (BP Location: Right arm)   Pulse 62   Ht 1.778 m (5' 10\")   Wt 101.2 kg (223 lb)   SpO2 98%   BMI 32.00 kg/m    Constitutional:  cooperative, alert and oriented, well developed, well nourished, in no acute distress        Skin:  warm and dry to the touch, no apparent skin lesions or masses noted   right " CEA scar    Head:  normocephalic, no masses or lesions        Eyes:  pupils equal and round;conjunctivae and lids unremarkable;sclera white;no xanthalasma        ENT:  no pallor or cyanosis        Neck:  no carotid bruit;JVP normal   prominent carotid pulsations at base of neck bilaterally. Well-healed right carotid endarterectomy scar    Respiratory:  no use of accessory muscles;normal symmetry;clear to auscultation        Cardiac: regular rhythm;normal S1 and S2       systolic murmur;grade 1;RUSB          GI:  abdomen soft;BS normoactive obese      Vascular: pulses full and equal                                      Extremities and Musculoskeletal:  no spinal abnormalities noted;normal muscle strength and tone        Neurological:  no gross motor deficits;affect appropriate              LABS/DATA:  I reviewed the following:  Limited echo 1/20/20:  Interpretation Summary  The visual ejection fraction is estimated at 55-60%.  There is mild to moderate concentric left ventricular hypertrophy.  The left atrium is mild to moderately dilated.  Right ventricular systolic pressure is elevated, consistent with moderate  pulmonary hypertension.  The ascending aorta is Mildly dilated.  LVEF is significantly improved compared with most recent study.        Mildred 6/21/21:     The nuclear stress test is negative for inducible myocardial ischemia or infarction.     Left ventricular function is normal, EF 71%.     A prior study was conducted on 10/4/2018.  This study has no change when compared with the prior study.        Zio 14 day 6/28/21:  SR  1 VT, 14.6 seconds  242 SVT, up to 20 beats        Treadmill ECG 8/5/21:  Interpretation Summary   This was a normal stress EKG with no evidence of stress-induced ischemia.         ASSESSMENT/PLAN:  CAD  - Angioplasty of LAD back in 1991, LAD stenting in early 2012 with a jailed diagonal vessel, later that year repeat coronary angiogram was performed which showed stable diagonal vessel  with FFR of 0.8 and therefore medical therapy was recommended; nuclear stress study in 11/2018 demonstrating no evidence of ischemia or infarct  - Cardiac cath 4/23/19: 95% distal RCA s/p MERARI  - 12/2019 admission with chest pain and NSTEMI.  Cath 12/10/19 without new lesions.  Echo 12/10/19 showed new decline in LVEF to 35%  - Repeat echo 1/2020 showed EF improved to 55-60%  - Due to some atypical chest discomfort, underwent Mildred 6/21/21: negative for ischemia  - Due to some VT (14 seconds on Zio), underwent treadmill GXT 8/5/21: negative for ischemia   - Currently on ASA 81 mg lifelong, Toprol XL 50 mg, losartan 100 mg, Imdur 30 mg, Crestor 40 mg, Zetia 10 mg. Lowering losartan due to  and some LH.   - Mediterranean style diet        H/o cardiomyopathy, consistent with stress cardiomyopathy, resolved  - Echo 12/10/19 LVEF 35% with MWA suggestive of stress cardiomyopathy  - Cath 12/10/19 without new lesions  - Echo 1/20/20: improvement in LVEF to 55-60%  - Continue Toprol XL 50 mg.  Currently on losartan 100 mg.  Lowering to 50 mg due to some LH and .         HTN  - Currently on Imdur 30 mg, Toprol XL 50 mg, losartan 100 mg  - Lowering losartan to 50 mg due to some LH and .   - Follow in 1 month        Hyperlipidemia  - FLP 4/15/21 shows LDL 47 on a combination of Zetia and rosuvastatin 40 mg        Carotid artery disease  - History of right carotid endarterectomy in 2016  - Last imaging 8/2018  - Continue ASA, statin        VT  Falls/syncope  - Some question of prior falls vs syncope  - 14 seconds of VT on 14 day Zio 6/28/2021  - Treadmill Mildred 8/5/21: negative for ischemia  - No inducible VT on EP study   - He had another fall in early December.  Reveal device implant interrogated and no arrhythmias.    - Continue Toprol XL.  Lowered Imdur.  Lowering losartan now.         SVT  - 242 runs, up tp 20 beats on 14 day Zio 6/28/21  - Currently on BB, see above        Follow up:  1 month        Jenaro  POORNIMA Shook PA-C      cc:   Akhil Mendoza MD  [id number]  [place] [id number]

## 2022-01-18 NOTE — PROGRESS NOTES
CARDIOLOGY CLINIC PROGRESS NOTE    DOS: 2022      Iván Nicholas  : 1940, 81 year old  MRN: [id number]      History:  I had the pleasure of following up with Iván Nicholas today in the cardiology clinic.   He is a patient of Dr. Mendoza.      Grace is a pleasant 81-year-old gentleman with past medical history notable for coronary artery disease.  He had angioplasty of his LAD back in , LAD stenting in early  with a jailed diagonal vessel, later that year repeat coronary angiogram was performed which showed stable diagonal vessel with FFR of 0.8 and therefore medical therapy was recommended.  Also hyperlipidemia, hypertension, carotid artery disease (history of right carotid endarterectomy in ), left bundle branch block, history of tobacco use, chronic angina since his last angiogram that was treated with Imdur, nonischemic cardiomyopathy, falls/syncope.      Cardiac catheterization 19.  95% distal RCA s/p MERARI.  Started on Plavix.       19 Dr. Mendoza stopped amlodipine and started lisinopril 20 mg and chlorthalidone 25 mg daily.     - 19 Hosp Admission at [facility] due to chest pain/tightness radiating to back and arms.   Dr. Morales consulted for NSTEMI.   12/10/19 cardiac cath:  no new lesions found.   12/10/19 Echo:  LVEF 35% with WMA suggestive of stress cardiomyopathy.     19 had epistaxis, resolved with Afrin.  Continued DAPT due to stent in 2019.  MEDS:  SWITCHED propranolol to Toprol XL 50 mg daily.   STOPPED Chlorthalidone.   DECREASED lisinopril from 20 to 5 mg.   Continued PTA Imdur 60 mg.      19.  He had a cough since starting lisinopril, so we switched to losartan.      20 Echo: showed significant improvement in the LVEF from 36% to 55-60%.  There is mild to moderate concentric left ventricular hypertrophy.       2020 came off Plavix.      21 with Dr. Mendoza he noted atypical chest pain, recurrent falls vs  syncope.     6/21/21 Lexsican: negative for ischemia.       6/28/21 14 day Ziopatch: 1 run VT 14.6 sec, 242 runs SVT up to 20 beats.  Average sinus rate is 61 bpm.       8/5/21 Exercise stress test: negative for ischemia      Seen by Dr. Aroldo Molina 163/83, but had forgotten to take his medications that day.  His losartan was increased from 25 mg to 100 mg.  Dr. Molina conducted an EP study and found to be noninducible VT and he implanted a Reveal device.       12/07/21, Grace had another falling spell in his garage.  He was hospitalized.  His device was interrogated and there were no significant arrhythmias.  It was thought that it was probably hypotension and intravascular volume depletion.     12/20/21 Dr. Mendoza decreased Imdur from 60 mg to 30 mg.     Grace returns to clinic today stating that he is feeling well.  On lower dose Imdur, no increase in chest pain.  No recurrence of syncope or near syncope.  Some LH with position changes.  BP today is 118.   Unaware of any arrhythmias.    He has some mild edema in the right ankle that has been chronic and not changed or increased.    He walks a few days a week, about 1 mile.       ROS:  Skin:  Negative     Eyes:  Negative    ENT:  Positive for hearing loss  Respiratory:  Negative    Cardiovascular:    lightheadedness;Positive for  Gastroenterology: Negative    Genitourinary:  not assessed    Musculoskeletal:  Positive for    Neurologic:  Negative    Psychiatric:  Negative    Heme/Lymph/Imm:  Negative    Endocrine:  Negative      PAST MEDICAL HISTORY:  Past Medical History:   Diagnosis Date     Carotid artery stenosis     Right, s/p right CEA 11/9/2016     Cerebral artery occlusion with cerebral infarction (H) 2016    TIA -  Endarterectomy...No residual     Colon polyps      Color blind      Coronary artery disease     2/2012 - MERARI to mid LAD     Decreased hearing      Depression      Gout      Hypertension      Hypertrophy of prostate without urinary obstruction and  other lower urinary tract symptoms (LUTS)      Onychomycosis of toenail      Paroxysmal ventricular tachycardia (H)     with stress test 2012     Syncope and collapse 6/16/2021       PAST SURGICAL HISTORY:  Past Surgical History:   Procedure Laterality Date     ANGIOPLASTY  1991     ARTHROPLASTY KNEE Left 11/12/2018    Procedure: Left total knee arthroplasty;  Surgeon: Matt Schaefer MD;  Location:  OR     COLONOSCOPY  12/13/2011    Procedure:COLONOSCOPY; COLONOSCOPY; Surgeon:EMMANUELLE MOSER; Location: GI     CORONARY ANGIOGRAPHY ADULT ORDER  1991,2012 1991 - stent to proximal LAD, 2012 - Stent to mid LAD     CV HEART CATHETERIZATION WITH POSSIBLE INTERVENTION Left 4/23/2019    Procedure: Coronary Angiogram;  Surgeon: Percy Armas MD;  Location:  HEART CARDIAC CATH LAB     CV HEART CATHETERIZATION WITH POSSIBLE INTERVENTION N/A 12/10/2019    Procedure: Heart Catheterization with Possible Intervention;  Surgeon: Dajuan Duvall MD;  Location:  HEART CARDIAC CATH LAB     CV PCI ANGIOPLASTY N/A 4/23/2019    Procedure: PCI Angioplasty;  Surgeon: Percy Armas MD;  Location:  HEART CARDIAC CATH LAB     ENDARTERECTOMY CAROTID Right 11/10/2016    Procedure: ENDARTERECTOMY CAROTID;  Surgeon: Paco Suresh MD;  Location:  OR     EP COMPREHENSIVE EP STUDY N/A 9/7/2021    Procedure: EP Comprehensive EP Study;  Surgeon: Jas Peralta MD;  Location:  HEART CARDIAC CATH LAB     HEART CATH, ANGIOPLASTY       ORTHOPEDIC SURGERY       PHACOEMULSIFICATION CLEAR CORNEA WITH STANDARD INTRAOCULAR LENS IMPLANT  12/19/2013    Procedure: PHACOEMULSIFICATION CLEAR CORNEA WITH STANDARD INTRAOCULAR LENS IMPLANT;  RIGHT PHACOEMULSIFICATION CLEAR CORNEA WITH STANDARD INTRAOCULAR LENS IMPLANT ;  Surgeon: Luisito Juan MD;  Location: [facility]       SOCIAL HISTORY:  Social History     Socioeconomic History     Marital status:      Spouse name: None     Number of children: None      Years of education: None     Highest education level: None   Occupational History     None   Tobacco Use     Smoking status: Former Smoker     Packs/day: 0.50     Years: 20.00     Pack years: 10.00     Types: Cigarettes     Quit date: 1990     Years since quittin.1     Smokeless tobacco: Never Used   Substance and Sexual Activity     Alcohol use: Yes     Alcohol/week: 0.0 standard drinks     Comment: daily - drinks x2     Drug use: No     Sexual activity: Never   Other Topics Concern     Parent/sibling w/ CABG, MI or angioplasty before 65F 55M? Not Asked      Service Not Asked     Blood Transfusions Not Asked     Caffeine Concern No     Comment: 1-2 cups daily     Occupational Exposure Not Asked     Hobby Hazards Not Asked     Sleep Concern No     Stress Concern No     Weight Concern Not Asked     Special Diet No     Comment: trying to watch sodium intake     Back Care Not Asked     Exercise No     Comment: some walking     Bike Helmet Not Asked     Seat Belt Not Asked     Self-Exams Not Asked   Social History Narrative     None     Social Determinants of Health     Financial Resource Strain: Not on file   Food Insecurity: Not on file   Transportation Needs: Not on file   Physical Activity: Not on file   Stress: Not on file   Social Connections: Not on file   Intimate Partner Violence: Not on file   Housing Stability: Not on file       FAMILY HISTORY:  Family History   Problem Relation Age of Onset     Heart Disease Mother 83     Heart Disease Father 69        emphysema     Coronary Artery Disease Brother      Coronary Artery Disease Sister        MEDS: acetaminophen (TYLENOL) 325 MG tablet, Take 2 tablets (650 mg) by mouth every 4 hours as needed for mild pain or headaches  ALLOPURINOL PO, Take 100 mg by mouth daily. To prevent gout attacks, recently started.   aspirin (ASA) 81 MG chewable tablet, Take 1 tablet (81 mg) by mouth daily  ezetimibe (ZETIA) 10 MG tablet, Take 10 mg by mouth At Bedtime  "  hypromellose-dextran (ARTIFICAL TEARS) 0.1-0.3 % ophthalmic solution, Place 1 drop into both eyes 2 times daily as needed for dry eyes  isosorbide mononitrate (IMDUR) 30 MG 24 hr tablet, Take 1 tablet (30 mg) by mouth daily Appointment due In April  losartan (COZAAR) 100 MG tablet, Take 0.5 tablets (50 mg) by mouth daily  metoprolol succinate ER (TOPROL-XL) 50 MG 24 hr tablet, Take 1 tablet (50 mg) by mouth daily (Patient taking differently: Take 50 mg by mouth every evening )  Multiple Vitamins-Minerals (CENTRUM SILVER) per tablet, Take 1 tablet by mouth daily.  Multiple Vitamins-Minerals (PRESERVISION AREDS 2) CAPS, Take 1 capsule by mouth 2 times daily   nitroGLYcerin (NITROSTAT) 0.4 MG sublingual tablet, Place 1 tablet (0.4 mg) under the tongue every 5 minutes as needed for chest pain  pantoprazole (PROTONIX) 40 MG EC tablet, Take 40 mg by mouth daily  primidone (MYSOLINE) 250 MG tablet, Take 250 mg by mouth At Bedtime  primidone (MYSOLINE) 250 MG tablet, Take 500 mg by mouth every morning   rosuvastatin (CRESTOR) 40 MG tablet, Take 1 tablet by mouth daily.    No current facility-administered medications on file prior to visit.      ALLERGIES:   Allergies   Allergen Reactions     Oxycodone Other (See Comments)     Confused & angry     Statins [Hmg-Coa-R Inhibitors] Muscle Pain (Myalgia)     lipitor     Lisinopril Cough       PHYSICAL EXAM:  Vitals: /65 (BP Location: Right arm)   Pulse 62   Ht 1.778 m (5' 10\")   Wt 101.2 kg (223 lb)   SpO2 98%   BMI 32.00 kg/m    Constitutional:  cooperative, alert and oriented, well developed, well nourished, in no acute distress        Skin:  warm and dry to the touch, no apparent skin lesions or masses noted   right CEA scar    Head:  normocephalic, no masses or lesions        Eyes:  pupils equal and round;conjunctivae and lids unremarkable;sclera white;no xanthalasma        ENT:  no pallor or cyanosis        Neck:  no carotid bruit;JVP normal   prominent carotid " pulsations at base of neck bilaterally. Well-healed right carotid endarterectomy scar    Respiratory:  no use of accessory muscles;normal symmetry;clear to auscultation        Cardiac: regular rhythm;normal S1 and S2       systolic murmur;grade 1;RUSB          GI:  abdomen soft;BS normoactive obese      Vascular: pulses full and equal                                      Extremities and Musculoskeletal:  no spinal abnormalities noted;normal muscle strength and tone        Neurological:  no gross motor deficits;affect appropriate              LABS/DATA:  I reviewed the following:  Limited echo 1/20/20:  Interpretation Summary  The visual ejection fraction is estimated at 55-60%.  There is mild to moderate concentric left ventricular hypertrophy.  The left atrium is mild to moderately dilated.  Right ventricular systolic pressure is elevated, consistent with moderate  pulmonary hypertension.  The ascending aorta is Mildly dilated.  LVEF is significantly improved compared with most recent study.        Mildred 6/21/21:     The nuclear stress test is negative for inducible myocardial ischemia or infarction.     Left ventricular function is normal, EF 71%.     A prior study was conducted on 10/4/2018.  This study has no change when compared with the prior study.        Zio 14 day 6/28/21:  SR  1 VT, 14.6 seconds  242 SVT, up to 20 beats        Treadmill ECG 8/5/21:  Interpretation Summary   This was a normal stress EKG with no evidence of stress-induced ischemia.         ASSESSMENT/PLAN:  CAD  - Angioplasty of LAD back in 1991, LAD stenting in early 2012 with a jailed diagonal vessel, later that year repeat coronary angiogram was performed which showed stable diagonal vessel with FFR of 0.8 and therefore medical therapy was recommended; nuclear stress study in 11/2018 demonstrating no evidence of ischemia or infarct  - Cardiac cath 4/23/19: 95% distal RCA s/p MERARI  - 12/2019 admission with chest pain and NSTEMI.  Cath  12/10/19 without new lesions.  Echo 12/10/19 showed new decline in LVEF to 35%  - Repeat echo 1/2020 showed EF improved to 55-60%  - Due to some atypical chest discomfort, underwent Mildred 6/21/21: negative for ischemia  - Due to some VT (14 seconds on Zio), underwent treadmill GXT 8/5/21: negative for ischemia   - Currently on ASA 81 mg lifelong, Toprol XL 50 mg, losartan 100 mg, Imdur 30 mg, Crestor 40 mg, Zetia 10 mg. Lowering losartan due to  and some LH.   - Mediterranean style diet        H/o cardiomyopathy, consistent with stress cardiomyopathy, resolved  - Echo 12/10/19 LVEF 35% with MWA suggestive of stress cardiomyopathy  - Cath 12/10/19 without new lesions  - Echo 1/20/20: improvement in LVEF to 55-60%  - Continue Toprol XL 50 mg.  Currently on losartan 100 mg.  Lowering to 50 mg due to some LH and .         HTN  - Currently on Imdur 30 mg, Toprol XL 50 mg, losartan 100 mg  - Lowering losartan to 50 mg due to some LH and .   - Follow in 1 month        Hyperlipidemia  - FLP 4/15/21 shows LDL 47 on a combination of Zetia and rosuvastatin 40 mg        Carotid artery disease  - History of right carotid endarterectomy in 2016  - Last imaging 8/2018  - Continue ASA, statin        VT  Falls/syncope  - Some question of prior falls vs syncope  - 14 seconds of VT on 14 day Zio 6/28/2021  - Treadmill Mildred 8/5/21: negative for ischemia  - No inducible VT on EP study   - He had another fall in early December.  Reveal device implant interrogated and no arrhythmias.    - Continue Toprol XL.  Lowered Imdur.  Lowering losartan now.         SVT  - 242 runs, up tp 20 beats on 14 day Zio 6/28/21  - Currently on BB, see above        Follow up:  1 month        Jenaro Shook PA-C

## 2022-02-17 PROBLEM — Z71.89 ACP (ADVANCE CARE PLANNING): Chronic | Status: RESOLVED | Noted: 2017-01-09 | Resolved: 2018-11-13

## 2022-02-19 ENCOUNTER — HEALTH MAINTENANCE LETTER (OUTPATIENT)
Age: 82
End: 2022-02-19

## 2022-03-01 ENCOUNTER — OFFICE VISIT (OUTPATIENT)
Dept: CARDIOLOGY | Facility: CLINIC | Age: 82
End: 2022-03-01
Attending: PHYSICIAN ASSISTANT
Payer: COMMERCIAL

## 2022-03-01 VITALS
HEIGHT: 70 IN | WEIGHT: 216.8 LBS | DIASTOLIC BLOOD PRESSURE: 62 MMHG | BODY MASS INDEX: 31.04 KG/M2 | OXYGEN SATURATION: 99 % | HEART RATE: 73 BPM | SYSTOLIC BLOOD PRESSURE: 128 MMHG

## 2022-03-01 DIAGNOSIS — I10 ESSENTIAL HYPERTENSION, BENIGN: ICD-10-CM

## 2022-03-01 PROCEDURE — 99214 OFFICE O/P EST MOD 30 MIN: CPT | Performed by: PHYSICIAN ASSISTANT

## 2022-03-01 NOTE — PROGRESS NOTES
CARDIOLOGY CLINIC PROGRESS NOTE    DOS: 2022      Iván Nicholas  : 1940, 81 year old  MRN: [id number]      History:  I had the pleasure of following up with Iván Nicholas today in the cardiology clinic.   He is a patient of Dr. Mendoza.      Grace is a pleasant 81-year-old gentleman with past medical history notable for coronary artery disease.  He had angioplasty of his LAD back in , LAD stenting in early  with a jailed diagonal vessel, later that year repeat coronary angiogram was performed which showed stable diagonal vessel with FFR of 0.8 and therefore medical therapy was recommended.  Also hyperlipidemia, hypertension, carotid artery disease (history of right carotid endarterectomy in ), left bundle branch block, history of tobacco use, chronic angina since his last angiogram that was treated with Imdur, nonischemic cardiomyopathy, falls/syncope.      Cardiac catheterization 19.  95% distal RCA s/p MERARI.  Started on Plavix.       19 Dr. Mendoza stopped amlodipine and started lisinopril 20 mg and chlorthalidone 25 mg daily.     - 19 Hosp Admission at [facility] due to chest pain/tightness radiating to back and arms.   Dr. Morales consulted for NSTEMI.   12/10/19 cardiac cath:  no new lesions found.   12/10/19 Echo:  LVEF 35% with WMA suggestive of stress cardiomyopathy.     19 had epistaxis, resolved with Afrin.  Continued DAPT due to stent in 2019.  MEDS:  SWITCHED propranolol to Toprol XL 50 mg daily.   STOPPED Chlorthalidone.   DECREASED lisinopril from 20 to 5 mg.   Continued PTA Imdur 60 mg.      19.  He had a cough since starting lisinopril, so we switched to losartan.      20 Echo: showed significant improvement in the LVEF from 36% to 55-60%.  There is mild to moderate concentric left ventricular hypertrophy.       2020 came off Plavix.      21 with Dr. Mendoza he noted atypical chest pain, recurrent falls vs  syncope.     6/21/21 Lexsican: negative for ischemia.       6/28/21 14 day Ziopatch: 1 run VT 14.6 sec, 242 runs SVT up to 20 beats.  Average sinus rate is 61 bpm.       8/5/21 Exercise stress test: negative for ischemia      Seen by Dr. Aroldo Molina 163/83, but had forgotten to take his medications that day.  His losartan was increased from 25 mg to 100 mg.  Dr. Molina conducted an EP study and found to be noninducible VT and he implanted a Reveal device.       12/07/21, Grace had another falling spell in his garage.  He was hospitalized.  His device was interrogated and there were no significant arrhythmias.  It was thought that it was probably hypotension and intravascular volume depletion.     12/20/21 Dr. Mendoza decreased Imdur from 60 mg to 30 mg.     We lowered losartan from 100 mg to 50 mg to see if his positional LH improved.     Grace returns to clinic today for follow up.  With the lower dose, he had no improvement in LH.  Some LH with getting out of bed the first thing in the morning.  His BP went from 118 to 138 in clinic.  But on repeat it is 128/62.  On lower dose Imdur, no increase in chest pain.    No recurrence of syncope or near syncope.    Unaware of any arrhythmias.    He has some mild edema in the right ankle that has been chronic and not changed or increased.    He walks a few days a week, about 1 mile.       ROS:  Skin:  Negative     Eyes:  Negative    ENT:  Positive for hearing loss  Respiratory:  Negative    Cardiovascular:    lightheadedness;Positive for;edema  Gastroenterology: Negative    Genitourinary:  Negative    Musculoskeletal:  Positive for    Neurologic:  Negative    Psychiatric:  Negative    Heme/Lymph/Imm:  Negative    Endocrine:  Negative      PAST MEDICAL HISTORY:  Past Medical History:   Diagnosis Date     Carotid artery stenosis     Right, s/p right CEA 11/9/2016     Cerebral artery occlusion with cerebral infarction (H) 2016    TIA -  Endarterectomy...No residual     Colon  polyps      Color blind      Coronary artery disease     2/2012 - MERARI to mid LAD     Decreased hearing      Depression      Gout      Hypertension      Hypertrophy of prostate without urinary obstruction and other lower urinary tract symptoms (LUTS)      Onychomycosis of toenail      Paroxysmal ventricular tachycardia (H)     with stress test 2012     Syncope and collapse 6/16/2021       PAST SURGICAL HISTORY:  Past Surgical History:   Procedure Laterality Date     ANGIOPLASTY  1991     ARTHROPLASTY KNEE Left 11/12/2018    Procedure: Left total knee arthroplasty;  Surgeon: Matt Schaefer MD;  Location:  OR     COLONOSCOPY  12/13/2011    Procedure:COLONOSCOPY; COLONOSCOPY; Surgeon:EMMANUELLE MOSER; Location: GI     CORONARY ANGIOGRAPHY ADULT ORDER  1991,2012 1991 - stent to proximal LAD, 2012 - Stent to mid LAD     CV HEART CATHETERIZATION WITH POSSIBLE INTERVENTION Left 4/23/2019    Procedure: Coronary Angiogram;  Surgeon: Percy Armas MD;  Location:  HEART CARDIAC CATH LAB     CV HEART CATHETERIZATION WITH POSSIBLE INTERVENTION N/A 12/10/2019    Procedure: Heart Catheterization with Possible Intervention;  Surgeon: Dajuan Duvall MD;  Location:  HEART CARDIAC CATH LAB     CV PCI ANGIOPLASTY N/A 4/23/2019    Procedure: PCI Angioplasty;  Surgeon: Percy Armas MD;  Location:  HEART CARDIAC CATH LAB     ENDARTERECTOMY CAROTID Right 11/10/2016    Procedure: ENDARTERECTOMY CAROTID;  Surgeon: Paco Suresh MD;  Location:  OR     EP COMPREHENSIVE EP STUDY N/A 9/7/2021    Procedure: EP Comprehensive EP Study;  Surgeon: Jas Peralta MD;  Location:  HEART CARDIAC CATH LAB     HEART CATH, ANGIOPLASTY       ORTHOPEDIC SURGERY       PHACOEMULSIFICATION CLEAR CORNEA WITH STANDARD INTRAOCULAR LENS IMPLANT  12/19/2013    Procedure: PHACOEMULSIFICATION CLEAR CORNEA WITH STANDARD INTRAOCULAR LENS IMPLANT;  RIGHT PHACOEMULSIFICATION CLEAR CORNEA WITH STANDARD INTRAOCULAR LENS  IMPLANT ;  Surgeon: Luisito Juan MD;  Location[facility]       SOCIAL HISTORY:  Social History     Socioeconomic History     Marital status:      Spouse name: None     Number of children: None     Years of education: None     Highest education level: None   Occupational History     None   Tobacco Use     Smoking status: Former Smoker     Packs/day: 0.50     Years: 20.00     Pack years: 10.00     Types: Cigarettes     Quit date: 1990     Years since quittin.1     Smokeless tobacco: Never Used   Substance and Sexual Activity     Alcohol use: Yes     Alcohol/week: 0.0 standard drinks     Comment: daily - drinks x2     Drug use: No     Sexual activity: Never   Other Topics Concern     Parent/sibling w/ CABG, MI or angioplasty before 65F 55M? Not Asked      Service Not Asked     Blood Transfusions Not Asked     Caffeine Concern No     Comment: 1-2 cups daily     Occupational Exposure Not Asked     Hobby Hazards Not Asked     Sleep Concern No     Stress Concern No     Weight Concern Not Asked     Special Diet No     Comment: trying to watch sodium intake     Back Care Not Asked     Exercise No     Comment: some walking     Bike Helmet Not Asked     Seat Belt Not Asked     Self-Exams Not Asked   Social History Narrative     None     Social Determinants of Health     Financial Resource Strain: Not on file   Food Insecurity: Not on file   Transportation Needs: Not on file   Physical Activity: Not on file   Stress: Not on file   Social Connections: Not on file   Intimate Partner Violence: Not on file   Housing Stability: Not on file       FAMILY HISTORY:  Family History   Problem Relation Age of Onset     Heart Disease Mother 83     Heart Disease Father 69        emphysema     Coronary Artery Disease Brother      Coronary Artery Disease Sister        MEDS: acetaminophen (TYLENOL) 325 MG tablet, Take 2 tablets (650 mg) by mouth every 4 hours as needed for mild pain or headaches  ALLOPURINOL PO,  "Take 100 mg by mouth daily. To prevent gout attacks, recently started.   aspirin (ASA) 81 MG chewable tablet, Take 1 tablet (81 mg) by mouth daily  ezetimibe (ZETIA) 10 MG tablet, Take 10 mg by mouth At Bedtime   hypromellose-dextran (ARTIFICAL TEARS) 0.1-0.3 % ophthalmic solution, Place 1 drop into both eyes 2 times daily as needed for dry eyes  isosorbide mononitrate (IMDUR) 30 MG 24 hr tablet, Take 1 tablet (30 mg) by mouth daily Appointment due In April  losartan (COZAAR) 100 MG tablet, Take 0.5 tablets (50 mg) by mouth daily  metoprolol succinate ER (TOPROL-XL) 50 MG 24 hr tablet, Take 1 tablet (50 mg) by mouth daily (Patient taking differently: Take 50 mg by mouth every evening )  Multiple Vitamins-Minerals (CENTRUM SILVER) per tablet, Take 1 tablet by mouth daily.  Multiple Vitamins-Minerals (PRESERVISION AREDS 2) CAPS, Take 1 capsule by mouth 2 times daily   nitroGLYcerin (NITROSTAT) 0.4 MG sublingual tablet, Place 1 tablet (0.4 mg) under the tongue every 5 minutes as needed for chest pain  pantoprazole (PROTONIX) 40 MG EC tablet, Take 40 mg by mouth daily  primidone (MYSOLINE) 250 MG tablet, Take 250 mg by mouth At Bedtime  primidone (MYSOLINE) 250 MG tablet, Take 500 mg by mouth every morning   rosuvastatin (CRESTOR) 40 MG tablet, Take 1 tablet by mouth daily.    No current facility-administered medications on file prior to visit.      ALLERGIES:   Allergies   Allergen Reactions     Oxycodone Other (See Comments)     Confused & angry     Statins [Hmg-Coa-R Inhibitors] Muscle Pain (Myalgia)     lipitor     Lisinopril Cough       PHYSICAL EXAM:  Vitals: /62   Pulse 73   Ht 1.778 m (5' 10\")   Wt 98.3 kg (216 lb 12.8 oz)   SpO2 99%   BMI 31.11 kg/m    Constitutional:  cooperative, alert and oriented, well developed, well nourished, in no acute distress        Skin:  warm and dry to the touch, no apparent skin lesions or masses noted   right CEA scar    Head:  normocephalic, no masses or lesions    "     Eyes:  pupils equal and round;conjunctivae and lids unremarkable;sclera white;no xanthalasma        ENT:  no pallor or cyanosis        Neck:  JVP normal   prominent carotid pulsations at base of neck bilaterally. Well-healed right carotid endarterectomy scar    Respiratory:  no use of accessory muscles;normal symmetry;clear to auscultation        Cardiac: regular rhythm;normal S1 and S2       systolic murmur;grade 1;RUSB          GI:  abdomen soft;BS normoactive obese      Vascular: pulses full and equal                                      Extremities and Musculoskeletal:  no spinal abnormalities noted;normal muscle strength and tone        Neurological:  no gross motor deficits;affect appropriate              LABS/DATA:  I reviewed the following:  Limited echo 1/20/20:  Interpretation Summary  The visual ejection fraction is estimated at 55-60%.  There is mild to moderate concentric left ventricular hypertrophy.  The left atrium is mild to moderately dilated.  Right ventricular systolic pressure is elevated, consistent with moderate  pulmonary hypertension.  The ascending aorta is Mildly dilated.  LVEF is significantly improved compared with most recent study.        Mildred 6/21/21:     The nuclear stress test is negative for inducible myocardial ischemia or infarction.     Left ventricular function is normal, EF 71%.     A prior study was conducted on 10/4/2018.  This study has no change when compared with the prior study.        Zio 14 day 6/28/21:  SR  1 VT, 14.6 seconds  242 SVT, up to 20 beats        Treadmill ECG 8/5/21:  Interpretation Summary   This was a normal stress EKG with no evidence of stress-induced ischemia.         ASSESSMENT/PLAN:  CAD  - Angioplasty of LAD back in 1991, LAD stenting in early 2012 with a jailed diagonal vessel, later that year repeat coronary angiogram was performed which showed stable diagonal vessel with FFR of 0.8 and therefore medical therapy was recommended; nuclear  stress study in 11/2018 demonstrating no evidence of ischemia or infarct  - Cardiac cath 4/23/19: 95% distal RCA s/p MERARI  - 12/2019 admission with chest pain and NSTEMI.  Cath 12/10/19 without new lesions.  Echo 12/10/19 showed new decline in LVEF to 35%  - Repeat echo 1/2020 showed EF improved to 55-60%  - Due to some atypical chest discomfort, underwent Mildred 6/21/21: negative for ischemia  - Due to some VT (14 seconds on Zio), underwent treadmill GXT 8/5/21: negative for ischemia   - Currently on ASA 81 mg lifelong, Toprol XL 50 mg, losartan 50 mg, Imdur 30 mg, Crestor 40 mg, Zetia 10 mg.   - Mediterranean style diet        H/o cardiomyopathy, consistent with stress cardiomyopathy, resolved  - Echo 12/10/19 LVEF 35% with MWA suggestive of stress cardiomyopathy  - Cath 12/10/19 without new lesions  - Echo 1/20/20: improvement in LVEF to 55-60%  - Continue Toprol XL 50 mg, losartan 50 mg        HTN  - Currently on Imdur 30 mg, Toprol XL 50 mg, losartan 50 mg and BP ok        Hyperlipidemia  - FLP 4/15/21 shows LDL 47 on a combination of Zetia and rosuvastatin 40 mg        Carotid artery disease  - History of right carotid endarterectomy in 2016  - Last imaging 8/2018  - Continue ASA, statin        VT  Falls/syncope  - Some question of prior falls vs syncope  - 14 seconds of VT on 14 day Zio 6/28/2021  - Treadmill Mildred 8/5/21: negative for ischemia  - No inducible VT on EP study   - He had another fall in early December.  Reveal device implant interrogated and no arrhythmias.    - Continue Toprol XL.  Lowered Imdur and losartan.         SVT  - 242 runs, up tp 20 beats on 14 day Zio 6/28/21  - Currently on BB, see above        Follow up:  Dr. Mendoza 12/2022 with BMP, FLP        Jenaro Shook PA-C

## 2022-03-01 NOTE — LETTER
3/1/2022    Stephan Nice MD  [facility] [address]  [place] [address]    RE: Iván Nicholas       Dear Colleague,     I had the pleasure of seeing Iván Nicholas in the [facility].      CARDIOLOGY CLINIC PROGRESS NOTE    DOS: 2022      Iván Nicholas  : 1940, 81 year old  MRN: [id number]      History:  I had the pleasure of following up with Iván Nicholas today in the cardiology clinic.   He is a patient of Dr. Mendoza.      Grace is a pleasant 81-year-old gentleman with past medical history notable for coronary artery disease.  He had angioplasty of his LAD back in , LAD stenting in early  with a jailed diagonal vessel, later that year repeat coronary angiogram was performed which showed stable diagonal vessel with FFR of 0.8 and therefore medical therapy was recommended.  Also hyperlipidemia, hypertension, carotid artery disease (history of right carotid endarterectomy in ), left bundle branch block, history of tobacco use, chronic angina since his last angiogram that was treated with Imdur, nonischemic cardiomyopathy, falls/syncope.      Cardiac catheterization 19.  95% distal RCA s/p MERARI.  Started on Plavix.       19 Dr. Mendoza stopped amlodipine and started lisinopril 20 mg and chlorthalidone 25 mg daily.     - 19 Hosp Admission at [facility] due to chest pain/tightness radiating to back and arms.   Dr. Morales consulted for NSTEMI.   12/10/19 cardiac cath:  no new lesions found.   12/10/19 Echo:  LVEF 35% with WMA suggestive of stress cardiomyopathy.     19 had epistaxis, resolved with Afrin.  Continued DAPT due to stent in 2019.  MEDS:  SWITCHED propranolol to Toprol XL 50 mg daily.   STOPPED Chlorthalidone.   DECREASED lisinopril from 20 to 5 mg.   Continued PTA Imdur 60 mg.      19.  He had a cough since starting lisinopril, so we switched to losartan.      20 Echo: showed  significant improvement in the LVEF from 36% to 55-60%.  There is mild to moderate concentric left ventricular hypertrophy.       5/2020 came off Plavix.      6/16/21 with Dr. Mendoza he noted atypical chest pain, recurrent falls vs syncope.     6/21/21 Lexsican: negative for ischemia.       6/28/21 14 day Ziopatch: 1 run VT 14.6 sec, 242 runs SVT up to 20 beats.  Average sinus rate is 61 bpm.       8/5/21 Exercise stress test: negative for ischemia      Seen by Dr. Aroldo Molina 163/83, but had forgotten to take his medications that day.  His losartan was increased from 25 mg to 100 mg.  Dr. Molina conducted an EP study and found to be noninducible VT and he implanted a Reveal device.       12/07/21, Grace had another falling spell in his garage.  He was hospitalized.  His device was interrogated and there were no significant arrhythmias.  It was thought that it was probably hypotension and intravascular volume depletion.     12/20/21 Dr. Mendoza decreased Imdur from 60 mg to 30 mg.     We lowered losartan from 100 mg to 50 mg to see if his positional LH improved.     Grace returns to clinic today for follow up.  With the lower dose, he had no improvement in LH.  Some LH with getting out of bed the first thing in the morning.  His BP went from 118 to 138 in clinic.  But on repeat it is 128/62.  On lower dose Imdur, no increase in chest pain.    No recurrence of syncope or near syncope.    Unaware of any arrhythmias.    He has some mild edema in the right ankle that has been chronic and not changed or increased.    He walks a few days a week, about 1 mile.       ROS:  Skin:  Negative     Eyes:  Negative    ENT:  Positive for hearing loss  Respiratory:  Negative    Cardiovascular:    lightheadedness;Positive for;edema  Gastroenterology: Negative    Genitourinary:  Negative    Musculoskeletal:  Positive for    Neurologic:  Negative    Psychiatric:  Negative    Heme/Lymph/Imm:  Negative    Endocrine:  Negative      PAST  MEDICAL HISTORY:  Past Medical History:   Diagnosis Date     Carotid artery stenosis     Right, s/p right CEA 11/9/2016     Cerebral artery occlusion with cerebral infarction (H) 2016    TIA -  Endarterectomy...No residual     Colon polyps      Color blind      Coronary artery disease     2/2012 - MERARI to mid LAD     Decreased hearing      Depression      Gout      Hypertension      Hypertrophy of prostate without urinary obstruction and other lower urinary tract symptoms (LUTS)      Onychomycosis of toenail      Paroxysmal ventricular tachycardia (H)     with stress test 2012     Syncope and collapse 6/16/2021       PAST SURGICAL HISTORY:  Past Surgical History:   Procedure Laterality Date     ANGIOPLASTY  1991     ARTHROPLASTY KNEE Left 11/12/2018    Procedure: Left total knee arthroplasty;  Surgeon: Matt Schaefer MD;  Location:  OR     COLONOSCOPY  12/13/2011    Procedure:COLONOSCOPY; COLONOSCOPY; Surgeon:EMMANUELLE MOSER; Location: GI     CORONARY ANGIOGRAPHY ADULT ORDER  1991,2012 1991 - stent to proximal LAD, 2012 - Stent to mid LAD     CV HEART CATHETERIZATION WITH POSSIBLE INTERVENTION Left 4/23/2019    Procedure: Coronary Angiogram;  Surgeon: Percy Armas MD;  Location:  HEART CARDIAC CATH LAB     CV HEART CATHETERIZATION WITH POSSIBLE INTERVENTION N/A 12/10/2019    Procedure: Heart Catheterization with Possible Intervention;  Surgeon: Dajuan Duvall MD;  Location:  HEART CARDIAC CATH LAB     CV PCI ANGIOPLASTY N/A 4/23/2019    Procedure: PCI Angioplasty;  Surgeon: Percy Armas MD;  Location:  HEART CARDIAC CATH LAB     ENDARTERECTOMY CAROTID Right 11/10/2016    Procedure: ENDARTERECTOMY CAROTID;  Surgeon: Paco Suresh MD;  Location:  OR     EP COMPREHENSIVE EP STUDY N/A 9/7/2021    Procedure: EP Comprehensive EP Study;  Surgeon: Jas Peralta MD;  Location:  HEART CARDIAC CATH LAB     HEART CATH, ANGIOPLASTY       ORTHOPEDIC SURGERY        PHACOEMULSIFICATION CLEAR CORNEA WITH STANDARD INTRAOCULAR LENS IMPLANT  2013    Procedure: PHACOEMULSIFICATION CLEAR CORNEA WITH STANDARD INTRAOCULAR LENS IMPLANT;  RIGHT PHACOEMULSIFICATION CLEAR CORNEA WITH STANDARD INTRAOCULAR LENS IMPLANT ;  Surgeon: Luisito Juan MD;  Location: [facility]       SOCIAL HISTORY:  Social History     Socioeconomic History     Marital status:      Spouse name: None     Number of children: None     Years of education: None     Highest education level: None   Occupational History     None   Tobacco Use     Smoking status: Former Smoker     Packs/day: 0.50     Years: 20.00     Pack years: 10.00     Types: Cigarettes     Quit date: 1990     Years since quittin.1     Smokeless tobacco: Never Used   Substance and Sexual Activity     Alcohol use: Yes     Alcohol/week: 0.0 standard drinks     Comment: daily - drinks x2     Drug use: No     Sexual activity: Never   Other Topics Concern     Parent/sibling w/ CABG, MI or angioplasty before 65F 55M? Not Asked      Service Not Asked     Blood Transfusions Not Asked     Caffeine Concern No     Comment: 1-2 cups daily     Occupational Exposure Not Asked     Hobby Hazards Not Asked     Sleep Concern No     Stress Concern No     Weight Concern Not Asked     Special Diet No     Comment: trying to watch sodium intake     Back Care Not Asked     Exercise No     Comment: some walking     Bike Helmet Not Asked     Seat Belt Not Asked     Self-Exams Not Asked   Social History Narrative     None     Social Determinants of Health     Financial Resource Strain: Not on file   Food Insecurity: Not on file   Transportation Needs: Not on file   Physical Activity: Not on file   Stress: Not on file   Social Connections: Not on file   Intimate Partner Violence: Not on file   Housing Stability: Not on file       FAMILY HISTORY:  Family History   Problem Relation Age of Onset     Heart Disease Mother 83     Heart Disease Father 69  "       emphysema     Coronary Artery Disease Brother      Coronary Artery Disease Sister        MEDS: acetaminophen (TYLENOL) 325 MG tablet, Take 2 tablets (650 mg) by mouth every 4 hours as needed for mild pain or headaches  ALLOPURINOL PO, Take 100 mg by mouth daily. To prevent gout attacks, recently started.   aspirin (ASA) 81 MG chewable tablet, Take 1 tablet (81 mg) by mouth daily  ezetimibe (ZETIA) 10 MG tablet, Take 10 mg by mouth At Bedtime   hypromellose-dextran (ARTIFICAL TEARS) 0.1-0.3 % ophthalmic solution, Place 1 drop into both eyes 2 times daily as needed for dry eyes  isosorbide mononitrate (IMDUR) 30 MG 24 hr tablet, Take 1 tablet (30 mg) by mouth daily Appointment due In April  losartan (COZAAR) 100 MG tablet, Take 0.5 tablets (50 mg) by mouth daily  metoprolol succinate ER (TOPROL-XL) 50 MG 24 hr tablet, Take 1 tablet (50 mg) by mouth daily (Patient taking differently: Take 50 mg by mouth every evening )  Multiple Vitamins-Minerals (CENTRUM SILVER) per tablet, Take 1 tablet by mouth daily.  Multiple Vitamins-Minerals (PRESERVISION AREDS 2) CAPS, Take 1 capsule by mouth 2 times daily   nitroGLYcerin (NITROSTAT) 0.4 MG sublingual tablet, Place 1 tablet (0.4 mg) under the tongue every 5 minutes as needed for chest pain  pantoprazole (PROTONIX) 40 MG EC tablet, Take 40 mg by mouth daily  primidone (MYSOLINE) 250 MG tablet, Take 250 mg by mouth At Bedtime  primidone (MYSOLINE) 250 MG tablet, Take 500 mg by mouth every morning   rosuvastatin (CRESTOR) 40 MG tablet, Take 1 tablet by mouth daily.    No current facility-administered medications on file prior to visit.      ALLERGIES:   Allergies   Allergen Reactions     Oxycodone Other (See Comments)     Confused & angry     Statins [Hmg-Coa-R Inhibitors] Muscle Pain (Myalgia)     lipitor     Lisinopril Cough       PHYSICAL EXAM:  Vitals: /62   Pulse 73   Ht 1.778 m (5' 10\")   Wt 98.3 kg (216 lb 12.8 oz)   SpO2 99%   BMI 31.11 kg/m  "   Constitutional:  cooperative, alert and oriented, well developed, well nourished, in no acute distress        Skin:  warm and dry to the touch, no apparent skin lesions or masses noted   right CEA scar    Head:  normocephalic, no masses or lesions        Eyes:  pupils equal and round;conjunctivae and lids unremarkable;sclera white;no xanthalasma        ENT:  no pallor or cyanosis        Neck:  JVP normal   prominent carotid pulsations at base of neck bilaterally. Well-healed right carotid endarterectomy scar    Respiratory:  no use of accessory muscles;normal symmetry;clear to auscultation        Cardiac: regular rhythm;normal S1 and S2       systolic murmur;grade 1;RUSB          GI:  abdomen soft;BS normoactive obese      Vascular: pulses full and equal                                      Extremities and Musculoskeletal:  no spinal abnormalities noted;normal muscle strength and tone        Neurological:  no gross motor deficits;affect appropriate              LABS/DATA:  I reviewed the following:  Limited echo 1/20/20:  Interpretation Summary  The visual ejection fraction is estimated at 55-60%.  There is mild to moderate concentric left ventricular hypertrophy.  The left atrium is mild to moderately dilated.  Right ventricular systolic pressure is elevated, consistent with moderate  pulmonary hypertension.  The ascending aorta is Mildly dilated.  LVEF is significantly improved compared with most recent study.        Mildred 6/21/21:     The nuclear stress test is negative for inducible myocardial ischemia or infarction.     Left ventricular function is normal, EF 71%.     A prior study was conducted on 10/4/2018.  This study has no change when compared with the prior study.        Zio 14 day 6/28/21:  SR  1 VT, 14.6 seconds  242 SVT, up to 20 beats        Treadmill ECG 8/5/21:  Interpretation Summary   This was a normal stress EKG with no evidence of stress-induced ischemia.         ASSESSMENT/PLAN:  CAD  -  Angioplasty of LAD back in 1991, LAD stenting in early 2012 with a jailed diagonal vessel, later that year repeat coronary angiogram was performed which showed stable diagonal vessel with FFR of 0.8 and therefore medical therapy was recommended; nuclear stress study in 11/2018 demonstrating no evidence of ischemia or infarct  - Cardiac cath 4/23/19: 95% distal RCA s/p MERARI  - 12/2019 admission with chest pain and NSTEMI.  Cath 12/10/19 without new lesions.  Echo 12/10/19 showed new decline in LVEF to 35%  - Repeat echo 1/2020 showed EF improved to 55-60%  - Due to some atypical chest discomfort, underwent Mildred 6/21/21: negative for ischemia  - Due to some VT (14 seconds on Zio), underwent treadmill GXT 8/5/21: negative for ischemia   - Currently on ASA 81 mg lifelong, Toprol XL 50 mg, losartan 50 mg, Imdur 30 mg, Crestor 40 mg, Zetia 10 mg.   - Mediterranean style diet        H/o cardiomyopathy, consistent with stress cardiomyopathy, resolved  - Echo 12/10/19 LVEF 35% with MWA suggestive of stress cardiomyopathy  - Cath 12/10/19 without new lesions  - Echo 1/20/20: improvement in LVEF to 55-60%  - Continue Toprol XL 50 mg, losartan 50 mg        HTN  - Currently on Imdur 30 mg, Toprol XL 50 mg, losartan 50 mg and BP ok        Hyperlipidemia  - FLP 4/15/21 shows LDL 47 on a combination of Zetia and rosuvastatin 40 mg        Carotid artery disease  - History of right carotid endarterectomy in 2016  - Last imaging 8/2018  - Continue ASA, statin        VT  Falls/syncope  - Some question of prior falls vs syncope  - 14 seconds of VT on 14 day Zio 6/28/2021  - Treadmill Mildred 8/5/21: negative for ischemia  - No inducible VT on EP study   - He had another fall in early December.  Reveal device implant interrogated and no arrhythmias.    - Continue Toprol XL.  Lowered Imdur and losartan.         SVT  - 242 runs, up tp 20 beats on 14 day Zio 6/28/21  - Currently on BB, see above        Follow up:  Dr. Mendoza 12/2022 with BMP,  FLP      Jenaro Shook PA-C    Thank you for allowing me to participate in the care of your patient.      Sincerely,   Jenaro Shook PA-C   [facility]  cc:   Jenaro Shook PA-C  [address] [place] [address]

## 2022-03-23 ENCOUNTER — ANCILLARY PROCEDURE (OUTPATIENT)
Dept: CARDIOLOGY | Facility: CLINIC | Age: 82
End: 2022-03-23
Attending: INTERNAL MEDICINE
Payer: COMMERCIAL

## 2022-03-23 DIAGNOSIS — R55 SYNCOPE: ICD-10-CM

## 2022-03-23 DIAGNOSIS — Z95.818 STATUS POST PLACEMENT OF IMPLANTABLE LOOP RECORDER: Primary | ICD-10-CM

## 2022-03-23 PROCEDURE — 93297 REM INTERROG DEV EVAL ICPMS: CPT | Performed by: INTERNAL MEDICINE

## 2022-03-23 PROCEDURE — G2066 INTER DEVC REMOTE 30D: HCPCS | Performed by: INTERNAL MEDICINE

## 2022-03-31 LAB
MDC_IDC_EPISODE_DTM: NORMAL
MDC_IDC_EPISODE_ID: NORMAL
MDC_IDC_EPISODE_TYPE: NORMAL
MDC_IDC_MSMT_BATTERY_DTM: NORMAL
MDC_IDC_MSMT_BATTERY_STATUS: NORMAL
MDC_IDC_PG_IMPLANT_DTM: NORMAL
MDC_IDC_PG_MFG: NORMAL
MDC_IDC_PG_MODEL: NORMAL
MDC_IDC_PG_SERIAL: NORMAL
MDC_IDC_PG_TYPE: NORMAL
MDC_IDC_SESS_CLINIC_NAME: NORMAL
MDC_IDC_SESS_DTM: NORMAL
MDC_IDC_SESS_TYPE: NORMAL
MDC_IDC_STAT_AT_BURDEN_PERCENT: 0 %
MDC_IDC_STAT_AT_DTM_END: NORMAL
MDC_IDC_STAT_AT_DTM_START: NORMAL

## 2022-06-27 ENCOUNTER — ANCILLARY PROCEDURE (OUTPATIENT)
Dept: CARDIOLOGY | Facility: CLINIC | Age: 82
End: 2022-06-27
Attending: INTERNAL MEDICINE
Payer: COMMERCIAL

## 2022-06-27 DIAGNOSIS — Z95.818 STATUS POST PLACEMENT OF IMPLANTABLE LOOP RECORDER: ICD-10-CM

## 2022-06-27 DIAGNOSIS — R55 SYNCOPE: ICD-10-CM

## 2022-06-27 PROCEDURE — 93297 REM INTERROG DEV EVAL ICPMS: CPT | Performed by: INTERNAL MEDICINE

## 2022-06-27 PROCEDURE — G2066 INTER DEVC REMOTE 30D: HCPCS | Performed by: INTERNAL MEDICINE

## 2022-10-03 ENCOUNTER — ANCILLARY PROCEDURE (OUTPATIENT)
Dept: CARDIOLOGY | Facility: CLINIC | Age: 82
End: 2022-10-03
Attending: INTERNAL MEDICINE
Payer: COMMERCIAL

## 2022-10-03 DIAGNOSIS — R55 SYNCOPE: ICD-10-CM

## 2022-10-03 DIAGNOSIS — Z95.818 STATUS POST PLACEMENT OF IMPLANTABLE LOOP RECORDER: ICD-10-CM

## 2022-10-03 PROCEDURE — 93296 REM INTERROG EVL PM/IDS: CPT | Performed by: INTERNAL MEDICINE

## 2022-10-03 PROCEDURE — 93295 DEV INTERROG REMOTE 1/2/MLT: CPT | Performed by: INTERNAL MEDICINE

## 2022-10-22 ENCOUNTER — HEALTH MAINTENANCE LETTER (OUTPATIENT)
Age: 82
End: 2022-10-22

## 2022-11-08 ENCOUNTER — TELEPHONE (OUTPATIENT)
Dept: CARDIOLOGY | Facility: CLINIC | Age: 82
End: 2022-11-08

## 2022-11-08 NOTE — TELEPHONE ENCOUNTER
Pt is already set up for his remote transmission. He does not need an in clinic appt. Pt called and informed of next device check and informed we will follow him per his regular 3 month checks. KAMALA Abad

## 2022-11-08 NOTE — TELEPHONE ENCOUNTER
[facility] Call Center    Phone Message    May a detailed message be left on voicemail: yes     Reason for Call: Other: Pt would like a call back as his orders  prior to next appt and he would like to discuss if he needs an order for in clinic device check piror to appts     Action Taken: Message routed to:  [facility] ([facility] Cardio    Travel Screening: Not Applicable

## 2023-01-05 ENCOUNTER — ANCILLARY PROCEDURE (OUTPATIENT)
Dept: CARDIOLOGY | Facility: CLINIC | Age: 83
End: 2023-01-05
Attending: INTERNAL MEDICINE
Payer: COMMERCIAL

## 2023-01-05 DIAGNOSIS — Z95.818 STATUS POST PLACEMENT OF IMPLANTABLE LOOP RECORDER: ICD-10-CM

## 2023-01-05 DIAGNOSIS — R55 SYNCOPE: ICD-10-CM

## 2023-01-05 PROCEDURE — 93297 REM INTERROG DEV EVAL ICPMS: CPT | Performed by: INTERNAL MEDICINE

## 2023-01-05 PROCEDURE — G2066 INTER DEVC REMOTE 30D: HCPCS | Performed by: INTERNAL MEDICINE

## 2023-01-09 DIAGNOSIS — I25.118 CORONARY ARTERY DISEASE OF NATIVE ARTERY OF NATIVE HEART WITH STABLE ANGINA PECTORIS (H): Primary | Chronic | ICD-10-CM

## 2023-01-16 ENCOUNTER — LAB (OUTPATIENT)
Dept: LAB | Facility: CLINIC | Age: 83
End: 2023-01-16
Payer: COMMERCIAL

## 2023-01-16 DIAGNOSIS — I25.118 CORONARY ARTERY DISEASE OF NATIVE ARTERY OF NATIVE HEART WITH STABLE ANGINA PECTORIS (H): Chronic | ICD-10-CM

## 2023-01-16 LAB
ANION GAP SERPL CALCULATED.3IONS-SCNC: 9 MMOL/L (ref 7–15)
BUN SERPL-MCNC: 23.9 MG/DL (ref 8–23)
CALCIUM SERPL-MCNC: 8.4 MG/DL (ref 8.8–10.2)
CHLORIDE SERPL-SCNC: 104 MMOL/L (ref 98–107)
CHOLEST SERPL-MCNC: 161 MG/DL
CREAT SERPL-MCNC: 1.06 MG/DL (ref 0.67–1.17)
DEPRECATED HCO3 PLAS-SCNC: 30 MMOL/L (ref 22–29)
GFR SERPL CREATININE-BSD FRML MDRD: 70 ML/MIN/1.73M2
GLUCOSE SERPL-MCNC: 91 MG/DL (ref 70–99)
HDLC SERPL-MCNC: 63 MG/DL
LDLC SERPL CALC-MCNC: 80 MG/DL
NONHDLC SERPL-MCNC: 98 MG/DL
POTASSIUM SERPL-SCNC: 4.4 MMOL/L (ref 3.4–5.3)
SODIUM SERPL-SCNC: 143 MMOL/L (ref 136–145)
TRIGL SERPL-MCNC: 91 MG/DL

## 2023-01-16 PROCEDURE — 36415 COLL VENOUS BLD VENIPUNCTURE: CPT | Performed by: INTERNAL MEDICINE

## 2023-01-16 PROCEDURE — 80061 LIPID PANEL: CPT | Performed by: INTERNAL MEDICINE

## 2023-01-16 PROCEDURE — 80048 BASIC METABOLIC PNL TOTAL CA: CPT | Performed by: INTERNAL MEDICINE

## 2023-01-20 ENCOUNTER — OFFICE VISIT (OUTPATIENT)
Dept: CARDIOLOGY | Facility: CLINIC | Age: 83
End: 2023-01-20
Payer: COMMERCIAL

## 2023-01-20 VITALS
WEIGHT: 213.6 LBS | HEART RATE: 55 BPM | OXYGEN SATURATION: 98 % | BODY MASS INDEX: 30.58 KG/M2 | DIASTOLIC BLOOD PRESSURE: 78 MMHG | HEIGHT: 70 IN | SYSTOLIC BLOOD PRESSURE: 128 MMHG

## 2023-01-20 DIAGNOSIS — R73.01 IFG (IMPAIRED FASTING GLUCOSE): ICD-10-CM

## 2023-01-20 DIAGNOSIS — E66.09 CLASS 1 OBESITY DUE TO EXCESS CALORIES WITH SERIOUS COMORBIDITY AND BODY MASS INDEX (BMI) OF 30.0 TO 30.9 IN ADULT: ICD-10-CM

## 2023-01-20 DIAGNOSIS — I10 ESSENTIAL HYPERTENSION, BENIGN: ICD-10-CM

## 2023-01-20 DIAGNOSIS — E78.00 PURE HYPERCHOLESTEROLEMIA: ICD-10-CM

## 2023-01-20 DIAGNOSIS — I47.29 PAROXYSMAL VENTRICULAR TACHYCARDIA (H): ICD-10-CM

## 2023-01-20 DIAGNOSIS — E66.811 CLASS 1 OBESITY DUE TO EXCESS CALORIES WITH SERIOUS COMORBIDITY AND BODY MASS INDEX (BMI) OF 30.0 TO 30.9 IN ADULT: ICD-10-CM

## 2023-01-20 DIAGNOSIS — I25.10 CORONARY ARTERY DISEASE INVOLVING NATIVE CORONARY ARTERY OF NATIVE HEART WITHOUT ANGINA PECTORIS: Primary | ICD-10-CM

## 2023-01-20 DIAGNOSIS — R60.0 PERIPHERAL EDEMA: ICD-10-CM

## 2023-01-20 PROCEDURE — 99214 OFFICE O/P EST MOD 30 MIN: CPT | Performed by: INTERNAL MEDICINE

## 2023-01-20 NOTE — LETTER
1/20/2023    Stephan Nice MD  [facility] [address]  [facility] [address]    RE: Iván Nicholas       Dear Colleague,     I had the pleasure of seeing Iván Nicholas in the [facility].  HPI and Plan:   See dictation    Today's clinic visit entailed:  Review of the result(s) of each unique test - Lab work, device interrogation  Ordering of each unique test  Prescription drug management  35 minutes spent on the date of the encounter doing chart review, history and exam, documentation and further activities per the note  Provider  Link to MDM Help Grid     The level of medical decision making during this visit was of moderate complexity.      Orders Placed This Encounter   Procedures     Lipid Profile     ALT     Basic metabolic panel     Follow-Up with Cardiology FLORENCIA     Follow-Up with Cardiology       No orders of the defined types were placed in this encounter.      There are no discontinued medications.      Encounter Diagnoses   Name Primary?     Coronary artery disease involving native coronary artery of native heart without angina pectoris Yes     Paroxysmal ventricular tachycardia      Pure hypercholesterolemia      Essential hypertension, benign      Class 1 obesity due to excess calories with serious comorbidity and body mass index (BMI) of 30.0 to 30.9 in adult      Peripheral edema      IFG (impaired fasting glucose)        CURRENT MEDICATIONS:  Current Outpatient Medications   Medication Sig Dispense Refill     acetaminophen (TYLENOL) 325 MG tablet Take 2 tablets (650 mg) by mouth every 4 hours as needed for mild pain or headaches       ALLOPURINOL PO Take 100 mg by mouth daily. To prevent gout attacks, recently started.        aspirin (ASA) 81 MG chewable tablet Take 1 tablet (81 mg) by mouth daily       ezetimibe (ZETIA) 10 MG tablet Take 10 mg by mouth At Bedtime        hypromellose-dextran (ARTIFICAL TEARS) 0.1-0.3 % ophthalmic solution Place 1  drop into both eyes 2 times daily as needed for dry eyes       isosorbide mononitrate (IMDUR) 30 MG 24 hr tablet Take 1 tablet (30 mg) by mouth daily Appointment due In April 90 tablet 3     losartan (COZAAR) 100 MG tablet Take 0.5 tablets (50 mg) by mouth daily       metoprolol succinate ER (TOPROL-XL) 50 MG 24 hr tablet Take 1 tablet (50 mg) by mouth daily (Patient taking differently: Take 50 mg by mouth every evening) 90 tablet 3     Multiple Vitamins-Minerals (CENTRUM SILVER) per tablet Take 1 tablet by mouth daily.       Multiple Vitamins-Minerals (PRESERVISION AREDS 2) CAPS Take 1 capsule by mouth 2 times daily        nitroGLYcerin (NITROSTAT) 0.4 MG sublingual tablet Place 1 tablet (0.4 mg) under the tongue every 5 minutes as needed for chest pain 25 tablet 11     pantoprazole (PROTONIX) 40 MG EC tablet Take 40 mg by mouth daily       primidone (MYSOLINE) 250 MG tablet Take 250 mg by mouth At Bedtime       primidone (MYSOLINE) 250 MG tablet Take 500 mg by mouth every morning        rosuvastatin (CRESTOR) 40 MG tablet Take 1 tablet by mouth daily. 30 tablet 1       ALLERGIES     Allergies   Allergen Reactions     Oxycodone Other (See Comments)     Confused & angry     Statins [Hmg-Coa-R Inhibitors] Muscle Pain (Myalgia)     lipitor     Lisinopril Cough       PAST MEDICAL HISTORY:  Past Medical History:   Diagnosis Date     Carotid artery stenosis     Right, s/p right CEA 11/9/2016     Cerebral artery occlusion with cerebral infarction (H) 2016    TIA -  Endarterectomy...No residual     Colon polyps      Color blind      Coronary artery disease     2/2012 - MERARI to mid LAD     Decreased hearing      Depression      Gout      Hypertension      Hypertrophy of prostate without urinary obstruction and other lower urinary tract symptoms (LUTS)      Onychomycosis of toenail      Paroxysmal ventricular tachycardia     with stress test 2012     Syncope and collapse 6/16/2021       PAST SURGICAL HISTORY:  Past Surgical  History:   Procedure Laterality Date     ANGIOPLASTY  1991     ARTHROPLASTY KNEE Left 11/12/2018    Procedure: Left total knee arthroplasty;  Surgeon: Matt Schaefer MD;  Location:  OR     COLONOSCOPY  12/13/2011    Procedure:COLONOSCOPY; COLONOSCOPY; Surgeon:EMMANUELLE MOSER; Location: GI     CORONARY ANGIOGRAPHY ADULT ORDER  1991,2012 1991 - stent to proximal LAD, 2012 - Stent to mid LAD     CV HEART CATHETERIZATION WITH POSSIBLE INTERVENTION Left 4/23/2019    Procedure: Coronary Angiogram;  Surgeon: Percy Armas MD;  Location:  HEART CARDIAC CATH LAB     CV HEART CATHETERIZATION WITH POSSIBLE INTERVENTION N/A 12/10/2019    Procedure: Heart Catheterization with Possible Intervention;  Surgeon: Dajuan Duvall MD;  Location:  HEART CARDIAC CATH LAB     CV PCI ANGIOPLASTY N/A 4/23/2019    Procedure: PCI Angioplasty;  Surgeon: Percy Armas MD;  Location:  HEART CARDIAC CATH LAB     ENDARTERECTOMY CAROTID Right 11/10/2016    Procedure: ENDARTERECTOMY CAROTID;  Surgeon: Paco Suresh MD;  Location:  OR     EP COMPREHENSIVE EP STUDY N/A 9/7/2021    Procedure: EP Comprehensive EP Study;  Surgeon: Jas Peralta MD;  Location:  HEART CARDIAC CATH LAB     HEART CATH, ANGIOPLASTY       ORTHOPEDIC SURGERY       PHACOEMULSIFICATION CLEAR CORNEA WITH STANDARD INTRAOCULAR LENS IMPLANT  12/19/2013    Procedure: PHACOEMULSIFICATION CLEAR CORNEA WITH STANDARD INTRAOCULAR LENS IMPLANT;  RIGHT PHACOEMULSIFICATION CLEAR CORNEA WITH STANDARD INTRAOCULAR LENS IMPLANT ;  Surgeon: Luisito Juan MD;  Location: [facility]       FAMILY HISTORY:  Family History   Problem Relation Age of Onset     Heart Disease Mother 83     Heart Disease Father 69        emphysema     Coronary Artery Disease Brother      Coronary Artery Disease Sister        SOCIAL HISTORY:  Social History     Socioeconomic History     Marital status:      Spouse name: None     Number of children: None     Years  "of education: None     Highest education level: None   Tobacco Use     Smoking status: Former     Packs/day: 0.50     Years: 20.00     Pack years: 10.00     Types: Cigarettes     Quit date: 1990     Years since quittin.1     Smokeless tobacco: Never   Substance and Sexual Activity     Alcohol use: Yes     Alcohol/week: 0.0 standard drinks     Comment: daily - drinks x2     Drug use: No     Sexual activity: Never   Other Topics Concern     Caffeine Concern No     Comment: 1-2 cups daily     Sleep Concern No     Stress Concern No     Special Diet No     Comment: trying to watch sodium intake     Exercise No     Comment: some walking       Review of Systems:  Skin:  Positive for itching     Eyes:  Positive for glasses    ENT:  Positive for hearing loss    Respiratory:  Negative       Cardiovascular:  Negative      Gastroenterology: Negative      Genitourinary:  Negative      Musculoskeletal:  Positive for arthritis;back pain knee pain minor back pain  Neurologic:  Positive for numbness or tingling of hands cold hand and feet  Psychiatric:  Negative      Heme/Lymph/Imm:  Negative      Endocrine:  Negative        Physical Exam:  Vitals: /78 (BP Location: Right arm, Patient Position: Sitting, Cuff Size: Adult Large)   Pulse 55   Ht 1.778 m (5' 10\")   Wt 96.9 kg (213 lb 9.6 oz)   SpO2 98%   BMI 30.65 kg/m      Constitutional:  cooperative, alert and oriented, well developed, well nourished, in no acute distress        Skin:  warm and dry to the touch, no apparent skin lesions or masses noted     right CEA scar    Head:  normocephalic, no masses or lesions        Eyes:  pupils equal and round;conjunctivae and lids unremarkable;sclera white;no xanthalasma        Lymph:      ENT:  no pallor or cyanosis, dentition good        Neck:  carotid pulses are full and equal bilaterally;no carotid bruit   prominent carotid pulsations at base of neck bilaterally. Well-healed right carotid endarterectomy " scar    Respiratory:  normal breath sounds, clear to auscultation, normal A-P diameter, normal symmetry, normal respiratory excursion, no use of accessory muscles basal rales       Cardiac: regular rhythm;normal S1 and S2       systolic murmur;grade 1;RUSB        pulses full and equal                                        GI:    obese      Extremities and Muscular Skeletal:  no spinal abnormalities noted;normal muscle strength and tone   bilateral LE edema;R greater than L;trace;1+          Neurological:  no gross motor deficits        Psych:  affect appropriate, oriented to time, person and place        CC  No referring provider defined for this encounter.    Thank you for allowing me to participate in the care of your patient.      Sincerely,     Akhil Mendoza MD     [facility]

## 2023-01-20 NOTE — PROGRESS NOTES
Service Date: 01/20/2023    HISTORY OF PRESENT ILLNESS:  Grace is a very nice 82-year-old gentleman with past medical history significant for coronary artery disease, nonsustained ventricular tachycardia, obesity, hyperlipidemia, hypertension, impaired fasting glucose, peripheral edema, right leg worse than left, and a series of fainting spells and falling spells, which ultimately I think are due to intravascular volume depletion and too much antihypertensive medications.    Grace's coronary history dates back to 1991 when he underwent angioplasty of his left anterior descending artery.  In 2012, he had stenting of his left anterior descending artery due to crescendo angina and an abnormal stress test.  We jailed his diagonal.  It did not appear to be significantly compromised.  Subsequent stress test did not show any ischemia.  However, he was left with chronic stable exertional angina at a high workload.    We took him back to the Cath Lab in 2012.  FFR of the jailed diagonal was 0.8, and again we decided to treat him medically.  In 2019, due to crescendo angina, he underwent stenting of his distal right coronary artery.  Later in 12/2019, due to chest pain syndrome and a non-ST segment elevation myocardial infarction, we brought him back to the Cath Lab, where he was found to have no flow-limiting stenoses.  First diagonal FFR was 0.87.  Ramus intermedius branch had a 50% stenosis, also with a negative FFR.    As stated, Grace had a series of falling spells.  Workup included a stress nuclear scan that appeared to be negative for ischemia with ejection fraction of 71%.  I referred him to EP because of a monitor demonstrating a 17-beat run of ventricular tachycardia.  Dr. Aroldo Molina performed an EP study and could not induce any arrhythmias and implanted a Reveal.  Over the years, the Reveal has shown no significant atrial or ventricular arrhythmias.    Grace returns to clinic stating that he has done well in the  last year.  He has had no more falling spells.  He has no chest, arm, neck, jaw or shoulder discomfort.  He has no lightheadedness, dizziness, syncope or near syncope.  His peripheral edema is at baseline.  He notes no side effects or problems with his current medical regimen.    ASSESSMENT AND PLAN:  Grace is having no symptoms at this time to suggest ischemia, and this is consistent with his angiogram from 2019.    He has no symptoms to suggest heart failure or significant arrhythmia, and this is supported by interrogation of his Reveal device.    Blood pressure is well controlled at 128/78 with a pulse of 55.  Over the years, I have decreased his Imdur and made other adjustments in his antihypertensive medications.    Weight is 213 pounds and he is down about 10 pounds over the last 2 years.  I have congratulated him on this and encouraged him to continue to do so.  He states he attributes it to just eating less.    Of note, he does have impaired fasting glucose, and in talking to him, he drinks at least 2 drinks a day and I have told him this is pushing him into a prediabetic category, that he needs to have some skips days and drink less alcohol.  It has not shown up in his lipid profile yet, which would manifest as hypertriglyceridemia, although he has had that in the past.  Fasting lipid profile from earlier this week shows a total cholesterol 161, HDL of 63, LDL of 80 and triglycerides of 91, although as stated, triglycerides have been as high as 322.    Creatinine is normal at 1.06 with normal electrolytes.    His peripheral edema is at baseline.  We talked about the importance of low-salt diet regular exercise and elevating his legs.    We reviewed his medications.  We will continue them as is.  I have encouraged him to exercise more consistently, both aerobic and resistance.  He states he is usually pretty good about it in the summertime but does not go out in the cold and the fact that live in [place]  means we are going to expect 5 months of cold weather.  I also emphasized the importance of cutting down on his carbohydrates, especially his alcohol consumption.  We will continue to follow his Reveal device every quarter.  I will have him check in with my nurse practitioner in 6 months.  I will see him back in a year.  If he should have any problems, I would be glad to see him sooner.    Thank you for allowing me to participate in his care.    Akhil Mendoza MD, [facility]        D: 2023   T: 2023   MT: michelle    Name:     GREG VALDERRAMA  MRN:      [id number]        Account:      [id number]   :      1940           Service Date: 2023       Document: [id number]

## 2023-01-20 NOTE — PROGRESS NOTES
HPI and Plan:   See dictation    Today's clinic visit entailed:  Review of the result(s) of each unique test - Lab work, device interrogation  Ordering of each unique test  Prescription drug management  35 minutes spent on the date of the encounter doing chart review, history and exam, documentation and further activities per the note  Provider  Link to MDM Help Grid     The level of medical decision making during this visit was of moderate complexity.      Orders Placed This Encounter   Procedures     Lipid Profile     ALT     Basic metabolic panel     Follow-Up with Cardiology FLORENCIA     Follow-Up with Cardiology       No orders of the defined types were placed in this encounter.      There are no discontinued medications.      Encounter Diagnoses   Name Primary?     Coronary artery disease involving native coronary artery of native heart without angina pectoris Yes     Paroxysmal ventricular tachycardia      Pure hypercholesterolemia      Essential hypertension, benign      Class 1 obesity due to excess calories with serious comorbidity and body mass index (BMI) of 30.0 to 30.9 in adult      Peripheral edema      IFG (impaired fasting glucose)        CURRENT MEDICATIONS:  Current Outpatient Medications   Medication Sig Dispense Refill     acetaminophen (TYLENOL) 325 MG tablet Take 2 tablets (650 mg) by mouth every 4 hours as needed for mild pain or headaches       ALLOPURINOL PO Take 100 mg by mouth daily. To prevent gout attacks, recently started.        aspirin (ASA) 81 MG chewable tablet Take 1 tablet (81 mg) by mouth daily       ezetimibe (ZETIA) 10 MG tablet Take 10 mg by mouth At Bedtime        hypromellose-dextran (ARTIFICAL TEARS) 0.1-0.3 % ophthalmic solution Place 1 drop into both eyes 2 times daily as needed for dry eyes       isosorbide mononitrate (IMDUR) 30 MG 24 hr tablet Take 1 tablet (30 mg) by mouth daily Appointment due In April 90 tablet 3     losartan (COZAAR) 100 MG tablet Take 0.5 tablets (50  mg) by mouth daily       metoprolol succinate ER (TOPROL-XL) 50 MG 24 hr tablet Take 1 tablet (50 mg) by mouth daily (Patient taking differently: Take 50 mg by mouth every evening) 90 tablet 3     Multiple Vitamins-Minerals (CENTRUM SILVER) per tablet Take 1 tablet by mouth daily.       Multiple Vitamins-Minerals (PRESERVISION AREDS 2) CAPS Take 1 capsule by mouth 2 times daily        nitroGLYcerin (NITROSTAT) 0.4 MG sublingual tablet Place 1 tablet (0.4 mg) under the tongue every 5 minutes as needed for chest pain 25 tablet 11     pantoprazole (PROTONIX) 40 MG EC tablet Take 40 mg by mouth daily       primidone (MYSOLINE) 250 MG tablet Take 250 mg by mouth At Bedtime       primidone (MYSOLINE) 250 MG tablet Take 500 mg by mouth every morning        rosuvastatin (CRESTOR) 40 MG tablet Take 1 tablet by mouth daily. 30 tablet 1       ALLERGIES     Allergies   Allergen Reactions     Oxycodone Other (See Comments)     Confused & angry     Statins [Hmg-Coa-R Inhibitors] Muscle Pain (Myalgia)     lipitor     Lisinopril Cough       PAST MEDICAL HISTORY:  Past Medical History:   Diagnosis Date     Carotid artery stenosis     Right, s/p right CEA 11/9/2016     Cerebral artery occlusion with cerebral infarction (H) 2016    TIA -  Endarterectomy...No residual     Colon polyps      Color blind      Coronary artery disease     2/2012 - MERARI to mid LAD     Decreased hearing      Depression      Gout      Hypertension      Hypertrophy of prostate without urinary obstruction and other lower urinary tract symptoms (LUTS)      Onychomycosis of toenail      Paroxysmal ventricular tachycardia     with stress test 2012     Syncope and collapse 6/16/2021       PAST SURGICAL HISTORY:  Past Surgical History:   Procedure Laterality Date     ANGIOPLASTY  1991     ARTHROPLASTY KNEE Left 11/12/2018    Procedure: Left total knee arthroplasty;  Surgeon: Matt Schaefer MD;  Location: [facility] OR     COLONOSCOPY  12/13/2011     Procedure:COLONOSCOPY; COLONOSCOPY; Surgeon:EMMANUELLE MOSER; Location: GI     CORONARY ANGIOGRAPHY ADULT ORDER  1991,2012 1991 - stent to proximal LAD, 2012 - Stent to mid LAD     CV HEART CATHETERIZATION WITH POSSIBLE INTERVENTION Left 4/23/2019    Procedure: Coronary Angiogram;  Surgeon: Percy Armas MD;  Location:  HEART CARDIAC CATH LAB     CV HEART CATHETERIZATION WITH POSSIBLE INTERVENTION N/A 12/10/2019    Procedure: Heart Catheterization with Possible Intervention;  Surgeon: Dajuan Duvall MD;  Location:  HEART CARDIAC CATH LAB     CV PCI ANGIOPLASTY N/A 4/23/2019    Procedure: PCI Angioplasty;  Surgeon: Percy Armas MD;  Location: [facility] CARDIAC CATH LAB     ENDARTERECTOMY CAROTID Right 11/10/2016    Procedure: ENDARTERECTOMY CAROTID;  Surgeon: Paco Suresh MD;  Location:  OR      COMPREHENSIVE EP STUDY N/A 9/7/2021    Procedure: EP Comprehensive EP Study;  Surgeon: Jas Peralta MD;  Location:  HEART CARDIAC CATH LAB     HEART CATH, ANGIOPLASTY       ORTHOPEDIC SURGERY       PHACOEMULSIFICATION CLEAR CORNEA WITH STANDARD INTRAOCULAR LENS IMPLANT  12/19/2013    Procedure: PHACOEMULSIFICATION CLEAR CORNEA WITH STANDARD INTRAOCULAR LENS IMPLANT;  RIGHT PHACOEMULSIFICATION CLEAR CORNEA WITH STANDARD INTRAOCULAR LENS IMPLANT ;  Surgeon: Luisito Juan MD;  [facility]       FAMILY HISTORY:  Family History   Problem Relation Age of Onset     Heart Disease Mother 83     Heart Disease Father 69        emphysema     Coronary Artery Disease Brother      Coronary Artery Disease Sister        SOCIAL HISTORY:  Social History     Socioeconomic History     Marital status:      Spouse name: None     Number of children: None     Years of education: None     Highest education level: None   Tobacco Use     Smoking status: Former     Packs/day: 0.50     Years: 20.00     Pack years: 10.00     Types: Cigarettes     Quit date: 12/13/1990     Years since  "quittin.1     Smokeless tobacco: Never   Substance and Sexual Activity     Alcohol use: Yes     Alcohol/week: 0.0 standard drinks     Comment: daily - drinks x2     Drug use: No     Sexual activity: Never   Other Topics Concern     Caffeine Concern No     Comment: 1-2 cups daily     Sleep Concern No     Stress Concern No     Special Diet No     Comment: trying to watch sodium intake     Exercise No     Comment: some walking       Review of Systems:  Skin:  Positive for itching     Eyes:  Positive for glasses    ENT:  Positive for hearing loss    Respiratory:  Negative       Cardiovascular:  Negative      Gastroenterology: Negative      Genitourinary:  Negative      Musculoskeletal:  Positive for arthritis;back pain knee pain minor back pain  Neurologic:  Positive for numbness or tingling of hands cold hand and feet  Psychiatric:  Negative      Heme/Lymph/Imm:  Negative      Endocrine:  Negative        Physical Exam:  Vitals: /78 (BP Location: Right arm, Patient Position: Sitting, Cuff Size: Adult Large)   Pulse 55   Ht 1.778 m (5' 10\")   Wt 96.9 kg (213 lb 9.6 oz)   SpO2 98%   BMI 30.65 kg/m      Constitutional:  cooperative, alert and oriented, well developed, well nourished, in no acute distress        Skin:  warm and dry to the touch, no apparent skin lesions or masses noted     right CEA scar    Head:  normocephalic, no masses or lesions        Eyes:  pupils equal and round;conjunctivae and lids unremarkable;sclera white;no xanthalasma        Lymph:      ENT:  no pallor or cyanosis, dentition good        Neck:  carotid pulses are full and equal bilaterally;no carotid bruit   prominent carotid pulsations at base of neck bilaterally. Well-healed right carotid endarterectomy scar    Respiratory:  normal breath sounds, clear to auscultation, normal A-P diameter, normal symmetry, normal respiratory excursion, no use of accessory muscles basal rales       Cardiac: regular rhythm;normal S1 and S2       " systolic murmur;grade 1;RUSB        pulses full and equal                                        GI:    obese      Extremities and Muscular Skeletal:  no spinal abnormalities noted;normal muscle strength and tone   bilateral LE edema;R greater than L;trace;1+          Neurological:  no gross motor deficits        Psych:  affect appropriate, oriented to time, person and place        CC  No referring provider defined for this encounter.

## 2023-04-06 ENCOUNTER — ANCILLARY PROCEDURE (OUTPATIENT)
Dept: CARDIOLOGY | Facility: CLINIC | Age: 83
End: 2023-04-06
Attending: INTERNAL MEDICINE
Payer: COMMERCIAL

## 2023-04-06 DIAGNOSIS — Z95.818 STATUS POST PLACEMENT OF IMPLANTABLE LOOP RECORDER: ICD-10-CM

## 2023-04-06 DIAGNOSIS — R55 SYNCOPE: ICD-10-CM

## 2023-04-06 PROCEDURE — 93297 REM INTERROG DEV EVAL ICPMS: CPT | Performed by: INTERNAL MEDICINE

## 2023-04-06 PROCEDURE — G2066 INTER DEVC REMOTE 30D: HCPCS | Performed by: INTERNAL MEDICINE

## 2023-04-06 NOTE — PROGRESS NOTES
"Orthopedic Surgery  11/13/2018  POD 1    S: Patient voices no unexpected ortho complaints today. Denies chest pain or shortness of breath. Stated was up and walked last michael.    O: Blood pressure 144/67, temperature 96.9  F (36.1  C), temperature source Oral, resp. rate 16, height 1.8 m (5' 10.87\"), weight 98.4 kg (217 lb), SpO2 93 %.  Lab Results   Component Value Date    HGB 13.9 01/24/2017     Lab Results   Component Value Date    INR 0.99 11/10/2016     I/O last 3 completed shifts:  In: 3519 [P.O.:680; I.V.:2839]  Out: 2765 [Urine:2150; Drains:590; Blood:25]  Distal extremity CMSI bilaterally.  Calves are negative bilaterally, both soft and nontender.  The dressing is C/D/I.      A: Mr. Nicholas is doing well status post Procedure(s):  Left Total Knee Arthroplasty,  Spinal with Adductor Canal Block.    P: Continue physical therapy. Continue DVT pphx with ASA due to high mobility and low risk factors for DVT. Anticipate discharge to home likely tomorrow.    Daniela Jordan PA-C  [phone]  " signature was used to authenticate this note.     --Saima Pratt, APRN - CNP

## 2023-07-10 ENCOUNTER — TELEPHONE (OUTPATIENT)
Dept: CARDIOLOGY | Facility: CLINIC | Age: 83
End: 2023-07-10
Payer: COMMERCIAL

## 2023-07-10 NOTE — TELEPHONE ENCOUNTER
Pt has a loop recorder. No in clinic device check needed. Already scheduled for remote device check for tomorrow. No orders needed. SH/RN

## 2023-07-10 NOTE — TELEPHONE ENCOUNTER
[facility] Call Center    Phone Message    May a detailed message be left on voicemail: yes     Reason for Call: Other:     Pt is returning message to schedule device check.  No orders are present.  Please call pt to coordinate.    Action Taken: Other: cardio    Travel Screening: Not Applicable     Thank you!  [facility]

## 2023-07-11 ENCOUNTER — ANCILLARY PROCEDURE (OUTPATIENT)
Dept: CARDIOLOGY | Facility: CLINIC | Age: 83
End: 2023-07-11
Attending: INTERNAL MEDICINE
Payer: COMMERCIAL

## 2023-07-11 DIAGNOSIS — Z95.818 STATUS POST PLACEMENT OF IMPLANTABLE LOOP RECORDER: ICD-10-CM

## 2023-07-11 DIAGNOSIS — R55 SYNCOPE: ICD-10-CM

## 2023-07-11 PROCEDURE — 93297 REM INTERROG DEV EVAL ICPMS: CPT | Performed by: INTERNAL MEDICINE

## 2023-07-11 PROCEDURE — G2066 INTER DEVC REMOTE 30D: HCPCS | Performed by: INTERNAL MEDICINE

## 2023-07-27 ENCOUNTER — OFFICE VISIT (OUTPATIENT)
Dept: CARDIOLOGY | Facility: CLINIC | Age: 83
End: 2023-07-27
Attending: INTERNAL MEDICINE
Payer: COMMERCIAL

## 2023-07-27 VITALS
DIASTOLIC BLOOD PRESSURE: 62 MMHG | OXYGEN SATURATION: 98 % | SYSTOLIC BLOOD PRESSURE: 116 MMHG | BODY MASS INDEX: 30.77 KG/M2 | HEIGHT: 70 IN | WEIGHT: 214.9 LBS | HEART RATE: 60 BPM

## 2023-07-27 DIAGNOSIS — I25.118 CORONARY ARTERY DISEASE OF NATIVE ARTERY OF NATIVE HEART WITH STABLE ANGINA PECTORIS (H): ICD-10-CM

## 2023-07-27 DIAGNOSIS — I25.10 CORONARY ARTERY DISEASE INVOLVING NATIVE CORONARY ARTERY OF NATIVE HEART WITHOUT ANGINA PECTORIS: ICD-10-CM

## 2023-07-27 PROCEDURE — 99214 OFFICE O/P EST MOD 30 MIN: CPT | Performed by: PHYSICIAN ASSISTANT

## 2023-07-27 NOTE — PROGRESS NOTES
CARDIOLOGY CLINIC PROGRESS NOTE    DOS: 2023      Iván Nicholas  : 1940, 83 year old  MRN: [id number]      History:  I had the pleasure of following up with Iván Nicholas today in the cardiology clinic.   He is a patient of Dr. Mendoza.      Grace is a pleasant 83-year-old gentleman with past medical history notable for coronary artery disease, hyperlipidemia, hypertension, carotid artery disease (history of right carotid endarterectomy in 2016), left bundle branch block, history of tobacco use, nonischemic cardiomyopathy, falls/syncope thought due to intravascular volume depletion and too much anti-hypertensive meds, NSVT, obesity.      CAD history dates back to .  He had angioplasty of his LAD back in , LAD stenting in early  with a jailed diagonal vessel, later that year repeat coronary angiogram was performed which showed stable diagonal vessel with FFR of 0.8 and therefore medical therapy was recommended.      Cardiac catheterization 19.  95% distal RCA s/p MERARI.     - 19 Hosp Admission for NSTEMI.    12/10/19 cardiac cath:  no new lesions found. First diagonal FFR was 0.87. Ramus intermedius branch had a 50% stenosis, also with a negative FFR.   12/10/19 Echo:  LVEF 35% with WMA suggestive of stress cardiomyopathy.        20 Echo: showed significant improvement in the LVEF from 36% to 55-60%.  There is mild to moderate concentric left ventricular hypertrophy.      21 Lexsican: negative for ischemia.       21 14 day Ziopatch: 1 run VT 14.6 sec, 242 runs SVT up to 20 beats.  Average sinus rate is 61 bpm.       21 Exercise stress test: negative for ischemia      Seen by Dr. Aroldo Molina 163/83, but had forgotten to take his medications that day.  His losartan was increased from 25 mg to 100 mg.  Dr. Molina conducted an EP study and found to be noninducible VT and he implanted a Reveal device.       Over the years, the Reveal has shown no  significant atrial or ventricular arrhythmias.       Grace returns to clinic today for follow up.    He is doing pretty good now, no chest pain.   No recurrence of falls.syncope.   Blood pressure looks good.   Has chronic edema in the ankles. He has some venous stasis changes.  We talked about compression stockings and elevation.   His wife is dealing with some health issues, some dementia.       ROS:  Skin:        Eyes:       ENT:       Respiratory:  Negative    Cardiovascular:  Negative    Gastroenterology:      Genitourinary:       Musculoskeletal:       Neurologic:       Psychiatric:       Heme/Lymph/Imm:       Endocrine:         PAST MEDICAL HISTORY:  Past Medical History:   Diagnosis Date    Carotid artery stenosis     Right, s/p right CEA 11/9/2016    Cerebral artery occlusion with cerebral infarction (H) 2016    TIA -  Endarterectomy...No residual    Colon polyps     Color blind     Coronary artery disease     2/2012 - MERARI to mid LAD    Decreased hearing     Depression     Gout     Hypertension     Hypertrophy of prostate without urinary obstruction and other lower urinary tract symptoms (LUTS)     Onychomycosis of toenail     Paroxysmal ventricular tachycardia (H)     with stress test 2012    Syncope and collapse 6/16/2021       PAST SURGICAL HISTORY:  Past Surgical History:   Procedure Laterality Date    ANGIOPLASTY  1991    ARTHROPLASTY KNEE Left 11/12/2018    Procedure: Left total knee arthroplasty;  Surgeon: Matt Schaefer MD;  Location:  OR    COLONOSCOPY  12/13/2011    Procedure:COLONOSCOPY; COLONOSCOPY; Surgeon:EMMANUELLE MOSER; Location: GI    CORONARY ANGIOGRAPHY ADULT ORDER  1991,2012 1991 - stent to proximal LAD, 2012 - Stent to mid LAD    CV HEART CATHETERIZATION WITH POSSIBLE INTERVENTION Left 4/23/2019    Procedure: Coronary Angiogram;  Surgeon: Percy Armas MD;  Location:  HEART CARDIAC CATH LAB    CV HEART CATHETERIZATION WITH POSSIBLE INTERVENTION N/A 12/10/2019     Procedure: Heart Catheterization with Possible Intervention;  Surgeon: Dajuan Duvall MD;  Location:  HEART CARDIAC CATH LAB    CV PCI ANGIOPLASTY N/A 2019    Procedure: PCI Angioplasty;  Surgeon: Percy Armas MD;  Location:  HEART CARDIAC CATH LAB    ENDARTERECTOMY CAROTID Right 11/10/2016    Procedure: ENDARTERECTOMY CAROTID;  Surgeon: Paco Suresh MD;  Location:  OR    EP COMPREHENSIVE EP STUDY N/A 2021    Procedure: EP Comprehensive EP Study;  Surgeon: Jas Peralta MD;  Location:  HEART CARDIAC CATH LAB    HEART CATH, ANGIOPLASTY      ORTHOPEDIC SURGERY      PHACOEMULSIFICATION CLEAR CORNEA WITH STANDARD INTRAOCULAR LENS IMPLANT  2013    Procedure: PHACOEMULSIFICATION CLEAR CORNEA WITH STANDARD INTRAOCULAR LENS IMPLANT;  RIGHT PHACOEMULSIFICATION CLEAR CORNEA WITH STANDARD INTRAOCULAR LENS IMPLANT ;  Surgeon: Luisito Juan MD;  Location: [facility]       SOCIAL HISTORY:  Social History     Socioeconomic History    Marital status:      Spouse name: None    Number of children: None    Years of education: None    Highest education level: None   Occupational History    None   Tobacco Use    Smoking status: Former Smoker     Packs/day: 0.50     Years: 20.00     Pack years: 10.00     Types: Cigarettes     Quit date: 1990     Years since quittin.1    Smokeless tobacco: Never Used   Substance and Sexual Activity    Alcohol use: Yes     Alcohol/week: 0.0 standard drinks     Comment: daily - drinks x2    Drug use: No    Sexual activity: Never   Other Topics Concern    Parent/sibling w/ CABG, MI or angioplasty before 65F 55M? Not Asked     Service Not Asked    Blood Transfusions Not Asked    Caffeine Concern No     Comment: 1-2 cups daily    Occupational Exposure Not Asked    Hobby Hazards Not Asked    Sleep Concern No    Stress Concern No    Weight Concern Not Asked    Special Diet No     Comment: trying to watch sodium intake    Back Care Not  Asked    Exercise No     Comment: some walking    Bike Helmet Not Asked    Seat Belt Not Asked    Self-Exams Not Asked   Social History Narrative    None     Social Determinants of Health     Financial Resource Strain: Not on file   Food Insecurity: Not on file   Transportation Needs: Not on file   Physical Activity: Not on file   Stress: Not on file   Social Connections: Not on file   Intimate Partner Violence: Not on file   Housing Stability: Not on file       FAMILY HISTORY:  Family History   Problem Relation Age of Onset    Heart Disease Mother 83    Heart Disease Father 69        emphysema    Coronary Artery Disease Brother     Coronary Artery Disease Sister        MEDS: acetaminophen (TYLENOL) 325 MG tablet, Take 2 tablets (650 mg) by mouth every 4 hours as needed for mild pain or headaches  ALLOPURINOL PO, Take 100 mg by mouth daily. To prevent gout attacks, recently started.   aspirin (ASA) 81 MG chewable tablet, Take 1 tablet (81 mg) by mouth daily  ezetimibe (ZETIA) 10 MG tablet, Take 10 mg by mouth At Bedtime   hypromellose-dextran (ARTIFICAL TEARS) 0.1-0.3 % ophthalmic solution, Place 1 drop into both eyes 2 times daily as needed for dry eyes  isosorbide mononitrate (IMDUR) 30 MG 24 hr tablet, Take 1 tablet (30 mg) by mouth daily Appointment due In April  losartan (COZAAR) 100 MG tablet, Take 0.5 tablets (50 mg) by mouth daily  metoprolol succinate ER (TOPROL-XL) 50 MG 24 hr tablet, Take 1 tablet (50 mg) by mouth daily (Patient taking differently: Take 50 mg by mouth every evening)  Multiple Vitamins-Minerals (CENTRUM SILVER) per tablet, Take 1 tablet by mouth daily.  Multiple Vitamins-Minerals (PRESERVISION AREDS 2) CAPS, Take 1 capsule by mouth 2 times daily   nitroGLYcerin (NITROSTAT) 0.4 MG sublingual tablet, Place 1 tablet (0.4 mg) under the tongue every 5 minutes as needed for chest pain  pantoprazole (PROTONIX) 40 MG EC tablet, Take 40 mg by mouth daily  primidone (MYSOLINE) 250 MG tablet, Take  "250 mg by mouth At Bedtime  primidone (MYSOLINE) 250 MG tablet, Take 500 mg by mouth every morning   rosuvastatin (CRESTOR) 40 MG tablet, Take 1 tablet by mouth daily.    No current facility-administered medications on file prior to visit.      ALLERGIES:   Allergies   Allergen Reactions    Oxycodone Other (See Comments)     Confused & angry    Statins [Statins] Muscle Pain (Myalgia)     lipitor    Lisinopril Cough       PHYSICAL EXAM:  Vitals: /62 (BP Location: Right arm, Patient Position: Sitting, Cuff Size: Adult Regular)   Pulse 60   Ht 1.778 m (5' 10\")   Wt 97.5 kg (214 lb 14.4 oz)   SpO2 98%   BMI 30.83 kg/m    Constitutional:  cooperative, alert and oriented, well developed, well nourished, in no acute distress        Skin:  warm and dry to the touch, no apparent skin lesions or masses noted   right CEA scar    Head:  normocephalic, no masses or lesions        Eyes:  pupils equal and round;conjunctivae and lids unremarkable;sclera white        ENT:  no pallor or cyanosis, dentition good        Neck:  no carotid bruit;JVP normal   prominent carotid pulsations at base of neck bilaterally. Well-healed right carotid endarterectomy scar    Respiratory:    basal rales      Cardiac: regular rhythm;normal S1 and S2       systolic murmur;grade 1;RUSB          GI:    obese      Vascular: pulses full and equal                                      Extremities and Musculoskeletal:  no spinal abnormalities noted;normal muscle strength and tone   bilateral trace ankle edema R>L, venous stasis changes    Neurological:  no gross motor deficits;affect appropriate              LABS/DATA:  I reviewed the following:  Limited echo 1/20/20:  Interpretation Summary  The visual ejection fraction is estimated at 55-60%.  There is mild to moderate concentric left ventricular hypertrophy.  The left atrium is mild to moderately dilated.  Right ventricular systolic pressure is elevated, consistent with moderate  pulmonary " hypertension.  The ascending aorta is Mildly dilated.  LVEF is significantly improved compared with most recent study.        Mildred 6/21/21:    The nuclear stress test is negative for inducible myocardial ischemia or infarction.    Left ventricular function is normal, EF 71%.    A prior study was conducted on 10/4/2018.  This study has no change when compared with the prior study.        Zio 14 day 6/28/21:  SR  1 VT, 14.6 seconds  242 SVT, up to 20 beats        Treadmill ECG 8/5/21:  Interpretation Summary   This was a normal stress EKG with no evidence of stress-induced ischemia.         ASSESSMENT/PLAN:  CAD  - Angioplasty of LAD back in 1991, LAD stenting in early 2012 with a jailed diagonal vessel, later that year repeat coronary angiogram was performed which showed stable diagonal vessel with FFR of 0.8 and therefore medical therapy was recommended; nuclear stress study in 11/2018 demonstrating no evidence of ischemia or infarct  - Cardiac cath 4/23/19: 95% distal RCA s/p MERARI  - 12/2019 admission with chest pain and NSTEMI.  Cath 12/10/19 without new lesions.  Echo 12/10/19 showed new decline in LVEF to 35%  - Repeat echo 1/2020 showed EF improved to 55-60%  - Due to some atypical chest discomfort, underwent Mildred 6/21/21: negative for ischemia  - Due to some VT (14 seconds on Zio), underwent treadmill GXT 8/5/21: negative for ischemia   - Currently on ASA 81 mg lifelong, Toprol XL 50 mg, losartan 50 mg, Imdur 30 mg, Crestor 40 mg, Zetia 10 mg.   - No angina at present  - Mediterranean style diet        H/o cardiomyopathy, consistent with stress cardiomyopathy, resolved  - Echo 12/10/19 LVEF 35% with MWA suggestive of stress cardiomyopathy  - Cath 12/10/19 without new lesions  - Echo 1/20/20: improvement in LVEF to 55-60%  - Echo 12/7/21: EF 60-65%  - Continue Toprol XL 50 mg, losartan 50 mg        HTN  - Currently on Imdur 30 mg, Toprol XL 50 mg, losartan 50 mg and BP controlled        Hyperlipidemia  - FLP  "1/16/23 shows LDL 80 on a combination of Zetia and rosuvastatin 40 mg  - Ideally LDL would be closer to 50 with his CAD history but at highest dose Crestor       Carotid artery disease  - History of right carotid endarterectomy in 2016  - Last carotid US 9/2019  - Vascular note from 2019: \" It is been 3 years since his carotid endarterectomy and there is no evidence of recurrence on the right or progression on the left. I do not think further continued surveillance with duplex sonography is necessary. \"  - Continue ASA, statin        VT  Falls/syncope  - Some question of prior falls vs syncope  - 14 seconds of VT on 14 day Zio 6/28/2021  - Treadmill Mildred 8/5/21: negative for ischemia  - No inducible VT on EP study   - He had another fall in early December.  Reveal device implant interrogated and no arrhythmias.    - Continue Toprol XL.  Lowered Imdur and losartan.   - No recent falls/syncope        SVT  - 242 runs, up tp 20 beats on 14 day Zio 6/28/21  - Currently on BB, see above        Does not need meds refilled.       Follow up:  1/2024 Dr. Mendoza with BMP, FLP, ALT        Jenaro Shook PA-C  "

## 2023-07-27 NOTE — LETTER
2023    Stephan Nice MD  [facility] [address]  [place] [address]    RE: Iván Nicholas       Dear Colleague,     I had the pleasure of seeing Iván Nicholas in the [facility].      CARDIOLOGY CLINIC PROGRESS NOTE    DOS: 2023      Iván Nicholas  : 1940, 83 year old  MRN: [id number]      History:  I had the pleasure of following up with Iván Nicholas today in the cardiology clinic.   He is a patient of Dr. Mendoza.      Grace is a pleasant 83-year-old gentleman with past medical history notable for coronary artery disease, hyperlipidemia, hypertension, carotid artery disease (history of right carotid endarterectomy in ), left bundle branch block, history of tobacco use, nonischemic cardiomyopathy, falls/syncope thought due to intravascular volume depletion and too much anti-hypertensive meds, NSVT, obesity.      CAD history dates back to .  He had angioplasty of his LAD back in , LAD stenting in early  with a jailed diagonal vessel, later that year repeat coronary angiogram was performed which showed stable diagonal vessel with FFR of 0.8 and therefore medical therapy was recommended.      Cardiac catheterization 19.  95% distal RCA s/p MERARI.     - 19 Hosp Admission for NSTEMI.    12/10/19 cardiac cath:  no new lesions found. First diagonal FFR was 0.87. Ramus intermedius branch had a 50% stenosis, also with a negative FFR.   12/10/19 Echo:  LVEF 35% with WMA suggestive of stress cardiomyopathy.        20 Echo: showed significant improvement in the LVEF from 36% to 55-60%.  There is mild to moderate concentric left ventricular hypertrophy.      21 Lexsican: negative for ischemia.       21 14 day Ziopatch: 1 run VT 14.6 sec, 242 runs SVT up to 20 beats.  Average sinus rate is 61 bpm.       21 Exercise stress test: negative for ischemia      Seen by Dr. Aroldo Molina 163/83, but  had forgotten to take his medications that day.  His losartan was increased from 25 mg to 100 mg.  Dr. Molina conducted an EP study and found to be noninducible VT and he implanted a Reveal device.       Over the years, the Reveal has shown no significant atrial or ventricular arrhythmias.       Grace returns to clinic today for follow up.    He is doing pretty good now, no chest pain.   No recurrence of falls.syncope.   Blood pressure looks good.   Has chronic edema in the ankles. He has some venous stasis changes.  We talked about compression stockings and elevation.   His wife is dealing with some health issues, some dementia.       ROS:  Skin:        Eyes:       ENT:       Respiratory:  Negative    Cardiovascular:  Negative    Gastroenterology:      Genitourinary:       Musculoskeletal:       Neurologic:       Psychiatric:       Heme/Lymph/Imm:       Endocrine:         PAST MEDICAL HISTORY:  Past Medical History:   Diagnosis Date    Carotid artery stenosis     Right, s/p right CEA 11/9/2016    Cerebral artery occlusion with cerebral infarction (H) 2016    TIA -  Endarterectomy...No residual    Colon polyps     Color blind     Coronary artery disease     2/2012 - MERARI to mid LAD    Decreased hearing     Depression     Gout     Hypertension     Hypertrophy of prostate without urinary obstruction and other lower urinary tract symptoms (LUTS)     Onychomycosis of toenail     Paroxysmal ventricular tachycardia (H)     with stress test 2012    Syncope and collapse 6/16/2021       PAST SURGICAL HISTORY:  Past Surgical History:   Procedure Laterality Date    ANGIOPLASTY  1991    ARTHROPLASTY KNEE Left 11/12/2018    Procedure: Left total knee arthroplasty;  Surgeon: Matt Schaefer MD;  Location:  OR    COLONOSCOPY  12/13/2011    Procedure:COLONOSCOPY; COLONOSCOPY; Surgeon:EMMANUELLE MOSER; Location: GI    CORONARY ANGIOGRAPHY ADULT ORDER  1991,2012 1991 - stent to proximal LAD, 2012 - Stent to mid LAD    CV  HEART CATHETERIZATION WITH POSSIBLE INTERVENTION Left 2019    Procedure: Coronary Angiogram;  Surgeon: Percy Armas MD;  Location:  HEART CARDIAC CATH LAB    CV HEART CATHETERIZATION WITH POSSIBLE INTERVENTION N/A 12/10/2019    Procedure: Heart Catheterization with Possible Intervention;  Surgeon: Dajuan Duvall MD;  Location:  HEART CARDIAC CATH LAB    CV PCI ANGIOPLASTY N/A 2019    Procedure: PCI Angioplasty;  Surgeon: Percy Armas MD;  Location:  HEART CARDIAC CATH LAB    ENDARTERECTOMY CAROTID Right 11/10/2016    Procedure: ENDARTERECTOMY CAROTID;  Surgeon: Paco Suresh MD;  Location:  OR    EP COMPREHENSIVE EP STUDY N/A 2021    Procedure: EP Comprehensive EP Study;  Surgeon: Jas Peralta MD;  Location:  HEART CARDIAC CATH LAB    HEART CATH, ANGIOPLASTY      ORTHOPEDIC SURGERY      PHACOEMULSIFICATION CLEAR CORNEA WITH STANDARD INTRAOCULAR LENS IMPLANT  2013    Procedure: PHACOEMULSIFICATION CLEAR CORNEA WITH STANDARD INTRAOCULAR LENS IMPLANT;  RIGHT PHACOEMULSIFICATION CLEAR CORNEA WITH STANDARD INTRAOCULAR LENS IMPLANT ;  Surgeon: Luisito Juan MD;  Location: [facility]       SOCIAL HISTORY:  Social History     Socioeconomic History    Marital status:      Spouse name: None    Number of children: None    Years of education: None    Highest education level: None   Occupational History    None   Tobacco Use    Smoking status: Former Smoker     Packs/day: 0.50     Years: 20.00     Pack years: 10.00     Types: Cigarettes     Quit date: 1990     Years since quittin.1    Smokeless tobacco: Never Used   Substance and Sexual Activity    Alcohol use: Yes     Alcohol/week: 0.0 standard drinks     Comment: daily - drinks x2    Drug use: No    Sexual activity: Never   Other Topics Concern    Parent/sibling w/ CABG, MI or angioplasty before 65F 55M? Not Asked     Service Not Asked    Blood Transfusions Not Asked    Caffeine  Concern No     Comment: 1-2 cups daily    Occupational Exposure Not Asked    Hobby Hazards Not Asked    Sleep Concern No    Stress Concern No    Weight Concern Not Asked    Special Diet No     Comment: trying to watch sodium intake    Back Care Not Asked    Exercise No     Comment: some walking    Bike Helmet Not Asked    Seat Belt Not Asked    Self-Exams Not Asked   Social History Narrative    None     Social Determinants of Health     Financial Resource Strain: Not on file   Food Insecurity: Not on file   Transportation Needs: Not on file   Physical Activity: Not on file   Stress: Not on file   Social Connections: Not on file   Intimate Partner Violence: Not on file   Housing Stability: Not on file       FAMILY HISTORY:  Family History   Problem Relation Age of Onset    Heart Disease Mother 83    Heart Disease Father 69        emphysema    Coronary Artery Disease Brother     Coronary Artery Disease Sister        MEDS: acetaminophen (TYLENOL) 325 MG tablet, Take 2 tablets (650 mg) by mouth every 4 hours as needed for mild pain or headaches  ALLOPURINOL PO, Take 100 mg by mouth daily. To prevent gout attacks, recently started.   aspirin (ASA) 81 MG chewable tablet, Take 1 tablet (81 mg) by mouth daily  ezetimibe (ZETIA) 10 MG tablet, Take 10 mg by mouth At Bedtime   hypromellose-dextran (ARTIFICAL TEARS) 0.1-0.3 % ophthalmic solution, Place 1 drop into both eyes 2 times daily as needed for dry eyes  isosorbide mononitrate (IMDUR) 30 MG 24 hr tablet, Take 1 tablet (30 mg) by mouth daily Appointment due In April  losartan (COZAAR) 100 MG tablet, Take 0.5 tablets (50 mg) by mouth daily  metoprolol succinate ER (TOPROL-XL) 50 MG 24 hr tablet, Take 1 tablet (50 mg) by mouth daily (Patient taking differently: Take 50 mg by mouth every evening)  Multiple Vitamins-Minerals (CENTRUM SILVER) per tablet, Take 1 tablet by mouth daily.  Multiple Vitamins-Minerals (PRESERVISION AREDS 2) CAPS, Take 1 capsule by mouth 2 times daily  "  nitroGLYcerin (NITROSTAT) 0.4 MG sublingual tablet, Place 1 tablet (0.4 mg) under the tongue every 5 minutes as needed for chest pain  pantoprazole (PROTONIX) 40 MG EC tablet, Take 40 mg by mouth daily  primidone (MYSOLINE) 250 MG tablet, Take 250 mg by mouth At Bedtime  primidone (MYSOLINE) 250 MG tablet, Take 500 mg by mouth every morning   rosuvastatin (CRESTOR) 40 MG tablet, Take 1 tablet by mouth daily.    No current facility-administered medications on file prior to visit.      ALLERGIES:   Allergies   Allergen Reactions    Oxycodone Other (See Comments)     Confused & angry    Statins [Statins] Muscle Pain (Myalgia)     lipitor    Lisinopril Cough       PHYSICAL EXAM:  Vitals: /62 (BP Location: Right arm, Patient Position: Sitting, Cuff Size: Adult Regular)   Pulse 60   Ht 1.778 m (5' 10\")   Wt 97.5 kg (214 lb 14.4 oz)   SpO2 98%   BMI 30.83 kg/m    Constitutional:  cooperative, alert and oriented, well developed, well nourished, in no acute distress        Skin:  warm and dry to the touch, no apparent skin lesions or masses noted   right CEA scar    Head:  normocephalic, no masses or lesions        Eyes:  pupils equal and round;conjunctivae and lids unremarkable;sclera white        ENT:  no pallor or cyanosis, dentition good        Neck:  no carotid bruit;JVP normal   prominent carotid pulsations at base of neck bilaterally. Well-healed right carotid endarterectomy scar    Respiratory:    basal rales      Cardiac: regular rhythm;normal S1 and S2       systolic murmur;grade 1;RUSB          GI:    obese      Vascular: pulses full and equal                                      Extremities and Musculoskeletal:  no spinal abnormalities noted;normal muscle strength and tone   bilateral trace ankle edema R>L, venous stasis changes    Neurological:  no gross motor deficits;affect appropriate              LABS/DATA:  I reviewed the following:  Limited echo 1/20/20:  Interpretation Summary  The visual " ejection fraction is estimated at 55-60%.  There is mild to moderate concentric left ventricular hypertrophy.  The left atrium is mild to moderately dilated.  Right ventricular systolic pressure is elevated, consistent with moderate  pulmonary hypertension.  The ascending aorta is Mildly dilated.  LVEF is significantly improved compared with most recent study.        Mildred 6/21/21:    The nuclear stress test is negative for inducible myocardial ischemia or infarction.    Left ventricular function is normal, EF 71%.    A prior study was conducted on 10/4/2018.  This study has no change when compared with the prior study.        Zio 14 day 6/28/21:  SR  1 VT, 14.6 seconds  242 SVT, up to 20 beats        Treadmill ECG 8/5/21:  Interpretation Summary   This was a normal stress EKG with no evidence of stress-induced ischemia.         ASSESSMENT/PLAN:  CAD  - Angioplasty of LAD back in 1991, LAD stenting in early 2012 with a jailed diagonal vessel, later that year repeat coronary angiogram was performed which showed stable diagonal vessel with FFR of 0.8 and therefore medical therapy was recommended; nuclear stress study in 11/2018 demonstrating no evidence of ischemia or infarct  - Cardiac cath 4/23/19: 95% distal RCA s/p MERARI  - 12/2019 admission with chest pain and NSTEMI.  Cath 12/10/19 without new lesions.  Echo 12/10/19 showed new decline in LVEF to 35%  - Repeat echo 1/2020 showed EF improved to 55-60%  - Due to some atypical chest discomfort, underwent Mildred 6/21/21: negative for ischemia  - Due to some VT (14 seconds on Zio), underwent treadmill GXT 8/5/21: negative for ischemia   - Currently on ASA 81 mg lifelong, Toprol XL 50 mg, losartan 50 mg, Imdur 30 mg, Crestor 40 mg, Zetia 10 mg.   - No angina at present  - Mediterranean style diet        H/o cardiomyopathy, consistent with stress cardiomyopathy, resolved  - Echo 12/10/19 LVEF 35% with MWA suggestive of stress cardiomyopathy  - Cath 12/10/19 without new  "lesions  - Echo 1/20/20: improvement in LVEF to 55-60%  - Echo 12/7/21: EF 60-65%  - Continue Toprol XL 50 mg, losartan 50 mg        HTN  - Currently on Imdur 30 mg, Toprol XL 50 mg, losartan 50 mg and BP controlled        Hyperlipidemia  - FLP 1/16/23 shows LDL 80 on a combination of Zetia and rosuvastatin 40 mg  - Ideally LDL would be closer to 50 with his CAD history but at highest dose Crestor       Carotid artery disease  - History of right carotid endarterectomy in 2016  - Last carotid US 9/2019  - Vascular note from 2019: \" It is been 3 years since his carotid endarterectomy and there is no evidence of recurrence on the right or progression on the left. I do not think further continued surveillance with duplex sonography is necessary. \"  - Continue ASA, statin        VT  Falls/syncope  - Some question of prior falls vs syncope  - 14 seconds of VT on 14 day Zio 6/28/2021  - Treadmill Mildred 8/5/21: negative for ischemia  - No inducible VT on EP study   - He had another fall in early December.  Reveal device implant interrogated and no arrhythmias.    - Continue Toprol XL.  Lowered Imdur and losartan.   - No recent falls/syncope        SVT  - 242 runs, up tp 20 beats on 14 day Zio 6/28/21  - Currently on BB, see above        Does not need meds refilled.       Follow up:  1/2024 Dr. Mendoza with BMP, FLP, ALT        Jenaro Shook PA-C      Thank you for allowing me to participate in the care of your patient.      Sincerely,     Jenaro Shook PA-C     [facility]  cc:   Akhil Mendoza MD  [address]  [place] [id number]    "

## 2023-10-16 ENCOUNTER — ANCILLARY PROCEDURE (OUTPATIENT)
Dept: CARDIOLOGY | Facility: CLINIC | Age: 83
End: 2023-10-16
Attending: INTERNAL MEDICINE
Payer: COMMERCIAL

## 2023-10-16 DIAGNOSIS — Z95.818 STATUS POST PLACEMENT OF IMPLANTABLE LOOP RECORDER: ICD-10-CM

## 2023-10-16 DIAGNOSIS — R55 SYNCOPE: ICD-10-CM

## 2023-10-16 PROCEDURE — 93297 REM INTERROG DEV EVAL ICPMS: CPT | Performed by: INTERNAL MEDICINE

## 2023-10-16 PROCEDURE — G2066 INTER DEVC REMOTE 30D: HCPCS | Performed by: INTERNAL MEDICINE

## 2023-11-05 ENCOUNTER — HEALTH MAINTENANCE LETTER (OUTPATIENT)
Age: 83
End: 2023-11-05

## 2023-12-07 ENCOUNTER — APPOINTMENT (OUTPATIENT)
Dept: CT IMAGING | Facility: CLINIC | Age: 83
End: 2023-12-07
Attending: EMERGENCY MEDICINE
Payer: COMMERCIAL

## 2023-12-07 ENCOUNTER — HOSPITAL ENCOUNTER (EMERGENCY)
Facility: CLINIC | Age: 83
Discharge: HOME | End: 2023-12-07
Attending: EMERGENCY MEDICINE | Admitting: EMERGENCY MEDICINE
Payer: COMMERCIAL

## 2023-12-07 VITALS
DIASTOLIC BLOOD PRESSURE: 93 MMHG | SYSTOLIC BLOOD PRESSURE: 197 MMHG | RESPIRATION RATE: 18 BRPM | HEART RATE: 57 BPM | TEMPERATURE: 97.7 F | OXYGEN SATURATION: 96 %

## 2023-12-07 DIAGNOSIS — F10.920 ALCOHOLIC INTOXICATION WITHOUT COMPLICATION (H): ICD-10-CM
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
## 2023-12-07 DIAGNOSIS — M54.16 LUMBAR BACK PAIN WITH RADICULOPATHY AFFECTING RIGHT LOWER EXTREMITY: ICD-10-CM

## 2023-12-07 LAB
ALBUMIN SERPL BCG-MCNC: 3.9 G/DL (ref 3.5–5.2)
ALP SERPL-CCNC: 83 U/L (ref 40–150)
ALT SERPL W P-5'-P-CCNC: 16 U/L (ref 0–70)
AMMONIA PLAS-SCNC: 16 UMOL/L (ref 16–60)
ANION GAP SERPL CALCULATED.3IONS-SCNC: 13 MMOL/L (ref 7–15)
AST SERPL W P-5'-P-CCNC: 24 U/L (ref 0–45)
ATRIAL RATE - MUSE: 57 BPM
BASOPHILS # BLD AUTO: 0 10E3/UL (ref 0–0.2)
BASOPHILS NFR BLD AUTO: 0 %
BILIRUB SERPL-MCNC: 0.2 MG/DL
BUN SERPL-MCNC: 26.3 MG/DL (ref 8–23)
CALCIUM SERPL-MCNC: 8.4 MG/DL (ref 8.8–10.2)
CHLORIDE SERPL-SCNC: 105 MMOL/L (ref 98–107)
CREAT SERPL-MCNC: 1 MG/DL (ref 0.67–1.17)
DEPRECATED HCO3 PLAS-SCNC: 25 MMOL/L (ref 22–29)
DIASTOLIC BLOOD PRESSURE - MUSE: NORMAL MMHG
EGFRCR SERPLBLD CKD-EPI 2021: 75 ML/MIN/1.73M2
EOSINOPHIL # BLD AUTO: 0.1 10E3/UL (ref 0–0.7)
EOSINOPHIL NFR BLD AUTO: 1 %
ERYTHROCYTE [DISTWIDTH] IN BLOOD BY AUTOMATED COUNT: 13.7 % (ref 10–15)
ETHANOL SERPL-MCNC: 0.09 G/DL
GLUCOSE SERPL-MCNC: 71 MG/DL (ref 70–99)
HCT VFR BLD AUTO: 41 % (ref 40–53)
HGB BLD-MCNC: 13.8 G/DL (ref 13.3–17.7)
IMM GRANULOCYTES # BLD: 0 10E3/UL
IMM GRANULOCYTES NFR BLD: 0 %
INTERPRETATION ECG - MUSE: NORMAL
LYMPHOCYTES # BLD AUTO: 1.1 10E3/UL (ref 0.8–5.3)
LYMPHOCYTES NFR BLD AUTO: 15 %
MAGNESIUM SERPL-MCNC: 2 MG/DL (ref 1.7–2.3)
MCH RBC QN AUTO: 35.3 PG (ref 26.5–33)
MCHC RBC AUTO-ENTMCNC: 33.7 G/DL (ref 31.5–36.5)
MCV RBC AUTO: 105 FL (ref 78–100)
MONOCYTES # BLD AUTO: 0.7 10E3/UL (ref 0–1.3)
MONOCYTES NFR BLD AUTO: 9 %
NEUTROPHILS # BLD AUTO: 5.3 10E3/UL (ref 1.6–8.3)
NEUTROPHILS NFR BLD AUTO: 75 %
NRBC # BLD AUTO: 0 10E3/UL
NRBC BLD AUTO-RTO: 0 /100
P AXIS - MUSE: 59 DEGREES
PLATELET # BLD AUTO: 128 10E3/UL (ref 150–450)
POTASSIUM SERPL-SCNC: 3.9 MMOL/L (ref 3.4–5.3)
PR INTERVAL - MUSE: 194 MS
PROT SERPL-MCNC: 6.6 G/DL (ref 6.4–8.3)
QRS DURATION - MUSE: 96 MS
QT - MUSE: 496 MS
QTC - MUSE: 482 MS
R AXIS - MUSE: 29 DEGREES
RBC # BLD AUTO: 3.91 10E6/UL (ref 4.4–5.9)
SODIUM SERPL-SCNC: 143 MMOL/L (ref 135–145)
SYSTOLIC BLOOD PRESSURE - MUSE: NORMAL MMHG
T AXIS - MUSE: 57 DEGREES
VENTRICULAR RATE- MUSE: 57 BPM
WBC # BLD AUTO: 7.2 10E3/UL (ref 4–11)

## 2023-12-07 PROCEDURE — 83735 ASSAY OF MAGNESIUM: CPT | Performed by: EMERGENCY MEDICINE

## 2023-12-07 PROCEDURE — 85025 COMPLETE CBC W/AUTO DIFF WBC: CPT | Performed by: EMERGENCY MEDICINE

## 2023-12-07 PROCEDURE — 93005 ELECTROCARDIOGRAM TRACING: CPT

## 2023-12-07 PROCEDURE — 82077 ASSAY SPEC XCP UR&BREATH IA: CPT | Performed by: EMERGENCY MEDICINE

## 2023-12-07 PROCEDURE — 82140 ASSAY OF AMMONIA: CPT | Performed by: EMERGENCY MEDICINE

## 2023-12-07 PROCEDURE — 72125 CT NECK SPINE W/O DYE: CPT

## 2023-12-07 PROCEDURE — 72131 CT LUMBAR SPINE W/O DYE: CPT

## 2023-12-07 PROCEDURE — 99285 EMERGENCY DEPT VISIT HI MDM: CPT | Mod: 25

## 2023-12-07 PROCEDURE — 80053 COMPREHEN METABOLIC PANEL: CPT | Performed by: EMERGENCY MEDICINE

## 2023-12-07 PROCEDURE — 70450 CT HEAD/BRAIN W/O DYE: CPT

## 2023-12-07 PROCEDURE — 36415 COLL VENOUS BLD VENIPUNCTURE: CPT | Performed by: EMERGENCY MEDICINE

## 2023-12-07 ASSESSMENT — ACTIVITIES OF DAILY LIVING (ADL)
ADLS_ACUITY_SCORE: 35
ADLS_ACUITY_SCORE: 36

## 2023-12-07 NOTE — ED TRIAGE NOTES
Arrived via EMS from home where he lives with his wife. Wife called 911, several hours after pt had fall on kitchen floor after having some drinks. EMS states that wife had made a bed for patient on the floor and that he had fallen around 10pm and was just going to sleep on the floor since unable to get up. EMS states they also have had several calls to address with similar complaints of falls after drinking. Pt complains of low back and bilateral hip pain. PIV left AC 18 g. BG 93 VSS's stable other than hypertensive.     Triage Assessment (Adult)       Row Name 12/07/23 0440          Triage Assessment    Airway WDL WDL        Respiratory WDL    Respiratory WDL WDL        Skin Circulation/Temperature WDL    Skin Circulation/Temperature WDL WDL        Cardiac WDL    Cardiac WDL WDL        Peripheral/Neurovascular WDL    Peripheral Neurovascular WDL WDL        Cognitive/Neuro/Behavioral WDL    Cognitive/Neuro/Behavioral WDL X;speech     Speech slurred        Pupils (CN II)    Pupil PERRLA yes     Pupil Size Left 3 mm     Pupil Size Right 3 mm

## 2023-12-07 NOTE — ED NOTES
12/07/23 0723   Vital Signs   BP (!) 197/93   BP - Mean 120   Patient Position Sitting   Resp 16   SpO2 97 %   O2 Device None (Room air)   Joie Coma Scale   Best Eye Response 4-->(E4) spontaneous   Best Motor Response 6-->(M6) obeys commands   Best Verbal Response 5-->(V5) oriented   Joie Coma Scale Score 15     BP continues to be elevated, pt denies Chest pain, denies headaches, no vision changes and jesika intact.  Pt mentation at baseline.  Provider aware of hypertensions and pt does have blood pressure medications and missed dose from last evening. Discharge instructions given and reviewed, pt does express caregiver fatigue with with cares at home. Resources and phone numbers given from local services and churches for patient to reach out for support.  Patient expressed gratitude. In addition working on safe ride to home and delay discharge as a result.  Musa FERRERA Son, RN

## 2023-12-07 NOTE — ED PROVIDER NOTES
"  History     Chief Complaint:  Fall and Alcohol Intoxication     The history is provided by the patient.      Iván Nicholas is a 83 year old male with a history of CAD, hypertension, TIA, LBBB, ACS, and prostate cancer who presents to the emergency department for fall and ETOH intoxication. The patient states that tonight, he fell and was unable to get up. He is unsure if he hit his head but he reports that he is still experiencing lower back pain. Denies drug use. Denies new leg edema. Denies neck pain. Denies mid back pain. He reports that he has fallen several times in the past due to \"dizziness.\" He states that he fell while sitting on a stool and while getting off of c a pontoon boat.    Independent Historian:   None - Patient Only    Review of External Notes:   I reviewed cardiology note July 2023 details his history and plan at that time there was no concern for ongoing anginal equivalents.  Plan is to continue his metoprolol and other antihypertensives.  His most recent EP study according to that note showed no inducible VT.  Reviewed interrogations which have not shown recurrent arrhythmias.    Medications:    Allopurinol  Aspirin 81 mg  Ezetimibe  Losartan  Metoprolol  Nitroglycerin  Pantoprazole  Primidone  Rosuvastatin    Past Medical History:    Carotid artery stenosis  Cerebral artery occlusion  Colon polyps  CAD  Depression  Gout  Hypertension  Prostate hypertrophy  Onychomycosis of toenail  Paroxysmal ventricular tachycardia  TIA  LBBB  ACS  Troponin elevated  Ischemic heart disease  GERD  Prostate cancer    Past Surgical History:    Angioplasty  L TKA  CV heart cath with intervention   CV PCI angioplasty  Endarterectomy  Orthopedic surgery  Phacoemulsification clear cornea with standard IOL  Radical retropubic prostatectomy  Menisectomy    Physical Exam   Patient Vitals for the past 24 hrs:   BP Temp Temp src Pulse Resp SpO2   12/07/23 0753 -- -- -- -- 18 96 %   12/07/23 0723 (!) 197/93 -- -- " -- 16 97 %   12/07/23 0527 (!) 200/84 -- -- 57 -- --   12/07/23 0517 (!) 214/105 -- -- 56 -- --   12/07/23 0507 -- -- -- 60 -- --   12/07/23 0457 (!) 215/98 -- -- 64 -- --   12/07/23 0448 -- -- -- 70 -- 99 %   12/07/23 0443 (!) 206/108 -- -- 59 -- 97 %   12/07/23 0437 (!) 198/90 97.7  F (36.5  C) Oral 59 18 97 %      Physical Exam    HENT:  mmm, no rhinorrhea no visible trauma on the face or scalp.  Eyes: periorbital tissues and sclera normal, 4 mm bilaterally, no nystagmus  Neck: supple, no abnormal swelling, no midline tenderness, painless range of motion  Lungs:  CTAB,  no resp distress no chest wall tenderness  CV: rrr, no m/r/g, ppi  Abd: soft, nontender, nondistended, no rebound/masses/guarding/hsm  Ext: Skeletal survey unremarkable for significant reproducible bony tenderness palpation with the exception of mild tenderness in the mid to lower lumbar spine, no palpable deformities  Skin: warm, dry, well perfused, no rashes/bruising/lesions on exposed skin  Neuro: Intoxicated but alert and able to answer questions and follow commands including two-step commands, cranial nerves II through XII intact symmetric, 5 out of 5 motor bilateral upper and bilateral lower extremities, sensation intact to light touch, gait stable this was done after CT scans trauma workup was negative.  Psych: Normal mood, normal affect      Emergency Department Course   ECG  ECG results from 12/07/23   EKG 12-lead, tracing only     Value    Systolic Blood Pressure     Diastolic Blood Pressure     Ventricular Rate 57    Atrial Rate 57    CO Interval 194    QRS Duration 96        QTc 482    P Axis 59    R AXIS 29    T Axis 57    Interpretation ECG      Sinus bradycardia  Prolonged QT  Abnormal ECG  When compared with ECG of 07-DEC-2021 01:16,  No significant change was found  Confirmed by - EMERGENCY ROOM, PHYSICIAN ([id number]),  JOLANTA AGUIRRE (Tahmina) on 12/7/2023 6:39:35 AM       Imaging:  Lumbar spine CT w/o contrast   Final  Result   IMPRESSION:   1.  No acute lumbar spine fracture.                          CT Cervical Spine w/o Contrast   Final Result   IMPRESSION:   HEAD CT:   1.  No acute intracranial abnormality or significant change compared to the prior study.      CERVICAL SPINE CT:   1.  No acute cervical spine fracture.                                  CT Head w/o Contrast   Final Result   IMPRESSION:   HEAD CT:   1.  No acute intracranial abnormality or significant change compared to the prior study.      CERVICAL SPINE CT:   1.  No acute cervical spine fracture.                                     Read by radiologist.    Laboratory:  Labs Ordered and Resulted from Time of ED Arrival to Time of ED Departure   COMPREHENSIVE METABOLIC PANEL - Abnormal       Result Value    Sodium 143      Potassium 3.9      Carbon Dioxide (CO2) 25      Anion Gap 13      Urea Nitrogen 26.3 (*)     Creatinine 1.00      GFR Estimate 75      Calcium 8.4 (*)     Chloride 105      Glucose 71      Alkaline Phosphatase 83      AST 24      ALT 16      Protein Total 6.6      Albumin 3.9      Bilirubin Total 0.2     ETHYL ALCOHOL LEVEL - Abnormal    Alcohol ethyl 0.09 (*)    CBC WITH PLATELETS AND DIFFERENTIAL - Abnormal    WBC Count 7.2      RBC Count 3.91 (*)     Hemoglobin 13.8      Hematocrit 41.0       (*)     MCH 35.3 (*)     MCHC 33.7      RDW 13.7      Platelet Count 128 (*)     % Neutrophils 75      % Lymphocytes 15      % Monocytes 9      % Eosinophils 1      % Basophils 0      % Immature Granulocytes 0      NRBCs per 100 WBC 0      Absolute Neutrophils 5.3      Absolute Lymphocytes 1.1      Absolute Monocytes 0.7      Absolute Eosinophils 0.1      Absolute Basophils 0.0      Absolute Immature Granulocytes 0.0      Absolute NRBCs 0.0     MAGNESIUM - Normal    Magnesium 2.0     AMMONIA - Normal    Ammonia 16        Emergency Department Course & Assessments:     Interventions:  Medications - No data to display     Assessments:  0502 I  obtained history and examined the patient as noted above.     Independent Interpretation (X-rays, CTs, rhythm strip):  Head CT without obvious large intracranial hemorrhage    Consultations/Discussion of Management or Tests:       Social Determinants of Health affecting care:   None        Impression & Plan      Medical Decision Makin-year-old male with history of alcohol use disorder here after a fall while being intoxicated with alcohol several hours ago.  Wife had him sleeping on the floor however he is continue to complain of back pain.  No visible trauma on the head he.  Head and neck CT scans are negative.  Skeletal survey only had tenderness in the lower lumbar spine and that CT scan shows no acute trauma.  Abdomen is benign.  He is not complaining of chest pain or shortness of breath.  ECG shows sinus rhythm no significant ischemia or red flags for malignant arrhythmia.  His last cardiology visit was unremarkable.  Last interrogation was unremarkable.  Was able to pass an ambulation trial here in the emergency room.  Plan to be to discharge home with a sober ride.    Diagnosis:    ICD-10-CM    1. Alcoholic intoxication without complication (H24)  F10.920       2. Fall, initial encounter  W19.XXXA       3. Lumbar back pain with radiculopathy affecting right lower extremity  M54.16            Discharge Medications:  Discharge Medication List as of 2023  7:22 AM         Scribe Disclosure:  IJoseph, am serving as a scribe at 4:58 AM on 2023 to document services personally performed by Richard Aparicio MD based on my observations and the provider's statements to me.     2023   Richard Aparicio MD Walker, Jerome Richard, MD  [phone]

## 2023-12-07 NOTE — DISCHARGE INSTRUCTIONS
Return to ER immediately if you develop: worsening pain, Fever > 101, persistent nausea or vomiting OR you have any other concerns about your health.      Moderate alcohol consumption

## 2024-01-18 ENCOUNTER — LAB (OUTPATIENT)
Dept: LAB | Facility: CLINIC | Age: 84
End: 2024-01-18
Payer: COMMERCIAL

## 2024-01-18 DIAGNOSIS — I25.10 CORONARY ARTERY DISEASE INVOLVING NATIVE CORONARY ARTERY OF NATIVE HEART WITHOUT ANGINA PECTORIS: ICD-10-CM

## 2024-01-18 LAB
ALT SERPL W P-5'-P-CCNC: 27 U/L (ref 0–70)
ANION GAP SERPL CALCULATED.3IONS-SCNC: 9 MMOL/L (ref 7–15)
BUN SERPL-MCNC: 23.6 MG/DL (ref 8–23)
CALCIUM SERPL-MCNC: 8.5 MG/DL (ref 8.8–10.2)
CHLORIDE SERPL-SCNC: 104 MMOL/L (ref 98–107)
CREAT SERPL-MCNC: 1.12 MG/DL (ref 0.67–1.17)
DEPRECATED HCO3 PLAS-SCNC: 28 MMOL/L (ref 22–29)
EGFRCR SERPLBLD CKD-EPI 2021: 65 ML/MIN/1.73M2
GLUCOSE SERPL-MCNC: 90 MG/DL (ref 70–99)
POTASSIUM SERPL-SCNC: 4.8 MMOL/L (ref 3.4–5.3)
SODIUM SERPL-SCNC: 141 MMOL/L (ref 135–145)

## 2024-01-18 PROCEDURE — 36415 COLL VENOUS BLD VENIPUNCTURE: CPT | Performed by: INTERNAL MEDICINE

## 2024-01-18 PROCEDURE — 84460 ALANINE AMINO (ALT) (SGPT): CPT | Performed by: INTERNAL MEDICINE

## 2024-01-18 PROCEDURE — 80061 LIPID PANEL: CPT | Performed by: INTERNAL MEDICINE

## 2024-01-18 PROCEDURE — 80048 BASIC METABOLIC PNL TOTAL CA: CPT | Performed by: INTERNAL MEDICINE

## 2024-01-19 ENCOUNTER — OFFICE VISIT (OUTPATIENT)
Dept: CARDIOLOGY | Facility: CLINIC | Age: 84
End: 2024-01-19
Attending: INTERNAL MEDICINE
Payer: COMMERCIAL

## 2024-01-19 ENCOUNTER — TELEPHONE (OUTPATIENT)
Dept: CARDIOLOGY | Facility: CLINIC | Age: 84
End: 2024-01-19

## 2024-01-19 VITALS
BODY MASS INDEX: 29.68 KG/M2 | HEART RATE: 52 BPM | WEIGHT: 207.3 LBS | SYSTOLIC BLOOD PRESSURE: 130 MMHG | OXYGEN SATURATION: 99 % | HEIGHT: 70 IN | DIASTOLIC BLOOD PRESSURE: 66 MMHG

## 2024-01-19 DIAGNOSIS — Z91.81 HISTORY OF FALLING: ICD-10-CM

## 2024-01-19 DIAGNOSIS — I25.118 CORONARY ARTERY DISEASE OF NATIVE ARTERY OF NATIVE HEART WITH STABLE ANGINA PECTORIS (H): Chronic | ICD-10-CM

## 2024-01-19 DIAGNOSIS — I44.7 LEFT BUNDLE BRANCH BLOCK: ICD-10-CM

## 2024-01-19 DIAGNOSIS — I25.118 CORONARY ARTERY DISEASE OF NATIVE ARTERY OF NATIVE HEART WITH STABLE ANGINA PECTORIS (H): ICD-10-CM

## 2024-01-19 DIAGNOSIS — R73.01 IFG (IMPAIRED FASTING GLUCOSE): ICD-10-CM

## 2024-01-19 DIAGNOSIS — I25.10 CORONARY ARTERY DISEASE INVOLVING NATIVE CORONARY ARTERY OF NATIVE HEART WITHOUT ANGINA PECTORIS: ICD-10-CM

## 2024-01-19 DIAGNOSIS — I10 ESSENTIAL HYPERTENSION, BENIGN: ICD-10-CM

## 2024-01-19 DIAGNOSIS — I65.21 CAROTID STENOSIS, RIGHT: ICD-10-CM

## 2024-01-19 DIAGNOSIS — I47.29 PAROXYSMAL VENTRICULAR TACHYCARDIA (H): Primary | ICD-10-CM

## 2024-01-19 DIAGNOSIS — R60.0 PERIPHERAL EDEMA: ICD-10-CM

## 2024-01-19 DIAGNOSIS — E78.00 PURE HYPERCHOLESTEROLEMIA: ICD-10-CM

## 2024-01-19 LAB
CHOLEST SERPL-MCNC: 147 MG/DL
FASTING STATUS PATIENT QL REPORTED: YES
HDLC SERPL-MCNC: 81 MG/DL
LDLC SERPL CALC-MCNC: 52 MG/DL
NONHDLC SERPL-MCNC: 66 MG/DL
TRIGL SERPL-MCNC: 70 MG/DL

## 2024-01-19 PROCEDURE — 99215 OFFICE O/P EST HI 40 MIN: CPT | Performed by: INTERNAL MEDICINE

## 2024-01-19 RX ORDER — METOPROLOL SUCCINATE 25 MG/1
25 TABLET, EXTENDED RELEASE ORAL DAILY
Qty: 90 TABLET | Refills: 4 | Status: SHIPPED | OUTPATIENT
Start: 2024-01-19

## 2024-01-19 NOTE — PROGRESS NOTES
HPI and Plan:   Grace is a very nice 83-year-old gentleman with past medical history significant for coronary artery disease, nonsustained ventricular tachycardia, obesity, hyperlipidemia, hypertension, impaired fasting glucose, peripheral edema right leg worse than left, prostate cancer, and a series of fainting spells and falling spells, which ultimately I think are due to intravascular volume depletion and too much antihypertensive medications.  The chart shows another fall and ER visit in December that was thought to be secondary to intoxication.     Grace's coronary history dates back to 1991 when he underwent angioplasty of his left anterior descending artery.  In February 2012, he had stenting of his left anterior descending artery due to crescendo angina and an abnormal stress test.  We jailed his diagonal.  It did not appear to be significantly compromised.  Subsequent stress test did not show any ischemia.  However, he was left with chronic stable exertional angina at a high workload.     Due to persistent symptoms we took him back to the Cath Lab  in April 2012.  FFR of the jailed diagonal was 0.8, and again we decided to treat him medically.      In 4/2019, due to crescendo angina, he underwent stenting of his distal right coronary artery.  Later in 12/2019, due to chest pain syndrome and a non-ST segment elevation myocardial infarction, we brought him back to the Cath Lab, where he was found to have no flow-limiting stenoses.  First diagonal FFR was 0.87.  Ramus intermedius branch had a 50% stenosis, also with a negative FFR.     As stated, Grace had a series of falling spells.  Workup included a stress nuclear scan that appeared to be negative for ischemia with ejection fraction of 71%.  I referred him to EP because of a monitor demonstrating a 17-beat run of ventricular tachycardia.  Dr. Aroldo Molina performed an EP study and could not induce any arrhythmias and implanted a Reveal.  Over the years, the  Reveal has shown no significant atrial or ventricular arrhythmias.    He states he had a very stressful year.  His wife lost her appetite and lost weight all the way down to 95 pounds.  When she could not walk or get out of bed she was hospitalized with intravascular volume depletion and starvation.  She was in the hospital for 9 days with tube feedings.  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     He has lost some weight he states some of it may have been stressed but he is also cut back on how much she is eating.  He has had no more falling spells.  He did have a ER visit following spell as noted above.      He has no chest, arm, neck, jaw or shoulder discomfort.  He has no lightheadedness, dizziness, syncope or near syncope.  His peripheral edema is at baseline.  He notes no side effects or problems with his current medical regimen.     ASSESSMENT AND PLAN:  Grace is having no symptoms at this time to suggest ischemia, and this is consistent with his angiogram from 2019.     He has no symptoms to suggest heart failure or significant arrhythmia, supported by his most recent Reveal interrogation in October.  He does appear to be a bit bradycardic with possible chronotropic incompetence versus just decreased activity.  I will decrease his metoprolol to 25 mg daily.     Blood pressure is well controlled at 130/66 with a pulse of 52.  Over the years, I have decreased his Imdur and made other adjustments in his antihypertensive medications.     Weight is 207 pounds down 14 pounds over the last 2 years     I suspect he may be missing some of his doses of his statin as despite his weight loss his LDL has climbed to 80.  This may be a seesaw effect from improved triglyceride control.  I will continue his rosuvastatin as is     Creatinine is normal at 1.12 with normal electrolytes     His peripheral edema is at baseline.  We talked about the importance of  low-salt diet regular exercise, elevating his legs and regular walking.       I have encouraged him to exercise more consistently, both aerobic and resistance.  He states he is usually pretty good about it in the summertime but does not go out in the cold and the fact that live in [place] means we are going to expect 5 months of cold weather.  I also emphasized the importance of cutting down on his carbohydrates, especially his alcohol consumption.  We will continue to follow his Reveal device every quarter.  I will have him check in with my nurse practitioner in 6 months.  I will see him back in a year.  If he should have any problems, I would be glad to see him sooner.     Thank you for allowing me to participate in his care.     Akhil eMndoza MD, [facility]          Today's clinic visit entailed:  Review of external notes as documented elsewhere in note  Review of the result(s) of each unique test - ER records, lab work, reveal interrogation  Ordering of each unique test  Prescription drug management  45 minutes spent by me on the date of the encounter doing chart review, history and exam, documentation and further activities per the note  Provider  Link to [facility] Help Grid     The level of medical decision making during this visit was of moderate complexity.      Orders Placed This Encounter   Procedures    Basic metabolic panel    Lipid Profile    ALT    Follow-Up with Cardiology    Follow-Up with Cardiology [facility]OR[facility]       No orders of the defined types were placed in this encounter.      There are no discontinued medications.      Encounter Diagnoses   Name Primary?    Coronary artery disease involving native coronary artery of native heart without angina pectoris     Paroxysmal ventricular tachycardia (H) Yes    Pure hypercholesterolemia     Essential hypertension, benign     Peripheral edema     Carotid stenosis, right     Left bundle branch block     History of falling     IFG (impaired fasting glucose)         CURRENT MEDICATIONS:  Current Outpatient Medications   Medication Sig Dispense Refill    acetaminophen (TYLENOL) 325 MG tablet Take 2 tablets (650 mg) by mouth every 4 hours as needed for mild pain or headaches      ALLOPURINOL PO Take 100 mg by mouth daily. To prevent gout attacks, recently started.       aspirin (ASA) 81 MG chewable tablet Take 1 tablet (81 mg) by mouth daily      ezetimibe (ZETIA) 10 MG tablet Take 10 mg by mouth every morning      hypromellose-dextran (ARTIFICAL TEARS) 0.1-0.3 % ophthalmic solution Place 1 drop into both eyes 2 times daily as needed for dry eyes      isosorbide mononitrate (IMDUR) 30 MG 24 hr tablet Take 1 tablet (30 mg) by mouth daily Appointment due In April (Patient taking differently: Take 0.5 tablets by mouth daily) 90 tablet 3    losartan (COZAAR) 50 MG tablet Take 50 mg by mouth daily      metoprolol succinate ER (TOPROL-XL) 50 MG 24 hr tablet Take 1 tablet (50 mg) by mouth daily (Patient taking differently: Take 50 mg by mouth every evening) 90 tablet 3    Multiple Vitamins-Minerals (CENTRUM SILVER) per tablet Take 1 tablet by mouth daily.      Multiple Vitamins-Minerals (PRESERVISION AREDS 2) CAPS Take 1 capsule by mouth 2 times daily       nitroGLYcerin (NITROSTAT) 0.4 MG sublingual tablet Place 1 tablet (0.4 mg) under the tongue every 5 minutes as needed for chest pain 25 tablet 11    pantoprazole (PROTONIX) 40 MG EC tablet Take 40 mg by mouth daily      primidone (MYSOLINE) 250 MG tablet Take 250 mg by mouth At Bedtime      primidone (MYSOLINE) 250 MG tablet Take 500 mg by mouth every morning       rosuvastatin (CRESTOR) 40 MG tablet Take 1 tablet by mouth daily. 30 tablet 1       ALLERGIES     Allergies   Allergen Reactions    Oxycodone Other (See Comments)     Confused & angry    Statins [Statins] Muscle Pain (Myalgia)     lipitor    Lisinopril Cough       PAST MEDICAL HISTORY:  Past Medical History:   Diagnosis Date    Carotid artery stenosis     Right, s/p  right CEA 11/9/2016    Cerebral artery occlusion with cerebral infarction (H) 2016    TIA -  Endarterectomy...No residual    Colon polyps     Color blind     Coronary artery disease     2/2012 - MERARI to mid LAD    Decreased hearing     Depression     Gout     Hypertension     Hypertrophy of prostate without urinary obstruction and other lower urinary tract symptoms (LUTS)     Onychomycosis of toenail     Paroxysmal ventricular tachycardia (H)     with stress test 2012    Syncope and collapse 6/16/2021       PAST SURGICAL HISTORY:  Past Surgical History:   Procedure Laterality Date    ANGIOPLASTY  1991    ARTHROPLASTY KNEE Left 11/12/2018    Procedure: Left total knee arthroplasty;  Surgeon: Matt Schaefer MD;  Location: [facility] OR    COLONOSCOPY  12/13/2011    Procedure:COLONOSCOPY; COLONOSCOPY; Surgeon:EMMANUELLE MOSER; Location: GI    CORONARY ANGIOGRAPHY ADULT ORDER  1991,2012 1991 - stent to proximal LAD, 2012 - Stent to mid LAD    CV HEART CATHETERIZATION WITH POSSIBLE INTERVENTION Left 4/23/2019    Procedure: Coronary Angiogram;  Surgeon: Percy Armas MD;  Location:  HEART CARDIAC CATH LAB    CV HEART CATHETERIZATION WITH POSSIBLE INTERVENTION N/A 12/10/2019    Procedure: Heart Catheterization with Possible Intervention;  Surgeon: Dajuan Duvall MD;  Location:  HEART CARDIAC CATH LAB    CV PCI ANGIOPLASTY N/A 4/23/2019    Procedure: PCI Angioplasty;  Surgeon: Percy Armas MD;  Location:  HEART CARDIAC CATH LAB    ENDARTERECTOMY CAROTID Right 11/10/2016    Procedure: ENDARTERECTOMY CAROTID;  Surgeon: Paco Suresh MD;  Location:  OR    EP COMPREHENSIVE EP STUDY N/A 9/7/2021    Procedure: EP Comprehensive EP Study;  Surgeon: Jas Peralta MD;  Location:  HEART CARDIAC CATH LAB    HEART CATH, ANGIOPLASTY      ORTHOPEDIC SURGERY      PHACOEMULSIFICATION CLEAR CORNEA WITH STANDARD INTRAOCULAR LENS IMPLANT  12/19/2013    Procedure: PHACOEMULSIFICATION CLEAR CORNEA WITH  "STANDARD INTRAOCULAR LENS IMPLANT;  RIGHT PHACOEMULSIFICATION CLEAR CORNEA WITH STANDARD INTRAOCULAR LENS IMPLANT ;  Surgeon: Luisito Juan MD;  Location: [facility]       FAMILY HISTORY:  Family History   Problem Relation Age of Onset    Heart Disease Mother 83    Heart Disease Father 69        emphysema    Coronary Artery Disease Brother     Coronary Artery Disease Sister        SOCIAL HISTORY:  Social History     Socioeconomic History    Marital status:      Spouse name: None    Number of children: None    Years of education: None    Highest education level: None   Tobacco Use    Smoking status: Former     Packs/day: 0.50     Years: 20.00     Additional pack years: 0.00     Total pack years: 10.00     Types: Cigarettes     Quit date: 1990     Years since quittin.1    Smokeless tobacco: Never   Substance and Sexual Activity    Alcohol use: Yes     Alcohol/week: 0.0 standard drinks of alcohol     Comment: daily - drinks x2    Drug use: No    Sexual activity: Never   Other Topics Concern    Caffeine Concern No     Comment: 1-2 cups daily    Sleep Concern No    Stress Concern No    Special Diet No     Comment: trying to watch sodium intake    Exercise No     Comment: some walking       Review of Systems:  Skin:  Positive for itching     Eyes:  Positive for glasses    ENT:  Positive for hearing loss    Respiratory:  Negative       Cardiovascular:  Negative      Gastroenterology: Negative      Genitourinary:  Negative      Musculoskeletal:  Positive for arthritis;back pain;joint pain knee pain minor back pain  Neurologic:  Positive for numbness or tingling of hands cold hand and feet  Psychiatric:  Negative      Heme/Lymph/Imm:  Negative      Endocrine:  Negative        Physical Exam:  Vitals: /66 (BP Location: Right arm, Patient Position: Sitting, Cuff Size: Adult Regular)   Pulse 52   Ht 1.778 m (5' 10\")   Wt 94 kg (207 lb 4.8 oz)   SpO2 99%   BMI 29.74 kg/m      Constitutional:  " cooperative, alert and oriented, well developed, well nourished, in no acute distress        Skin:  warm and dry to the touch, no apparent skin lesions or masses noted     right CEA scar    Head:  normocephalic, no masses or lesions        Eyes:  pupils equal and round;conjunctivae and lids unremarkable;sclera white;no xanthalasma        Lymph:      ENT:  no pallor or cyanosis, dentition good        Neck:  carotid pulses are full and equal bilaterally;no carotid bruit   prominent carotid pulsations at base of neck bilaterally. Well-healed right carotid endarterectomy scar    Respiratory:  normal breath sounds, clear to auscultation, normal A-P diameter, normal symmetry, normal respiratory excursion, no use of accessory muscles basal rales       Cardiac: regular rhythm;normal S1 and S2       grade 1;RUSB;systolic ejection murmur;grade 2        pulses full and equal                                        GI:           Extremities and Muscular Skeletal:  no spinal abnormalities noted;normal muscle strength and tone   bilateral LE edema;R greater than L;trace;1+;pitting          Neurological:  no gross motor deficits        Psych:  affect appropriate, oriented to time, person and place        CC  Akhil Mendoza MD  [id number]  [place] [id number]

## 2024-01-19 NOTE — LETTER
1/19/2024    Stephan Nice MD  [facility] [address]  [place] [address]    RE: Iván WAGONER Cristopher       Dear Colleague,     I had the pleasure of seeing Iván Nicholas in the [facility].  HPI and Plan:   Grace is a very nice 83-year-old gentleman with past medical history significant for coronary artery disease, nonsustained ventricular tachycardia, obesity, hyperlipidemia, hypertension, impaired fasting glucose, peripheral edema right leg worse than left, prostate cancer, and a series of fainting spells and falling spells, which ultimately I think are due to intravascular volume depletion and too much antihypertensive medications.  The chart shows another fall and ER visit in December that was thought to be secondary to intoxication.     Grace's coronary history dates back to 1991 when he underwent angioplasty of his left anterior descending artery.  In February 2012, he had stenting of his left anterior descending artery due to crescendo angina and an abnormal stress test.  We jailed his diagonal.  It did not appear to be significantly compromised.  Subsequent stress test did not show any ischemia.  However, he was left with chronic stable exertional angina at a high workload.     Due to persistent symptoms we took him back to the Cath Lab  in April 2012.  FFR of the jailed diagonal was 0.8, and again we decided to treat him medically.      In 4/2019, due to crescendo angina, he underwent stenting of his distal right coronary artery.  Later in 12/2019, due to chest pain syndrome and a non-ST segment elevation myocardial infarction, we brought him back to the Cath Lab, where he was found to have no flow-limiting stenoses.  First diagonal FFR was 0.87.  Ramus intermedius branch had a 50% stenosis, also with a negative FFR.     As stated, Grace had a series of falling spells.  Workup included a stress nuclear scan that appeared to be negative for ischemia with  ejection fraction of 71%.  I referred him to EP because of a monitor demonstrating a 17-beat run of ventricular tachycardia.  Dr. Aroldo Molina performed an EP study and could not induce any arrhythmias and implanted a Reveal.  Over the years, the Reveal has shown no significant atrial or ventricular arrhythmias.    He states he had a very stressful year.  His wife lost her appetite and lost weight all the way down to 95 pounds.  When she could not walk or get out of bed she was hospitalized with intravascular volume depletion and starvation.  She was in the hospital for 9 days with tube feedings.  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     He has lost some weight he states some of it may have been stressed but he is also cut back on how much she is eating.  He has had no more falling spells.  He did have a ER visit following spell as noted above.      He has no chest, arm, neck, jaw or shoulder discomfort.  He has no lightheadedness, dizziness, syncope or near syncope.  His peripheral edema is at baseline.  He notes no side effects or problems with his current medical regimen.     ASSESSMENT AND PLAN:  Grace is having no symptoms at this time to suggest ischemia, and this is consistent with his angiogram from 2019.     He has no symptoms to suggest heart failure or significant arrhythmia, supported by his most recent Reveal interrogation in October.  He does appear to be a bit bradycardic with possible chronotropic incompetence versus just decreased activity.  I will decrease his metoprolol to 25 mg daily.     Blood pressure is well controlled at 130/66 with a pulse of 52.  Over the years, I have decreased his Imdur and made other adjustments in his antihypertensive medications.     Weight is 207 pounds down 14 pounds over the last 2 years     I suspect he may be missing some of his doses of his statin as despite his weight loss his LDL has climbed to  80.  This may be a seesaw effect from improved triglyceride control.  I will continue his rosuvastatin as is     Creatinine is normal at 1.12 with normal electrolytes     His peripheral edema is at baseline.  We talked about the importance of low-salt diet regular exercise, elevating his legs and regular walking.       I have encouraged him to exercise more consistently, both aerobic and resistance.  He states he is usually pretty good about it in the summertime but does not go out in the cold and the fact that live in [place] means we are going to expect 5 months of cold weather.  I also emphasized the importance of cutting down on his carbohydrates, especially his alcohol consumption.  We will continue to follow his Reveal device every quarter.  I will have him check in with my nurse practitioner in 6 months.  I will see him back in a year.  If he should have any problems, I would be glad to see him sooner.     Thank you for allowing me to participate in his care.     Akhil Mendoza MD, [facility]          Today's clinic visit entailed:  Review of external notes as documented elsewhere in note  Review of the result(s) of each unique test - ER records, lab work, reveal interrogation  Ordering of each unique test  Prescription drug management  45 minutes spent by me on the date of the encounter doing chart review, history and exam, documentation and further activities per the note  Provider  Link to [facility] Help Grid     The level of medical decision making during this visit was of moderate complexity.      Orders Placed This Encounter   Procedures    Basic metabolic panel    Lipid Profile    ALT    Follow-Up with Cardiology    Follow-Up with Cardiology FLORENCIA       No orders of the defined types were placed in this encounter.      There are no discontinued medications.      Encounter Diagnoses   Name Primary?    Coronary artery disease involving native coronary artery of native heart without angina pectoris      Paroxysmal ventricular tachycardia (H) Yes    Pure hypercholesterolemia     Essential hypertension, benign     Peripheral edema     Carotid stenosis, right     Left bundle branch block     History of falling     IFG (impaired fasting glucose)        CURRENT MEDICATIONS:  Current Outpatient Medications   Medication Sig Dispense Refill    acetaminophen (TYLENOL) 325 MG tablet Take 2 tablets (650 mg) by mouth every 4 hours as needed for mild pain or headaches      ALLOPURINOL PO Take 100 mg by mouth daily. To prevent gout attacks, recently started.       aspirin (ASA) 81 MG chewable tablet Take 1 tablet (81 mg) by mouth daily      ezetimibe (ZETIA) 10 MG tablet Take 10 mg by mouth every morning      hypromellose-dextran (ARTIFICAL TEARS) 0.1-0.3 % ophthalmic solution Place 1 drop into both eyes 2 times daily as needed for dry eyes      isosorbide mononitrate (IMDUR) 30 MG 24 hr tablet Take 1 tablet (30 mg) by mouth daily Appointment due In April (Patient taking differently: Take 0.5 tablets by mouth daily) 90 tablet 3    losartan (COZAAR) 50 MG tablet Take 50 mg by mouth daily      metoprolol succinate ER (TOPROL-XL) 50 MG 24 hr tablet Take 1 tablet (50 mg) by mouth daily (Patient taking differently: Take 50 mg by mouth every evening) 90 tablet 3    Multiple Vitamins-Minerals (CENTRUM SILVER) per tablet Take 1 tablet by mouth daily.      Multiple Vitamins-Minerals (PRESERVISION AREDS 2) CAPS Take 1 capsule by mouth 2 times daily       nitroGLYcerin (NITROSTAT) 0.4 MG sublingual tablet Place 1 tablet (0.4 mg) under the tongue every 5 minutes as needed for chest pain 25 tablet 11    pantoprazole (PROTONIX) 40 MG EC tablet Take 40 mg by mouth daily      primidone (MYSOLINE) 250 MG tablet Take 250 mg by mouth At Bedtime      primidone (MYSOLINE) 250 MG tablet Take 500 mg by mouth every morning       rosuvastatin (CRESTOR) 40 MG tablet Take 1 tablet by mouth daily. 30 tablet 1       ALLERGIES     Allergies   Allergen  Reactions    Oxycodone Other (See Comments)     Confused & angry    Statins [Statins] Muscle Pain (Myalgia)     lipitor    Lisinopril Cough       PAST MEDICAL HISTORY:  Past Medical History:   Diagnosis Date    Carotid artery stenosis     Right, s/p right CEA 11/9/2016    Cerebral artery occlusion with cerebral infarction (H) 2016    TIA -  Endarterectomy...No residual    Colon polyps     Color blind     Coronary artery disease     2/2012 - MERARI to mid LAD    Decreased hearing     Depression     Gout     Hypertension     Hypertrophy of prostate without urinary obstruction and other lower urinary tract symptoms (LUTS)     Onychomycosis of toenail     Paroxysmal ventricular tachycardia (H)     with stress test 2012    Syncope and collapse 6/16/2021       PAST SURGICAL HISTORY:  Past Surgical History:   Procedure Laterality Date    ANGIOPLASTY  1991    ARTHROPLASTY KNEE Left 11/12/2018    Procedure: Left total knee arthroplasty;  Surgeon: Matt Schaefer MD;  Location:  OR    COLONOSCOPY  12/13/2011    Procedure:COLONOSCOPY; COLONOSCOPY; Surgeon:EMMANUELLE MOSER; Location: GI    CORONARY ANGIOGRAPHY ADULT ORDER  1991,2012 1991 - stent to proximal LAD, 2012 - Stent to mid LAD    CV HEART CATHETERIZATION WITH POSSIBLE INTERVENTION Left 4/23/2019    Procedure: Coronary Angiogram;  Surgeon: Percy Armas MD;  Location:  HEART CARDIAC CATH LAB    CV HEART CATHETERIZATION WITH POSSIBLE INTERVENTION N/A 12/10/2019    Procedure: Heart Catheterization with Possible Intervention;  Surgeon: Dajuan Duvall MD;  Location:  HEART CARDIAC CATH LAB    CV PCI ANGIOPLASTY N/A 4/23/2019    Procedure: PCI Angioplasty;  Surgeon: Percy Armas MD;  Location:  HEART CARDIAC CATH LAB    ENDARTERECTOMY CAROTID Right 11/10/2016    Procedure: ENDARTERECTOMY CAROTID;  Surgeon: Paco Suresh MD;  Location:  OR    EP COMPREHENSIVE EP STUDY N/A 9/7/2021    Procedure: EP Comprehensive EP Study;  Surgeon:  Jas Peralta MD;  Location:  HEART CARDIAC CATH LAB    HEART CATH, ANGIOPLASTY      ORTHOPEDIC SURGERY      PHACOEMULSIFICATION CLEAR CORNEA WITH STANDARD INTRAOCULAR LENS IMPLANT  2013    Procedure: PHACOEMULSIFICATION CLEAR CORNEA WITH STANDARD INTRAOCULAR LENS IMPLANT;  RIGHT PHACOEMULSIFICATION CLEAR CORNEA WITH STANDARD INTRAOCULAR LENS IMPLANT ;  Surgeon: Luisito Juan MD;  Location: [facility]       FAMILY HISTORY:  Family History   Problem Relation Age of Onset    Heart Disease Mother 83    Heart Disease Father 69        emphysema    Coronary Artery Disease Brother     Coronary Artery Disease Sister        SOCIAL HISTORY:  Social History     Socioeconomic History    Marital status:      Spouse name: None    Number of children: None    Years of education: None    Highest education level: None   Tobacco Use    Smoking status: Former     Packs/day: 0.50     Years: 20.00     Additional pack years: 0.00     Total pack years: 10.00     Types: Cigarettes     Quit date: 1990     Years since quittin.1    Smokeless tobacco: Never   Substance and Sexual Activity    Alcohol use: Yes     Alcohol/week: 0.0 standard drinks of alcohol     Comment: daily - drinks x2    Drug use: No    Sexual activity: Never   Other Topics Concern    Caffeine Concern No     Comment: 1-2 cups daily    Sleep Concern No    Stress Concern No    Special Diet No     Comment: trying to watch sodium intake    Exercise No     Comment: some walking       Review of Systems:  Skin:  Positive for itching     Eyes:  Positive for glasses    ENT:  Positive for hearing loss    Respiratory:  Negative       Cardiovascular:  Negative      Gastroenterology: Negative      Genitourinary:  Negative      Musculoskeletal:  Positive for arthritis;back pain;joint pain knee pain minor back pain  Neurologic:  Positive for numbness or tingling of hands cold hand and feet  Psychiatric:  Negative      Heme/Lymph/Imm:  Negative      Endocrine:  " Negative        Physical Exam:  Vitals: /66 (BP Location: Right arm, Patient Position: Sitting, Cuff Size: Adult Regular)   Pulse 52   Ht 1.778 m (5' 10\")   Wt 94 kg (207 lb 4.8 oz)   SpO2 99%   BMI 29.74 kg/m      Constitutional:  cooperative, alert and oriented, well developed, well nourished, in no acute distress        Skin:  warm and dry to the touch, no apparent skin lesions or masses noted     right CEA scar    Head:  normocephalic, no masses or lesions        Eyes:  pupils equal and round;conjunctivae and lids unremarkable;sclera white;no xanthalasma        Lymph:      ENT:  no pallor or cyanosis, dentition good        Neck:  carotid pulses are full and equal bilaterally;no carotid bruit   prominent carotid pulsations at base of neck bilaterally. Well-healed right carotid endarterectomy scar    Respiratory:  normal breath sounds, clear to auscultation, normal A-P diameter, normal symmetry, normal respiratory excursion, no use of accessory muscles basal rales       Cardiac: regular rhythm;normal S1 and S2       grade 1;RUSB;systolic ejection murmur;grade 2        pulses full and equal                                        GI:           Extremities and Muscular Skeletal:  no spinal abnormalities noted;normal muscle strength and tone   bilateral LE edema;R greater than L;trace;1+;pitting          Neurological:  no gross motor deficits        Psych:  affect appropriate, oriented to time, person and place        CC  Akhil Mendoza MD  [address]  [place] [address]      Thank you for allowing me to participate in the care of your patient.      Sincerely,     Akhil Mendoza MD     [facility]  "

## 2024-01-19 NOTE — TELEPHONE ENCOUNTER
Message from Dr. Mendoza:  Akhil Mendoza MD P [facility] Heart Team 2  Given the fact that Grace has had some falling spells.  His reveal appears to demonstrate some chronotropic incompetence.  I would like to decrease his metoprolol succinate to 25 mg daily.  He can cut his current 50s in half.  I sent in a new prescription for the 25 mg size.  Also clarify that he is on isosorbide mononitrate 30 mg half a tablet per day    1330 attempted to contact patient to review Dr. Mendoza's update to change medications:  1) decrease toprol XL to 25mg Daily  2) check that imdur dose is 15mg daily  Left message for patient to call back.        Addendum:   Akhil Mendoza MD [facility] Heart Team 2  Fasting lipid profile is outstanding.  Continue current statin/Zetia    Will await callback from patient and review.  Wood WRAY

## 2024-01-22 RX ORDER — ISOSORBIDE MONONITRATE 30 MG/1
30 TABLET, EXTENDED RELEASE ORAL DAILY
Qty: 90 TABLET | Refills: 4 | Status: SHIPPED | OUTPATIENT
Start: 2024-01-22

## 2024-01-22 NOTE — TELEPHONE ENCOUNTER
Patient called back and left a message saying he is taking 1/2 of a 60mg tablet (30mg).     Spoke to patient, will confirm imdur dosing with Dr Mendoza. Reviewed labs and plan to continue with crestor/zetia. Pt states he does not need refills at this time. Also updated patient that new Rx for lower dose of metoprolol was sent to pharmacy.

## 2024-01-22 NOTE — TELEPHONE ENCOUNTER
Akhil Mendoza MD  You32 minutes ago (11:21 AM)     I want him on what ever dose is working for him.  And I want the chart to be accurate.  Continue as is just make sure documentation is correct.  Thanks.       Spoke to patient and reveiwed plan to continue with 30mg daily of imdur. New Rx sent for 30mg tablet so patient no longer has to cut it. He was appreciative of this, no other questions at this time.

## 2024-02-01 ENCOUNTER — ANCILLARY PROCEDURE (OUTPATIENT)
Dept: CARDIOLOGY | Facility: CLINIC | Age: 84
End: 2024-02-01
Attending: INTERNAL MEDICINE
Payer: COMMERCIAL

## 2024-02-01 DIAGNOSIS — R55 SYNCOPE: ICD-10-CM

## 2024-02-01 DIAGNOSIS — Z95.818 STATUS POST PLACEMENT OF IMPLANTABLE LOOP RECORDER: ICD-10-CM

## 2024-02-01 PROCEDURE — 93298 REM INTERROG DEV EVAL SCRMS: CPT | Performed by: INTERNAL MEDICINE

## 2024-05-02 ENCOUNTER — ANCILLARY PROCEDURE (OUTPATIENT)
Dept: CARDIOLOGY | Facility: CLINIC | Age: 84
End: 2024-05-02
Attending: INTERNAL MEDICINE
Payer: COMMERCIAL

## 2024-05-02 DIAGNOSIS — R55 SYNCOPE: ICD-10-CM

## 2024-05-02 DIAGNOSIS — Z95.818 STATUS POST PLACEMENT OF IMPLANTABLE LOOP RECORDER: ICD-10-CM

## 2024-05-02 PROCEDURE — 93298 REM INTERROG DEV EVAL SCRMS: CPT | Performed by: INTERNAL MEDICINE

## 2024-08-01 ENCOUNTER — ANCILLARY PROCEDURE (OUTPATIENT)
Dept: CARDIOLOGY | Facility: CLINIC | Age: 84
End: 2024-08-01
Attending: INTERNAL MEDICINE
Payer: COMMERCIAL

## 2024-08-01 DIAGNOSIS — R55 SYNCOPE: ICD-10-CM

## 2024-08-01 DIAGNOSIS — Z95.818 STATUS POST PLACEMENT OF IMPLANTABLE LOOP RECORDER: ICD-10-CM

## 2024-08-01 PROCEDURE — 93298 REM INTERROG DEV EVAL SCRMS: CPT | Performed by: INTERNAL MEDICINE

## 2024-09-16 ENCOUNTER — OFFICE VISIT (OUTPATIENT)
Dept: CARDIOLOGY | Facility: CLINIC | Age: 84
End: 2024-09-16
Payer: COMMERCIAL

## 2024-09-16 VITALS
BODY MASS INDEX: 29.38 KG/M2 | HEIGHT: 70 IN | DIASTOLIC BLOOD PRESSURE: 62 MMHG | WEIGHT: 205.2 LBS | HEART RATE: 52 BPM | SYSTOLIC BLOOD PRESSURE: 138 MMHG | OXYGEN SATURATION: 99 %

## 2024-09-16 DIAGNOSIS — I25.10 CORONARY ARTERY DISEASE INVOLVING NATIVE CORONARY ARTERY OF NATIVE HEART WITHOUT ANGINA PECTORIS: Primary | ICD-10-CM

## 2024-09-16 DIAGNOSIS — I10 ESSENTIAL HYPERTENSION, BENIGN: ICD-10-CM

## 2024-09-16 DIAGNOSIS — I42.8 NICM (NONISCHEMIC CARDIOMYOPATHY) (H): ICD-10-CM

## 2024-09-16 DIAGNOSIS — I10 BENIGN ESSENTIAL HYPERTENSION: ICD-10-CM

## 2024-09-16 DIAGNOSIS — E78.00 PURE HYPERCHOLESTEROLEMIA: ICD-10-CM

## 2024-09-16 DIAGNOSIS — E78.5 HYPERLIPIDEMIA WITH TARGET LDL LESS THAN 70: ICD-10-CM

## 2024-09-16 DIAGNOSIS — I65.21 CAROTID STENOSIS, RIGHT: ICD-10-CM

## 2024-09-16 PROCEDURE — 99214 OFFICE O/P EST MOD 30 MIN: CPT | Performed by: NURSE PRACTITIONER

## 2024-09-16 RX ORDER — ASPIRIN 81 MG/1
81 TABLET, CHEWABLE ORAL DAILY
COMMUNITY
Start: 2024-09-16

## 2024-09-16 RX ORDER — LATANOPROST 50 UG/ML
1 SOLUTION/ DROPS OPHTHALMIC DAILY
COMMUNITY

## 2024-09-16 NOTE — PROGRESS NOTES
CARDIOLOGY CLINIC NOTE    PRIMARY CARDIOLOGIST  Dr. Mendoza     PRIMARY CARE PHYSICIAN:  Stephan Nice    HISTORY OF PRESENT ILLNESS:  Iván Nicholas is a very pleasant 84-year-old male with a past medical history significant for coronary artery disease status post remote angioplasty to the LAD in 1991 and stenting of the LAD in 2012, PCI to the distal RCA in 2019, nonsustained VT status post reveal implant, hypertension, hyperlipidemia, obesity, chronic peripheral edema, carotid artery disease status post right carotid endarterectomy in 2016, left bundle branch block, nonischemic cardiomyopathy, falls/syncope (?  Intravascular volume depletion), and prostate cancer.     In review, his CAD dates back to 1991 where he underwent angioplasty to the LAD followed by stenting of the LAD in 2012 with a jailed diagonal vessel.  Coronary angiogram in 2019 showed a 95% distal RCA lesion followed by a 3.5 x 16 mm MERARI.  Follow-up coronary angiogram showed moderate nonflow limiting LAD/diagonal and ramus disease and patent distal RCA stent.  Nuclear stress test in 2021 was negative for ischemia.     Last echocardiogram in 2021 showed a normal ejection fraction estimated at 60 to 65%, mild LVH, normal RV size and function, no significant valvular disease.    Loop interrogation on 8/1/2024 was negative for arrhythmia, bradycardia or pauses.    He returns to the office today for 9-month follow-up.  He does not endorse any ischemic symptoms including chest pain, shortness of breath, palpitations, PND, orthopnea, presyncope or syncope.  He has mild right lower extremity edema.  He recently underwent a procedure for skin cancer which he is still healing from.     Blood pressure is well-controlled at 138/62, heart rate 52.  Of note, metoprolol was decreased from 50 to 25 mg daily without any significant improvement in heart rate.  Last BMP was unremarkable  Lipid panel is excellent with a total cholesterol 147, HDL 81, LDL 52  and triglycerides 70    He is not as active as he used to be.   Follows a strict heart healthy diet  Drinks 2 alcoholic beverages per night  Compliant with all medications.      PAST MEDICAL HISTORY:  Past Medical History:   Diagnosis Date    Carotid artery stenosis     Right, s/p right CEA 11/9/2016    Cerebral artery occlusion with cerebral infarction (H) 2016    TIA -  Endarterectomy...No residual    Colon polyps     Color blind     Coronary artery disease     2/2012 - MERARI to mid LAD    Decreased hearing     Depression     Gout     Hypertension     Hypertrophy of prostate without urinary obstruction and other lower urinary tract symptoms (LUTS)     Onychomycosis of toenail     Paroxysmal ventricular tachycardia (H)     with stress test 2012    Syncope and collapse 6/16/2021       MEDICATIONS:  Current Outpatient Medications   Medication Sig Dispense Refill    acetaminophen (TYLENOL) 325 MG tablet Take 2 tablets (650 mg) by mouth every 4 hours as needed for mild pain or headaches      ALLOPURINOL PO Take 100 mg by mouth daily. To prevent gout attacks, recently started.       aspirin (ASA) 81 MG chewable tablet Take 1 tablet (81 mg) by mouth daily.      ezetimibe (ZETIA) 10 MG tablet Take 10 mg by mouth every morning      hypromellose-dextran (ARTIFICAL TEARS) 0.1-0.3 % ophthalmic solution Place 1 drop into both eyes 2 times daily as needed for dry eyes      isosorbide mononitrate (IMDUR) 30 MG 24 hr tablet Take 1 tablet (30 mg) by mouth daily 90 tablet 4    latanoprost (XALATAN) 0.005 % ophthalmic solution Place 1 drop into both eyes daily.      losartan (COZAAR) 50 MG tablet Take 50 mg by mouth daily      metoprolol succinate ER (TOPROL XL) 25 MG 24 hr tablet Take 1 tablet (25 mg) by mouth daily 90 tablet 4    Multiple Vitamins-Minerals (CENTRUM SILVER) per tablet Take 1 tablet by mouth daily.      Multiple Vitamins-Minerals (PRESERVISION AREDS 2) CAPS Take 1 capsule by mouth 2 times daily       nitroGLYcerin  (NITROSTAT) 0.4 MG sublingual tablet Place 1 tablet (0.4 mg) under the tongue every 5 minutes as needed for chest pain 25 tablet 11    pantoprazole (PROTONIX) 40 MG EC tablet Take 40 mg by mouth daily      primidone (MYSOLINE) 250 MG tablet Take 250 mg by mouth At Bedtime      primidone (MYSOLINE) 250 MG tablet Take 500 mg by mouth daily.      rosuvastatin (CRESTOR) 40 MG tablet Take 1 tablet by mouth daily. 30 tablet 1     No current facility-administered medications for this visit.       SOCIAL HISTORY:  I have reviewed this patient's social history and updated it with pertinent information if needed. Iván Nicholas  reports that he quit smoking about 33 years ago. His smoking use included cigarettes. He started smoking about 53 years ago. He has a 10 pack-year smoking history. He has never used smokeless tobacco. He reports current alcohol use. He reports that he does not use drugs.    PHYSICAL EXAM:  Pulse:  [52] 52  BP: (138-149)/(62-77) 138/62  SpO2:  [99 %] 99 %  205 lbs 3.2 oz    Constitutional: alert, no distress  Respiratory: Good bilateral air entry  Cardiovascular: Regular rate and rhythm  GI: nondistended  Neuropsychiatric: appropriate affact    ASSESSMENT/PLAN:  Pertinent issues addressed/ reviewed during this cardiology visit  Coronary artery disease -remote angioplasty to the LAD in 1991 followed by stenting of the LAD in 2012 with a jailed diagonal vessel.  Coronary angiogram in 2019 showed a 95% distal RCA lesion followed by a 3.5 x 16 mm MERARI.  Negative stress test in 2021.  No symptoms of ischemia.  Continue aspirin, metoprolol, losartan, isosorbide and rosuvastatin.  Encouraged routine exercise, weight loss, and heart healthy diet.  Hypertension -well-controlled  Hyperlipidemia -LDL at goal, continue rosuvastatin and ezetimibe  NSVT -status post reveal implant. Interrogations show no arrhythmia.  NICM - resolved. Last echocardiogram in 2021 showed a normal ejection fraction estimated at 60 to  65%, mild LVH, normal RV size and function, no significant valvular disease.  Carotid disease s/p right carotid endarterectomy. Continue aspirin and statin.   7. ETOH use - strict moderation.     Follow up in 1 year to establish care with cardiologist.     It was a pleasure seeing this patient in clinic today. Please do not hesitate to contact me with any future questions.     ERIC Lau, CNP  Cardiology - [facility]  09/16/2024       The level of medical decision making during this visit was of moderate complexity.    This note was completed in part using dictation via the Dragon voice recognition software. Some word and grammatical errors may occur and must be interpreted in the appropriate clinical context.  If there are any questions pertaining to this issue, please contact me for further clarification.

## 2024-09-16 NOTE — LETTER
9/16/2024    Stephan Nice MD  [facility] [address]  [place] [address]    RE: Iván Nicholas       Dear Colleague,     I had the pleasure of seeing Iván Nicholas in the [facility].  CARDIOLOGY CLINIC NOTE    PRIMARY CARDIOLOGIST  Dr. Mendoza     PRIMARY CARE PHYSICIAN:  Stephan Nice    HISTORY OF PRESENT ILLNESS:  Iván Nicholas is a very pleasant 84-year-old male with a past medical history significant for coronary artery disease status post remote angioplasty to the LAD in 1991 and stenting of the LAD in 2012, PCI to the distal RCA in 2019, nonsustained VT status post reveal implant, hypertension, hyperlipidemia, obesity, chronic peripheral edema, carotid artery disease status post right carotid endarterectomy in 2016, left bundle branch block, nonischemic cardiomyopathy, falls/syncope (?  Intravascular volume depletion), and prostate cancer.     In review, his CAD dates back to 1991 where he underwent angioplasty to the LAD followed by stenting of the LAD in 2012 with a jailed diagonal vessel.  Coronary angiogram in 2019 showed a 95% distal RCA lesion followed by a 3.5 x 16 mm MERARI.  Follow-up coronary angiogram showed moderate nonflow limiting LAD/diagonal and ramus disease and patent distal RCA stent.  Nuclear stress test in 2021 was negative for ischemia.     Last echocardiogram in 2021 showed a normal ejection fraction estimated at 60 to 65%, mild LVH, normal RV size and function, no significant valvular disease.    Loop interrogation on 8/1/2024 was negative for arrhythmia, bradycardia or pauses.    He returns to the office today for 9-month follow-up.  He does not endorse any ischemic symptoms including chest pain, shortness of breath, palpitations, PND, orthopnea, presyncope or syncope.  He has mild right lower extremity edema.  He recently underwent a procedure for skin cancer which he is still healing from.     Blood pressure is  well-controlled at 138/62, heart rate 52.  Of note, metoprolol was decreased from 50 to 25 mg daily without any significant improvement in heart rate.  Last BMP was unremarkable  Lipid panel is excellent with a total cholesterol 147, HDL 81, LDL 52 and triglycerides 70    He is not as active as he used to be.   Follows a strict heart healthy diet  Drinks 2 alcoholic beverages per night  Compliant with all medications.      PAST MEDICAL HISTORY:  Past Medical History:   Diagnosis Date     Carotid artery stenosis     Right, s/p right CEA 11/9/2016     Cerebral artery occlusion with cerebral infarction (H) 2016    TIA -  Endarterectomy...No residual     Colon polyps      Color blind      Coronary artery disease     2/2012 - MERARI to mid LAD     Decreased hearing      Depression      Gout      Hypertension      Hypertrophy of prostate without urinary obstruction and other lower urinary tract symptoms (LUTS)      Onychomycosis of toenail      Paroxysmal ventricular tachycardia (H)     with stress test 2012     Syncope and collapse 6/16/2021       MEDICATIONS:  Current Outpatient Medications   Medication Sig Dispense Refill     acetaminophen (TYLENOL) 325 MG tablet Take 2 tablets (650 mg) by mouth every 4 hours as needed for mild pain or headaches       ALLOPURINOL PO Take 100 mg by mouth daily. To prevent gout attacks, recently started.        aspirin (ASA) 81 MG chewable tablet Take 1 tablet (81 mg) by mouth daily.       ezetimibe (ZETIA) 10 MG tablet Take 10 mg by mouth every morning       hypromellose-dextran (ARTIFICAL TEARS) 0.1-0.3 % ophthalmic solution Place 1 drop into both eyes 2 times daily as needed for dry eyes       isosorbide mononitrate (IMDUR) 30 MG 24 hr tablet Take 1 tablet (30 mg) by mouth daily 90 tablet 4     latanoprost (XALATAN) 0.005 % ophthalmic solution Place 1 drop into both eyes daily.       losartan (COZAAR) 50 MG tablet Take 50 mg by mouth daily       metoprolol succinate ER (TOPROL XL) 25 MG  24 hr tablet Take 1 tablet (25 mg) by mouth daily 90 tablet 4     Multiple Vitamins-Minerals (CENTRUM SILVER) per tablet Take 1 tablet by mouth daily.       Multiple Vitamins-Minerals (PRESERVISION AREDS 2) CAPS Take 1 capsule by mouth 2 times daily        nitroGLYcerin (NITROSTAT) 0.4 MG sublingual tablet Place 1 tablet (0.4 mg) under the tongue every 5 minutes as needed for chest pain 25 tablet 11     pantoprazole (PROTONIX) 40 MG EC tablet Take 40 mg by mouth daily       primidone (MYSOLINE) 250 MG tablet Take 250 mg by mouth At Bedtime       primidone (MYSOLINE) 250 MG tablet Take 500 mg by mouth daily.       rosuvastatin (CRESTOR) 40 MG tablet Take 1 tablet by mouth daily. 30 tablet 1     No current facility-administered medications for this visit.       SOCIAL HISTORY:  I have reviewed this patient's social history and updated it with pertinent information if needed. Iván WAGONER Cristopher  reports that he quit smoking about 33 years ago. His smoking use included cigarettes. He started smoking about 53 years ago. He has a 10 pack-year smoking history. He has never used smokeless tobacco. He reports current alcohol use. He reports that he does not use drugs.    PHYSICAL EXAM:  Pulse:  [52] 52  BP: (138-149)/(62-77) 138/62  SpO2:  [99 %] 99 %  205 lbs 3.2 oz    Constitutional: alert, no distress  Respiratory: Good bilateral air entry  Cardiovascular: Regular rate and rhythm  GI: nondistended  Neuropsychiatric: appropriate affact    ASSESSMENT/PLAN:  Pertinent issues addressed/ reviewed during this cardiology visit  Coronary artery disease -remote angioplasty to the LAD in 1991 followed by stenting of the LAD in 2012 with a jailed diagonal vessel.  Coronary angiogram in 2019 showed a 95% distal RCA lesion followed by a 3.5 x 16 mm MERARI.  Negative stress test in 2021.  No symptoms of ischemia.  Continue aspirin, metoprolol, losartan, isosorbide and rosuvastatin.  Encouraged routine exercise, weight loss, and heart  healthy diet.  Hypertension -well-controlled  Hyperlipidemia -LDL at goal, continue rosuvastatin and ezetimibe  NSVT -status post reveal implant. Interrogations show no arrhythmia.  NICM - resolved. Last echocardiogram in 2021 showed a normal ejection fraction estimated at 60 to 65%, mild LVH, normal RV size and function, no significant valvular disease.  Carotid disease s/p right carotid endarterectomy. Continue aspirin and statin.   7. ETOH use - strict moderation.     Follow up in 1 year to establish care with cardiologist.     It was a pleasure seeing this patient in clinic today. Please do not hesitate to contact me with any future questions.     ERIC Lau, CNP  Cardiology - [facility]  09/16/2024       The level of medical decision making during this visit was of moderate complexity.    This note was completed in part using dictation via the Dragon voice recognition software. Some word and grammatical errors may occur and must be interpreted in the appropriate clinical context.  If there are any questions pertaining to this issue, please contact me for further clarification.      Thank you for allowing me to participate in the care of your patient.      Sincerely,     ERIC Lau [facility]  cc:   Akhil Mendoza MD  [address] [place] [address]

## 2024-09-16 NOTE — PATIENT INSTRUCTIONS
Thanks for participating in a office visit with the [facility] clinic today.    Doing well on a cardiac standpoint   Blood pressures well controlled. Continue to monitor at home.   Continue current medical therapy.   Encourage more walking   Alcohol in strict moderation  Due for labs in January     Follow up in 1 year with cardiologist to est care.     Please call my nurse at   [phone]. Call with any questions or concerns.    Scheduling phone number: [phone]  Reminder: Please bring in all current medications, over the counter supplements and vitamin bottles to your next appointment.

## 2024-11-06 ENCOUNTER — ANCILLARY PROCEDURE (OUTPATIENT)
Dept: CARDIOLOGY | Facility: CLINIC | Age: 84
End: 2024-11-06
Attending: INTERNAL MEDICINE
Payer: COMMERCIAL

## 2024-11-06 DIAGNOSIS — Z95.818 STATUS POST PLACEMENT OF IMPLANTABLE LOOP RECORDER: ICD-10-CM

## 2024-11-06 DIAGNOSIS — R55 SYNCOPE: ICD-10-CM

## 2024-11-06 PROCEDURE — 93298 REM INTERROG DEV EVAL SCRMS: CPT | Performed by: INTERNAL MEDICINE

## 2025-01-08 ENCOUNTER — TELEPHONE (OUTPATIENT)
Dept: CARDIOLOGY | Facility: CLINIC | Age: 85
End: 2025-01-08
Payer: COMMERCIAL

## 2025-01-08 NOTE — TELEPHONE ENCOUNTER
"Remote ILR alert received, ILR battery at [facility].     ILR was implanted in 9/2021 for syncope and NSVT. There have been 3 symptom episodes recorded and 1 tachy episode recorded.     1 symptom episode from 9/13/2021 was just pt testing the symptom button. 2 symptom episodes for \"shortness of breath\" (both from 11/15/2021) showed SR 70s-90s    Tachy EKG is inappropriate, shows oversensing of large T waves and also some large P waves.     Called pt and explained that the ILR battery is at the end of its life, so we will no longer be monitoring it. Gave pt the option of leaving the ILR in or having it removed. Pt said it doesn't bother him so he will just leave it in. Told pt he can call us in the future if he ever changes his mind. Also reviewed with pt that no episodes were ever detected on his loop recorder. Pt states understanding.       "

## 2025-01-20 ENCOUNTER — LAB (OUTPATIENT)
Dept: LAB | Facility: CLINIC | Age: 85
End: 2025-01-20
Payer: COMMERCIAL

## 2025-01-20 DIAGNOSIS — I25.10 CORONARY ARTERY DISEASE INVOLVING NATIVE CORONARY ARTERY OF NATIVE HEART WITHOUT ANGINA PECTORIS: ICD-10-CM

## 2025-01-20 DIAGNOSIS — E78.00 PURE HYPERCHOLESTEROLEMIA: ICD-10-CM

## 2025-01-20 LAB
ANION GAP SERPL CALCULATED.3IONS-SCNC: 9 MMOL/L (ref 7–15)
BUN SERPL-MCNC: 24.9 MG/DL (ref 8–23)
CALCIUM SERPL-MCNC: 8.7 MG/DL (ref 8.8–10.4)
CHLORIDE SERPL-SCNC: 103 MMOL/L (ref 98–107)
CHOLEST SERPL-MCNC: 157 MG/DL
CREAT SERPL-MCNC: 1 MG/DL (ref 0.67–1.17)
EGFRCR SERPLBLD CKD-EPI 2021: 74 ML/MIN/1.73M2
ERYTHROCYTE [DISTWIDTH] IN BLOOD BY AUTOMATED COUNT: 14.4 % (ref 10–15)
FASTING STATUS PATIENT QL REPORTED: YES
GLUCOSE SERPL-MCNC: 73 MG/DL (ref 70–99)
HCO3 SERPL-SCNC: 28 MMOL/L (ref 22–29)
HCT VFR BLD AUTO: 41.5 % (ref 40–53)
HDLC SERPL-MCNC: 75 MG/DL
HGB BLD-MCNC: 13.9 G/DL (ref 13.3–17.7)
LDLC SERPL CALC-MCNC: 69 MG/DL
MCH RBC QN AUTO: 34.2 PG (ref 26.5–33)
MCHC RBC AUTO-ENTMCNC: 33.5 G/DL (ref 31.5–36.5)
MCV RBC AUTO: 102 FL (ref 78–100)
NONHDLC SERPL-MCNC: 82 MG/DL
PLATELET # BLD AUTO: 167 10E3/UL (ref 150–450)
POTASSIUM SERPL-SCNC: 4.6 MMOL/L (ref 3.4–5.3)
RBC # BLD AUTO: 4.07 10E6/UL (ref 4.4–5.9)
SODIUM SERPL-SCNC: 140 MMOL/L (ref 135–145)
TRIGL SERPL-MCNC: 64 MG/DL
WBC # BLD AUTO: 4.7 10E3/UL (ref 4–11)

## 2025-01-20 PROCEDURE — 80061 LIPID PANEL: CPT | Performed by: NURSE PRACTITIONER

## 2025-01-20 PROCEDURE — 36415 COLL VENOUS BLD VENIPUNCTURE: CPT | Performed by: NURSE PRACTITIONER

## 2025-01-20 PROCEDURE — 80048 BASIC METABOLIC PNL TOTAL CA: CPT | Performed by: NURSE PRACTITIONER

## 2025-01-20 PROCEDURE — 85027 COMPLETE CBC AUTOMATED: CPT | Performed by: NURSE PRACTITIONER

## 2025-02-23 ENCOUNTER — HEALTH MAINTENANCE LETTER (OUTPATIENT)
Age: 85
End: 2025-02-23

## 2025-04-15 DIAGNOSIS — I25.118 CORONARY ARTERY DISEASE OF NATIVE ARTERY OF NATIVE HEART WITH STABLE ANGINA PECTORIS: ICD-10-CM

## 2025-04-15 DIAGNOSIS — I25.118 CORONARY ARTERY DISEASE OF NATIVE ARTERY OF NATIVE HEART WITH STABLE ANGINA PECTORIS: Chronic | ICD-10-CM

## 2025-04-15 RX ORDER — ISOSORBIDE MONONITRATE 30 MG/1
30 TABLET, EXTENDED RELEASE ORAL DAILY
Qty: 90 TABLET | Refills: 4 | Status: SHIPPED | OUTPATIENT
Start: 2025-04-15

## 2025-04-15 RX ORDER — METOPROLOL SUCCINATE 25 MG/1
25 TABLET, EXTENDED RELEASE ORAL DAILY
Qty: 90 TABLET | Refills: 4 | Status: SHIPPED | OUTPATIENT
Start: 2025-04-15

## 2025-05-22 ENCOUNTER — TRANSFERRED RECORDS (OUTPATIENT)
Dept: HEALTH INFORMATION MANAGEMENT | Facility: CLINIC | Age: 85
End: 2025-05-22
Payer: COMMERCIAL

## (undated) DEVICE — GLOVE PROTEXIS W/NEU-THERA 8.5  2D73TE85

## (undated) DEVICE — RAD G/W INQWIRE .035X260CM J-TIP EXCHANGE IQ35F260J1O5RS

## (undated) DEVICE — NDL SPINAL 18GA 3.5" 405184

## (undated) DEVICE — MANIFOLD KIT ANGIO AUTOMATED 014613

## (undated) DEVICE — DEFIB PRO-PADZ LVP LQD GEL ADULT 8900-2105-01

## (undated) DEVICE — BLADE SAW SAGITTAL STRK 13X90X1.27MM HD SYS 6 6113-127-090

## (undated) DEVICE — SLEEVE TR BAND RADIAL COMPRESSION DEVICE 24CM TRB24-REG

## (undated) DEVICE — DRAIN HEMOVAC RESERVOIR KIT 10FR 1/8" MED 00-2550-002-10

## (undated) DEVICE — GLOVE PROTEXIS POWDER FREE 8.5 ORTHOPEDIC 2D73ET85

## (undated) DEVICE — SU ETHIBOND 0 CT-1 CR 8X18" CX21D

## (undated) DEVICE — CATH TRAY FOLEY COUDE SURESTEP 16FR W/DRN BAG LATEX A304416A

## (undated) DEVICE — SOL WATER IRRIG 1000ML BOTTLE 2F7114

## (undated) DEVICE — INTRODUCER SHEATH GREEN 6.5FRX11CM .038IN PSI-6F-11-038ACT

## (undated) DEVICE — CATH JACKY 5FR 3.5 CURVE 40-5023

## (undated) DEVICE — BLADE SAW SAGITTAL STRK 25X90X1.27MM HD SYS 6 6125-127-090

## (undated) DEVICE — KIT LG BORE TOUHY ACCESS PLUS MAP152

## (undated) DEVICE — GLOVE PROTEXIS W/NEU-THERA 7.0  2D73TE70

## (undated) DEVICE — SUCTION MANIFOLD NEPTUNE 2 SYS 4 PORT 0702-020-000

## (undated) DEVICE — LINEN FULL SHEET 5511

## (undated) DEVICE — CATH LAUNCHER 6FR AL 1.0 LA6AL10

## (undated) DEVICE — KIT HAND CONTROL ANGIOTOUCH ACIST 65CM AT-P65

## (undated) DEVICE — LINEN ORTHO ACL PACK 5447

## (undated) DEVICE — CATH EP CATH EP REPRO DAIG RSPN FX CRV DX EP C

## (undated) DEVICE — DECANTER VIAL 2006S

## (undated) DEVICE — SMART CAPNOLINE H PLUS, ADULT/INTERMEDIATE O2, LONG

## (undated) DEVICE — INTRO GLIDESHEATH SLENDER 6FR 10X45CM 60-1060

## (undated) DEVICE — SET HANDPIECE INTERPULSE W/COAXIAL FAN SPRAY TIP 0210118000

## (undated) DEVICE — GUIDEWIRE VERRATA PLUS PRESSURE 185CM JTIP 10185JP

## (undated) DEVICE — WIRE GUIDE 0.035"X260CM SAFE-T-J EXCHANGE G00517

## (undated) DEVICE — CATH LAUNCHER 6FR JR 4.0 LA6JR40

## (undated) DEVICE — NDL BLUNT 18GA 1" W/O FILTER 305181

## (undated) DEVICE — SU VICRYL 1 CT-1 27" J341H

## (undated) DEVICE — TOURNIQUET CUFF 34" REPRO BROWN 60-7070-106

## (undated) DEVICE — PACK EP SRG PROC LF DISP SAN32EPFSR

## (undated) DEVICE — GUIDEWIRE VASC 0.014INX180CM RUNTHROUGH 25-1011

## (undated) DEVICE — PACK CUSTOM CATH LAB RIDGES LF PC15CCFCJ

## (undated) DEVICE — GLOVE PROTEXIS POWDER FREE 7.0 ORTHOPEDIC 2D73ET70

## (undated) DEVICE — PACK TOTAL KNEE BOXED LATEX FREE PO15TKFCT

## (undated) DEVICE — GLOVE PROTEXIS BLUE W/NEU-THERA 8.5  2D73EB85

## (undated) DEVICE — TOTE ANGIO CORP PC15AT SAN32CC83O

## (undated) DEVICE — DRSG AQUACEL AG 3.5X9.75" HYDROFIBER 412011

## (undated) DEVICE — TOURNIQUET CUFF 30" REPRO BLUE 60-7070-105

## (undated) DEVICE — CATH BALLOON NC EMERGE 3.50X8MM H7493926708350

## (undated) DEVICE — RAD INFLATOR BASIC COMPAK  IN4130

## (undated) DEVICE — SYR 03ML LL W/O NDL 309657

## (undated) DEVICE — CAST PADDING 6" STERILE 9046S

## (undated) DEVICE — BAG CLEAR TRASH 1.3M 39X33" P4040C

## (undated) DEVICE — CLOSURE DEVICE 6FR VASC PROGLIDE MEDICATED SUTURE 12673-03

## (undated) DEVICE — CATH LAUNCHER 6FR EBU 3.5 LA6EBU35

## (undated) DEVICE — GLOVE PROTEXIS BLUE W/NEU-THERA 7.0  2D73EB70

## (undated) DEVICE — BONE CEMENT MIXEVAC III HI VAC KIT  0206-015-000

## (undated) DEVICE — CATH DIAGNOSTIC RADIAL 5FR TIG 4.0

## (undated) DEVICE — Device

## (undated) DEVICE — SU VICRYL 2-0 CT-1 27" UND J259H

## (undated) RX ORDER — KETOROLAC TROMETHAMINE 30 MG/ML
INJECTION, SOLUTION INTRAMUSCULAR; INTRAVENOUS
Status: DISPENSED
Start: 2018-11-12

## (undated) RX ORDER — CEFAZOLIN SODIUM 2 G/100ML
INJECTION, SOLUTION INTRAVENOUS
Status: DISPENSED
Start: 2018-11-12

## (undated) RX ORDER — HEPARIN SODIUM 1000 [USP'U]/ML
INJECTION, SOLUTION INTRAVENOUS; SUBCUTANEOUS
Status: DISPENSED
Start: 2021-09-07

## (undated) RX ORDER — FENTANYL CITRATE 50 UG/ML
INJECTION, SOLUTION INTRAMUSCULAR; INTRAVENOUS
Status: DISPENSED
Start: 2018-11-12

## (undated) RX ORDER — LIDOCAINE HYDROCHLORIDE 10 MG/ML
INJECTION, SOLUTION EPIDURAL; INFILTRATION; INTRACAUDAL; PERINEURAL
Status: DISPENSED
Start: 2018-11-12

## (undated) RX ORDER — FENTANYL CITRATE 50 UG/ML
INJECTION, SOLUTION INTRAMUSCULAR; INTRAVENOUS
Status: DISPENSED
Start: 2019-12-10

## (undated) RX ORDER — LIDOCAINE HYDROCHLORIDE 10 MG/ML
INJECTION, SOLUTION EPIDURAL; INFILTRATION; INTRACAUDAL; PERINEURAL
Status: DISPENSED
Start: 2021-09-07

## (undated) RX ORDER — OXYCODONE HCL 10 MG/1
TABLET, FILM COATED, EXTENDED RELEASE ORAL
Status: DISPENSED
Start: 2018-11-12

## (undated) RX ORDER — REGADENOSON 0.08 MG/ML
INJECTION, SOLUTION INTRAVENOUS
Status: DISPENSED
Start: 2018-10-04

## (undated) RX ORDER — CELECOXIB 200 MG/1
CAPSULE ORAL
Status: DISPENSED
Start: 2018-11-12

## (undated) RX ORDER — PROPOFOL 10 MG/ML
INJECTION, EMULSION INTRAVENOUS
Status: DISPENSED
Start: 2018-11-12

## (undated) RX ORDER — GLYCOPYRROLATE 0.2 MG/ML
INJECTION INTRAMUSCULAR; INTRAVENOUS
Status: DISPENSED
Start: 2018-11-12

## (undated) RX ORDER — FENTANYL CITRATE 50 UG/ML
INJECTION, SOLUTION INTRAMUSCULAR; INTRAVENOUS
Status: DISPENSED
Start: 2021-09-07

## (undated) RX ORDER — VERAPAMIL HYDROCHLORIDE 2.5 MG/ML
INJECTION, SOLUTION INTRAVENOUS
Status: DISPENSED
Start: 2019-12-10

## (undated) RX ORDER — NITROGLYCERIN 5 MG/ML
VIAL (ML) INTRAVENOUS
Status: DISPENSED
Start: 2019-12-10

## (undated) RX ORDER — ONDANSETRON 2 MG/ML
INJECTION INTRAMUSCULAR; INTRAVENOUS
Status: DISPENSED
Start: 2018-11-12

## (undated) RX ORDER — HEPARIN SODIUM 1000 [USP'U]/ML
INJECTION, SOLUTION INTRAVENOUS; SUBCUTANEOUS
Status: DISPENSED
Start: 2019-12-10

## (undated) RX ORDER — KETAMINE HCL IN 0.9 % NACL 50 MG/5 ML
SYRINGE (ML) INTRAVENOUS
Status: DISPENSED
Start: 2018-11-12

## (undated) RX ORDER — LIDOCAINE HYDROCHLORIDE 10 MG/ML
INJECTION, SOLUTION EPIDURAL; INFILTRATION; INTRACAUDAL; PERINEURAL
Status: DISPENSED
Start: 2019-12-10

## (undated) RX ORDER — BUPIVACAINE HYDROCHLORIDE AND EPINEPHRINE 2.5; 5 MG/ML; UG/ML
INJECTION, SOLUTION EPIDURAL; INFILTRATION; INTRACAUDAL; PERINEURAL
Status: DISPENSED
Start: 2018-11-12

## (undated) RX ORDER — ADENOSINE 3 MG/ML
INJECTION, SOLUTION INTRAVENOUS
Status: DISPENSED
Start: 2019-12-10